# Patient Record
Sex: MALE | Race: WHITE | Employment: UNEMPLOYED | URBAN - METROPOLITAN AREA
[De-identification: names, ages, dates, MRNs, and addresses within clinical notes are randomized per-mention and may not be internally consistent; named-entity substitution may affect disease eponyms.]

---

## 2019-01-21 PROBLEM — R45.851 SUICIDAL IDEATION: Status: ACTIVE | Noted: 2019-01-21

## 2019-01-22 PROBLEM — F32.A DEPRESSION: Status: ACTIVE | Noted: 2019-01-22

## 2019-03-19 PROBLEM — R45.89 SUICIDAL BEHAVIOR WITHOUT ATTEMPTED SELF-INJURY: Status: ACTIVE | Noted: 2019-03-19

## 2019-03-22 PROBLEM — R45.851 SUICIDAL IDEATION: Status: RESOLVED | Noted: 2019-01-21 | Resolved: 2019-03-22

## 2019-10-31 ENCOUNTER — APPOINTMENT (OUTPATIENT)
Dept: GENERAL RADIOLOGY | Age: 24
End: 2019-10-31
Payer: MEDICAID

## 2019-10-31 ENCOUNTER — HOSPITAL ENCOUNTER (EMERGENCY)
Age: 24
Discharge: HOME | End: 2019-10-31
Attending: EMERGENCY MEDICINE
Payer: MEDICAID

## 2019-10-31 VITALS
SYSTOLIC BLOOD PRESSURE: 100 MMHG | RESPIRATION RATE: 20 BRPM | DIASTOLIC BLOOD PRESSURE: 61 MMHG | HEART RATE: 52 BPM | HEIGHT: 74 IN | WEIGHT: 250 LBS | OXYGEN SATURATION: 100 % | TEMPERATURE: 98.2 F | BODY MASS INDEX: 32.08 KG/M2

## 2019-10-31 DIAGNOSIS — F19.10 MULTIPLE SUBSTANCE ABUSE (HCC): Primary | ICD-10-CM

## 2019-10-31 LAB
ALBUMIN SERPL-MCNC: 4.2 G/DL (ref 3.5–4.6)
ALP BLD-CCNC: 81 U/L (ref 35–104)
ALT SERPL-CCNC: 74 U/L (ref 0–41)
AMPHETAMINE SCREEN, URINE: POSITIVE
ANION GAP SERPL CALCULATED.3IONS-SCNC: 13 MEQ/L (ref 9–15)
AST SERPL-CCNC: 67 U/L (ref 0–40)
BACTERIA: NORMAL /HPF
BARBITURATE SCREEN URINE: ABNORMAL
BASOPHILS ABSOLUTE: 0 K/UL (ref 0–0.2)
BASOPHILS RELATIVE PERCENT: 0.2 %
BENZODIAZEPINE SCREEN, URINE: ABNORMAL
BILIRUB SERPL-MCNC: 0.5 MG/DL (ref 0.2–0.7)
BILIRUBIN URINE: ABNORMAL
BLOOD, URINE: ABNORMAL
BUN BLDV-MCNC: 7 MG/DL (ref 6–20)
CALCIUM SERPL-MCNC: 9.4 MG/DL (ref 8.5–9.9)
CANNABINOID SCREEN URINE: POSITIVE
CHLORIDE BLD-SCNC: 104 MEQ/L (ref 95–107)
CLARITY: CLEAR
CO2: 25 MEQ/L (ref 20–31)
COCAINE METABOLITE SCREEN URINE: ABNORMAL
COLOR: YELLOW
CREAT SERPL-MCNC: 0.58 MG/DL (ref 0.7–1.2)
EKG ATRIAL RATE: 57 BPM
EKG P AXIS: 43 DEGREES
EKG P-R INTERVAL: 138 MS
EKG Q-T INTERVAL: 400 MS
EKG QRS DURATION: 94 MS
EKG QTC CALCULATION (BAZETT): 389 MS
EKG R AXIS: 22 DEGREES
EKG T AXIS: 12 DEGREES
EKG VENTRICULAR RATE: 57 BPM
EOSINOPHILS ABSOLUTE: 0.2 K/UL (ref 0–0.7)
EOSINOPHILS RELATIVE PERCENT: 2 %
EPITHELIAL CELLS, UA: NORMAL /HPF
GFR AFRICAN AMERICAN: >60
GFR NON-AFRICAN AMERICAN: >60
GLOBULIN: 2.9 G/DL (ref 2.3–3.5)
GLUCOSE BLD-MCNC: 92 MG/DL (ref 70–99)
GLUCOSE URINE: NEGATIVE MG/DL
HCT VFR BLD CALC: 44 % (ref 42–52)
HEMOGLOBIN: 14.6 G/DL (ref 14–18)
KETONES, URINE: ABNORMAL MG/DL
LEUKOCYTE ESTERASE, URINE: ABNORMAL
LYMPHOCYTES ABSOLUTE: 1.9 K/UL (ref 1–4.8)
LYMPHOCYTES RELATIVE PERCENT: 23.6 %
Lab: ABNORMAL
MAGNESIUM: 1.8 MG/DL (ref 1.7–2.4)
MCH RBC QN AUTO: 32.3 PG (ref 27–31.3)
MCHC RBC AUTO-ENTMCNC: 33.1 % (ref 33–37)
MCV RBC AUTO: 97.7 FL (ref 80–100)
MONOCYTES ABSOLUTE: 0.7 K/UL (ref 0.2–0.8)
MONOCYTES RELATIVE PERCENT: 8.2 %
MUCUS: PRESENT
NEUTROPHILS ABSOLUTE: 5.4 K/UL (ref 1.4–6.5)
NEUTROPHILS RELATIVE PERCENT: 66 %
NITRITE, URINE: NEGATIVE
OPIATE SCREEN URINE: ABNORMAL
PDW BLD-RTO: 14.4 % (ref 11.5–14.5)
PH UA: 7 (ref 5–9)
PHENCYCLIDINE SCREEN URINE: ABNORMAL
PLATELET # BLD: 266 K/UL (ref 130–400)
POTASSIUM SERPL-SCNC: 4 MEQ/L (ref 3.4–4.9)
PROTEIN UA: ABNORMAL MG/DL
RBC # BLD: 4.51 M/UL (ref 4.7–6.1)
RBC UA: NORMAL /HPF (ref 0–2)
SODIUM BLD-SCNC: 142 MEQ/L (ref 135–144)
SPECIFIC GRAVITY UA: 1.01 (ref 1–1.03)
TOTAL PROTEIN: 7.1 G/DL (ref 6.3–8)
TRICYCLIC, URINE: ABNORMAL
URINE REFLEX TO CULTURE: YES
UROBILINOGEN, URINE: >=8 E.U./DL
WBC # BLD: 8.2 K/UL (ref 4.8–10.8)
WBC UA: NORMAL /HPF (ref 0–5)

## 2019-10-31 PROCEDURE — 6360000002 HC RX W HCPCS: Performed by: EMERGENCY MEDICINE

## 2019-10-31 PROCEDURE — 93005 ELECTROCARDIOGRAM TRACING: CPT

## 2019-10-31 PROCEDURE — 87086 URINE CULTURE/COLONY COUNT: CPT

## 2019-10-31 PROCEDURE — 83735 ASSAY OF MAGNESIUM: CPT

## 2019-10-31 PROCEDURE — 96361 HYDRATE IV INFUSION ADD-ON: CPT

## 2019-10-31 PROCEDURE — 96374 THER/PROPH/DIAG INJ IV PUSH: CPT

## 2019-10-31 PROCEDURE — 80053 COMPREHEN METABOLIC PANEL: CPT

## 2019-10-31 PROCEDURE — 96375 TX/PRO/DX INJ NEW DRUG ADDON: CPT

## 2019-10-31 PROCEDURE — 85025 COMPLETE CBC W/AUTO DIFF WBC: CPT

## 2019-10-31 PROCEDURE — 74022 RADEX COMPL AQT ABD SERIES: CPT

## 2019-10-31 PROCEDURE — 2580000003 HC RX 258: Performed by: EMERGENCY MEDICINE

## 2019-10-31 PROCEDURE — 99284 EMERGENCY DEPT VISIT MOD MDM: CPT

## 2019-10-31 PROCEDURE — 80306 DRUG TEST PRSMV INSTRMNT: CPT

## 2019-10-31 PROCEDURE — 81001 URINALYSIS AUTO W/SCOPE: CPT

## 2019-10-31 PROCEDURE — 36415 COLL VENOUS BLD VENIPUNCTURE: CPT

## 2019-10-31 RX ORDER — KETOROLAC TROMETHAMINE 10 MG/1
10 TABLET, FILM COATED ORAL EVERY 6 HOURS PRN
Qty: 20 TABLET | Refills: 0 | Status: ON HOLD | OUTPATIENT
Start: 2019-10-31 | End: 2020-10-13

## 2019-10-31 RX ORDER — KETOROLAC TROMETHAMINE 30 MG/ML
30 INJECTION, SOLUTION INTRAMUSCULAR; INTRAVENOUS ONCE
Status: COMPLETED | OUTPATIENT
Start: 2019-10-31 | End: 2019-10-31

## 2019-10-31 RX ORDER — BENZONATATE 100 MG/1
100 CAPSULE ORAL 3 TIMES DAILY PRN
Qty: 20 CAPSULE | Refills: 0 | Status: ON HOLD | OUTPATIENT
Start: 2019-10-31 | End: 2020-10-13

## 2019-10-31 RX ORDER — ONDANSETRON 2 MG/ML
4 INJECTION INTRAMUSCULAR; INTRAVENOUS ONCE
Status: COMPLETED | OUTPATIENT
Start: 2019-10-31 | End: 2019-10-31

## 2019-10-31 RX ORDER — 0.9 % SODIUM CHLORIDE 0.9 %
1000 INTRAVENOUS SOLUTION INTRAVENOUS ONCE
Status: COMPLETED | OUTPATIENT
Start: 2019-10-31 | End: 2019-10-31

## 2019-10-31 RX ORDER — ONDANSETRON 4 MG/1
4 TABLET, FILM COATED ORAL EVERY 8 HOURS PRN
Qty: 20 TABLET | Refills: 0 | Status: ON HOLD | OUTPATIENT
Start: 2019-10-31 | End: 2020-10-13

## 2019-10-31 RX ADMIN — SODIUM CHLORIDE 1000 ML: 9 INJECTION, SOLUTION INTRAVENOUS at 16:38

## 2019-10-31 RX ADMIN — ONDANSETRON 4 MG: 2 INJECTION INTRAMUSCULAR; INTRAVENOUS at 16:38

## 2019-10-31 RX ADMIN — KETOROLAC TROMETHAMINE 30 MG: 30 INJECTION, SOLUTION INTRAMUSCULAR at 16:38

## 2019-10-31 ASSESSMENT — PAIN SCALES - GENERAL: PAINLEVEL_OUTOF10: 2

## 2019-10-31 ASSESSMENT — PAIN DESCRIPTION - DESCRIPTORS: DESCRIPTORS: CRAMPING

## 2019-10-31 ASSESSMENT — PAIN DESCRIPTION - LOCATION: LOCATION: LEG

## 2019-10-31 ASSESSMENT — PAIN DESCRIPTION - FREQUENCY: FREQUENCY: INTERMITTENT

## 2019-10-31 ASSESSMENT — PAIN DESCRIPTION - PAIN TYPE: TYPE: ACUTE PAIN

## 2019-10-31 ASSESSMENT — PAIN DESCRIPTION - ORIENTATION: ORIENTATION: RIGHT

## 2019-11-01 PROCEDURE — 93010 ELECTROCARDIOGRAM REPORT: CPT | Performed by: INTERNAL MEDICINE

## 2019-11-02 LAB — URINE CULTURE, ROUTINE: NORMAL

## 2019-11-03 ENCOUNTER — HOSPITAL ENCOUNTER (EMERGENCY)
Age: 24
Discharge: HOME | End: 2019-11-03
Attending: EMERGENCY MEDICINE
Payer: MEDICAID

## 2019-11-03 VITALS
WEIGHT: 275 LBS | DIASTOLIC BLOOD PRESSURE: 83 MMHG | SYSTOLIC BLOOD PRESSURE: 121 MMHG | TEMPERATURE: 98.3 F | OXYGEN SATURATION: 97 % | HEIGHT: 75 IN | RESPIRATION RATE: 18 BRPM | HEART RATE: 82 BPM | BODY MASS INDEX: 34.19 KG/M2

## 2019-11-03 DIAGNOSIS — F19.10 DRUG ABUSE (HCC): Primary | ICD-10-CM

## 2019-11-03 LAB
ALBUMIN SERPL-MCNC: 4.2 G/DL (ref 3.5–4.6)
ALP BLD-CCNC: 87 U/L (ref 35–104)
ALT SERPL-CCNC: 148 U/L (ref 0–41)
AMPHETAMINE SCREEN, URINE: ABNORMAL
ANION GAP SERPL CALCULATED.3IONS-SCNC: 14 MEQ/L (ref 9–15)
AST SERPL-CCNC: 123 U/L (ref 0–40)
BACTERIA: NEGATIVE /HPF
BARBITURATE SCREEN URINE: ABNORMAL
BENZODIAZEPINE SCREEN, URINE: ABNORMAL
BILIRUB SERPL-MCNC: 0.4 MG/DL (ref 0.2–0.7)
BILIRUBIN URINE: NEGATIVE
BLOOD, URINE: NEGATIVE
BUN BLDV-MCNC: 8 MG/DL (ref 6–20)
CALCIUM SERPL-MCNC: 9.6 MG/DL (ref 8.5–9.9)
CANNABINOID SCREEN URINE: POSITIVE
CHLORIDE BLD-SCNC: 102 MEQ/L (ref 95–107)
CLARITY: CLEAR
CO2: 24 MEQ/L (ref 20–31)
COCAINE METABOLITE SCREEN URINE: ABNORMAL
COLOR: YELLOW
CREAT SERPL-MCNC: 0.46 MG/DL (ref 0.7–1.2)
EPITHELIAL CELLS, UA: ABNORMAL /HPF (ref 0–5)
ETHANOL PERCENT: NORMAL G/DL
ETHANOL: <10 MG/DL (ref 0–0.08)
GFR AFRICAN AMERICAN: >60
GFR NON-AFRICAN AMERICAN: >60
GLOBULIN: 3.1 G/DL (ref 2.3–3.5)
GLUCOSE BLD-MCNC: 119 MG/DL (ref 70–99)
GLUCOSE URINE: NEGATIVE MG/DL
HCT VFR BLD CALC: 44.2 % (ref 42–52)
HEMOGLOBIN: 14.8 G/DL (ref 14–18)
HYALINE CASTS: ABNORMAL /HPF (ref 0–5)
KETONES, URINE: NEGATIVE MG/DL
LEUKOCYTE ESTERASE, URINE: ABNORMAL
Lab: ABNORMAL
MAGNESIUM: 1.9 MG/DL (ref 1.7–2.4)
MCH RBC QN AUTO: 32.5 PG (ref 27–31.3)
MCHC RBC AUTO-ENTMCNC: 33.6 % (ref 33–37)
MCV RBC AUTO: 96.8 FL (ref 80–100)
METHADONE SCREEN, URINE: ABNORMAL
NITRITE, URINE: NEGATIVE
OPIATE SCREEN URINE: ABNORMAL
OXYCODONE URINE: ABNORMAL
PDW BLD-RTO: 14.1 % (ref 11.5–14.5)
PH UA: 6.5 (ref 5–9)
PHENCYCLIDINE SCREEN URINE: ABNORMAL
PLATELET # BLD: 244 K/UL (ref 130–400)
POTASSIUM SERPL-SCNC: 4.1 MEQ/L (ref 3.4–4.9)
PROPOXYPHENE SCREEN: ABNORMAL
PROTEIN UA: NEGATIVE MG/DL
RBC # BLD: 4.56 M/UL (ref 4.7–6.1)
RBC UA: ABNORMAL /HPF (ref 0–5)
SODIUM BLD-SCNC: 140 MEQ/L (ref 135–144)
SPECIFIC GRAVITY UA: 1.02 (ref 1–1.03)
TOTAL CK: 36 U/L (ref 0–190)
TOTAL PROTEIN: 7.3 G/DL (ref 6.3–8)
TSH SERPL DL<=0.05 MIU/L-ACNC: 0.66 UIU/ML (ref 0.44–3.86)
URINE REFLEX TO CULTURE: YES
UROBILINOGEN, URINE: 1 E.U./DL
WBC # BLD: 9.2 K/UL (ref 4.8–10.8)
WBC UA: ABNORMAL /HPF (ref 0–5)

## 2019-11-03 PROCEDURE — 84443 ASSAY THYROID STIM HORMONE: CPT

## 2019-11-03 PROCEDURE — 80053 COMPREHEN METABOLIC PANEL: CPT

## 2019-11-03 PROCEDURE — 36415 COLL VENOUS BLD VENIPUNCTURE: CPT

## 2019-11-03 PROCEDURE — 85027 COMPLETE CBC AUTOMATED: CPT

## 2019-11-03 PROCEDURE — 87086 URINE CULTURE/COLONY COUNT: CPT

## 2019-11-03 PROCEDURE — 80307 DRUG TEST PRSMV CHEM ANLYZR: CPT

## 2019-11-03 PROCEDURE — 82550 ASSAY OF CK (CPK): CPT

## 2019-11-03 PROCEDURE — 6370000000 HC RX 637 (ALT 250 FOR IP): Performed by: EMERGENCY MEDICINE

## 2019-11-03 PROCEDURE — 99282 EMERGENCY DEPT VISIT SF MDM: CPT

## 2019-11-03 PROCEDURE — 83735 ASSAY OF MAGNESIUM: CPT

## 2019-11-03 PROCEDURE — 81001 URINALYSIS AUTO W/SCOPE: CPT

## 2019-11-03 PROCEDURE — G0480 DRUG TEST DEF 1-7 CLASSES: HCPCS

## 2019-11-03 RX ORDER — ACETAMINOPHEN 500 MG
1000 TABLET ORAL ONCE
Status: COMPLETED | OUTPATIENT
Start: 2019-11-03 | End: 2019-11-03

## 2019-11-03 RX ORDER — LORAZEPAM 1 MG/1
2 TABLET ORAL ONCE
Status: COMPLETED | OUTPATIENT
Start: 2019-11-03 | End: 2019-11-03

## 2019-11-03 RX ORDER — NICOTINE 21 MG/24HR
1 PATCH, TRANSDERMAL 24 HOURS TRANSDERMAL ONCE
Status: DISCONTINUED | OUTPATIENT
Start: 2019-11-03 | End: 2019-11-03 | Stop reason: HOSPADM

## 2019-11-03 RX ORDER — LORAZEPAM 1 MG/1
1 TABLET ORAL ONCE
Status: DISCONTINUED | OUTPATIENT
Start: 2019-11-03 | End: 2019-11-03

## 2019-11-03 RX ORDER — ONDANSETRON 4 MG/1
4 TABLET, ORALLY DISINTEGRATING ORAL ONCE
Status: COMPLETED | OUTPATIENT
Start: 2019-11-03 | End: 2019-11-03

## 2019-11-03 RX ADMIN — ONDANSETRON 4 MG: 4 TABLET, ORALLY DISINTEGRATING ORAL at 16:51

## 2019-11-03 RX ADMIN — ACETAMINOPHEN 1000 MG: 500 TABLET ORAL at 16:51

## 2019-11-03 RX ADMIN — LORAZEPAM 2 MG: 1 TABLET ORAL at 19:13

## 2019-11-03 ASSESSMENT — PAIN DESCRIPTION - PAIN TYPE: TYPE: ACUTE PAIN

## 2019-11-03 ASSESSMENT — ENCOUNTER SYMPTOMS
RHINORRHEA: 0
ABDOMINAL PAIN: 0
SHORTNESS OF BREATH: 0
EYE DISCHARGE: 0
VOMITING: 0
FACIAL SWELLING: 0
COLOR CHANGE: 0
NAUSEA: 1

## 2019-11-03 ASSESSMENT — PAIN DESCRIPTION - LOCATION: LOCATION: HEAD

## 2019-11-03 ASSESSMENT — PAIN SCALES - GENERAL
PAINLEVEL_OUTOF10: 7
PAINLEVEL_OUTOF10: 7

## 2019-11-05 LAB — URINE CULTURE, ROUTINE: NORMAL

## 2019-12-15 ASSESSMENT — ENCOUNTER SYMPTOMS
EYE PAIN: 0
SHORTNESS OF BREATH: 0
CONSTIPATION: 1
BACK PAIN: 0
APNEA: 0
COLOR CHANGE: 0
ABDOMINAL PAIN: 0
VOMITING: 0
NAUSEA: 1
COUGH: 0
SORE THROAT: 0
SINUS PRESSURE: 0
WHEEZING: 0
PHOTOPHOBIA: 0
ABDOMINAL DISTENTION: 0
RHINORRHEA: 0
DIARRHEA: 0

## 2020-03-12 ENCOUNTER — HOSPITAL ENCOUNTER (EMERGENCY)
Age: 25
Discharge: HOME | End: 2020-03-12
Attending: EMERGENCY MEDICINE
Payer: MEDICAID

## 2020-03-12 VITALS
SYSTOLIC BLOOD PRESSURE: 125 MMHG | HEIGHT: 75 IN | WEIGHT: 275 LBS | OXYGEN SATURATION: 97 % | TEMPERATURE: 97.9 F | BODY MASS INDEX: 34.19 KG/M2 | RESPIRATION RATE: 14 BRPM | DIASTOLIC BLOOD PRESSURE: 75 MMHG | HEART RATE: 100 BPM

## 2020-03-12 LAB
ACETAMINOPHEN LEVEL: <5 MCG/ML (ref 10–30)
ALBUMIN SERPL-MCNC: 4.3 G/DL (ref 3.5–5.2)
ALP BLD-CCNC: 68 U/L (ref 40–129)
ALT SERPL-CCNC: 40 U/L (ref 0–40)
AMPHETAMINE SCREEN, URINE: NOT DETECTED
ANION GAP SERPL CALCULATED.3IONS-SCNC: 10 MMOL/L (ref 7–16)
AST SERPL-CCNC: 47 U/L (ref 0–39)
BARBITURATE SCREEN URINE: NOT DETECTED
BASOPHILS ABSOLUTE: 0.03 E9/L (ref 0–0.2)
BASOPHILS RELATIVE PERCENT: 0.5 % (ref 0–2)
BENZODIAZEPINE SCREEN, URINE: NOT DETECTED
BILIRUB SERPL-MCNC: 0.6 MG/DL (ref 0–1.2)
BUN BLDV-MCNC: 11 MG/DL (ref 6–20)
CALCIUM SERPL-MCNC: 9.2 MG/DL (ref 8.6–10.2)
CANNABINOID SCREEN URINE: NOT DETECTED
CHLORIDE BLD-SCNC: 107 MMOL/L (ref 98–107)
CO2: 25 MMOL/L (ref 22–29)
COCAINE METABOLITE SCREEN URINE: NOT DETECTED
CREAT SERPL-MCNC: 0.7 MG/DL (ref 0.7–1.2)
EOSINOPHILS ABSOLUTE: 0.1 E9/L (ref 0.05–0.5)
EOSINOPHILS RELATIVE PERCENT: 1.6 % (ref 0–6)
ETHANOL: <10 MG/DL (ref 0–0.08)
FENTANYL SCREEN, URINE: NOT DETECTED
GFR AFRICAN AMERICAN: >60
GFR NON-AFRICAN AMERICAN: >60 ML/MIN/1.73
GLUCOSE BLD-MCNC: 107 MG/DL (ref 74–99)
HCT VFR BLD CALC: 39.2 % (ref 37–54)
HEMOGLOBIN: 13.4 G/DL (ref 12.5–16.5)
IMMATURE GRANULOCYTES #: 0.01 E9/L
IMMATURE GRANULOCYTES %: 0.2 % (ref 0–5)
LYMPHOCYTES ABSOLUTE: 2.28 E9/L (ref 1.5–4)
LYMPHOCYTES RELATIVE PERCENT: 37.5 % (ref 20–42)
Lab: NORMAL
MCH RBC QN AUTO: 31.5 PG (ref 26–35)
MCHC RBC AUTO-ENTMCNC: 34.2 % (ref 32–34.5)
MCV RBC AUTO: 92.2 FL (ref 80–99.9)
METHADONE SCREEN, URINE: NOT DETECTED
MONOCYTES ABSOLUTE: 0.54 E9/L (ref 0.1–0.95)
MONOCYTES RELATIVE PERCENT: 8.9 % (ref 2–12)
NEUTROPHILS ABSOLUTE: 3.12 E9/L (ref 1.8–7.3)
NEUTROPHILS RELATIVE PERCENT: 51.3 % (ref 43–80)
OPIATE SCREEN URINE: NOT DETECTED
OXYCODONE URINE: NOT DETECTED
PDW BLD-RTO: 13.2 FL (ref 11.5–15)
PHENCYCLIDINE SCREEN URINE: NOT DETECTED
PLATELET # BLD: 206 E9/L (ref 130–450)
PMV BLD AUTO: 9.4 FL (ref 7–12)
POTASSIUM SERPL-SCNC: 3.9 MMOL/L (ref 3.5–5)
RBC # BLD: 4.25 E12/L (ref 3.8–5.8)
SALICYLATE, SERUM: <0.3 MG/DL (ref 0–30)
SODIUM BLD-SCNC: 142 MMOL/L (ref 132–146)
TOTAL PROTEIN: 7.1 G/DL (ref 6.4–8.3)
TRICYCLIC ANTIDEPRESSANTS SCREEN SERUM: NEGATIVE NG/ML
WBC # BLD: 6.1 E9/L (ref 4.5–11.5)

## 2020-03-12 PROCEDURE — 85025 COMPLETE CBC W/AUTO DIFF WBC: CPT

## 2020-03-12 PROCEDURE — 80053 COMPREHEN METABOLIC PANEL: CPT

## 2020-03-12 PROCEDURE — 6360000002 HC RX W HCPCS: Performed by: EMERGENCY MEDICINE

## 2020-03-12 PROCEDURE — 96372 THER/PROPH/DIAG INJ SC/IM: CPT

## 2020-03-12 PROCEDURE — G0480 DRUG TEST DEF 1-7 CLASSES: HCPCS

## 2020-03-12 PROCEDURE — 80307 DRUG TEST PRSMV CHEM ANLYZR: CPT

## 2020-03-12 PROCEDURE — 99284 EMERGENCY DEPT VISIT MOD MDM: CPT

## 2020-03-12 RX ORDER — HALOPERIDOL 5 MG/ML
5 INJECTION INTRAMUSCULAR ONCE
Status: COMPLETED | OUTPATIENT
Start: 2020-03-12 | End: 2020-03-12

## 2020-03-12 RX ADMIN — HALOPERIDOL LACTATE 5 MG: 5 INJECTION INTRAMUSCULAR at 16:41

## 2020-03-12 NOTE — ED PROVIDER NOTES
HPI:  3/12/20, Time: 4:59 PM EDT         Jazmyn Castañeda is a 30 y.o. male presenting to the ED for psychiatric evaluation. Patient's been clean from meth about 2 months. He denies suicidal or homicidal ideation. He just has having auditory hallucinations. Hears people calling his name. He says was severe when he first got clean, it is improved much since. Denies any nausea, vomit, neck pain or stiffness, chest pain, or any other symptoms or complaints. Review of Systems:   Pertinent positives and negatives are stated within HPI, all other systems reviewed and are negative.          --------------------------------------------- PAST HISTORY ---------------------------------------------  Past Medical History:  has a past medical history of Anxiety, Bipolar 1 disorder ([facility] H/O alcohol abuse, and Hep C w/o coma, chronic ([facility] Past Surgical History:  has no past surgical history on file. Social History:  reports that he has quit smoking. His smoking use included cigarettes. He has a 7.00 pack-year smoking history. He has quit using smokeless tobacco.  His smokeless tobacco use included snuff. He reports previous alcohol use. He reports previous drug use. Drugs: Methamphetamines, Opiates , and Other-see comments. Family History: family history is not on file. The patients home medications have been reviewed. Allergies: Patient has no known allergies.     -------------------------------------------------- RESULTS -------------------------------------------------  All laboratory and radiology results have been personally reviewed by myself   LABS:  Results for orders placed or performed during the hospital encounter of 03/12/20   Comprehensive Metabolic Panel   Result Value Ref Range    Sodium 142 132 - 146 mmol/L    Potassium 3.9 3.5 - 5.0 mmol/L    Chloride 107 98 - 107 mmol/L    CO2 25 22 - 29 mmol/L    Anion Gap 10 7 - 16 mmol/L    Glucose 107 (H) 74 - 99 mg/dL    BUN 11 6 - 20 mg/dL    CREATININE 0.7 0.7 - 1.2 mg/dL    GFR Non-African American >60 >=60 mL/min/1.73    GFR African American >60     Calcium 9.2 8.6 - 10.2 mg/dL    Total Protein 7.1 6.4 - 8.3 g/dL    Alb 4.3 3.5 - 5.2 g/dL    Total Bilirubin 0.6 0.0 - 1.2 mg/dL    Alkaline Phosphatase 68 40 - 129 U/L    ALT 40 0 - 40 U/L    AST 47 (H) 0 - 39 U/L   CBC Auto Differential   Result Value Ref Range    WBC 6.1 4.5 - 11.5 E9/L    RBC 4.25 3.80 - 5.80 E12/L    Hemoglobin 13.4 12.5 - 16.5 g/dL    Hematocrit 39.2 37.0 - 54.0 %    MCV 92.2 80.0 - 99.9 fL    MCH 31.5 26.0 - 35.0 pg    MCHC 34.2 32.0 - 34.5 %    RDW 13.2 11.5 - 15.0 fL    Platelets 463 302 - 525 E9/L    MPV 9.4 7.0 - 12.0 fL    Neutrophils % 51.3 43.0 - 80.0 %    Immature Granulocytes % 0.2 0.0 - 5.0 %    Lymphocytes % 37.5 20.0 - 42.0 %    Monocytes % 8.9 2.0 - 12.0 %    Eosinophils % 1.6 0.0 - 6.0 %    Basophils % 0.5 0.0 - 2.0 %    Neutrophils Absolute 3.12 1.80 - 7.30 E9/L    Immature Granulocytes # 0.01 E9/L    Lymphocytes Absolute 2.28 1.50 - 4.00 E9/L    Monocytes Absolute 0.54 0.10 - 0.95 E9/L    Eosinophils Absolute 0.10 0.05 - 0.50 E9/L    Basophils Absolute 0.03 0.00 - 0.20 E9/L   Serum Drug Screen   Result Value Ref Range    Ethanol Lvl <10 mg/dL    Acetaminophen Level <5.0 (L) 10.0 - 54.4 mcg/mL    Salicylate, Serum <0.1 0.0 - 30.0 mg/dL    TCA Scrn NEGATIVE Cutoff:300 ng/mL   Urine Drug Screen   Result Value Ref Range    Amphetamine Screen, Urine NOT DETECTED Negative <1000 ng/mL    Barbiturate Screen, Ur NOT DETECTED Negative < 200 ng/mL    Benzodiazepine Screen, Urine NOT DETECTED Negative < 200 ng/mL    Cannabinoid Scrn, Ur NOT DETECTED Negative < 50ng/mL    Cocaine Metabolite Screen, Urine NOT DETECTED Negative < 300 ng/mL    Opiate Scrn, Ur NOT DETECTED Negative < 300ng/mL    PCP Screen, Urine NOT DETECTED Negative < 25 ng/mL    Methadone Screen, Urine NOT DETECTED Negative <300 ng/mL    Oxycodone Urine NOT DETECTED Negative <100 DISPOSITION  Disposition: Pending  Patient condition is stable      NOTE: This report was transcribed using voice recognition software.  Every effort was made to ensure accuracy; however, inadvertent computerized transcription errors may be present.starla Patel MD  03/15/20 0140

## 2020-03-12 NOTE — ED NOTES
Haldol 5mg IM for for c/o paranoia. Pt reports 61 days sobriety from meth. Resides in a [address] [place] [address] on [address]. Pt denies SI or HI. Reports intermittent auditory auditory hallucinations, non commanding. Reports he has a mental health appointment on Monday.       Celestine Severino RN  03/12/20 1700

## 2020-06-24 ENCOUNTER — HOSPITAL ENCOUNTER (EMERGENCY)
Age: 25
Discharge: HOME | End: 2020-06-24
Payer: MEDICAID

## 2020-06-24 VITALS
TEMPERATURE: 98.1 F | DIASTOLIC BLOOD PRESSURE: 69 MMHG | SYSTOLIC BLOOD PRESSURE: 129 MMHG | HEART RATE: 74 BPM | OXYGEN SATURATION: 99 % | RESPIRATION RATE: 17 BRPM

## 2020-06-24 PROCEDURE — 96372 THER/PROPH/DIAG INJ SC/IM: CPT

## 2020-06-24 PROCEDURE — 6360000002 HC RX W HCPCS: Performed by: PHYSICIAN ASSISTANT

## 2020-06-24 PROCEDURE — 99282 EMERGENCY DEPT VISIT SF MDM: CPT

## 2020-06-24 PROCEDURE — 6370000000 HC RX 637 (ALT 250 FOR IP): Performed by: PHYSICIAN ASSISTANT

## 2020-06-24 RX ORDER — NAPROXEN 500 MG/1
500 TABLET ORAL 2 TIMES DAILY WITH MEALS
Qty: 20 TABLET | Refills: 0 | Status: ON HOLD | OUTPATIENT
Start: 2020-06-24 | End: 2020-10-13

## 2020-06-24 RX ORDER — KETOROLAC TROMETHAMINE 30 MG/ML
30 INJECTION, SOLUTION INTRAMUSCULAR; INTRAVENOUS ONCE
Status: COMPLETED | OUTPATIENT
Start: 2020-06-24 | End: 2020-06-24

## 2020-06-24 RX ORDER — PENICILLIN V POTASSIUM 500 MG/1
500 TABLET ORAL 3 TIMES DAILY
Qty: 30 TABLET | Refills: 0 | Status: SHIPPED | OUTPATIENT
Start: 2020-06-24 | End: 2020-07-04

## 2020-06-24 RX ORDER — PENICILLIN V POTASSIUM 250 MG/1
500 TABLET ORAL ONCE
Status: COMPLETED | OUTPATIENT
Start: 2020-06-24 | End: 2020-06-24

## 2020-06-24 RX ORDER — PENICILLIN V POTASSIUM 500 MG/1
500 TABLET ORAL 3 TIMES DAILY
Qty: 30 TABLET | Refills: 0 | Status: SHIPPED | OUTPATIENT
Start: 2020-06-24 | End: 2020-06-24 | Stop reason: SDUPTHER

## 2020-06-24 RX ORDER — NAPROXEN 500 MG/1
500 TABLET ORAL 2 TIMES DAILY WITH MEALS
Qty: 20 TABLET | Refills: 0 | Status: SHIPPED | OUTPATIENT
Start: 2020-06-24 | End: 2020-06-24 | Stop reason: SDUPTHER

## 2020-06-24 RX ADMIN — PENICILLIN V POTASIUM 500 MG: 250 TABLET ORAL at 11:25

## 2020-06-24 RX ADMIN — KETOROLAC TROMETHAMINE 30 MG: 30 INJECTION, SOLUTION INTRAMUSCULAR at 11:25

## 2020-06-24 ASSESSMENT — PAIN SCALES - GENERAL
PAINLEVEL_OUTOF10: 5
PAINLEVEL_OUTOF10: 5

## 2020-06-24 NOTE — ED PROVIDER NOTES
Independent MLP    HPI:  6/24/20, Time: 10:45 AM EDT         Valeriy Ceballos is a 30 y.o. male presenting to the ED for dental pain beginning 2 weeks ago. The complaint has been persistent, moderate in severity, and worsened by nothing. 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 States that the pain is located to the right lower jaw. Denies any intraoral swelling, elevation of the tongue, difficulty breathing or swallowing. States that he has not seen a dentist in approximately 4 years. Reports that he does have significant dental decay due to his methamphetamine abuse. Patient also reports an intermittent sore throat starting this morning. Afebrile at home without recent travel or sick contacts. Patient denies all other symptoms at this time. Review of Systems:   Pertinent positives and negatives are stated within HPI, all other systems reviewed and are negative.          --------------------------------------------- PAST HISTORY ---------------------------------------------  Past Medical History:  has a past medical history of Anxiety, Bipolar 1 disorder ([facility] Drug abuse in remission ([facility] H/O alcohol abuse, and Hep C w/o coma, chronic ([facility] Past Surgical History:  has no past surgical history on file. Social History:  reports that he has quit smoking. His smoking use included cigarettes. He has a 7.00 pack-year smoking history. He has quit using smokeless tobacco.  His smokeless tobacco use included snuff. He reports previous alcohol use. He reports previous drug use. Drugs: Methamphetamines, Opiates , and Other-see comments. Family History: family history is not on file. The patients home medications have been reviewed. Allergies: Patient has no known allergies.     -------------------------------------------------- RESULTS No airway problems at this time. Recommended close follow-up with PCP and dentist for further evaluation treatment. Will treat with antibiotics and pain medication. Patient specifically requested no narcotics due to being in recovery. We will also give patient a prescription for miracle mouthwash for sore throat. Advised return emergency department any new worsening symptoms. Patient voiced understanding is agreeable above treatment plan. Counseling: The emergency provider has spoken with the patient and discussed todays results, in addition to providing specific details for the plan of care and counseling regarding the diagnosis and prognosis. Questions are answered at this time and they are agreeable with the plan.      --------------------------------- IMPRESSION AND DISPOSITION ---------------------------------    IMPRESSION  1. Pain due to dental caries    2. Acute pharyngitis, unspecified etiology        DISPOSITION  Disposition: Discharge to home  Patient condition is stable      NOTE: This report was transcribed using voice recognition software.  Every effort was made to ensure accuracy; however, inadvertent computerized transcription errors may be present        Pepe Almaguer, [id number]  06/24/20 6824

## 2020-07-01 ENCOUNTER — HOSPITAL ENCOUNTER (EMERGENCY)
Age: 25
Discharge: LEFT BEFORE DISCHARGE | End: 2020-07-01
Attending: EMERGENCY MEDICINE
Payer: MEDICAID

## 2020-07-01 VITALS — OXYGEN SATURATION: 95 % | TEMPERATURE: 97.8 F | HEART RATE: 98 BPM

## 2020-07-01 NOTE — ED NOTES
Pt stated,he was just going to make it through to his appointment in the morning     Sindi Houston  07/01/20 0057

## 2020-10-11 PROBLEM — F11.10 OPIATE ABUSE, CONTINUOUS (HCC): Status: ACTIVE | Noted: 2020-10-11

## 2020-10-11 PROBLEM — F11.20 OPIOID TYPE DEPENDENCE, CONTINUOUS (HCC): Status: ACTIVE | Noted: 2020-10-11

## 2020-10-13 PROBLEM — L02.413 ABSCESS OF RIGHT ARM: Status: ACTIVE | Noted: 2020-10-13

## 2020-10-13 PROBLEM — F19.90 IVDU (INTRAVENOUS DRUG USER): Status: ACTIVE | Noted: 2020-10-13

## 2020-12-12 ENCOUNTER — HOSPITAL ENCOUNTER (INPATIENT)
Age: 25
LOS: 3 days | Discharge: HOME | DRG: 750 | End: 2020-12-15
Attending: EMERGENCY MEDICINE | Admitting: PSYCHIATRY & NEUROLOGY
Payer: MEDICAID

## 2020-12-12 PROBLEM — F32.A ACUTE DEPRESSION: Status: ACTIVE | Noted: 2020-12-12

## 2020-12-12 LAB
ACETAMINOPHEN LEVEL: <5 MCG/ML (ref 10–30)
ALBUMIN SERPL-MCNC: 4.4 G/DL (ref 3.5–5.2)
ALP BLD-CCNC: 62 U/L (ref 40–129)
ALT SERPL-CCNC: 17 U/L (ref 0–40)
AMORPHOUS: ABNORMAL
AMPHETAMINE SCREEN, URINE: NOT DETECTED
ANION GAP SERPL CALCULATED.3IONS-SCNC: 10 MMOL/L (ref 7–16)
AST SERPL-CCNC: 30 U/L (ref 0–39)
BACTERIA: ABNORMAL /HPF
BARBITURATE SCREEN URINE: NOT DETECTED
BASOPHILS ABSOLUTE: 0.02 E9/L (ref 0–0.2)
BASOPHILS RELATIVE PERCENT: 0.2 % (ref 0–2)
BENZODIAZEPINE SCREEN, URINE: NOT DETECTED
BILIRUB SERPL-MCNC: 0.2 MG/DL (ref 0–1.2)
BILIRUBIN URINE: NEGATIVE
BLOOD, URINE: NEGATIVE
BUN BLDV-MCNC: 15 MG/DL (ref 6–20)
CALCIUM SERPL-MCNC: 9.1 MG/DL (ref 8.6–10.2)
CANNABINOID SCREEN URINE: NOT DETECTED
CHLORIDE BLD-SCNC: 103 MMOL/L (ref 98–107)
CLARITY: CLEAR
CO2: 26 MMOL/L (ref 22–29)
COCAINE METABOLITE SCREEN URINE: NOT DETECTED
COLOR: YELLOW
CREAT SERPL-MCNC: 0.6 MG/DL (ref 0.7–1.2)
EOSINOPHILS ABSOLUTE: 0.07 E9/L (ref 0.05–0.5)
EOSINOPHILS RELATIVE PERCENT: 0.9 % (ref 0–6)
ETHANOL: <10 MG/DL (ref 0–0.08)
FENTANYL SCREEN, URINE: NOT DETECTED
GFR AFRICAN AMERICAN: >60
GFR NON-AFRICAN AMERICAN: >60 ML/MIN/1.73
GLUCOSE BLD-MCNC: 87 MG/DL (ref 74–99)
GLUCOSE URINE: NEGATIVE MG/DL
HCT VFR BLD CALC: 41.3 % (ref 37–54)
HEMOGLOBIN: 13.6 G/DL (ref 12.5–16.5)
IMMATURE GRANULOCYTES #: 0.03 E9/L
IMMATURE GRANULOCYTES %: 0.4 % (ref 0–5)
KETONES, URINE: NEGATIVE MG/DL
LEUKOCYTE ESTERASE, URINE: NEGATIVE
LYMPHOCYTES ABSOLUTE: 2.33 E9/L (ref 1.5–4)
LYMPHOCYTES RELATIVE PERCENT: 28.3 % (ref 20–42)
Lab: NORMAL
MCH RBC QN AUTO: 31 PG (ref 26–35)
MCHC RBC AUTO-ENTMCNC: 32.9 % (ref 32–34.5)
MCV RBC AUTO: 94.1 FL (ref 80–99.9)
METHADONE SCREEN, URINE: NOT DETECTED
MONOCYTES ABSOLUTE: 0.67 E9/L (ref 0.1–0.95)
MONOCYTES RELATIVE PERCENT: 8.2 % (ref 2–12)
NEUTROPHILS ABSOLUTE: 5.1 E9/L (ref 1.8–7.3)
NEUTROPHILS RELATIVE PERCENT: 62 % (ref 43–80)
NITRITE, URINE: NEGATIVE
OPIATE SCREEN URINE: NOT DETECTED
OXYCODONE URINE: NOT DETECTED
PDW BLD-RTO: 12.9 FL (ref 11.5–15)
PH UA: 7 (ref 5–9)
PHENCYCLIDINE SCREEN URINE: NOT DETECTED
PLATELET # BLD: 269 E9/L (ref 130–450)
PMV BLD AUTO: 9.1 FL (ref 7–12)
POTASSIUM SERPL-SCNC: 4.3 MMOL/L (ref 3.5–5)
PROTEIN UA: NEGATIVE MG/DL
RBC # BLD: 4.39 E12/L (ref 3.8–5.8)
RBC UA: ABNORMAL /HPF (ref 0–2)
SALICYLATE, SERUM: <0.3 MG/DL (ref 0–30)
SARS-COV-2, NAAT: NOT DETECTED
SODIUM BLD-SCNC: 139 MMOL/L (ref 132–146)
SPECIFIC GRAVITY UA: 1.02 (ref 1–1.03)
TOTAL PROTEIN: 7.2 G/DL (ref 6.4–8.3)
TRICYCLIC ANTIDEPRESSANTS SCREEN SERUM: NEGATIVE NG/ML
UROBILINOGEN, URINE: 0.2 E.U./DL
WBC # BLD: 8.2 E9/L (ref 4.5–11.5)
WBC UA: ABNORMAL /HPF (ref 0–5)

## 2020-12-12 PROCEDURE — U0002 COVID-19 LAB TEST NON-CDC: HCPCS

## 2020-12-12 PROCEDURE — 80307 DRUG TEST PRSMV CHEM ANLYZR: CPT

## 2020-12-12 PROCEDURE — 81001 URINALYSIS AUTO W/SCOPE: CPT

## 2020-12-12 PROCEDURE — G0480 DRUG TEST DEF 1-7 CLASSES: HCPCS

## 2020-12-12 PROCEDURE — 93005 ELECTROCARDIOGRAM TRACING: CPT | Performed by: EMERGENCY MEDICINE

## 2020-12-12 PROCEDURE — 96372 THER/PROPH/DIAG INJ SC/IM: CPT

## 2020-12-12 PROCEDURE — 80053 COMPREHEN METABOLIC PANEL: CPT

## 2020-12-12 PROCEDURE — 1240000000 HC EMOTIONAL WELLNESS R&B

## 2020-12-12 PROCEDURE — 6360000002 HC RX W HCPCS: Performed by: EMERGENCY MEDICINE

## 2020-12-12 PROCEDURE — 85025 COMPLETE CBC W/AUTO DIFF WBC: CPT

## 2020-12-12 PROCEDURE — 99284 EMERGENCY DEPT VISIT MOD MDM: CPT

## 2020-12-12 RX ORDER — HALOPERIDOL 5 MG/ML
5 INJECTION INTRAMUSCULAR ONCE
Status: COMPLETED | OUTPATIENT
Start: 2020-12-12 | End: 2020-12-12

## 2020-12-12 RX ADMIN — HALOPERIDOL LACTATE 5 MG: 5 INJECTION, SOLUTION INTRAMUSCULAR at 19:52
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
## 2020-12-12 NOTE — ED NOTES
Patient placed in gown and pants, belongings removed and placed in locker 30 in [address] [place] [address], [address] [place] [address]  12/12/20 5211

## 2020-12-13 PROBLEM — F19.10 POLYSUBSTANCE ABUSE (HCC): Status: ACTIVE | Noted: 2020-12-13

## 2020-12-13 PROBLEM — F25.0 SCHIZOAFFECTIVE DISORDER, BIPOLAR TYPE (HCC): Status: ACTIVE | Noted: 2020-12-13

## 2020-12-13 LAB
EKG ATRIAL RATE: 96 BPM
EKG P AXIS: 61 DEGREES
EKG P-R INTERVAL: 164 MS
EKG Q-T INTERVAL: 344 MS
EKG QRS DURATION: 82 MS
EKG QTC CALCULATION (BAZETT): 434 MS
EKG R AXIS: 53 DEGREES
EKG T AXIS: 37 DEGREES
EKG VENTRICULAR RATE: 96 BPM

## 2020-12-13 PROCEDURE — 6370000000 HC RX 637 (ALT 250 FOR IP): Performed by: PSYCHIATRY & NEUROLOGY

## 2020-12-13 PROCEDURE — 6370000000 HC RX 637 (ALT 250 FOR IP): Performed by: NURSE PRACTITIONER

## 2020-12-13 PROCEDURE — 1240000000 HC EMOTIONAL WELLNESS R&B

## 2020-12-13 PROCEDURE — 93010 ELECTROCARDIOGRAM REPORT: CPT | Performed by: INTERNAL MEDICINE

## 2020-12-13 PROCEDURE — 99221 1ST HOSP IP/OBS SF/LOW 40: CPT | Performed by: NURSE PRACTITIONER

## 2020-12-13 RX ORDER — HALOPERIDOL 5 MG/ML
5 INJECTION INTRAMUSCULAR EVERY 6 HOURS PRN
Status: DISCONTINUED | OUTPATIENT
Start: 2020-12-13 | End: 2020-12-15 | Stop reason: HOSPADM

## 2020-12-13 RX ORDER — NICOTINE 21 MG/24HR
1 PATCH, TRANSDERMAL 24 HOURS TRANSDERMAL ONCE
Status: COMPLETED | OUTPATIENT
Start: 2020-12-13 | End: 2020-12-14

## 2020-12-13 RX ORDER — CHLORDIAZEPOXIDE HYDROCHLORIDE 25 MG/1
25 CAPSULE, GELATIN COATED ORAL EVERY 6 HOURS PRN
Status: DISCONTINUED | OUTPATIENT
Start: 2020-12-13 | End: 2020-12-15 | Stop reason: HOSPADM

## 2020-12-13 RX ORDER — DIVALPROEX SODIUM 250 MG/1
250 TABLET, DELAYED RELEASE ORAL EVERY 12 HOURS SCHEDULED
Status: DISCONTINUED | OUTPATIENT
Start: 2020-12-13 | End: 2020-12-15 | Stop reason: HOSPADM

## 2020-12-13 RX ORDER — TRAZODONE HYDROCHLORIDE 50 MG/1
50 TABLET ORAL NIGHTLY PRN
Status: DISCONTINUED | OUTPATIENT
Start: 2020-12-13 | End: 2020-12-15 | Stop reason: HOSPADM

## 2020-12-13 RX ORDER — HYDROXYZINE PAMOATE 50 MG/1
50 CAPSULE ORAL 3 TIMES DAILY PRN
Status: DISCONTINUED | OUTPATIENT
Start: 2020-12-13 | End: 2020-12-15 | Stop reason: HOSPADM

## 2020-12-13 RX ORDER — BUPRENORPHINE AND NALOXONE 4; 1 MG/1; MG/1
1 FILM, SOLUBLE BUCCAL; SUBLINGUAL 2 TIMES DAILY
Status: ON HOLD | COMMUNITY
End: 2020-12-15 | Stop reason: HOSPADM

## 2020-12-13 RX ORDER — MAGNESIUM HYDROXIDE/ALUMINUM HYDROXICE/SIMETHICONE 120; 1200; 1200 MG/30ML; MG/30ML; MG/30ML
30 SUSPENSION ORAL PRN
Status: DISCONTINUED | OUTPATIENT
Start: 2020-12-13 | End: 2020-12-15 | Stop reason: HOSPADM

## 2020-12-13 RX ORDER — HALOPERIDOL 5 MG
5 TABLET ORAL EVERY 6 HOURS PRN
Status: DISCONTINUED | OUTPATIENT
Start: 2020-12-13 | End: 2020-12-15 | Stop reason: HOSPADM

## 2020-12-13 RX ORDER — ACETAMINOPHEN 325 MG/1
650 TABLET ORAL EVERY 6 HOURS PRN
Status: DISCONTINUED | OUTPATIENT
Start: 2020-12-13 | End: 2020-12-15 | Stop reason: HOSPADM

## 2020-12-13 RX ADMIN — DIVALPROEX SODIUM 250 MG: 250 TABLET, DELAYED RELEASE ORAL at 11:09

## 2020-12-13 RX ADMIN — HYDROXYZINE PAMOATE 50 MG: 50 CAPSULE ORAL at 18:46

## 2020-12-13 RX ADMIN — TRAZODONE HYDROCHLORIDE 50 MG: 50 TABLET ORAL at 21:52

## 2020-12-13 RX ADMIN — DIVALPROEX SODIUM 250 MG: 250 TABLET, DELAYED RELEASE ORAL at 21:52

## 2020-12-13 RX ADMIN — HYDROXYZINE PAMOATE 50 MG: 50 CAPSULE ORAL at 11:11

## 2020-12-13 ASSESSMENT — SLEEP AND FATIGUE QUESTIONNAIRES
AVERAGE NUMBER OF SLEEP HOURS: 4
DIFFICULTY STAYING ASLEEP: YES
DO YOU USE A SLEEP AID: NO
RESTFUL SLEEP: NO
DO YOU USE A SLEEP AID: NO
DO YOU HAVE DIFFICULTY SLEEPING: YES
DIFFICULTY FALLING ASLEEP: YES
DIFFICULTY STAYING ASLEEP: NO
RESTFUL SLEEP: YES
SLEEP PATTERN: DIFFICULTY FALLING ASLEEP;DISTURBED/INTERRUPTED SLEEP
DIFFICULTY FALLING ASLEEP: NO
DIFFICULTY ARISING: NO
DIFFICULTY ARISING: NO
DO YOU HAVE DIFFICULTY SLEEPING: NO

## 2020-12-13 ASSESSMENT — PAIN DESCRIPTION - LOCATION: LOCATION: FOOT

## 2020-12-13 ASSESSMENT — LIFESTYLE VARIABLES: HISTORY_ALCOHOL_USE: NO

## 2020-12-13 ASSESSMENT — PAIN SCALES - GENERAL
PAINLEVEL_OUTOF10: 5
PAINLEVEL_OUTOF10: 0

## 2020-12-13 ASSESSMENT — PAIN - FUNCTIONAL ASSESSMENT: PAIN_FUNCTIONAL_ASSESSMENT: PREVENTS OR INTERFERES SOME ACTIVE ACTIVITIES AND ADLS

## 2020-12-13 ASSESSMENT — PAIN DESCRIPTION - FREQUENCY: FREQUENCY: CONTINUOUS

## 2020-12-13 ASSESSMENT — PAIN DESCRIPTION - DESCRIPTORS: DESCRIPTORS: ACHING;CONSTANT;CRAMPING;CRUSHING

## 2020-12-13 ASSESSMENT — PAIN DESCRIPTION - ONSET: ONSET: ON-GOING

## 2020-12-13 ASSESSMENT — PAIN DESCRIPTION - PAIN TYPE: TYPE: ACUTE PAIN

## 2020-12-13 NOTE — PLAN OF CARE
Patient is alert and oriented, denies thoughts of harm to others, but does verbalizes thoughts of harm to self by either friends gun or OD on drugs. Verbalizes did earlier today hearing voices saying his name, people making fun of him and telling me to kill myself. Also earlier today saw at times seen his self dying or random figures out of corner of eye. Patient observed as anxious , eager to get through assessment. Flat, sad depressed at times, do to unknown where going to go if does not get into in patient rehab. Verbalize struggles going to sleep and staying a sleep. Will continue to monitor, provide safe environment with continued rounds.
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
## 2020-12-13 NOTE — BH NOTE
Pt medicated for anxiety, \"If I marbin't get something, I am going to flip the fuck out!!\" pr pt. Pt was visibly anxious and repeatedly telling this nurse he needed something. Pt medicated and no other issues were assessed or occurred. Will monitor and follow.

## 2020-12-13 NOTE — ED NOTES
Notified Dr. Bimal Mitchell of need for admission. pt accepted to [facility].       Lyly Lazcano RN  12/12/20 0006

## 2020-12-13 NOTE — BH NOTE
Pt is stable and alert. Pt denies suicidal  / homicidal ideations. Pt denies hallucinations. Pt without distress. Will follow and monitor.

## 2020-12-13 NOTE — PROGRESS NOTES
`[facility]  Admission Note     Admission Type:   Admission Type: Involuntary  Patient arrived from [facility] via wheelchair with his belongings that he came to hospital with. Slightly anxious, flat,sad. Verbalizes that he has frequent thoughts of suicide either by friends gun or OD. Verbalizes that he was kicked out of [facility] where he had been a patient for 2 days due to staff member was rude to him so he got rude with her and got kicked out 12/12/20. States that he was hearing voices earlier today calling his name and people making fun of him telling him to kill his self. States also earlier today that he saw him self dying and random figures. Patient states his desire to be discharged to in patient rehab for drugs and alcohol. Has addiction to heroin and meth and has stolen in order to support his habit. Has struggle getting to sleep and staying a sleep. Will continue to monitor and provide safe environment with continued rounds.       Reason for admission:  Reason for Admission: patient verbalizes suicidal past couple weeks with plan to take friends gun and cocked to my head, and last week attempted to OD 1 gram of herion    PATIENT STRENGTHS:  Strengths: Motivated    Patient Strengths and Limitations:  Limitations: Inappropriate/potentially harmful leisure interests, Difficulty problem solving/relies on others to help solve problems    Addictive Behavior:   Addictive Behavior  In the past 3 months, have you felt or has someone told you that you have a problem with:  : None  Do you have a history of Chemical Use?: Yes  Do you have a history of Alcohol Use?: No  Do you have a history of Street Drug Abuse?: Yes  Histroy of Prescripton Drug Abuse?: No    Medical Problems:   Past Medical History:   Diagnosis Date    Anxiety     [facility]     Drug abuse in remission [facility]     H/O alcohol abuse     Hep C w/o coma, chronic ([facility]        Status EXAM:  Status and Exam  Normal: No Facial Expression: Flat, Sad  Affect: Congruent  Level of Consciousness: Alert  Mood:Normal: No  Mood: Depressed, Anxious  Motor Activity:Normal: No  Motor Activity: Increased  Interview Behavior: Cooperative  Preception: [place] to Person, [place] to Time, [place] to Place, [place] to Situation  Attention:Normal: No  Attention: Distractible  Thought Processes: (WNL)  Hallucinations: Auditory (Comment), Visual (Comment)  Delusions: No  Memory:Normal: No  Memory: Poor Remote, Poor Recent  Insight and Judgment: No  Insight and Judgment: Poor Judgment, Poor Insight  Present Suicidal Ideation: Yes  Present Homicidal Ideation: No    Tobacco Screening:  Practical Counseling, on admission, aron X, if applicable and completed (first 3 are required if patient doesn't refuse): ( x)  Recognizing danger situations (included triggers and roadblocks)                    ( x)  Coping skills (new ways to manage stress, exercise, relaxation techniques, changing routine, distraction)                                                           ( x)  Basic information about quitting (benefits of quitting, techniques in how to quit, available resources  ( x) Referral for counseling faxed to [facility]                                           ( x) Patient refused counseling  ( ) Patient has not smoked in the last 30 days    Metabolic Screening:    Lab Results   Component Value Date    LABA1C 5.0 08/15/2019       Lab Results   Component Value Date    CHOL 132 08/15/2019    CHOL 144 03/20/2019    CHOL 128 01/22/2019     Lab Results   Component Value Date    TRIG 200 (H) 08/15/2019    TRIG 83 03/20/2019    TRIG 146 01/22/2019     Lab Results   Component Value Date    HDL 41 08/15/2019    HDL 42 03/20/2019    HDL 37 (L) 01/22/2019     No components found for: LDLCAL  No results found for: LABVLDL      Body mass index is 27.44 kg/m².     BP Readings from Last 2 Encounters:   12/13/20 122/69   10/19/20 108/67 Pt admitted with followings belongings:  Dentures: None  Vision - Corrective Lenses: None  Hearing Aid: None  Jewelry: None  Body Piercings Removed: N/A(no visible body piercing.)  Clothing: Footwear, Pants, Shirt, Sweater, Socks, Undergarments (Comment)(1 pair of shoes,4 pair of socks, 2 shirts, 3 jackets, 3 undergarment)  Were All Patient Medications Collected?: Other (comment)(no medication with the patient)  Other Valuables: None     Valuables sent home withN/A. Valuables placed in safe in security envelope, number:  N/A. Patient's home medications were N/A. Patient oriented to surroundings and program expectations and copy of patient rights given. Received admission packet:  yes. Consents reviewed, signed yes. Patient verbalize understanding:  Yes . Patient education on precautions, PIN number,to call whom ever and give PIN number, time phone is available 24 hour help hot line, tour of facility, toiletries provided. ,                    Ayo Lorenzo RN

## 2020-12-13 NOTE — CARE COORDINATION
Biopsychosocial Assessment Note    Social work met with patient to complete the biopsychosocial assessment and CSSR-S. Pt was cooperative  Very drowsy kept falling asleeping  Mental Status Exam:  Pt is alert and oriented x4  Mood depressed   Affect  Flat  Speech clear    Chief Complaint: Pt was admitted after he left  [facility] , following an argument with staff. Pt went to a friend's home and put a gun to his head , which failed to go off. Pt then presented to the hospital for help  . Patient Report: Pt reports  He came to the hospital after a suicide attempt.  Pt reports  he wants to enter a drug tx program. Pt recently walked out of [facility]    Gender  [x] Male [] Female [] Transgender  [] Other    Sexual Orientation    [x] Heterosexual [] Homosexual [] Bisexual [] Other    Suicidal Ideation  [x] Reports [] Denies    Homicidal Ideation  [] Reports [x] Denies      Hallucinations/Delusions (Specify type)  [] Reports [x] Denies     Substance Use/Alcohol Use/Addiction  [x] Reports [] Denies     Trauma History  [x] Reports [] Denies     Collateral Contact (JESSA signed)  Refused to give collateral  Name:   Relationship:  Number:     Collateral Information:   refused    Access to Weapons:  none    Follow up provider:  Referred to peer support    Plan for discharge (where they live can they return): Pt wants inpatient  Substance abuse tx

## 2020-12-13 NOTE — BH NOTE
[facility]  Initial Interdisciplinary Treatment Plan NOTE    Review Date & Time: 12-13-20  1000am    Patient was not in treatment team    Admission Type:   Admission Type: Involuntary    Reason for admission:  Reason for Admission: patient verbalizes suicidal past couple weeks with plan to take friends gun and cocked to my head, and last week attempted to OD 1 gram of herion      Estimated Length of Stay Update:  3-5 days  Estimated Discharge Date Update: 3-5 days    PATIENT STRENGTHS:  Patient Strengths Strengths: Motivated  Patient Strengths and Limitations:Limitations: Inappropriate/potentially harmful leisure interests, Difficulty problem solving/relies on others to help solve problems  Addictive Behavior:Addictive Behavior  In the past 3 months, have you felt or has someone told you that you have a problem with:  : None  Do you have a history of Chemical Use?: Yes  Do you have a history of Alcohol Use?: No  Do you have a history of Street Drug Abuse?: Yes  Histroy of Prescripton Drug Abuse?: No  Medical Problems:  Past Medical History:   Diagnosis Date    Anxiety     [facility]     [facility]     H/O alcohol abuse     [facility]        EDUCATION:   Learner Progress Toward Treatment Goals: Reviewed results and recommendations of this team and Reviewed group plan and strategies    Method: Small group    Outcome: Verbalized understanding    PATIENT GOALS: pt does not verbalize a goal presently. PLAN/TREATMENT RECOMMENDATIONS UPDATE:Begin medication regimen and assess pt responses. GOALS UPDATE:   Time frame for Short-Term Goals: Daily re assessment.      Yissel Luna RN

## 2020-12-13 NOTE — H&P
On interview, the patient is tired, irritable but superficially cooperative. He states that he wants to go to rehab and wishes to speak with a  about the next step. He states that he has been experiencing auditory hallucinations that are command in nature and tell him to do harm to himself. He states that the voices come and go and have been present more frequently over the past few months. He says that he has been battling a depressed mood alongside the substance abuse for \"a long time\" and nothing seems to help. He endorses difficulty with sleep with occasional sleepless nights. He has had decreased interest in activities, concentration, energy, and weight loss with decreased appetite. He states that he has had weeks of elevated mood, distractibility, and feelings of grandiosity. He currently denies suicidal ideation or homicidal ideation with intent or plan. He hasn't experienced auditory or visual hallucinations today, but states they come and go regularly. Past Psychiatric History: The patient states that he attempted suicide two weeks ago by overdosing on heroine. He says that he was hospitalized at [facility] as a result. He also admits to a suicide attempt via hanging at age 19. He is unsure of any other hospitalizations or stays at a state facility. He has a history of schizoaffective disorder for which he was receiving Abilify 400 mg monthly for psychosis; however, he states that he has not had a dose in over a month. He also has been taking 10 mg of Lexapro, which he does not believe is helping. Family Psychiatric History: He describes his grandfather, mother, and sister as having schizophrenia. He is unaware of any other diagnoses or any previous suicide attempts. Substance Abuse: As mentioned prior, [facility] to this admission. He has interest in resuming rehab. He admits to alcohol abuse since age 10, with his last drink being yesterday. He is unsure of an amount, he described constant binge drinking. He states that he tries to use methamphetamine and heroine daily with previous uses being 4-5 days ago. He is unsure of previous rehab treatments. Legal History: He says that he has been to correction many times over the years for various reasons. His recent correction time was in September 2020 for [employer]. Social History: He says that he was born and raised in [place] [facility] [place]. He denies any support system. He hasn't held a job since April and he has been homeless for 2 years. He has never been  and denies any children. He denies any seizures or history of head trauma. He has received his GED. He denies access to a gun currently but states that \"if he wanted to, he could be crafty\". Past Medical History:        Diagnosis Date    Anxiety     [facility]     Drug abuse in remission [facility]     H/O alcohol abuse     Hep C w/o coma, chronic ([facility])        Medications Prior to Admission:   Medications Prior to Admission: buprenorphine-naloxone (SUBOXONE) 4-1 MG FILM SL film, Place 1 Film under the tongue 2 times daily. Bought off the street    Past Surgical History:        Procedure Laterality Date    ABSCESS DRAINAGE Right 10/13/2020    Irrigation and debridement    HAND SURGERY Right        Allergies:   Patient has no known allergies. Family History  History reviewed. No pertinent family history.       Vitals:  /69   Pulse 77   Temp 97.7 °F (36.5 °C) (Temporal)   Resp 17   Ht 6' 3\" (1.905 m)   Wt 219 lb 8 oz (99.6 kg)   SpO2 99%   BMI 27.44 kg/m²      Mental Status Examination:    Level of consciousness:  within normal limits Appearance:  well-appearing, hospital attire, lying in bed, poor grooming and poor hygiene  Behavior/Motor:  no abnormalities noted  Attitude toward examiner:  cooperative, attentive and good eye contact  Speech:  spontaneous, normal rate, normal volume and well articulated   Mood: anxious and irritable  Affect:  mood congruent  Thought processes:  linear and goal directed   Thought content:  Homocidal ideation denies  Suicidal Ideation:  denies  Delusions:  no evidence of delusions  Perceptual Disturbance:  auditory  Cognition:  oriented to person, place, and time   Concentration intact  Memory intact  Insight poor   Judgement poor   Fund of Knowledge limited      DIAGNOSIS:    1. Schizoaffective Disorder, bipolar type  2. Polysubstance Use Disorder   3. Cluster B Personality Disorder        LABS: REVIEWED TODAY:  Recent Labs     12/12/20  1745   WBC 8.2   HGB 13.6        Recent Labs     12/12/20  1745      K 4.3      CO2 26   BUN 15   CREATININE 0.6*   GLUCOSE 87     Recent Labs     12/12/20  1745   BILITOT 0.2   ALKPHOS 62   AST 30   ALT 17     Lab Results   Component Value Date    LABAMPH NOT DETECTED 12/12/2020    BARBSCNU NOT DETECTED 12/12/2020    LABBENZ NOT DETECTED 12/12/2020    COCAINESCRN Negative 10/10/2020    LABMETH NOT DETECTED 12/12/2020    OPIATESCREENURINE NOT DETECTED 12/12/2020    PHENCYCLIDINESCREENURINE NOT DETECTED 12/12/2020    ETOH <10 12/12/2020     Lab Results   Component Value Date    TSH 0.657 11/03/2019     Lab Results   Component Value Date    LITHIUM <0.2 (L) 03/20/2019     No results found for: VALPROATE, CBMZ  Lab Results   Component Value Date    LITHIUM <0.2 03/20/2019         Radiology No results found. TREATMENT PLAN:  - Monitor for signs of suicidal ideation. - The patient admits to a lengthy history of substance abuse. He was receiving care at [facility] for two days until he displayed violent behaviors toward staff and was released. He does display interest and readiness for rehab and would like another chance at a facility. Patient is currently on the Abilify injection well to verify the date and dose of his last injection  Start Depakote ER 50 mg twice daily for mood stabilization    Risk Management: Based on the diagnosis and assessment biopsychosocial treatment model was presented to the patient and was given the opportunity to ask any question. The patient was agreeable to the plan and all the patient's questions were answered to the patient's satisfaction. I discussed with the patient the risk, benefit, alternative and common side effects for the proposed medication treatment. The patient is consenting to this treatment. Collateral Information:  Will obtain collateral information from the family or friends. Will obtain medical records as appropriate from out patient providers  Will consult the hospitalist for a physical exam to rule out any co-morbid physical condition. Home medication Reconciled       New Medications started during this admission :    - Consider depakote 250 mg BID for mood stabilization.  - Resume Abilify 400 mg monthly injection for psychosis. Prn Haldol 5mg and Vistaril 50mg q6hr for extreme agitation. Trazodone as ordered for insomnia  Vistaril as ordered for anxiety      Psychotherapy:   Encourage participation in milieu and group therapy  Individual therapy as needed        Behavioral Services  Medicare Certification      Admission Day 1  I certify that this patient's inpatient psychiatric hospital admission is medically necessary for:     (1) treatment which could reasonably be expected to improve this patient's condition, or     (2) diagnostic study or its equivalent. Electronically signed by Olga Lidia Payan on 12/13/2020 at 12:03 PM

## 2020-12-14 PROCEDURE — 99231 SBSQ HOSP IP/OBS SF/LOW 25: CPT | Performed by: NURSE PRACTITIONER

## 2020-12-14 PROCEDURE — 6370000000 HC RX 637 (ALT 250 FOR IP): Performed by: NURSE PRACTITIONER

## 2020-12-14 PROCEDURE — 6370000000 HC RX 637 (ALT 250 FOR IP): Performed by: PSYCHIATRY & NEUROLOGY

## 2020-12-14 PROCEDURE — 1240000000 HC EMOTIONAL WELLNESS R&B

## 2020-12-14 RX ORDER — NICOTINE 21 MG/24HR
1 PATCH, TRANSDERMAL 24 HOURS TRANSDERMAL DAILY
Status: DISCONTINUED | OUTPATIENT
Start: 2020-12-14 | End: 2020-12-15 | Stop reason: HOSPADM

## 2020-12-14 RX ADMIN — DIVALPROEX SODIUM 250 MG: 250 TABLET, DELAYED RELEASE ORAL at 21:50

## 2020-12-14 RX ADMIN — DIVALPROEX SODIUM 250 MG: 250 TABLET, DELAYED RELEASE ORAL at 09:27

## 2020-12-14 RX ADMIN — TRAZODONE HYDROCHLORIDE 50 MG: 50 TABLET ORAL at 21:50

## 2020-12-14 RX ADMIN — HYDROXYZINE PAMOATE 50 MG: 50 CAPSULE ORAL at 16:00

## 2020-12-14 RX ADMIN — HYDROXYZINE PAMOATE 50 MG: 50 CAPSULE ORAL at 09:27

## 2020-12-14 ASSESSMENT — PAIN SCALES - GENERAL: PAINLEVEL_OUTOF10: 0

## 2020-12-14 NOTE — PROGRESS NOTES
[facility]  Initial Interdisciplinary Treatment Plan NOTE    Review Date & Time: 12/14/2020 1000    Patient was in treatment team    Admission Type:   Admission Type:  Involuntary    Reason for admission:  Reason for Admission: patient verbalizes suicidal past couple weeks with plan to take friends gun and cocked to my head, and last week attempted to OD 1 gram of herion      Estimated Length of Stay Update:  3-5 days  Estimated Discharge Date Update: 12/17/2020    PATIENT STRENGTHS:  Patient Strengths Strengths: Motivated  Patient Strengths and Limitations:Limitations: Multiple barriers to leisure interests, Difficulty problem solving/relies on others to help solve problems, Tendency to isolate self  Addictive Behavior:Addictive Behavior  In the past 3 months, have you felt or has someone told you that you have a problem with:  : None  Do you have a history of Chemical Use?: Yes  Do you have a history of Alcohol Use?: No  Do you have a history of Street Drug Abuse?: Yes  Histroy of Prescripton Drug Abuse?: No  Medical Problems:  Past Medical History:   Diagnosis Date    Anxiety     [facility]     [facility]     H/O alcohol abuse     Hep C w/o coma, chronic ([facility]        EDUCATION:   Learner Progress Toward Treatment Goals: Reviewed group plan and strategies    Method: Small group    Outcome: Demonstrated Understanding    PATIENT GOALS: medication compliance and group therapy attendance    PLAN/TREATMENT RECOMMENDATIONS UPDATE:medication compliance and group therapy attendance    GOALS UPDATE:   Time frame for Short-Term Goals: 3-5 days    Rg Caldwell RN

## 2020-12-14 NOTE — SUICIDE SAFETY PLAN
SAFETY PLAN    A suicide Safety Plan is a document that supports someone when they are having thoughts of suicide. Warning Signs that indicate a suicidal crisis may be developing: What (situations, thoughts, feelings, body sensations, behaviors, etc.) do you experience that lets you know you are beginning to think about suicide? 1. isolate  2. Get depressed  3. No appetite    Internal Coping Strategies:  What things can I do (relaxation techniques, hobbies, physical activities, etc.) to take my mind off my problems without contacting another person? 1. Take a walk  2. Listen to music  3. Watch tv    People and social settings that provide distraction: Who can I call or where can I go to distract me? 1. Name: none  Phone: none  2. Name: I have no friends      People whom I can ask for help: Who can I call when I need help - for example, friends, family, clergy, someone else? 1. Name:  Edinlester Jacob               Phone: 911  2. Name: tv       Professionals or [address] [place] [address] Blvd I can contact during a crisis: Who can I call for help - for example, my doctor, my psychiatrist, my psychologist, a mental health provider, a suicide hotline? 1. Clinician Name: 911   Phone: 211      Clinician Pager or Emergency Contact #: [phone]    7. Suicide Prevention Lifeline: 7-802-659-TALK (5223)    4. [address] [place] [address] Emergency Services -  for example, [facility] suicide hotline, [facility] Hotline: 211      Emergency Services Address: 911      Emergency Services Phone: 211    Making the environment safe: How can I make my environment (house/apartment/living space) safer? For example, can I remove guns, medications, and other items? 1. No weapons  2.  No guns

## 2020-12-14 NOTE — CARE COORDINATION
Pt accepted to [facility]. Will be transported to [facility] at 9:30 AM on Tuesday, Dec. 15th, via their [place]. Sw offered to assist as needed.

## 2020-12-14 NOTE — PROGRESS NOTES
Per nurse at [facility], [phone], ext.[phone], patient is no longer their client, was scheduled for the [facility] March 16, 2020 did not show up for his appointment, has not had a long acting injection there all year

## 2020-12-14 NOTE — PROGRESS NOTES
Patient attended afternoon meet and greet. Patient 1 of 16 in attendance. Patient participated in would you rather trivia. Updated on afternoon staffing and expectations.

## 2020-12-14 NOTE — PROGRESS NOTES
Patient was isolative to room. Denies SI,HI or AV hallucinations. Depression 6 out of 10 that fact I'm here. Anxiety is controlled. Verbalizes no with drawals. Verbalizes appetite is increased. Sleep is good. Compliant with medications and attended group. Safety rounds continue.

## 2020-12-14 NOTE — PLAN OF CARE
Problem: Suicide risk  Goal: Provide patient with safe environment  Description: Provide patient with safe environment  Outcome: Ongoing     Problem: Pain:  Goal: Pain level will decrease  Description: Pain level will decrease  Outcome: Ongoing  Goal: Control of acute pain  Description: Control of acute pain  Outcome: Ongoing  Goal: Control of chronic pain  Description: Control of chronic pain  Outcome: Ongoing     Problem: Depressive Behavior With or Without Suicide Precautions:  Goal: Able to verbalize acceptance of life and situations over which he or she has no control  Description: Able to verbalize acceptance of life and situations over which he or she has no control  Outcome: Ongoing  Goal: Able to verbalize and/or display a decrease in depressive symptoms  Description: Able to verbalize and/or display a decrease in depressive symptoms  Outcome: Ongoing  Goal: Ability to disclose and discuss suicidal ideas will improve  Description: Ability to disclose and discuss suicidal ideas will improve  Outcome: Ongoing  Goal: Absence of self-harm  Description: Absence of self-harm  Outcome: Ongoing  Goal: Patient specific goal  Description: Patient specific goal  Outcome: Ongoing  Goal: Participates in care planning  Description: Participates in care planning  Outcome: Ongoing

## 2020-12-14 NOTE — PROGRESS NOTES
History reviewed. No pertinent family history. Social History     Socioeconomic History    Marital status: Single     Spouse name: Not on file    Number of children: Not on file    Years of education: Not on file    Highest education level: Not on file   Occupational History    Not on file   Social Needs    Financial resource strain: Not on file    Food insecurity     Worry: Not on file     Inability: Not on file    Transportation needs     Medical: Not on file     Non-medical: Not on file   Tobacco Use    Smoking status: Former Smoker     Packs/day: 1.00     Years: 7.00     Pack years: 7.00     Types: Cigarettes    Smokeless tobacco: Former User     Types: Snuff    Tobacco comment: 5 days without smoking   Substance and Sexual Activity    Alcohol use: Yes    Drug use: Yes     Types: Methamphetamines, Opiates , IV    Sexual activity: Not Currently   Lifestyle    Physical activity     Days per week: Not on file     Minutes per session: Not on file    Stress: Not on file   Relationships    Social connections     Talks on phone: Not on file     Gets together: Not on file     Attends Jewish service: Not on file     Active member of club or organization: Not on file     Attends meetings of clubs or organizations: Not on file     Relationship status: Not on file    Intimate partner violence     Fear of current or ex partner: Not on file     Emotionally abused: Not on file     Physically abused: Not on file     Forced sexual activity: Not on file   Other Topics Concern    Not on file   Social History Narrative    Not on file           ROS:  [x] All negative/unchanged except if checked.  Explain positive(checked items) below:  [] Constitutional  [] Eyes  [] Ear/Nose/Mouth/Throat  [] Respiratory  [] CV  [] GI  []   [] Musculoskeletal  [] Skin/Breast  [] Neurological  [] Endocrine  [] Heme/Lymph  [] Allergic/Immunologic    Explanation:     MEDICATIONS:    Current Facility-Administered Medications:   nicotine (NICODERM CQ) 21 MG/24HR 1 patch, 1 patch, Transdermal, Daily, Orlin Landers MD, 1 patch at 12/14/20 1057    acetaminophen (TYLENOL) tablet 650 mg, 650 mg, Oral, Q6H PRN, Orlin Landers MD    magnesium hydroxide (MILK OF MAGNESIA) 400 MG/5ML suspension 30 mL, 30 mL, Oral, Daily PRN, Orlin Landers MD    aluminum & magnesium hydroxide-simethicone (MAALOX) 200-200-20 MG/5ML suspension 30 mL, 30 mL, Oral, PRN, Orlin Landers MD    hydrOXYzine (VISTARIL) capsule 50 mg, 50 mg, Oral, TID PRN, Orlin Landers MD, 50 mg at 12/14/20 8860    haloperidol lactate (HALDOL) injection 5 mg, 5 mg, Intramuscular, Q6H PRN **OR** haloperidol (HALDOL) tablet 5 mg, 5 mg, Oral, Q6H PRN, Orlin Landers MD    traZODone (DESYREL) tablet 50 mg, 50 mg, Oral, Nightly PRN, Orlin Landers MD, 50 mg at 12/13/20 2152    chlordiazePOXIDE (LIBRIUM) capsule 25 mg, 25 mg, Oral, Z5B PRN, STEFAN Lay - CNP    divalproex (DEPAKOTE) DR tablet 250 mg, 250 mg, Oral, 2 times per day, STEFAN Martínez - CNP, 855 mg at 12/14/20 7298      Examination:  BP (!) 106/50   Pulse 71   Temp 99.3 °F (37.4 °C)   Resp 16   Ht 6' 3\" (1.905 m)   Wt 219 lb 8 oz (99.6 kg)   SpO2 99%   BMI 27.44 kg/m²   Gait - steady  Medication side effects(SE): Denies    Mental Status Examination:    Level of consciousness:  within normal limits   Appearance:  good grooming and good hygiene  Behavior/Motor:  no abnormalities noted  Attitude toward examiner:  cooperative  Speech:  spontaneous, normal rate and normal volume   Mood: within normal limits  Affect:  mood congruent  Thought processes:  linear and goal directed   Thought content:  Homocidal ideation Denies  Suicidal Ideation:  denies suicidal ideation  Delusions:  no evidence of delusions  Perceptual Disturbance:  denies any perceptual disturbance  Cognition:  oriented to person, place, and time   Concentration intact  Insight poor   Judgement poor     ASSESSMENT: Patient symptoms are:  [] Well controlled  [x] Improving  [] Worsening  [] No change      Diagnosis:   Principal Problem:    Schizoaffective disorder, bipolar type ([facility]  Active Problems:    Polysubstance abuse ([facility]  Resolved Problems:    * No resolved hospital problems. *      LABS:    Recent Labs     12/12/20  1745   WBC 8.2   HGB 13.6        Recent Labs     12/12/20  1745      K 4.3      CO2 26   BUN 15   CREATININE 0.6*   GLUCOSE 87     Recent Labs     12/12/20  1745   BILITOT 0.2   ALKPHOS 62   AST 30   ALT 17     Lab Results   Component Value Date    LABAMPH NOT DETECTED 12/12/2020    BARBSCNU NOT DETECTED 12/12/2020    LABBENZ NOT DETECTED 12/12/2020    COCAINESCRN Negative 10/10/2020    LABMETH NOT DETECTED 12/12/2020    OPIATESCREENURINE NOT DETECTED 12/12/2020    PHENCYCLIDINESCREENURINE NOT DETECTED 12/12/2020    ETOH <10 12/12/2020     Lab Results   Component Value Date    TSH 0.657 11/03/2019     Lab Results   Component Value Date    LITHIUM <0.2 (L) 03/20/2019     No results found for: VALPROATE, CBMZ        Treatment Plan:  Reviewed current Medications with the patient. Risks, benefits, side effects, drug-to-drug interactions and alternatives to treatment were discussed. Collateral information:   CD evaluation  Encourage patient to attend group and other milieu activities.   Discharge planning discussed with the patient and treatment team.    Continue Depakote 250mg BID for mood stabilization   Nursing to verify date and dose of last injection    PSYCHOTHERAPY/COUNSELING:  [x] Therapeutic interview  [x] Supportive  [] CBT  [] Ongoing  [] Other    [x] Patient continues to need, on a daily basis, active treatment furnished directly by or requiring the supervision of inpatient psychiatric personnel      Anticipated Length of stay: 3 to 5 days based on stability            Electronically signed by Vilma Amaya on 12/14/2020 at 12:59 PM

## 2020-12-15 VITALS
OXYGEN SATURATION: 97 % | SYSTOLIC BLOOD PRESSURE: 114 MMHG | TEMPERATURE: 98 F | RESPIRATION RATE: 16 BRPM | HEART RATE: 75 BPM | HEIGHT: 75 IN | WEIGHT: 219.5 LBS | DIASTOLIC BLOOD PRESSURE: 56 MMHG | BODY MASS INDEX: 27.29 KG/M2

## 2020-12-15 PROCEDURE — 99239 HOSP IP/OBS DSCHRG MGMT >30: CPT | Performed by: NURSE PRACTITIONER

## 2020-12-15 PROCEDURE — 6370000000 HC RX 637 (ALT 250 FOR IP): Performed by: NURSE PRACTITIONER

## 2020-12-15 PROCEDURE — 6370000000 HC RX 637 (ALT 250 FOR IP): Performed by: PSYCHIATRY & NEUROLOGY

## 2020-12-15 RX ORDER — ARIPIPRAZOLE 10 MG/1
10 TABLET ORAL DAILY
Qty: 30 TABLET | Refills: 0 | Status: SHIPPED | OUTPATIENT
Start: 2020-12-15 | End: 2020-12-15

## 2020-12-15 RX ORDER — ARIPIPRAZOLE 10 MG/1
10 TABLET ORAL DAILY
Qty: 30 TABLET | Refills: 0 | Status: ON HOLD | OUTPATIENT
Start: 2020-12-15 | End: 2021-06-15

## 2020-12-15 RX ORDER — DIVALPROEX SODIUM 250 MG/1
250 TABLET, DELAYED RELEASE ORAL EVERY 12 HOURS SCHEDULED
Qty: 60 TABLET | Refills: 0 | Status: ON HOLD | OUTPATIENT
Start: 2020-12-15 | End: 2021-06-15 | Stop reason: HOSPADM

## 2020-12-15 RX ORDER — DIVALPROEX SODIUM 250 MG/1
250 TABLET, DELAYED RELEASE ORAL EVERY 12 HOURS SCHEDULED
Qty: 60 TABLET | Refills: 0 | Status: SHIPPED | OUTPATIENT
Start: 2020-12-15 | End: 2020-12-15

## 2020-12-15 RX ORDER — ARIPIPRAZOLE 10 MG/1
10 TABLET ORAL DAILY
Status: DISCONTINUED | OUTPATIENT
Start: 2020-12-15 | End: 2020-12-15 | Stop reason: HOSPADM

## 2020-12-15 RX ADMIN — DIVALPROEX SODIUM 250 MG: 250 TABLET, DELAYED RELEASE ORAL at 08:42

## 2020-12-15 RX ADMIN — ARIPIPRAZOLE 10 MG: 10 TABLET ORAL at 08:42

## 2020-12-15 RX ADMIN — HYDROXYZINE PAMOATE 50 MG: 50 CAPSULE ORAL at 08:42

## 2020-12-15 ASSESSMENT — PAIN SCALES - GENERAL: PAINLEVEL_OUTOF10: 0

## 2020-12-15 NOTE — PROGRESS NOTES
Awake in room denies any thoughts of suicide or HI or hallucinations tells me [facility] for rehab emotional support given

## 2020-12-15 NOTE — PROGRESS NOTES
CLINICAL PHARMACY NOTE: MEDS TO [address] Select Patient?: No  Total # of Prescriptions Filled: 2   The following medications were delivered to the patient:  · ABILIFY 10 MG   · DEPAKOTE  MG   Total # of Interventions Completed: 3  Time Spent (min): 15    Additional Documentation:

## 2020-12-15 NOTE — CARE COORDINATION
In order to ensure appropriate transition and discharge planning is in place, the following documents have been transmitted to Kadi Fernandez 106Sonia, as the new outpatient provider:    ? The d/c diagnosis was transmitted to the next care provider  ? The reason for hospitalization was transmitted to the next care provider  ? The d/c medications (dosage and indication) were transmitted to the next care provider   ?  The continuing care plan was transmitted to the next care provider

## 2020-12-17 NOTE — DISCHARGE SUMMARY
DISCHARGE SUMMARY      Patient ID:  Denia Mata  [id number]  30 y.o.  1995    Admit date: 12/12/2020    Discharge date and time: 12/15/20    Admitting Physician: Matthew Ellis MD     Discharge Physician: Dr Koffi Sarmiento MD    Admission Diagnoses: Acute depression [F32.9]    Admission Condition: poor    Discharged Condition: stable    Admission Circumstance: Patient presented the ED for suicidal ideations after getting [facility]      PAST MEDICAL/PSYCHIATRIC HISTORY:   Past Medical History:   Diagnosis Date    Anxiety     [facility]     Drug abuse in remission ([facility]     H/O alcohol abuse     Hep C w/o coma, chronic ([facility]        FAMILY/SOCIAL HISTORY:  History reviewed. No pertinent family history. Social History     Socioeconomic History    Marital status: Single     Spouse name: Not on file    Number of children: Not on file    Years of education: Not on file    Highest education level: Not on file   Occupational History    Not on file   Social Needs    Financial resource strain: Not on file    Food insecurity     Worry: Not on file     Inability: Not on file    Transportation needs     Medical: Not on file     Non-medical: Not on file   Tobacco Use    Smoking status: Former Smoker     Packs/day: 1.00     Years: 7.00     Pack years: 7.00     Types: Cigarettes    Smokeless tobacco: Former User     Types: Snuff    Tobacco comment: 5 days without smoking   Substance and Sexual Activity    Alcohol use:  Yes    Drug use: Yes     Types: Methamphetamines, Opiates , IV    Sexual activity: Not Currently   Lifestyle    Physical activity     Days per week: Not on file     Minutes per session: Not on file    Stress: Not on file   Relationships    Social connections     Talks on phone: Not on file     Gets together: Not on file     Attends Mosque service: Not on file     Active member of club or organization: Not on file Attends meetings of clubs or organizations: Not on file     Relationship status: Not on file    Intimate partner violence     Fear of current or ex partner: Not on file     Emotionally abused: Not on file     Physically abused: Not on file     Forced sexual activity: Not on file   Other Topics Concern    Not on file   Social History Narrative    Not on file       MEDICATIONS:  No current facility-administered medications for this encounter.      Current Outpatient Medications:     ARIPiprazole (ABILIFY) 10 MG tablet, Take 1 tablet by mouth daily, Disp: 30 tablet, Rfl: 0    divalproex (DEPAKOTE) 250 MG DR tablet, Take 1 tablet by mouth every 12 hours, Disp: 60 tablet, Rfl: 0    Examination:  BP (!) 114/56   Pulse 75   Temp 98 °F (36.7 °C) (Temporal)   Resp 16   Ht 6' 3\" (1.905 m)   Wt 219 lb 8 oz (99.6 kg)   SpO2 97%   BMI 27.44 kg/m²   Gait - steady    HOSPITAL COURSE[de-identified] Patient is admitted to unit on 12/12/2020 was closely monitored for suicidal ideations. He was evaluated and was treated with Abilify 10 mg daily and Depakote 250 mg twice daily. Medical instrument significant patient continued to improve on the floor. Patient never made any suicidal statements or any suicidal gestures. He start coming of his room he is attending groups he was socializing with peers. He stated he wanted to go to inpatient rehab on discharge and social work was able to secure an inpatient bed for patient. Treatment team felt the patient attained a maximum benefit for his hospitalization he was set up with an outpatient mental health agency for outpatient follow-up services. The time of discharge patient not shown impulsive behavior. He vehemently denied any suicidal homicidal ideations intent or plan. He was eating well sleeping well there are no neurovegetative signs symptoms of depression. He denies any auditory visual hallucinations there were no overt overt signs psychosis. He was appreciate that he received here. This patient no longer is criteria for inpatient hospitalization      No AVH or paranoid thoughts  No hopeless or worthless feeling  No active SI/HI  Appetite:  [x] Normal  [] Increased  [] Decreased    Sleep:       [x] Normal  [] Fair       [] Poor            Energy:    [x] Normal  [] Increased  [] Decreased     SI [] Present  [x] Absent  HI  []Present  [x] Absent   Aggression:  [] yes  [x] no  Patient is [x] able  [] unable to CONTRACT FOR SAFETY   Medication side effects(SE):  [x] None(Psych. Meds.) [] Other      Mental Status Examination on discharge:    Level of consciousness:  within normal limits   Appearance:  well-appearing  Behavior/Motor:  no abnormalities noted  Attitude toward examiner:  attentive and good eye contact  Speech:  spontaneous, normal rate and normal volume   Mood: \"My mood is good. \"  Affect: Appropriate and pleasant Thought processes: Linear without flight of ideas loose associations  Thought content: Devoid of any auditory visual hallucinations delusions or any other perceptual normalities. Denies SI/HI intent or plan  Cognition:  oriented to person, place, and time   Concentration intact  Memory intact  Insight good   Judgement fair   Fund of Knowledge adequate      ASSESSMENT:  Patient symptoms are:  [x] Well controlled  [x] Improving  [] Worsening  [] No change    Reason for more than one antipsychotic:  [x] N/A  [] 3 Failed Monotherapy attempts (Drugs tried:)  [] Crossover to a new antipsychotic  [] Taper to Monotherapy from Polypharmacy  [] Augmentation of clozapine therapy due to treatment resistance to single therapy    Diagnosis:  Principal Problem:    Schizoaffective disorder, bipolar type ([facility]  Active Problems:    Polysubstance abuse ([facility]  Resolved Problems:    * No resolved hospital problems. *      LABS:    No results for input(s): WBC, HGB, PLT in the last 72 hours. No results for input(s): NA, K, CL, CO2, BUN, CREATININE, GLUCOSE in the last 72 hours. No results for input(s): BILITOT, ALKPHOS, AST, ALT in the last 72 hours. Lab Results   Component Value Date    LABAMPH NOT DETECTED 12/12/2020    BARBSCNU NOT DETECTED 12/12/2020    LABBENZ NOT DETECTED 12/12/2020    COCAINESCRN Negative 10/10/2020    LABMETH NOT DETECTED 12/12/2020    OPIATESCREENURINE NOT DETECTED 12/12/2020    PHENCYCLIDINESCREENURINE NOT DETECTED 12/12/2020    ETOH <10 12/12/2020     Lab Results   Component Value Date    TSH 0.657 11/03/2019     Lab Results   Component Value Date    LITHIUM <0.2 (L) 03/20/2019     No results found for: VALPROATE, CBMZ    RISK ASSESSMENT AT DISCHARGE: Low risk for suicide and homicide. Treatment Plan:  Reviewed current Medications with the patient. Education provided on the complaince with treatment. Risks, benefits, side effects, drug-to-drug interactions and alternatives to treatment were discussed. Encourage patient to attend outpatient follow up appointment and therapy. Patient was advised to call the outpatient provider, visit the nearest ED or call 911 if symptoms are not manageable. Patient's family member was contacted prior to the discharge.          Medication List      START taking these medications    ARIPiprazole 10 MG tablet  Commonly known as: ABILIFY  Take 1 tablet by mouth daily     divalproex 250 MG DR tablet  Commonly known as: DEPAKOTE  Take 1 tablet by mouth every 12 hours        STOP taking these medications    Suboxone 4-1 MG Film SL film  Generic drug: buprenorphine-naloxone           Where to Get Your Medications      These medications were sent to [address]  [facility] [address]    Phone: [phone]   · ARIPiprazole 10 MG tablet  · divalproex 250 MG DR tablet       Patient is counseled if he continues to abuse drugs or alcohol he could act out impulsively causing serious harm to himself or others even though may be unintentional.  He demonstrated understanding of this and has the capacity understand this    Patient is counseled most been compliant with all medications outpatient follow-up appointments    Patient is discharged to inpatient rehab in stable condition    TIME SPEND - 35 MINUTES TO COMPLETE THE EVALUATION, DISCHARGE SUMMARY, MEDICATION RECONCILIATION AND FOLLOW UP CARE     Signed:  Kenrick Hernández  33/17/2690  3:56 PM

## 2021-01-21 ENCOUNTER — HOSPITAL ENCOUNTER (EMERGENCY)
Age: 26
Discharge: HOME | End: 2021-01-21
Attending: EMERGENCY MEDICINE
Payer: MEDICAID

## 2021-01-21 VITALS
RESPIRATION RATE: 18 BRPM | SYSTOLIC BLOOD PRESSURE: 125 MMHG | HEART RATE: 75 BPM | TEMPERATURE: 98 F | DIASTOLIC BLOOD PRESSURE: 73 MMHG | OXYGEN SATURATION: 93 %

## 2021-01-21 DIAGNOSIS — F39 MOOD DISORDER (HCC): Primary | ICD-10-CM

## 2021-01-21 PROCEDURE — 99283 EMERGENCY DEPT VISIT LOW MDM: CPT

## 2021-01-21 NOTE — ED PROVIDER NOTES
HPI:  1/21/21,   Time: 11:44 AM CANDELARIA Finney is a 30 y.o. male presenting to the ED for stated want to be up guard at [place] [address], beginning 1 hr ago. The complaint has been intermittent, mild in severity, and worsened by guard calling him derogatory terms. States guard at [facility] where he is at for rehab called him derogatory term. Pt states was mad, and told staff he wanted to beat up guard. Pt denies si/hi, states was just upset, and is over issue. No issues with this guard in past.  No hallucinations/n/v/d/cp/sob/cough/congestoin. Review of Systems:   Pertinent positives and negatives are stated within HPI, all other systems reviewed and are negative.          --------------------------------------------- PAST HISTORY ---------------------------------------------  Past Medical History:  has a past medical history of Anxiety, Bipolar 1 disorder ([facility] Drug abuse in remission ([facility] H/O alcohol abuse, and Hep C w/o coma, chronic ([facility] Past Surgical History:  has a past surgical history that includes Hand surgery (Right) and Abscess Drainage (Right, 10/13/2020). Social History:  reports that he has been smoking cigarettes. He has a 7.00 pack-year smoking history. He has quit using smokeless tobacco.  His smokeless tobacco use included snuff. He reports current alcohol use. He reports current drug use. Drugs: Methamphetamines, Opiates , and IV. Family History: family history is not on file. The patients home medications have been reviewed. Allergies: Patient has no known allergies.         ---------------------------------------------------PHYSICAL EXAM--------------------------------------    Constitutional/General: Alert and oriented x3, well appearing, non toxic in NAD  Head: Normocephalic and atraumatic  Eyes: PERRL, EOMI, conjunctive normal, sclera non icteric  Mouth: Oropharynx clear, handling secretions, no trismus, no asymmetry of the posterior oropharynx or uvular edema  Neck: Supple, full ROM, non tender to palpation in the midline, no stridor, no crepitus, no meningeal signs  Respiratory: Lungs clear to auscultation bilaterally, no wheezes, rales, or rhonchi. Not in respiratory distress  Cardiovascular:  Regular rate. Regular rhythm. No murmurs, gallops, or rubs. 2+ distal pulses  Chest: No chest wall tenderness  GI:  Abdomen Soft, Non tender, Non distended. +BS. No organomegaly, no palpable masses,  No rebound, guarding, or rigidity. Musculoskeletal: Moves all extremities x 4. Warm and well perfused, no clubbing, cyanosis, or edema. Capillary refill <3 seconds  Integument: skin warm and dry. No rashes. Lymphatic: no lymphadenopathy noted  Neurologic: GCS 15, no focal deficits, symmetric strength 5/5 in the upper and lower extremities bilaterally  Psychiatric: Normal Affect    -------------------------------------------------- RESULTS -------------------------------------------------  I have personally reviewed all laboratory and imaging results for this patient. Results are listed below. LABS:  No results found for this visit on 01/21/21. RADIOLOGY:  Interpreted by Radiologist.  No orders to display       EKG: This EKG is signed and interpreted by the EP. Time:   Rate:   Rhythm:   Interpretation:   Comparison:       ------------------------- NURSING NOTES AND VITALS REVIEWED ---------------------------   The nursing notes within the ED encounter and vital signs as below have been reviewed by myself. /73   Pulse 75   Temp 98 °F (36.7 °C)   Resp 18   SpO2 93%   Oxygen Saturation Interpretation: Normal    The patients available past medical records and past encounters were reviewed.         ------------------------------ ED COURSE/MEDICAL DECISION MAKING----------------------  Medications - No data to display      ED COURSE:       Medical Decision Making:    Pt no si/hi/hallucinations, no criteria for inpt tx, feel is safe to return to rehab      This patient's ED course included: a personal history and physicial examination    This patient has remained hemodynamically stable during their ED course. Counseling: The emergency provider has spoken with the patient and discussed todays results, in addition to providing specific details for the plan of care and counseling regarding the diagnosis and prognosis. Questions are answered at this time and they are agreeable with the plan.       --------------------------------- IMPRESSION AND DISPOSITION ---------------------------------    IMPRESSION  1. Mood disorder ([facility]        DISPOSITION  Disposition: Discharge to detox  Patient condition is stable    NOTE: This report was transcribed using voice recognition software.  Every effort was made to ensure accuracy; however, inadvertent computerized transcription errors may be present        Asia Osborn MD  01/21/21 1144

## 2021-04-04 PROBLEM — F11.93 OPIATE WITHDRAWAL (HCC): Status: ACTIVE | Noted: 2021-04-04

## 2021-04-05 PROBLEM — F11.20 SEVERE OPIOID USE DISORDER (HCC): Chronic | Status: ACTIVE | Noted: 2020-10-11

## 2021-04-05 PROBLEM — F19.10 POLYSUBSTANCE ABUSE (HCC): Chronic | Status: ACTIVE | Noted: 2020-12-13

## 2021-04-22 ENCOUNTER — HOSPITAL ENCOUNTER (EMERGENCY)
Age: 26
Discharge: OTHER INSTITUTION | End: 2021-04-22
Attending: EMERGENCY MEDICINE
Payer: COMMERCIAL

## 2021-04-22 VITALS
TEMPERATURE: 98 F | HEART RATE: 76 BPM | SYSTOLIC BLOOD PRESSURE: 128 MMHG | OXYGEN SATURATION: 98 % | RESPIRATION RATE: 16 BRPM | WEIGHT: 240 LBS | BODY MASS INDEX: 30.81 KG/M2 | DIASTOLIC BLOOD PRESSURE: 75 MMHG

## 2021-04-22 DIAGNOSIS — F25.9 SCHIZOAFFECTIVE DISORDER, UNSPECIFIED TYPE (HCC): Primary | ICD-10-CM

## 2021-04-22 DIAGNOSIS — F19.10 SUBSTANCE ABUSE (HCC): ICD-10-CM

## 2021-04-22 PROCEDURE — 99285 EMERGENCY DEPT VISIT HI MDM: CPT

## 2021-04-22 NOTE — ED NOTES
Emergency Department [facility] AN AFFILIATE OF [facility] Biopsychosocial Assessment Note    Chief Complaint: The pt is a 30 yr old white male who presents with a desire to be admitted to [facility]. MSE: The pt presents calm and cooperative with a flat affect and congruent mood. He is oriented times 4 and is a good historian. He denies current AVH but reports a hx of it \"I have done a lot of Acid\". Clinical Summary/History:  The pt stated that he was at [facility] in [facility] today and he was supposed to be transferred to [facility]. He stated that when he got there they felt he should go somewhere to address his schizoaffective d/o and get on meds. The pt stated that he does not want to go to a dual diagnosis clinic and would like to go to [facility]. He denies current SI, HI, and AVH. He stated that he has never had a suicide attempt or attempted to harm others but he has had suicidal ideation in the past.  He admitted that he was seeing things earlier due to earlier meth use. He stated that he is not currently following with any psychiatric agency but has been getting his psych meds when he has been in rehabs. He stated that his last admit to psych was around 3 weeks ago. Call to [facility] and spoke to Terry. He stated that he is willing to take the pt if we provide transport- ED doc in agreement with the pt being d/c to the [address] Gender  [x] Male [] Female [] Transgender  [] Other    Sexual Orientation    [x] Heterosexual [] Homosexual [] Bisexual [] Other    Suicidal Behavioral: CSSR-S Complete. [] Reports: hX THOUGHTS   [] Past [] Present   [x] Denies    Homicidal/ Violent Behavior  [] Reports:   [] Past [] Present   [x] Denies     Hallucinations/Delusions   [] Reports:   [x] Denies     Substance Use/Alcohol Use/Addiction: SBIRT Screen Complete.    [x] Reports: Meth use this AM  [] Denies     Trauma History  [] Reports:  [] Denies     Collateral Information: EMS      Level of Care/Disposition Plan  [] Home:   [] Outpatient Provider:   [] Crisis Unit:   [] Inpatient Psychiatric Unit:  [x] Other:  Cab to JENNIFER Conklin  04/22/21 3837

## 2021-04-22 NOTE — ED NOTES
Pt denies si/hi/ hallucinations  Reports was sent to farooq fiore from [facility] in [facility] to be admittd for detox howeer when he got there they refused to accept him d/t wanted pt to be on medicine for his psych conditio.   Pt requesting [place] pt is kimani and coopeative aware of plan of care awiating to speak with arthur [facility] at this time     Render HAKAN Casillas  04/22/21 9257

## 2021-04-22 NOTE — ED PROVIDER NOTES
HPI:  4/22/21,   Time: 3:30 PM EDT       Nhan White is a 30 y.o. male presenting to the ED for detox/substance abuse/schizoaff, beginning unk ago. The complaint has been persistent, moderate in severity, and worsened by nothing. Seen at [facility], sent to [facility] for rehab after med clearance, they refused on arrival, sent him here due to psych hx. Not on meds. Pos hallucinations, no si/hi/delusions    Review of Systems:   Pertinent positives and negatives are stated within HPI, all other systems reviewed and are negative.          --------------------------------------------- PAST HISTORY ---------------------------------------------  Past Medical History:  has a past medical history of Anxiety, Bipolar 1 disorder ([facility] Drug abuse in remission ([facility] H/O alcohol abuse, and Hep C w/o coma, chronic ([facility] Past Surgical History:  has a past surgical history that includes Hand surgery (Right) and Abscess Drainage (Right, 10/13/2020). Social History:  reports that he has been smoking cigarettes. He has a 3.50 pack-year smoking history. He has quit using smokeless tobacco.  His smokeless tobacco use included snuff. He reports current alcohol use. He reports current drug use. Drugs: Methamphetamines, Opiates , IV, Cocaine, and Marijuana. Family History: family history is not on file. The patients home medications have been reviewed. Allergies: Patient has no known allergies. ---------------------------------------------------PHYSICAL EXAM--------------------------------------    Constitutional/General: Alert and oriented x3, well appearing, non toxic in NAD  Head: Normocephalic and atraumatic  Eyes: PERRL, EOMI, conjunctive normal, sclera non icteric  Mouth: Oropharynx clear, handling secretions, no trismus, no asymmetry of the posterior oropharynx or uvular edema  Neck: Supple, full ROM,   Respiratory: Not in respiratory distress  Cardiovascular:  Regular rate. Regular rhythm.  No murmurs, gallops, or rubs. 2+ distal pulses  Chest: No chest wall tenderness  GI:  Abdomen Soft, Non tender, Non distended. +BS. No organomegaly, no palpable masses,  No rebound, guarding, or rigidity. Musculoskeletal: Moves all extremities x 4. Warm and well perfused, no clubbing, cyanosis, or edema. Capillary refill <3 seconds  Integument: skin warm and dry. No rashes. Lymphatic: no lymphadenopathy noted  Neurologic: GCS 15, no focal deficits, symmetric strength 5/5 in the upper and lower extremities bilaterally  Psychiatric: colorful Affect    -------------------------------------------------- RESULTS -------------------------------------------------  I have personally reviewed all laboratory and imaging results for this patient. Results are listed below. LABS:  No results found for this visit on 04/22/21. RADIOLOGY:  Interpreted by Radiologist.  No orders to display       EKG: This EKG is signed and interpreted by the EP. Time: \  Rate: \  Rhythm:   Interpretation:   Comparison:       ------------------------- NURSING NOTES AND VITALS REVIEWED ---------------------------   The nursing notes within the ED encounter and vital signs as below have been reviewed by myself. /83   Pulse 65   Temp 98.3 °F (36.8 °C)   Resp 18   Wt 240 lb (108.9 kg)   SpO2 98%   BMI 30.81 kg/m²   Oxygen Saturation Interpretation: Normal    The patients available past medical records and past encounters were reviewed.         ------------------------------ ED COURSE/MEDICAL DECISION MAKING----------------------  Medications - No data to display      ED COURSE:       Medical Decision Making:    Chart reviewed from [facility] er visit, medically clear from [facility] w/u, social work to setup transfer to tx facility supriya for pt with current conditions      This patient's ED course included: a personal history and physicial examination    This patient has remained hemodynamically stable during their ED

## 2021-05-05 ENCOUNTER — HOSPITAL ENCOUNTER (EMERGENCY)
Age: 26
Discharge: HOME | End: 2021-05-05
Attending: EMERGENCY MEDICINE
Payer: COMMERCIAL

## 2021-05-05 VITALS
HEIGHT: 74 IN | OXYGEN SATURATION: 97 % | DIASTOLIC BLOOD PRESSURE: 78 MMHG | SYSTOLIC BLOOD PRESSURE: 106 MMHG | WEIGHT: 230 LBS | TEMPERATURE: 97.6 F | BODY MASS INDEX: 29.52 KG/M2 | HEART RATE: 66 BPM | RESPIRATION RATE: 18 BRPM

## 2021-05-05 DIAGNOSIS — K02.9 DENTAL CARIES: ICD-10-CM

## 2021-05-05 DIAGNOSIS — K04.7 DENTAL INFECTION: Primary | ICD-10-CM

## 2021-05-05 PROCEDURE — 6360000002 HC RX W HCPCS: Performed by: EMERGENCY MEDICINE

## 2021-05-05 PROCEDURE — 99283 EMERGENCY DEPT VISIT LOW MDM: CPT

## 2021-05-05 PROCEDURE — 96372 THER/PROPH/DIAG INJ SC/IM: CPT

## 2021-05-05 PROCEDURE — 6370000000 HC RX 637 (ALT 250 FOR IP): Performed by: EMERGENCY MEDICINE

## 2021-05-05 RX ORDER — KETOROLAC TROMETHAMINE 30 MG/ML
30 INJECTION, SOLUTION INTRAMUSCULAR; INTRAVENOUS ONCE
Status: COMPLETED | OUTPATIENT
Start: 2021-05-05 | End: 2021-05-05

## 2021-05-05 RX ORDER — CLINDAMYCIN HYDROCHLORIDE 150 MG/1
450 CAPSULE ORAL 3 TIMES DAILY
Qty: 63 CAPSULE | Refills: 0 | Status: SHIPPED | OUTPATIENT
Start: 2021-05-05 | End: 2021-05-12

## 2021-05-05 RX ORDER — CLINDAMYCIN HYDROCHLORIDE 150 MG/1
450 CAPSULE ORAL ONCE
Status: COMPLETED | OUTPATIENT
Start: 2021-05-05 | End: 2021-05-05

## 2021-05-05 RX ORDER — IBUPROFEN 800 MG/1
800 TABLET ORAL EVERY 8 HOURS PRN
Qty: 21 TABLET | Refills: 0 | Status: SHIPPED | OUTPATIENT
Start: 2021-05-05 | End: 2021-11-24 | Stop reason: SDUPTHER

## 2021-05-05 RX ADMIN — CLINDAMYCIN HYDROCHLORIDE 450 MG: 150 CAPSULE ORAL at 14:40

## 2021-05-05 RX ADMIN — KETOROLAC TROMETHAMINE 30 MG: 30 INJECTION, SOLUTION INTRAMUSCULAR; INTRAVENOUS at 14:40

## 2021-05-05 ASSESSMENT — PAIN SCALES - GENERAL
PAINLEVEL_OUTOF10: 4
PAINLEVEL_OUTOF10: 4

## 2021-05-05 ASSESSMENT — PAIN DESCRIPTION - FREQUENCY: FREQUENCY: CONTINUOUS

## 2021-05-05 ASSESSMENT — PAIN DESCRIPTION - DESCRIPTORS: DESCRIPTORS: PRESSURE

## 2021-05-05 ASSESSMENT — PAIN DESCRIPTION - LOCATION: LOCATION: FACE

## 2021-05-05 NOTE — ED PROVIDER NOTES
Department of Emergency Medicine   ED  Provider Note  Admit Date/RoomTime: 5/5/2021  2:06 PM  ED Room: 06/06 5/5/21  2:23 PM EDT      HPI: Bryant Lo. 30 y.o. male with a past medical history of   Past Medical History:   Diagnosis Date    Anxiety     Bipolar 1 disorder ([facility]     Drug abuse in remission ([facility]     H/O alcohol abuse     Hep C w/o coma, chronic ([facility]     presents with a complaint of dental pain. The patient states this pain has been gradual in onset, persistent, moderate in severity and worse today which is what prompted the visit. Pain has not been relieved with any OTC medications. Patient reports unilateral facial swelling to left jaw. Patient is able to handle their own secretions and drink fluids without difficulty. Patient denies any fever. The patient also denies any history of dental trauma. Denies difficulty breathing or swallowing. The location of the pain appears to be isolated over tooth number 19/20 . Review of Systems:   Pertinent positives and negatives are stated within HPI, all other systems reviewed and are negative.           --------------------------------------------- PAST HISTORY ---------------------------------------------  Past Medical History:  has a past medical history of Anxiety, Bipolar 1 disorder ([facility] Drug abuse in remission ([facility] H/O alcohol abuse, and Hep C w/o coma, chronic ([facility] Past Surgical History:  has a past surgical history that includes Hand surgery (Right) and Abscess Drainage (Right, 10/13/2020). Social History:  reports that he has been smoking cigarettes. He has a 3.50 pack-year smoking history. He has quit using smokeless tobacco.  His smokeless tobacco use included snuff. He reports current alcohol use. He reports current drug use. Drugs: Methamphetamines, Opiates , IV, Cocaine, and Marijuana. Family History: family history is not on file.      The patients home medications have been reviewed. Allergies: Patient has no known allergies. Nursing Notes reviewed and vital signs reviewed by myself  /78   Pulse 66   Temp 97.6 °F (36.4 °C) (Temporal)   Resp 18   Ht 6' 2\" (1.88 m)   Wt 230 lb (104.3 kg)   SpO2 97%   BMI 29.53 kg/m²     Physical exam:  Constitutional: The patient is comfortable, alert and oriented x3, well appearing, non toxic in NAD. Head: Atraumatic and normocephalic. Eyes: No discharge from the eyes the sclerae are normal.  ENT: The oropharynx is normal. No pharyngeal erythema, uvular edema, tonsillar exudates, asymmetry or trismus. Mouth is normal to inspection  With the exception of a pain on percussion of the tooth noted above. with localized swelling to gum and cheek over jaw. No palpable abscess. . Floor of the mouth is soft. No tenderness in the submental or submandibular space. No tongue elevation. Neck: The neck demonstrates normal range of motion. No meningeals signs are present. No stridor. Respiratory/chest: The chest is nontender. Breath sounds are normal. no respiratory distress is noted  Cardiovascular: Heart shows a regular rate and rhythm no murmurs no rubs no gallops. Skin: The skin exam shows no evidence of rashes  Neuro: GCS is 15  Lymphatic: No cervical lymphadenopathy                  Medical Decision Making: Exam and history c/w  dental pain with developing infection but currently no palpable abscess. Patient is well-appearing, nontoxic and afebrile. He has no systemic symptoms, soft submandibular region. at this time we will  the patient on following up in dental clinic and provide pain relief and antibitoics, and return precautions.              Impression:   1) Dental infection  2) Dental Caries    Disposition: Discharge  Condition: Stable             Pablo Tellez, DO  05/07/21 4331

## 2021-06-11 ENCOUNTER — APPOINTMENT (OUTPATIENT)
Dept: GENERAL RADIOLOGY | Age: 26
End: 2021-06-11
Payer: COMMERCIAL

## 2021-06-11 ENCOUNTER — HOSPITAL ENCOUNTER (EMERGENCY)
Age: 26
Discharge: PSYCHIATRIC OR SUBSTANCE USE FACILITY | End: 2021-06-12
Attending: EMERGENCY MEDICINE
Payer: COMMERCIAL

## 2021-06-11 DIAGNOSIS — R45.851 SUICIDAL IDEATION: Primary | ICD-10-CM

## 2021-06-11 PROCEDURE — 80053 COMPREHEN METABOLIC PANEL: CPT

## 2021-06-11 PROCEDURE — 80179 DRUG ASSAY SALICYLATE: CPT

## 2021-06-11 PROCEDURE — 84484 ASSAY OF TROPONIN QUANT: CPT

## 2021-06-11 PROCEDURE — 99285 EMERGENCY DEPT VISIT HI MDM: CPT

## 2021-06-11 PROCEDURE — 96360 HYDRATION IV INFUSION INIT: CPT

## 2021-06-11 PROCEDURE — 71045 X-RAY EXAM CHEST 1 VIEW: CPT

## 2021-06-11 PROCEDURE — 80307 DRUG TEST PRSMV CHEM ANLYZR: CPT

## 2021-06-11 PROCEDURE — G0480 DRUG TEST DEF 1-7 CLASSES: HCPCS

## 2021-06-11 PROCEDURE — 80143 DRUG ASSAY ACETAMINOPHEN: CPT

## 2021-06-11 PROCEDURE — 85025 COMPLETE CBC W/AUTO DIFF WBC: CPT

## 2021-06-11 RX ORDER — ACETAMINOPHEN 500 MG
1000 TABLET ORAL ONCE
Status: COMPLETED | OUTPATIENT
Start: 2021-06-11 | End: 2021-06-12

## 2021-06-11 RX ORDER — 0.9 % SODIUM CHLORIDE 0.9 %
1000 INTRAVENOUS SOLUTION INTRAVENOUS ONCE
Status: COMPLETED | OUTPATIENT
Start: 2021-06-11 | End: 2021-06-12

## 2021-06-12 ENCOUNTER — APPOINTMENT (OUTPATIENT)
Dept: CT IMAGING | Age: 26
End: 2021-06-12
Payer: COMMERCIAL

## 2021-06-12 ENCOUNTER — HOSPITAL ENCOUNTER (INPATIENT)
Age: 26
LOS: 3 days | Discharge: HOME | DRG: 750 | End: 2021-06-15
Attending: PSYCHIATRY & NEUROLOGY | Admitting: PSYCHIATRY & NEUROLOGY
Payer: COMMERCIAL

## 2021-06-12 VITALS
TEMPERATURE: 96.8 F | RESPIRATION RATE: 19 BRPM | HEART RATE: 86 BPM | SYSTOLIC BLOOD PRESSURE: 126 MMHG | OXYGEN SATURATION: 98 % | DIASTOLIC BLOOD PRESSURE: 75 MMHG

## 2021-06-12 PROBLEM — F32.A DEPRESSION WITH SUICIDAL IDEATION: Status: ACTIVE | Noted: 2021-06-12

## 2021-06-12 PROBLEM — R45.851 DEPRESSION WITH SUICIDAL IDEATION: Status: ACTIVE | Noted: 2021-06-12

## 2021-06-12 LAB
ABSOLUTE EOS #: 0.05 K/UL (ref 0–0.44)
ABSOLUTE IMMATURE GRANULOCYTE: 0.05 K/UL (ref 0–0.3)
ABSOLUTE LYMPH #: 3.22 K/UL (ref 1.1–3.7)
ABSOLUTE MONO #: 1.07 K/UL (ref 0.1–1.2)
ACETAMINOPHEN LEVEL: <5 UG/ML (ref 10–30)
ALBUMIN SERPL-MCNC: 4.5 G/DL (ref 3.5–5.2)
ALBUMIN/GLOBULIN RATIO: 1.5 (ref 1–2.5)
ALP BLD-CCNC: 79 U/L (ref 40–129)
ALT SERPL-CCNC: 35 U/L (ref 5–41)
AMPHETAMINE SCREEN URINE: POSITIVE
ANION GAP SERPL CALCULATED.3IONS-SCNC: 16 MMOL/L (ref 9–17)
AST SERPL-CCNC: 35 U/L
BARBITURATE SCREEN URINE: NEGATIVE
BASOPHILS # BLD: 0 % (ref 0–2)
BASOPHILS ABSOLUTE: <0.03 K/UL (ref 0–0.2)
BENZODIAZEPINE SCREEN, URINE: NEGATIVE
BILIRUB SERPL-MCNC: 0.29 MG/DL (ref 0.3–1.2)
BUN BLDV-MCNC: 20 MG/DL (ref 6–20)
BUN/CREAT BLD: ABNORMAL (ref 9–20)
BUPRENORPHINE URINE: ABNORMAL
CALCIUM SERPL-MCNC: 9 MG/DL (ref 8.6–10.4)
CANNABINOID SCREEN URINE: POSITIVE
CHLORIDE BLD-SCNC: 108 MMOL/L (ref 98–107)
CO2: 20 MMOL/L (ref 20–31)
COCAINE METABOLITE, URINE: NEGATIVE
CREAT SERPL-MCNC: 0.53 MG/DL (ref 0.7–1.2)
DIFFERENTIAL TYPE: ABNORMAL
EKG ATRIAL RATE: 101 BPM
EKG P-R INTERVAL: 158 MS
EKG Q-T INTERVAL: 346 MS
EKG QRS DURATION: 86 MS
EKG QTC CALCULATION (BAZETT): 448 MS
EKG R AXIS: 175 DEGREES
EKG T AXIS: 161 DEGREES
EKG VENTRICULAR RATE: 101 BPM
EOSINOPHILS RELATIVE PERCENT: 0 % (ref 1–4)
ETHANOL PERCENT: 0.06 %
ETHANOL: 64 MG/DL
GFR AFRICAN AMERICAN: >60 ML/MIN
GFR NON-AFRICAN AMERICAN: >60 ML/MIN
GFR SERPL CREATININE-BSD FRML MDRD: ABNORMAL ML/MIN/{1.73_M2}
GFR SERPL CREATININE-BSD FRML MDRD: ABNORMAL ML/MIN/{1.73_M2}
GLUCOSE BLD-MCNC: 111 MG/DL (ref 70–99)
HCT VFR BLD CALC: 40 % (ref 40.7–50.3)
HEMOGLOBIN: 13.8 G/DL (ref 13–17)
IMMATURE GRANULOCYTES: 0 %
LYMPHOCYTES # BLD: 28 % (ref 24–43)
MCH RBC QN AUTO: 30.3 PG (ref 25.2–33.5)
MCHC RBC AUTO-ENTMCNC: 34.5 G/DL (ref 28.4–34.8)
MCV RBC AUTO: 87.7 FL (ref 82.6–102.9)
MDMA URINE: ABNORMAL
METHADONE SCREEN, URINE: NEGATIVE
METHAMPHETAMINE, URINE: ABNORMAL
MONOCYTES # BLD: 9 % (ref 3–12)
NRBC AUTOMATED: 0 PER 100 WBC
OPIATES, URINE: NEGATIVE
OXYCODONE SCREEN URINE: NEGATIVE
PDW BLD-RTO: 12.8 % (ref 11.8–14.4)
PHENCYCLIDINE, URINE: NEGATIVE
PLATELET # BLD: 274 K/UL (ref 138–453)
PLATELET ESTIMATE: ABNORMAL
PMV BLD AUTO: 9.5 FL (ref 8.1–13.5)
POTASSIUM SERPL-SCNC: 3.9 MMOL/L (ref 3.7–5.3)
PROPOXYPHENE, URINE: ABNORMAL
RBC # BLD: 4.56 M/UL (ref 4.21–5.77)
RBC # BLD: ABNORMAL 10*6/UL
SALICYLATE LEVEL: <1 MG/DL (ref 3–10)
SARS-COV-2, RAPID: NOT DETECTED
SEG NEUTROPHILS: 63 % (ref 36–65)
SEGMENTED NEUTROPHILS ABSOLUTE COUNT: 6.92 K/UL (ref 1.5–8.1)
SODIUM BLD-SCNC: 144 MMOL/L (ref 135–144)
SPECIMEN DESCRIPTION: NORMAL
TEST INFORMATION: ABNORMAL
TOTAL PROTEIN: 7.5 G/DL (ref 6.4–8.3)
TOXIC TRICYCLIC SC,BLOOD: NEGATIVE
TRICYCLIC ANTIDEPRESSANTS, UR: ABNORMAL
TROPONIN INTERP: NORMAL
TROPONIN T: NORMAL NG/ML
TROPONIN, HIGH SENSITIVITY: 13 NG/L (ref 0–22)
WBC # BLD: 11.3 K/UL (ref 3.5–11.3)
WBC # BLD: ABNORMAL 10*3/UL

## 2021-06-12 PROCEDURE — 6370000000 HC RX 637 (ALT 250 FOR IP): Performed by: PSYCHIATRY & NEUROLOGY

## 2021-06-12 PROCEDURE — 1240000000 HC EMOTIONAL WELLNESS R&B

## 2021-06-12 PROCEDURE — 6370000000 HC RX 637 (ALT 250 FOR IP): Performed by: NURSE PRACTITIONER

## 2021-06-12 PROCEDURE — 87635 SARS-COV-2 COVID-19 AMP PRB: CPT

## 2021-06-12 PROCEDURE — 6370000000 HC RX 637 (ALT 250 FOR IP): Performed by: STUDENT IN AN ORGANIZED HEALTH CARE EDUCATION/TRAINING PROGRAM

## 2021-06-12 PROCEDURE — 90792 PSYCH DIAG EVAL W/MED SRVCS: CPT | Performed by: PSYCHIATRY & NEUROLOGY

## 2021-06-12 PROCEDURE — 93010 ELECTROCARDIOGRAM REPORT: CPT | Performed by: INTERNAL MEDICINE

## 2021-06-12 PROCEDURE — 6360000002 HC RX W HCPCS: Performed by: PSYCHIATRY & NEUROLOGY

## 2021-06-12 PROCEDURE — APPSS60 APP SPLIT SHARED TIME 46-60 MINUTES: Performed by: NURSE PRACTITIONER

## 2021-06-12 PROCEDURE — 93005 ELECTROCARDIOGRAM TRACING: CPT | Performed by: STUDENT IN AN ORGANIZED HEALTH CARE EDUCATION/TRAINING PROGRAM

## 2021-06-12 PROCEDURE — 70450 CT HEAD/BRAIN W/O DYE: CPT

## 2021-06-12 PROCEDURE — 2580000003 HC RX 258: Performed by: STUDENT IN AN ORGANIZED HEALTH CARE EDUCATION/TRAINING PROGRAM

## 2021-06-12 PROCEDURE — 96360 HYDRATION IV INFUSION INIT: CPT

## 2021-06-12 PROCEDURE — 72125 CT NECK SPINE W/O DYE: CPT

## 2021-06-12 RX ORDER — DIVALPROEX SODIUM 500 MG/1
500 TABLET, DELAYED RELEASE ORAL 2 TIMES DAILY
Status: DISCONTINUED | OUTPATIENT
Start: 2021-06-12 | End: 2021-06-13

## 2021-06-12 RX ORDER — HALOPERIDOL 10 MG/1
5 TABLET ORAL EVERY 4 HOURS PRN
Status: DISCONTINUED | OUTPATIENT
Start: 2021-06-12 | End: 2021-06-15 | Stop reason: HOSPADM

## 2021-06-12 RX ORDER — MAGNESIUM HYDROXIDE/ALUMINUM HYDROXICE/SIMETHICONE 120; 1200; 1200 MG/30ML; MG/30ML; MG/30ML
30 SUSPENSION ORAL EVERY 6 HOURS PRN
Status: DISCONTINUED | OUTPATIENT
Start: 2021-06-12 | End: 2021-06-15 | Stop reason: HOSPADM

## 2021-06-12 RX ORDER — NICOTINE 21 MG/24HR
1 PATCH, TRANSDERMAL 24 HOURS TRANSDERMAL DAILY
Status: DISCONTINUED | OUTPATIENT
Start: 2021-06-12 | End: 2021-06-15 | Stop reason: HOSPADM

## 2021-06-12 RX ORDER — HALOPERIDOL 5 MG/ML
5 INJECTION INTRAMUSCULAR EVERY 4 HOURS PRN
Status: DISCONTINUED | OUTPATIENT
Start: 2021-06-12 | End: 2021-06-15 | Stop reason: HOSPADM

## 2021-06-12 RX ORDER — LORAZEPAM 1 MG/1
2 TABLET ORAL EVERY 4 HOURS PRN
Status: DISCONTINUED | OUTPATIENT
Start: 2021-06-12 | End: 2021-06-15 | Stop reason: HOSPADM

## 2021-06-12 RX ORDER — CYCLOBENZAPRINE HCL 10 MG
10 TABLET ORAL 3 TIMES DAILY PRN
Status: DISCONTINUED | OUTPATIENT
Start: 2021-06-12 | End: 2021-06-15 | Stop reason: HOSPADM

## 2021-06-12 RX ORDER — HYDROXYZINE 50 MG/1
50 TABLET, FILM COATED ORAL 3 TIMES DAILY PRN
Status: DISCONTINUED | OUTPATIENT
Start: 2021-06-12 | End: 2021-06-15 | Stop reason: HOSPADM

## 2021-06-12 RX ORDER — DIPHENHYDRAMINE HYDROCHLORIDE 50 MG/ML
50 INJECTION INTRAMUSCULAR; INTRAVENOUS EVERY 4 HOURS PRN
Status: DISCONTINUED | OUTPATIENT
Start: 2021-06-12 | End: 2021-06-15 | Stop reason: HOSPADM

## 2021-06-12 RX ORDER — LORAZEPAM 2 MG/ML
2 INJECTION INTRAMUSCULAR EVERY 4 HOURS PRN
Status: DISCONTINUED | OUTPATIENT
Start: 2021-06-12 | End: 2021-06-15 | Stop reason: HOSPADM

## 2021-06-12 RX ORDER — MIRTAZAPINE 30 MG/1
30 TABLET, FILM COATED ORAL NIGHTLY
Status: ON HOLD | COMMUNITY
End: 2021-06-15 | Stop reason: HOSPADM

## 2021-06-12 RX ORDER — TRAZODONE HYDROCHLORIDE 50 MG/1
50 TABLET ORAL NIGHTLY PRN
Status: DISCONTINUED | OUTPATIENT
Start: 2021-06-12 | End: 2021-06-15 | Stop reason: HOSPADM

## 2021-06-12 RX ORDER — ONDANSETRON 4 MG/1
4 TABLET, ORALLY DISINTEGRATING ORAL EVERY 8 HOURS PRN
Status: DISCONTINUED | OUTPATIENT
Start: 2021-06-12 | End: 2021-06-15 | Stop reason: HOSPADM

## 2021-06-12 RX ORDER — CLONIDINE HYDROCHLORIDE 0.1 MG/1
0.1 TABLET ORAL 3 TIMES DAILY PRN
Status: DISCONTINUED | OUTPATIENT
Start: 2021-06-12 | End: 2021-06-15 | Stop reason: HOSPADM

## 2021-06-12 RX ORDER — ARIPIPRAZOLE 10 MG/1
10 TABLET ORAL DAILY
Status: DISCONTINUED | OUTPATIENT
Start: 2021-06-12 | End: 2021-06-15 | Stop reason: HOSPADM

## 2021-06-12 RX ORDER — POLYETHYLENE GLYCOL 3350 17 G/17G
17 POWDER, FOR SOLUTION ORAL DAILY PRN
Status: DISCONTINUED | OUTPATIENT
Start: 2021-06-12 | End: 2021-06-15 | Stop reason: HOSPADM

## 2021-06-12 RX ORDER — IBUPROFEN 400 MG/1
400 TABLET ORAL EVERY 6 HOURS PRN
Status: DISCONTINUED | OUTPATIENT
Start: 2021-06-12 | End: 2021-06-15 | Stop reason: HOSPADM

## 2021-06-12 RX ORDER — ACETAMINOPHEN 325 MG/1
650 TABLET ORAL EVERY 4 HOURS PRN
Status: DISCONTINUED | OUTPATIENT
Start: 2021-06-12 | End: 2021-06-15 | Stop reason: HOSPADM

## 2021-06-12 RX ADMIN — ARIPIPRAZOLE 10 MG: 10 TABLET ORAL at 10:04

## 2021-06-12 RX ADMIN — TRAZODONE HYDROCHLORIDE 50 MG: 50 TABLET ORAL at 21:15

## 2021-06-12 RX ADMIN — DIPHENHYDRAMINE HYDROCHLORIDE 50 MG: 50 INJECTION INTRAMUSCULAR; INTRAVENOUS at 13:38

## 2021-06-12 RX ADMIN — DIVALPROEX SODIUM 500 MG: 500 TABLET, DELAYED RELEASE ORAL at 21:15

## 2021-06-12 RX ADMIN — SODIUM CHLORIDE 1000 ML: 9 INJECTION, SOLUTION INTRAVENOUS at 00:33

## 2021-06-12 RX ADMIN — DIVALPROEX SODIUM 500 MG: 500 TABLET, DELAYED RELEASE ORAL at 10:04

## 2021-06-12 RX ADMIN — CYCLOBENZAPRINE 10 MG: 10 TABLET, FILM COATED ORAL at 10:04

## 2021-06-12 RX ADMIN — HALOPERIDOL LACTATE 5 MG: 5 INJECTION, SOLUTION INTRAMUSCULAR at 13:38

## 2021-06-12 RX ADMIN — HYDROXYZINE HYDROCHLORIDE 50 MG: 50 TABLET, FILM COATED ORAL at 09:22

## 2021-06-12 RX ADMIN — LORAZEPAM 2 MG: 2 INJECTION INTRAMUSCULAR; INTRAVENOUS at 13:38

## 2021-06-12 RX ADMIN — ACETAMINOPHEN 1000 MG: 500 TABLET ORAL at 02:55

## 2021-06-12 ASSESSMENT — SLEEP AND FATIGUE QUESTIONNAIRES
DIFFICULTY ARISING: YES
DIFFICULTY ARISING: YES
DIFFICULTY FALLING ASLEEP: YES
SLEEP PATTERN: DIFFICULTY FALLING ASLEEP;DISTURBED/INTERRUPTED SLEEP;RESTLESSNESS;EARLY AWAKENING
DO YOU USE A SLEEP AID: YES
RESTFUL SLEEP: NO
AVERAGE NUMBER OF SLEEP HOURS: 3
RESTFUL SLEEP: NO
DIFFICULTY STAYING ASLEEP: YES
DO YOU HAVE DIFFICULTY SLEEPING: YES
SLEEP PATTERN: DISTURBED/INTERRUPTED SLEEP
DO YOU HAVE DIFFICULTY SLEEPING: YES
DO YOU USE A SLEEP AID: YES
DIFFICULTY STAYING ASLEEP: YES
AVERAGE NUMBER OF SLEEP HOURS: 6
DIFFICULTY FALLING ASLEEP: NO

## 2021-06-12 ASSESSMENT — LIFESTYLE VARIABLES
HISTORY_ALCOHOL_USE: YES
HISTORY_ALCOHOL_USE: NO

## 2021-06-12 ASSESSMENT — PAIN SCALES - GENERAL
PAINLEVEL_OUTOF10: 0
PAINLEVEL_OUTOF10: 8

## 2021-06-12 ASSESSMENT — PATIENT HEALTH QUESTIONNAIRE - PHQ9: SUM OF ALL RESPONSES TO PHQ QUESTIONS 1-9: 18

## 2021-06-12 NOTE — ED PROVIDER NOTES
[facility]  Emergency Department  Emergency Medicine Resident Sign-out     Care of Holly Peña. was assumed from Dr. Jonah Telles and is being seen for Alcohol Intoxication (daily drinker), Addiction Problem (pt uses meth, last use about a month ago, wants help. ), and Suicidal (OD on heroin)  . The patient's initial evaluation and plan have been discussed with the prior provider who initially evaluated the patient. EMERGENCY DEPARTMENT COURSE / MEDICAL DECISION MAKING:       MEDICATIONS GIVEN:  Orders Placed This Encounter   Medications    acetaminophen (TYLENOL) tablet 1,000 mg    0.9 % sodium chloride bolus       LABS / RADIOLOGY:     Labs Reviewed   CBC WITH AUTO DIFFERENTIAL - Abnormal; Notable for the following components:       Result Value    Hematocrit 40.0 (*)     Eosinophils % 0 (*)     All other components within normal limits   COMPREHENSIVE METABOLIC PANEL - Abnormal; Notable for the following components:    Glucose 111 (*)     CREATININE 0.53 (*)     Chloride 108 (*)     Total Bilirubin 0.29 (*)     All other components within normal limits   TOX SCR, BLD, ED - Abnormal; Notable for the following components:    Acetaminophen Level <5 (*)     Ethanol 64 (*)     Ethanol percent 3.759 (*)     Salicylate Lvl <1 (*)     All other components within normal limits   URINE DRUG SCREEN - Abnormal; Notable for the following components:    Amphetamine Screen, Ur POSITIVE (*)     Cannabinoid Scrn, Ur POSITIVE (*)     All other components within normal limits   COVID-19, RAPID   TROPONIN       XR CHEST PORTABLE    Result Date: 6/12/2021  EXAMINATION: ONE XRAY VIEW OF THE CHEST 6/12/2021 12:06 am COMPARISON: None. HISTORY: ORDERING SYSTEM PROVIDED HISTORY: lightheaded TECHNOLOGIST PROVIDED HISTORY: lightheaded Reason for Exam: lightheaded  chest pain   upright port FINDINGS: The cardiomediastinal silhouette is normal in size and contour. The lungs are clear.   No pleural effusion

## 2021-06-12 NOTE — ED PROVIDER NOTES
[facility]   Emergency Department  Emergency Medicine Attending Sign-out     Care of Meredith Leger. was assumed from previous attending Dr. Rutledge Sites and is being seen for Alcohol Intoxication (daily drinker), Addiction Problem (pt uses meth, last use about a month ago, wants help. ), and Suicidal (OD on heroin)  . The patient's initial evaluation and plan have been discussed with the prior provider who initially evaluated the patient. Attestation  I was available and discussed any additional care issues that arose and coordinated the management plans with the resident(s) caring for the patient during my duty period. Any areas of disagreement with resident's documentation of care or procedures are noted on the chart. I was personally present for the key portions of any/all procedures, during my duty period. I have documented in the chart those procedures where I was not present during the key portions. BRIEF PATIENT SUMMARY/MDM COURSE PER INITIAL PROVIDER:   RECENT VITALS:     Temp: 96.8 °F (36 °C),  Pulse: 130, Resp: 20, BP: 121/74, SpO2: 97 %    This patient is a 30 y.o. Male with alcohol intoxication and suicidal/homicidal ideatiosn.   Re-eval at sober time and possibel psych admission     DIAGNOSTICS/MEDICATIONS:     MEDICATIONS GIVEN:  ED Medication Orders (From admission, onward)    Start Ordered     Status Ordering Provider    06/11/21 2330 06/11/21 2320  acetaminophen (TYLENOL) tablet 1,000 mg  ONCE      Last MAR action: Given - by Jana Layton on 06/12/21 at [facility]    06/11/21 2330 06/11/21 2327  0.9 % sodium chloride bolus  ONCE      Last MAR action: Stopped - by Jana Layton on 06/12/21 at [facility] 92 Reviewed   CBC WITH AUTO DIFFERENTIAL - Abnormal; Notable for the following components:       Result Value    Hematocrit 40.0 (*)     Eosinophils % 0 (*)     All other components within normal limits   COMPREHENSIVE METABOLIC PANEL - Abnormal; Notable for the following components:    Glucose 111 (*)     CREATININE 0.53 (*)     Chloride 108 (*)     Total Bilirubin 0.29 (*)     All other components within normal limits   TOX SCR, BLD, ED - Abnormal; Notable for the following components:    Acetaminophen Level <5 (*)     Ethanol 64 (*)     Ethanol percent 6.894 (*)     Salicylate Lvl <1 (*)     All other components within normal limits   URINE DRUG SCREEN - Abnormal; Notable for the following components:    Amphetamine Screen, Ur POSITIVE (*)     Cannabinoid Scrn, Ur POSITIVE (*)     All other components within normal limits   TROPONIN       RADIOLOGY  XR CHEST PORTABLE    Result Date: 6/12/2021  EXAMINATION: ONE XRAY VIEW OF THE CHEST 6/12/2021 12:06 am COMPARISON: None. HISTORY: ORDERING SYSTEM PROVIDED HISTORY: lightheaded TECHNOLOGIST PROVIDED HISTORY: lightheaded Reason for Exam: lightheaded  chest pain   upright port FINDINGS: The cardiomediastinal silhouette is normal in size and contour. The lungs are clear. No pleural effusion or pneumothorax is present. No acute cardiopulmonary process       OUTSTANDING TASKS / ADDITIONAL COMMENTS   1.  Re-eval at [facility]  2. Bismark Moon MD  Emergency Medicine Attending  [facility]        Jose Angel Moore MD  06/12/21 2067

## 2021-06-12 NOTE — BH NOTE
Patient brought to unit by EMS in stretcher from [facility]. V's. Patient ambulated with steady gait. Patient cooperative with staff. Patient oriented to unit and assigned room. Safety maintained.

## 2021-06-12 NOTE — ED NOTES
Level of Care Disposition:   will contact [facility] once all labs are resulted for possible admission into [address] [place] [address].           Opal Carlson, \[facility] [place] [facility]\""  06/12/21 [address], \[facility] [place] [facility]\""  06/12/21 [address], \[facility] [place] [facility]\""  06/12/21 7823

## 2021-06-12 NOTE — ED PROVIDER NOTES
Faculty Sign-Out Attestation  Handoff taken on the following patient from prior Attending Physician: Ilya Sifuentes    I was available and discussed any additional care issues that arose and coordinated the management plans with the resident(s) caring for the patient during my duty period. Any areas of disagreement with residents documentation of care or procedures are noted on the chart. I was personally present for the key portions of any/all procedures during my duty period. I have documented in the chart those procedures where I was not present during the key portions. Medically cleared, transfer to [facility] for inpatient psychiatric admission for suicidal and homicidal ideation. Pending transport.     Yara Miranda MD  Attending Physician       Yara Miranda MD  06/12/21 5452
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
## 2021-06-12 NOTE — GROUP NOTE
Group Therapy Note    Date: 6/12/2021    Group Start Time: 1000  Group End Time: 1030  Group Topic: Psychoeducation    [facility] D    Evelin Reyes        Group Therapy Note         Patient refused to attend psychotherapy group after encouragement from staff. 1:1 talk time offered but refused. Signature:   Evelin Reyes

## 2021-06-12 NOTE — ED NOTES
Jose Keita and Louise providing transportation to [facility] room 229. ETA 0745 Forms at nurses station zone 3 in folder.       Zak Lombardo, LYNNE  06/12/21 Malissa Culver, LYNNE  06/12/21 2012

## 2021-06-12 NOTE — ED NOTES
Reviewed patient case with on call psychiatrist who finds that patient meets criteria for inpatient psychiatric admission. Pt to be admitted to the [facility] room 229 under the care of Adina Paez with a diagnosis of depression with suicidal ideation. Pt admitted voluntarily.        Tae Bradley, [facility]  06/12/21 [address] [facility], [facility]  06/12/21 1104

## 2021-06-12 NOTE — PROGRESS NOTES
Behavioral Services  Medicare Certification Upon Admission    I certify that this patient's inpatient psychiatric hospital admission is medically necessary for:    [x] (1) Treatment which could reasonably be expected to improve this patient's condition,       [x] (2) Or for diagnostic study;     AND     [x](2) The inpatient psychiatric services are provided while the individual is under the care of a physician and are included in the individualized plan of care.     Estimated length of stay/service 3-5 days    Plan for post-hospital care [facility]    Electronically signed by Yvette Chung MD on 6/12/2021 at 11:01 AM
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
## 2021-06-12 NOTE — BH NOTE
`[facility] [place]  Admission Note     Admission Type:   Admission Type: Voluntary    Reason for admission:  Reason for Admission: suicidal ideations without plan/substance abuse    PATIENT STRENGTHS:  Strengths: Communication, Social Skills, Positive Support, Motivated    Patient Strengths and Limitations:  Limitations: Multiple barriers to leisure interests, Inappropriate/potentially harmful leisure interests, Tendency to isolate self    Addictive Behavior:   Addictive Behavior  In the past 3 months, have you felt or has someone told you that you have a problem with:  : None  Do you have a history of Chemical Use?: No  Do you have a history of Alcohol Use?: No  Do you have a history of Street Drug Abuse?: Yes  Histroy of Prescripton Drug Abuse?: No    Medical Problems:   Past Medical History:   Diagnosis Date    Anxiety     [facility] ([facility] [address]     [facility]     H/O alcohol abuse     Hep C w/o coma, chronic (HCC)        Status EXAM:  Status and Exam  Normal: No  Facial Expression: Flat  Affect: Appropriate  Level of Consciousness: Alert  Mood:Normal: No  Mood: Depressed, Anxious  Motor Activity:Normal: No  Motor Activity: Decreased  Interview Behavior: Cooperative  Preception: [place] to Person, [place] to Time, [place] to Place, [place] to Situation  Attention:Normal: No  Attention: Distractible  Thought Processes: Circumstantial  Thought Content:Normal: No  Thought Content: Preoccupations  Hallucinations: None  Delusions: No  Memory:Normal: Yes  Insight and Judgment: No  Insight and Judgment: Poor Judgment  Present Suicidal Ideation: No  Present Homicidal Ideation: No    Tobacco Screening:  Practical Counseling, on admission, aron X, if applicable and completed (first 3 are required if patient doesn't refuse):            ( )  Recognizing danger situations (included triggers and roadblocks)                    ( )  Coping skills (new ways to manage stress, exercise, relaxation techniques, changing routine, distraction)                                                           ( )  Basic information about quitting (benefits of quitting, techniques in how to quit, available resources  ( ) Referral for counseling faxed to [facility]                                           ( ) Patient refused counseling  ( ) Patient has not smoked in the last 30 days    Metabolic Screening:    Lab Results   Component Value Date    LABA1C 5.0 08/15/2019       Lab Results   Component Value Date    CHOL 132 08/15/2019    CHOL 144 03/20/2019    CHOL 128 01/22/2019     Lab Results   Component Value Date    TRIG 200 (H) 08/15/2019    TRIG 83 03/20/2019    TRIG 146 01/22/2019     Lab Results   Component Value Date    HDL 41 08/15/2019    HDL 42 03/20/2019    HDL 37 (L) 01/22/2019     No components found for: LDLCAL  No results found for: LABVLDL      Body mass index is 29.15 kg/m². BP Readings from Last 2 Encounters:   06/12/21 121/85   06/12/21 126/75           Pt admitted with followings belongings:  Dentures: None  Vision - Corrective Lenses: None  Hearing Aid: None  Jewelry: None  Body Piercings Removed: N/A  Clothing: Footwear, Pants, Shirt, Undergarments (Comment), Other (Comment) (grey hoodie, covid card)  Were All Patient Medications Collected?: Not Applicable  Other Valuables: None     Valuables sent home with n/a. Valuables placed in safe in security envelope, number:  N/A. Patient's home medications were Verified. Patient oriented to surroundings and program expectations and copy of patient rights given. Received admission packet:  yes. Consents reviewed, signed yes. Refused no. Patient verbalize understanding:  yes. Patient education on precautions: yes  Patient admitted to unit from [facility]. V's for suicidal ideations. Patient also has been using meth and heroin daily. Patient denies suicidal/homicidal/self harm ideations. Patient denies hallucinations.  Patient cooperative upon admission. Patient searched with wand. Patient oriented to unit and assigned room. Food/fluids provided. Safety maintained. Provider notified of patient's admission to unit.                     Anisa Caballero RN

## 2021-06-12 NOTE — ED PROVIDER NOTES
[facility] ED  Emergency Department Encounter  Emergency Medicine Resident     Pt Name: Marya Mccurdy MRN: [id number]  Daydaygfcarolyn 1995  Date of evaluation: 6/12/21  PCP:  No primary care provider on file. CHIEF COMPLAINT       Chief Complaint   Patient presents with    Alcohol Intoxication     daily drinker    Addiction Problem     pt uses meth, last use about a month ago, wants help.  Suicidal     OD on heroin       HISTORY OFPRESENT ILLNESS  (Location/Symptom, Timing/Onset, Context/Setting, Quality, Duration, Modifying Factors,Severity.)      Marya Mccurdy is a 58-year-old male who presents with alcohol intoxication. The patient reports drinking a lot of of vodka today but unable to specify. Patient is obviously intoxicated and he is a poor historian. He also reported suicidal ideation and homicidal ideation. He has a plan to \"blow up his parents\". Patient reports using meth up until 3 days ago. Reports that he has not been sleeping for the past 3 days due to his meth use. He currently is complaining of lightheadedness and headache. The patient reports that he does not replace to stay and has been outside for long periods of time. Has been evaluated in the emergency department multiple times for substance abuse and suicidal ideation. PAST MEDICAL / SURGICAL / SOCIAL / FAMILY HISTORY      has a past medical history of Anxiety, Bipolar 1 disorder ([facility] [address] Drug abuse in remission ([facility] [address] H/O alcohol abuse, and Hep C w/o coma, chronic ([facility] [address] [facility] has a past surgical history that includes Hand surgery (Right) and Abscess Drainage (Right, 10/13/2020).      Social History     Socioeconomic History    Marital status: Single     Spouse name: Not on file    Number of children: Not on file    Years of education: Not on file    Highest education level: Not on file   Occupational History    Not on file   Tobacco Use    Smoking status: Current Every Day Smoker Packs/day: 0.50     Years: 7.00     Pack years: 3.50     Types: Cigarettes    Smokeless tobacco: Former User     Types: Snuff   Vaping Use    Vaping Use: Never used   Substance and Sexual Activity    Alcohol use: Yes    Drug use: Yes     Types: Methamphetamines, Opiates , IV, Cocaine, Marijuana     Comment: States uses \"whatever I can get my hands on\"    Sexual activity: Not Currently   Other Topics Concern    Not on file   Social History Narrative    Not on file     Social Determinants of Health     Financial Resource Strain:     Difficulty of Paying Living Expenses:    Food Insecurity:     Worried About Running Out of Food in the Last Year:     [address] in the Last Year:    Transportation Needs:     Lack of Transportation (Medical):  Lack of Transportation (Non-Medical):    Physical Activity:     Days of Exercise per Week:     Minutes of Exercise per Session:    Stress:     Feeling of Stress :    Social Connections:     Frequency of Communication with Friends and Family:     Frequency of Social Gatherings with Friends and Family:     Attends Orthodox Services:     Active Member of Clubs or Organizations:     Attends Club or Organization Meetings:     Marital Status:    Intimate Partner Violence:     Fear of Current or Ex-Partner:     Emotionally Abused:     Physically Abused:     Sexually Abused:        History reviewed. No pertinent family history. Allergies:  Patient has no known allergies. Home Medications:  Prior to Admission medications    Medication Sig Start Date End Date Taking?  Authorizing Provider   ibuprofen (IBU) 800 MG tablet Take 1 tablet by mouth every 8 hours as needed for Pain 5/5/21 5/12/21  Cierra Sis, DO   ARIPiprazole (ABILIFY) 10 MG tablet Take 1 tablet by mouth daily 12/15/20 3/77/11  STEFAN Mckeon CNP   divalproex (DEPAKOTE) 250 MG DR tablet Take 1 tablet by mouth every 12 hours 12/15/20 4/84/37  STEFAN Mckeon - CNP REVIEW OFSYSTEMS    (2-9 systems for level 4, 10 or more for level 5)      Review of Systems  Unable to obtain 2/2 mentation  PHYSICAL EXAM   (up to 7 for level 4, 8 or more forlevel 5)      INITIAL VITALS:   ED Triage Vitals [06/11/21 2249]   BP Temp Temp Source Pulse Resp SpO2 Height Weight   121/74 96.8 °F (36 °C) Oral 130 20 97 % -- --       Physical Exam  Constitutional:       Comments: Slurred speech, drowsy but answers questions   HENT:      Head: Normocephalic and atraumatic. Eyes:      Extraocular Movements: Extraocular movements intact. Pupils: Pupils are equal, round, and reactive to light. Cardiovascular:      Rate and Rhythm: Regular rhythm. Tachycardia present. Pulmonary:      Effort: Pulmonary effort is normal.      Breath sounds: Normal breath sounds. Abdominal:      General: There is no distension. Palpations: Abdomen is soft. Tenderness: There is no abdominal tenderness. Musculoskeletal:      Cervical back: Neck supple. Right lower leg: No edema. Left lower leg: No edema. Skin:     Comments: Significant sunburn on upper extremities and neck   Neurological:      General: No focal deficit present. Mental Status: He is alert. Comments: Ambulating with an unsteady gait         DIFFERENTIAL  DIAGNOSIS     PLAN (LABS / IMAGING / EKG):  Orders Placed This Encounter   Procedures    COVID-19, Rapid    XR CHEST PORTABLE    CT HEAD WO CONTRAST    CT CERVICAL SPINE WO CONTRAST    CBC WITH AUTO DIFFERENTIAL    COMPREHENSIVE METABOLIC PANEL    TOX SCR, BLD, ED    Urine Drug Screen    Troponin    EKG 12 Lead       MEDICATIONS ORDERED:  Orders Placed This Encounter   Medications    acetaminophen (TYLENOL) tablet 1,000 mg    0.9 % sodium chloride bolus     Initial MDM/Plan: 30 y.o. male who presents with alcohol intoxication, drug abuse and suicidal/homicidal ideation.   He was tachycardic on arrival.  The patient appeared drowsy but would answer questions. He has slurred speech. No external signs of trauma. Plan for ED tox screen and urine drug screen. We will also get cardiac work-up given the patient's complaint of lightheadedness and polysubstance abuse. DIAGNOSTIC RESULTS / EMERGENCYDEPARTMENT COURSE / MDM     LABS:  Labs Reviewed   CBC WITH AUTO DIFFERENTIAL - Abnormal; Notable for the following components:       Result Value    Hematocrit 40.0 (*)     Eosinophils % 0 (*)     All other components within normal limits   COMPREHENSIVE METABOLIC PANEL - Abnormal; Notable for the following components:    Glucose 111 (*)     CREATININE 0.53 (*)     Chloride 108 (*)     Total Bilirubin 0.29 (*)     All other components within normal limits   TOX SCR, BLD, ED - Abnormal; Notable for the following components:    Acetaminophen Level <5 (*)     Ethanol 64 (*)     Ethanol percent 0.265 (*)     Salicylate Lvl <1 (*)     All other components within normal limits   URINE DRUG SCREEN - Abnormal; Notable for the following components:    Amphetamine Screen, Ur POSITIVE (*)     Cannabinoid Scrn, Ur POSITIVE (*)     All other components within normal limits   COVID-19, RAPID   TROPONIN         RADIOLOGY:  XR CHEST PORTABLE    Result Date: 6/12/2021  EXAMINATION: ONE XRAY VIEW OF THE CHEST 6/12/2021 12:06 am COMPARISON: None. HISTORY: ORDERING SYSTEM PROVIDED HISTORY: lightheaded TECHNOLOGIST PROVIDED HISTORY: lightheaded Reason for Exam: lightheaded  chest pain   upright port FINDINGS: The cardiomediastinal silhouette is normal in size and contour. The lungs are clear. No pleural effusion or pneumothorax is present. No acute cardiopulmonary process         EKG      All EKG's are interpreted by the Emergency Department Physicianwho either signs or Co-signs this chart in the absence of a cardiologist.    [address] [place] [address]:    He is reevaluated after imaging and lab results. He was ambulating without difficulty. Reports improvement in his headache. Tolerating p.o. Patient is medically clear for discharge at this time. PROCEDURES:  None    CONSULTS:  None    CRITICAL CARE:  Please see attending note    FINAL IMPRESSION      1. Suicidal ideation          DISPOSITION / PLAN     DISPOSITION Decision To Transfer 06/12/2021 04:33:20 AM      PATIENT REFERRED TO:  No follow-up provider specified.     DISCHARGE MEDICATIONS:  New Prescriptions    No medications on file       Angelo Emmanuel MD  Emergency Medicine Resident    (Please note that portions of this note were completed with a voice recognition program.Efforts were made to edit the dictations but occasionally words are mis-transcribed.)       Angelo Emmanuel MD  Resident  06/12/21 [id number]
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
## 2021-06-12 NOTE — BH NOTE
Patient given tobacco quitline number 23622309451 at this time, refusing to call at this time, states \" I just dont want to quit now\"- patient given information as to the dangers of long term tobacco use. Continue to reinforce the importance of tobacco cessation.

## 2021-06-12 NOTE — GROUP NOTE
Group Therapy Note    Date: 6/12/2021    Group Start Time: 0900  Group End Time: 0920  Group Topic: Community Meeting    GARY PONCEI BRAN Amin    Patient refused to attend community meeting/ goals group at 0900 after encouragement from staff. 1:1 talk time provided by staff.      Signature:  Corina Amin

## 2021-06-12 NOTE — GROUP NOTE
Group Therapy Note    Date: 6/12/2021    Group Start Time: 1330  Group End Time: 9046  Group Topic: Psychoeducation    [facility]    Elvis Cummings        Group Therapy Note    Attendees: 10/17         Patient's Goal:  To increase interpersonal interaction    Notes:      Status After Intervention:  Improved    Participation Level:  Active Listener and Interactive    Participation Quality: Appropriate, Attentive and Sharing      Speech:  normal      Thought Process/Content: Logical  Linear      Affective Functioning: Congruent      Mood: euthymic      Level of consciousness:  Alert, Oriented x4 and Attentive      Response to Learning: Able to verbalize current knowledge/experience, Able to verbalize/acknowledge new learning, Able to retain information and Progressing to goal      Endings: None Reported    Modes of Intervention: Education, Support, Socialization, Exploration, Clarifying, Problem-solving and Activity      Discipline Responsible: Psychoeducational Specialist      Signature:  Elvis Cummings

## 2021-06-12 NOTE — PROGRESS NOTES
Pt unable to complete leisure assessment at this time. [facility] staff will meet with pt on 6/13/2021 to complete assessment.

## 2021-06-12 NOTE — ED NOTES
[] Deshawn    [] [facility]    [x]  [facility] ASSESSMENT      Y  N     [x] [] In the past two weeks have you had thoughts of hurting yourself in any way? [x] [] In the past two weeks have you had thoughts that you would be better off dead? [] [x] Have you made a suicide attempt in the past two months? [x] [] Do you have a plan for hurting yourself or suicide? [] [x] Presence of hallucinations/voices related to hurting himself or herself or someone else. SUICIDE/SECURITY WATCH PRECAUTION CHECKLIST     Orders    [x]  Suicide/Security Watch Precautions initiated as checked below:   6/12/21 4:31 AM EDT BH31/BH31B    [x] Notified physician:  Cheli Dean MD  6/12/21 4:31 AM EDT    [x] Orders obtained as appropriate:     [x] 1:1 Observer     [] Psych Consult     [] Psych Consult    Name:  Date:  Time:    [x] 1:1 Observer, Notified by:  Siddhartha Dorado RN    Contact Nurse Supervisor    [x] Remove all personal clothes from room and place in snap/paper gown/pants. Slipper only    [x] Remove all personal belongings from room and secured away from patient. Documentation    [x] Initiate Suicide/Security Watch Precaution Flow Sheet    [x] Initiate individualized Care Plan/Problem    [x] Document why precautions initiated on flow sheet (Initiate Nursing Care Plan/Problem)    [x] 1:1 Observer in place; instructions provided. Suicide precautions require observer be within arms length. [x] Nurse-Observer Communication Hand-off initiated by RN, reviewed with Observer. Subsequently used as Hand Off between Observers. [x] Initiate every 15 minute observations per observer as delegated by the RN.     [x] Initiate RN assessment and documentation    Environmental Scan  Search Criteria and Process: OPTIONAL, see Search Policy    [] Reason for search:    [] Nursing in presence of second person to search patient    [] Patient notified of reason for body assessment and belongings search:     Persons present during search:   Results of search and disposition:       Searchers Name: [employer]     These items or items similar should be removed from the room:   [x] Chairs   [x] Telephone   [x] Trash cans and liners   [x] Plastic utensils (order Patient Safety tray)   [x] Empty or remove Sharps containers   [x] All personal clothing/belongings removed   [x] All unnecessary lead wires, electrical cords, draw cords, etc.   [x] Flowers and plants   [x] Double check for lighters, matches, razors, any glass items etc that can be used as weapons. Person completing Checklist: Imelda Perales RN       GENERAL INFORMATION     Y  N     [x] [] Has the patient been informed that they are on a watch and what that means? [x] [] Can the patient get out of Bed without nursing assistance? [x] [] Can the patient use the restroom without nursing assistance? [x] [] Can the patient walk the halls to [place] their legs? \"   [] [x] Does the patient have metal utensils? [x] [] Have the patient's belongings been placed out of control of the patient? [x] [] Have the patient and his/her belongings been checked for contraband? [] [x] Is the patient under any visitor restrictions? If Yes, explain:   [] [x] Is the patient under an alias? [facility] Name:   Authorized visitors (no more than two are to be on the list)   Name/Relationship:   Name/Relationship:    Name of Staff member that you  Received this information from?: Imelda Perales RN    General Description:    Fidel Pollard male 30 y.o. Admission weight:      Race: [x]  [] Black  []   []   [] [place] [] Other  Facial Hair:  [] Yes  [] No  If yes, please describe: Identifying Marks (i.e. Visible tattoos, scars, etc... ):     NURSING CARE PLAN    Nursing Diagnosis: Risk of Self Directed Harm  [] Actual  [x] Potential  Date Started: 6/12/21      Etiological Factors: (related to)  [x]

## 2021-06-12 NOTE — ED PROVIDER NOTES
[facility]     Emergency Department     Faculty Attestation    I performed a history and physical examination of the patient and discussed management with the resident. I reviewed the resident´s note and agree with the documented findings and plan of care. Any areas of disagreement are noted on the chart. I was personally present for the key portions of any procedures. I have documented in the chart those procedures where I was not present during the key portions. I have reviewed the emergency nurses triage note. I agree with the chief complaint, past medical history, past surgical history, allergies, medications, social and family history as documented unless otherwise noted below. For Physician Assistant/ Nurse Practitioner cases/documentation I have personally evaluated this patient and have completed at least one if not all key elements of the E/M (history, physical exam, and MDM). Additional findings are as noted. Awake and alert, not oriented, clinically intoxicated, skin warm and dry, no ataxia or nystagmus, no muscle rigidity, cranial nerves intact, cerebellar testing normal, good airway, no meningeal signs.        Winda Collet, MD  06/11/21 5028       EKG Interpretation    Interpreted by emergency department physician    Rhythm: normal sinus   Rate: 101  Axis: right 175  Ectopy: none  Conduction: normal  Nonspecific ST and T wave changes  Q Waves: none    Clinical Impression: Abnormal EKG    Winda Collet, III Winda Collet, MD  06/14/21 3082

## 2021-06-12 NOTE — ED TRIAGE NOTES
Patient to ED for drug assessment and SI/HI. Patient states that he wants to overdose on heroin and also states that he wants to shoot his parents with a shotgun. Pt states that he does not have access to a shotgun. Pt reports that he is homeless. Pt admits to using heroin, cocaine, meth and drinking four lokos and vodka. VSS, patient is in NAD. Respirations even and unlabored, call light in reach, pt resting comfortably on stretcher. No further concerns at this time.

## 2021-06-13 PROCEDURE — 6370000000 HC RX 637 (ALT 250 FOR IP): Performed by: PSYCHIATRY & NEUROLOGY

## 2021-06-13 PROCEDURE — 6370000000 HC RX 637 (ALT 250 FOR IP): Performed by: NURSE PRACTITIONER

## 2021-06-13 PROCEDURE — 1240000000 HC EMOTIONAL WELLNESS R&B

## 2021-06-13 PROCEDURE — 99232 SBSQ HOSP IP/OBS MODERATE 35: CPT | Performed by: NURSE PRACTITIONER

## 2021-06-13 RX ADMIN — ONDANSETRON 4 MG: 4 TABLET, ORALLY DISINTEGRATING ORAL at 11:10

## 2021-06-13 RX ADMIN — HYDROXYZINE HYDROCHLORIDE 50 MG: 50 TABLET, FILM COATED ORAL at 21:11

## 2021-06-13 RX ADMIN — CYCLOBENZAPRINE 10 MG: 10 TABLET, FILM COATED ORAL at 21:11

## 2021-06-13 RX ADMIN — IBUPROFEN 400 MG: 400 TABLET, FILM COATED ORAL at 11:10

## 2021-06-13 RX ADMIN — ARIPIPRAZOLE 10 MG: 10 TABLET ORAL at 08:50

## 2021-06-13 RX ADMIN — DIVALPROEX SODIUM 500 MG: 500 TABLET, DELAYED RELEASE ORAL at 08:50

## 2021-06-13 RX ADMIN — HYDROXYZINE HYDROCHLORIDE 50 MG: 50 TABLET, FILM COATED ORAL at 08:50

## 2021-06-13 RX ADMIN — DIVALPROEX SODIUM 750 MG: 500 TABLET, DELAYED RELEASE ORAL at 21:11

## 2021-06-13 RX ADMIN — CYCLOBENZAPRINE 10 MG: 10 TABLET, FILM COATED ORAL at 08:50

## 2021-06-13 RX ADMIN — TRAZODONE HYDROCHLORIDE 50 MG: 50 TABLET ORAL at 21:11

## 2021-06-13 ASSESSMENT — PAIN SCALES - GENERAL: PAINLEVEL_OUTOF10: 6

## 2021-06-13 NOTE — GROUP NOTE
Group Therapy Note    Date: 6/13/2021    Group Start Time: 1330  Group End Time: 9343  Group Topic: Cognitive Skills    GARY BHI BRAN Ontiveros, CTRS    Pt did not attend 1330 cognitive skills group d/t resting in room despite staff invitation to attend. 1:1 talk time offered as alternative to group session, pt declined.           Signature:  Cele Wills

## 2021-06-13 NOTE — PLAN OF CARE
Problem: Depressive Behavior With or Without Suicide Precautions:  Goal: Ability to disclose and discuss suicidal ideas will improve  Description: Ability to disclose and discuss suicidal ideas will improve  6/13/2021 0959 by Spencer Cheng LPN  Outcome: Ongoing   Patient denies suicidal ideations at this time

## 2021-06-13 NOTE — PLAN OF CARE
Problem: Depressive Behavior With or Without Suicide Precautions:  Goal: Able to verbalize and/or display a decrease in depressive symptoms  Description: Able to verbalize and/or display a decrease in depressive symptoms  Outcome: Ongoing  Goal: Ability to disclose and discuss suicidal ideas will improve  Description: Ability to disclose and discuss suicidal ideas will improve  Outcome: Ongoing   Patient denies suicidal ideas at this time. Patient denies homicidal ideas at this time. Patient denies depressive symptoms at this time. Patient has been asleep in room. Patient is free of self harm at this time. Patient agrees to seek out staff if thoughts to harm self arise. Staff will provide support and reassurance as needed. Safety checks maintained every 15 minutes.

## 2021-06-13 NOTE — GROUP NOTE
Group Therapy Note    Date: 6/13/2021    Group Start Time: 0900  Group End Time: 3170  Group Topic: Community Meeting    SIENA Michelle        Group Therapy Note    Attendees: 9         Patient's Goal:  To improve goal setting skills / improve decision making skills     Notes:   Pt was pleasant and participated well     Status After Intervention:  Improved    Participation Level:  Active Listener and Interactive    Participation Quality: Appropriate, Attentive and Supportive      Speech:  normal      Thought Process/Content: Logical      Affective Functioning: Congruent      Mood: euthymic      Level of consciousness:  Alert      Response to Learning: Able to verbalize current knowledge/experience and Progressing to goal      Endings: None Reported    Modes of Intervention: Education, Support and Problem-solving      Discipline Responsible: Psychoeducational Specialist      Signature:  Jr Bruno

## 2021-06-13 NOTE — GROUP NOTE
Group Therapy Note    Date: 6/13/2021    Group Start Time: 1000  Group End Time: 1030  Group Topic: Psychoeducation    [facility]    Sona Leonard        Group Therapy Note    Attendees: 9/21         Patient's Goal:  PT will demonstrate increased interpersonal interaction and a clear understanding on multiple types of coping skills relating to the here-and-now therapeutic practice. Notes: :  Patient is making progress, AEB participating in group discussion, actively listening, and supporting other group members. PT participates in group and encourages others to participate     Status After Intervention:  Improved    Participation Level: Active Listener and Interactive    Participation Quality: Appropriate, Attentive and Sharing      Speech:  normal      Thought Process/Content: Logical      Affective Functioning: Flat      Mood: depressed      Level of consciousness:  Alert, Oriented x4 and Attentive      Response to Learning: Able to verbalize/acknowledge new learning and Progressing to goal      Endings: None Reported    Modes of Intervention: Education, Support, Socialization, Exploration and Clarifying      Discipline Responsible: /Counselor      Signature:   Sona Leonard

## 2021-06-13 NOTE — BH NOTE
RN has reviewed LPN documentation. patient reports generalized cough, fever, generalized body aches x today

## 2021-06-13 NOTE — PLAN OF CARE
[facility]  Initial Interdisciplinary Treatment Plan NO      Original treatment plan Date & Time: 6/13/2021  0728    Admission Type:  Admission Type: Voluntary    Reason for admission:   Reason for Admission: suicidal ideations without plan/substance abuse    Estimated Length of Stay:  5-7days  Estimated Discharge Date: to be determined by physician    PATIENT STRENGTHS:  Patient Strengths:Strengths: Communication  Patient Strengths and Limitations:Limitations: Tendency to isolate self, General negative or hopeless attitude about future/recovery, Lacks leisure interests, Inappropriate/potentially harmful leisure interests, Difficult relationships / poor social skills, Demonstrates discomfort with /lack of social skills, Difficulty problem solving/relies on others to help solve problems  Addictive Behavior: Addictive Behavior  In the past 3 months, have you felt or has someone told you that you have a problem with:  : Excessive Fluid intake  Do you have a history of Chemical Use?: Yes  Do you have a history of Alcohol Use?: Yes  Do you have a history of Street Drug Abuse?: Yes  Histroy of Prescripton Drug Abuse?: Yes  Medical Problems:  Past Medical History:   Diagnosis Date    Anxiety     Bipolar 1 disorder ([facility]     Drug abuse in remission ([facility]     H/O alcohol abuse     Hep C w/o coma, chronic ([facility]      Status EXAM:Status and Exam  Normal: No  Facial Expression: Flat  Affect: Blunt  Level of Consciousness: Alert  Mood:Normal: No  Mood: Depressed  Motor Activity:Normal: Yes  Motor Activity: Increased  Interview Behavior: Cooperative  Preception: [place] to Person, Karene Issa to Time, [place] to Place, [place] to Situation  Attention:Normal: No  Attention: Distractible  Thought Processes: Circumstantial  Thought Content:Normal: No  Thought Content: Preoccupations  Hallucinations: None  Delusions: No  Memory:Normal: Yes  Insight and Judgment: No  Insight and Judgment: Poor Judgment, Poor Insight  Present Suicidal Ideation: No  Present Homicidal Ideation: No    EDUCATION:   Learner Progress Toward Treatment Goals: reviewed group plans and strategies for care    Method:group therapy, medication compliance, individualized assessments and care planning    Outcome: needs reinforcement    PATIENT GOALS: to be discussed with patient within 72 hours    PLAN/TREATMENT RECOMMENDATIONS:     continue group therapy , medications compliance, goal setting, individualized assessments and care, continue to monitor pt on unit      SHORT-TERM GOALS:   Time frame for Short-Term Goals: 5-7 days    LONG-TERM GOALS:  Time frame for Long-Term Goals: 6 months  Members Present in Team Meeting: See Signature Sheet    BENJAMIN Lemon

## 2021-06-14 PROCEDURE — 1240000000 HC EMOTIONAL WELLNESS R&B

## 2021-06-14 PROCEDURE — 6370000000 HC RX 637 (ALT 250 FOR IP): Performed by: PSYCHIATRY & NEUROLOGY

## 2021-06-14 PROCEDURE — 99232 SBSQ HOSP IP/OBS MODERATE 35: CPT | Performed by: PSYCHIATRY & NEUROLOGY

## 2021-06-14 PROCEDURE — APPSS15 APP SPLIT SHARED TIME 0-15 MINUTES: Performed by: NURSE PRACTITIONER

## 2021-06-14 PROCEDURE — 6370000000 HC RX 637 (ALT 250 FOR IP): Performed by: NURSE PRACTITIONER

## 2021-06-14 RX ADMIN — DIVALPROEX SODIUM 750 MG: 500 TABLET, DELAYED RELEASE ORAL at 08:33

## 2021-06-14 RX ADMIN — TRAZODONE HYDROCHLORIDE 50 MG: 50 TABLET ORAL at 20:51

## 2021-06-14 RX ADMIN — DIVALPROEX SODIUM 750 MG: 500 TABLET, DELAYED RELEASE ORAL at 20:51

## 2021-06-14 RX ADMIN — ONDANSETRON 4 MG: 4 TABLET, ORALLY DISINTEGRATING ORAL at 10:59

## 2021-06-14 RX ADMIN — ARIPIPRAZOLE 10 MG: 10 TABLET ORAL at 08:33

## 2021-06-14 RX ADMIN — HYDROXYZINE HYDROCHLORIDE 50 MG: 50 TABLET, FILM COATED ORAL at 20:51

## 2021-06-14 NOTE — GROUP NOTE
Group Therapy Note    Date: 6/14/2021    Group Start Time: 1000  Group End Time: 1608  Group Topic: Cognitive Skills    SIENA Van        Group Therapy Note    Attendees: 15/23         Patient's Goal:  To increase social interaction and to practice decision making, and concentration skills. Notes: Pt participated fully in group . Pt was able to practice decision making, and concentration skills. Pt is pleasant and supportive of peers. Status After Intervention:  Improved         Participation Level:  Active Listener and Interactive     Participation Quality: Appropriate, Attentive, Sharing and Supportive        Speech:  Normal      Thought Process/Content: Logical , linear      Affective Functioning: Congruent, brightens     Mood: euthymic     Level of consciousness:  Alert, and Attentive        Response to Learning: Able to verbalize current knowledge/experience, Able to verbalize/acknowledge new learning ,  and Progressing to goal        Endings: None Reported     Modes of Intervention: Education, Support, Socialization, Exploration, Clarifying and Problem-solving        Discipline Responsible: Psychoeducational Specialist      Signature:  SIENA Bansal

## 2021-06-14 NOTE — PLAN OF CARE
Problem: Depressive Behavior With or Without Suicide Precautions:  Goal: Able to verbalize acceptance of life and situations over which he or she has no control  Description: Able to verbalize acceptance of life and situations over which he or she has no control  6/14/2021 1403 by Jyoti Ferraro  Outcome: Ongoing   Patient is accepting of life and situations over which he cannot control, he is able to show good judgement and insight in care, and the criteria for discharge. Problem: Depressive Behavior With or Without Suicide Precautions:  Goal: Able to verbalize and/or display a decrease in depressive symptoms  Description: Able to verbalize and/or display a decrease in depressive symptoms  6/14/2021 1403 by Jyoti Ferraro  Outcome: Ongoing   Patient denies depression at this time, is selectively social and attending groups on the unit. He can be hyper verbal at times, and is fixated on discharge. Problem: Depressive Behavior With or Without Suicide Precautions:  Goal: Ability to disclose and discuss suicidal ideas will improve  Description: Ability to disclose and discuss suicidal ideas will improve  6/14/2021 1403 by Jyoti Ferraro  Outcome: Ongoing   Patient denies suicidal ideation at this time. He verbally agrees to seek out staff should the thoughts arise.

## 2021-06-14 NOTE — PROGRESS NOTES
Daily Progress Note  6/14/2021    Patient Name: Stefan Ndiaye. CHIEF COMPLAINT: Depression with suicidal ideation         SUBJECTIVE:      Patient is seen today for a follow up assessment. Tyrel was interviewed today in the common area. He reports that he is feeling better and tolerating the increase dosage adjustment with the Depakote. He denies any side effects. He is nervous about treatment options, he reports that he is hoping he can stay somewhere that is somewhat long-term to reduce the likelihood of relapse. He states that he is feeling \"more clear\". Social work is looking into the likelihood of him discharging to [facility]. While he continues to endorse anxiety he reports that his depression is improving and he is feeling \"safer\" with regard to suicidal thoughts. He is denying any auditory or visual hallucinations. He endorses a good appetite and good sleep. Appetite:  [x] Adequate/Unchanged  [] Increased  [] Decreased      Sleep:       [x] Adequate/Unchanged  [] Fair  [] Poor      Group Attendance on Unit:   [x] Yes  [] Selectively    [] No    Medication Side Effects: Denies         Mental Status Exam  Level of consciousness: Alert and awake   Appearance: Appropriate attire for setting, standing at nurses station, with good  grooming and hygiene   Behavior/Motor: Approachable, nervous  Attitude toward examiner: Cooperative, attentive, good eye contact  Speech: Normal rate, volume and tone  Mood: Anxious  Affect: Mood congruent, somewhat guarded  Thought processes: linear, goal directed and coherent   Thought content: Discharge focused, Denies homicidal ideation  Suicidal Ideation: Reports improvement in suicidal ideations, contracts for safety on the unit. Delusions: No evidence of delusions. Perceptual Disturbance: Denies, patient does not appear to be responding to internal stimuli.    Cognition: Oriented to self, location, time, and situation Memory: intact  Insight: fair   Judgement: poor       Data   height is 6' 2\" (1.88 m) and weight is 227 lb (103 kg). His oral temperature is 98.1 °F (36.7 °C). His blood pressure is 137/96 (abnormal) and his pulse is 97. His respiration is 14 and oxygen saturation is 98%. Labs:   No visits with results within 2 Day(s) from this visit.    Latest known visit with results is:   Admission on 06/11/2021, Discharged on 06/12/2021   Component Date Value Ref Range Status    Ventricular Rate 06/12/2021 101  BPM Final    Atrial Rate 06/12/2021 101  BPM Final    P-R Interval 06/12/2021 158  ms Final    QRS Duration 06/12/2021 86  ms Final    Q-T Interval 06/12/2021 346  ms Final    QTc Calculation (Bazett) 06/12/2021 448  ms Final    R Axis 06/12/2021 175  degrees Final    T Axis 06/12/2021 161  degrees Final    WBC 06/11/2021 11.3  3.5 - 11.3 k/uL Final    RBC 06/11/2021 4.56  4.21 - 5.77 m/uL Final    Hemoglobin 06/11/2021 13.8  13.0 - 17.0 g/dL Final    Hematocrit 06/11/2021 40.0* 40.7 - 50.3 % Final    MCV 06/11/2021 87.7  82.6 - 102.9 fL Final    MCH 06/11/2021 30.3  25.2 - 33.5 pg Final    MCHC 06/11/2021 34.5  28.4 - 34.8 g/dL Final    RDW 06/11/2021 12.8  11.8 - 14.4 % Final    Platelets 23/50/2157 274  138 - 453 k/uL Final    MPV 06/11/2021 9.5  8.1 - 13.5 fL Final    NRBC Automated 06/11/2021 0.0  0.0 per 100 WBC Final    Differential Type 06/11/2021 NOT REPORTED   Final    Seg Neutrophils 06/11/2021 63  36 - 65 % Final    Lymphocytes 06/11/2021 28  24 - 43 % Final    Monocytes 06/11/2021 9  3 - 12 % Final    Eosinophils % 06/11/2021 0* 1 - 4 % Final    Basophils 06/11/2021 0  0 - 2 % Final    Immature Granulocytes 06/11/2021 0  0 % Final    Segs Absolute 06/11/2021 6.92  1.50 - 8.10 k/uL Final    Absolute Lymph # 06/11/2021 3.22  1.10 - 3.70 k/uL Final    Absolute Mono # 06/11/2021 1.07  0.10 - 1.20 k/uL Final    Absolute Eos # 06/11/2021 0.05  0.00 - 0.44 k/uL Final    Basophils Absolute 06/11/2021 <0.03  0.00 - 0.20 k/uL Final    Absolute Immature Granulocyte 06/11/2021 0.05  0.00 - 0.30 k/uL Final    WBC Morphology 06/11/2021 NOT REPORTED   Final    RBC Morphology 06/11/2021 NOT REPORTED   Final    Platelet Estimate 77/16/8819 NOT REPORTED   Final    Glucose 06/11/2021 111* 70 - 99 mg/dL Final    BUN 06/11/2021 20  6 - 20 mg/dL Final    CREATININE 06/11/2021 0.53* 0.70 - 1.20 mg/dL Final    Bun/Cre Ratio 06/11/2021 NOT REPORTED  9 - 20 Final    Calcium 06/11/2021 9.0  8.6 - 10.4 mg/dL Final    Sodium 06/11/2021 144  135 - 144 mmol/L Final    Potassium 06/11/2021 3.9  3.7 - 5.3 mmol/L Final    Chloride 06/11/2021 108* 98 - 107 mmol/L Final    CO2 06/11/2021 20  20 - 31 mmol/L Final    Anion Gap 06/11/2021 16  9 - 17 mmol/L Final    Alkaline Phosphatase 06/11/2021 79  40 - 129 U/L Final    ALT 06/11/2021 35  5 - 41 U/L Final    AST 06/11/2021 35  <40 U/L Final    Total Bilirubin 06/11/2021 0.29* 0.3 - 1.2 mg/dL Final    Total Protein 06/11/2021 7.5  6.4 - 8.3 g/dL Final    Albumin 06/11/2021 4.5  3.5 - 5.2 g/dL Final    Albumin/Globulin Ratio 06/11/2021 1.5  1.0 - 2.5 Final    GFR Non- 06/11/2021 >60  >60 mL/min Final    GFR  06/11/2021 >60  >60 mL/min Final    GFR Comment 06/11/2021        Final    Comment: Average GFR for 20-28 years old:   116 mL/min/1.73sq m  Chronic Kidney Disease:   <60 mL/min/1.73sq m  Kidney failure:   <15 mL/min/1.73sq m              eGFR calculated using average adult body mass.  Additional eGFR calculator available at:        [email or web address]            GFR Staging 06/11/2021 NOT REPORTED   Final    Acetaminophen Level 06/11/2021 <5* 10 - 30 ug/mL Final    Ethanol 06/11/2021 64* <10 mg/dL Final    Ethanol percent 06/11/2021 0.064* <6.917 % Final    Salicylate Lvl 65/78/3320 <1* 3 - 10 mg/dL Final    Toxic Tricyclic Sc,Blood 94/10/7814 NEGATIVE  NEGATIVE Final    Amphetamine Screen, Ur 06/11/2021 POSITIVE* NEGATIVE Final    Comment:       (Positive cutoff 1000 ng/mL)                  Barbiturate Screen, Ur 06/11/2021 NEGATIVE  NEGATIVE Final    Comment:       (Positive cutoff 200 ng/mL)                  Benzodiazepine Screen, Urine 06/11/2021 NEGATIVE  NEGATIVE Final    Comment:       (Positive cutoff 200 ng/mL)                  Cocaine Metabolite, Urine 06/11/2021 NEGATIVE  NEGATIVE Final    Comment:       (Positive cutoff 300 ng/mL)                  Methadone Screen, Urine 06/11/2021 NEGATIVE  NEGATIVE Final    Comment:       (Positive cutoff 300 ng/mL)                  Opiates, Urine 06/11/2021 NEGATIVE  NEGATIVE Final    Comment:       (Positive cutoff 300 ng/mL)                  Phencyclidine, Urine 06/11/2021 NEGATIVE  NEGATIVE Final    Comment:       (Positive cutoff 25 ng/mL)                  Propoxyphene, Urine 06/11/2021 NOT REPORTED  NEGATIVE Final    Cannabinoid Scrn, Ur 06/11/2021 POSITIVE* NEGATIVE Final    Comment:       (Positive cutoff 50 ng/mL)                  Oxycodone Screen, Ur 06/11/2021 NEGATIVE  NEGATIVE Final    Comment:       (Positive cutoff 100 ng/mL)                  Methamphetamine, Urine 06/11/2021 NOT REPORTED  NEGATIVE Final    Tricyclic Antidepressants, Urine 06/11/2021 NOT REPORTED  NEGATIVE Final    MDMA, Urine 06/11/2021 NOT REPORTED  NEGATIVE Final    Buprenorphine Urine 06/11/2021 NOT REPORTED  NEGATIVE Final    Test Information 06/11/2021 Assay provides medical screening only. The absence of expected drug(s) and/or metabolite(s) may indicate diluted or adulterated urine, limitations of testing or timing of collection. Final    Comment: Testing for legal purposes should be confirmed by another method. To request confirmation   of test result, please call the lab within 7 days of sample submission.       Troponin, High Sensitivity 06/11/2021 13  0 - 22 ng/L Final    Comment:       High Sensitivity Troponin values cannot be compared with other Troponin methodologies. Patients with high levels of Biotin oral intake (i.e >5mg/day) may have falsely decreased   Troponin levels. Samples collected within 8 hours of biotin intake may require additional   information for diagnosis.  Troponin T 06/11/2021 NOT REPORTED  <0.03 ng/mL Final    Troponin Interp 06/11/2021 NOT REPORTED   Final    Specimen Description 06/12/2021 . NASOPHARYNGEAL SWAB   Final    SARS-CoV-2, Rapid 06/12/2021 Not Detected  Not Detected Final    Comment:       Rapid NAAT:  The specimen is NEGATIVE for SARS-CoV-2, the novel coronavirus associated with   COVID-19. The ID NOW COVID-19 assay is designed to detect the virus that causes COVID-19 in patients   with signs and symptoms of infection who are suspected of COVID-19. An individual without symptoms of COVID-19 and who is not shedding SARS-CoV-2 virus would   expect to have a negative (not detected) result in this assay. Negative results should be treated as presumptive and, if inconsistent with clinical signs   and symptoms or necessary for patient management,  should be tested with an alternative molecular assay. Negative results do not preclude   SARS-CoV-2 infection and   should not be used as the sole basis for patient management decisions. Fact sheet for Healthcare Providers: Salma.es  Fact sheet for Patients: Salma.es          Methodology: Isothermal Nucleic Acid Amplification           Reviewed patient's current plan of care and vital signs with nursing staff.     Labs reviewed: [x] Yes    Medications  Current Facility-Administered Medications: divalproex (DEPAKOTE) DR tablet 750 mg, 750 mg, Oral, BID  acetaminophen (TYLENOL) tablet 650 mg, 650 mg, Oral, Q4H PRN  aluminum & magnesium hydroxide-simethicone (MAALOX) 200-200-20 MG/5ML suspension 30 mL, 30 mL, Oral, Q6H PRN  hydrOXYzine (ATARAX) tablet 50 mg, 50 furnished directly by or requiring the supervision of inpatient psychiatric personnel. Electronically signed by STEFAN Nieto CNP on 6/14/2021 at 10:26 AM    **This report has been created using voice recognition software. It may contain minor errors which are inherent in voice recognition technology. **                                         Psychiatry Attending Attestation     I independently saw and evaluated the patient. I reviewed the Advance Practice Provider's documentation above. Any additional comments or changes to the Advance Practice Provider's documentation are stated below otherwise agree with assessment. Patient feels better than before. Mood and affect are better. Patient reports fleeting suicidal thoughts with no intent or plan. Patient notes that these thoughts are occurring less frequently. Patient reports that he is looking forward to go to full [place] to work on his sobriety. Mentions his family called [facility] [place] and made these plans. Denies any homicidal thoughts, that was explored with the patient. Oriented to time place and person. Recent and remote memory is intact. Patient feels hopeful. Sleep and appetite is good. No side effect from medication reported. Side-effect of medication were discussed with the patient. Patient is responding to current treatment. Discharge soon, if patient continues to show improvement. Case discussed with the staff.        Electronically signed by Baldemar Cody MD on 6/14/21 at 2:29 PM EDT

## 2021-06-14 NOTE — PROGRESS NOTES
Daily Progress Note  6/13/2021    Patient Name: Booker Berry. CHIEF COMPLAINT: Depression with suicidal ideation         SUBJECTIVE:      Patient is seen today for a follow up assessment. Tyrel reports that he is feeling better today. He reports that he slept well last night. He states that he has been talking a lot with social work about discharge planning. He is hopeful that they will get him into an inpatient rehab facility. Social work will reach out to the [facility] in [facility] [place]. He is hopeful to transition to a place that takes care of addiction and mental health in the same setting. He continues to endorse depression though feels that the suicidal ideation is less intense. He remains able to contract for safety. We did discuss his current medication regimen, he is tolerating them though feels that he has room for improvement. We discussed the risks and benefits and agreed to increase his Depakote to 750 mg twice daily. Appetite:  [x] Adequate/Unchanged  [] Increased  [] Decreased      Sleep:       [x] Adequate/Unchanged  [] Fair  [] Poor      Group Attendance on Unit:   [x] Yes  [] Selectively    [] No    Medication Side Effects: Denies         Mental Status Exam  Level of consciousness: Alert and awake   Appearance: Appropriate attire for setting, seated in chair, with good  grooming and hygiene   Behavior/Motor: Approachable, no psychomotor abnormalities   Attitude toward examiner: Cooperative, attentive, good eye contact  Speech: Normal rate, volume and tone  Mood: Anxious  Affect: Mood congruent, guarded  Thought processes: linear, goal directed and coherent   Thought content: He has concerned that if he uses any as needed medication or misses any group it would delay his ability to discharge, Denies homicidal ideation  Suicidal Ideation: Reports improvement in suicidal ideations, contracts for safety on the unit. Delusions: No evidence of delusions.    Perceptual Disturbance: Denies, patient does not appear to be responding to internal stimuli. Cognition: Oriented to self, location, time, and situation  Memory: intact  Insight: fair   Judgement: poor       Data   height is 6' 2\" (1.88 m) and weight is 227 lb (103 kg). His oral temperature is 97.8 °F (36.6 °C). His blood pressure is 128/81 and his pulse is 100. His respiration is 14 and oxygen saturation is 98%.    Labs:   Admission on 06/11/2021, Discharged on 06/12/2021   Component Date Value Ref Range Status    Ventricular Rate 06/12/2021 101  BPM Final    Atrial Rate 06/12/2021 101  BPM Final    P-R Interval 06/12/2021 158  ms Final    QRS Duration 06/12/2021 86  ms Final    Q-T Interval 06/12/2021 346  ms Final    QTc Calculation (Bazett) 06/12/2021 448  ms Final    R Axis 06/12/2021 175  degrees Final    T Axis 06/12/2021 161  degrees Final    WBC 06/11/2021 11.3  3.5 - 11.3 k/uL Final    RBC 06/11/2021 4.56  4.21 - 5.77 m/uL Final    Hemoglobin 06/11/2021 13.8  13.0 - 17.0 g/dL Final    Hematocrit 06/11/2021 40.0* 40.7 - 50.3 % Final    MCV 06/11/2021 87.7  82.6 - 102.9 fL Final    MCH 06/11/2021 30.3  25.2 - 33.5 pg Final    MCHC 06/11/2021 34.5  28.4 - 34.8 g/dL Final    RDW 06/11/2021 12.8  11.8 - 14.4 % Final    Platelets 25/30/3082 274  138 - 453 k/uL Final    MPV 06/11/2021 9.5  8.1 - 13.5 fL Final    NRBC Automated 06/11/2021 0.0  0.0 per 100 WBC Final    Differential Type 06/11/2021 NOT REPORTED   Final    Seg Neutrophils 06/11/2021 63  36 - 65 % Final    Lymphocytes 06/11/2021 28  24 - 43 % Final    Monocytes 06/11/2021 9  3 - 12 % Final    Eosinophils % 06/11/2021 0* 1 - 4 % Final    Basophils 06/11/2021 0  0 - 2 % Final    Immature Granulocytes 06/11/2021 0  0 % Final    Segs Absolute 06/11/2021 6.92  1.50 - 8.10 k/uL Final    Absolute Lymph # 06/11/2021 3.22  1.10 - 3.70 k/uL Final    Absolute Mono # 06/11/2021 1.07  0.10 - 1.20 k/uL Final    Absolute Eos # 06/11/2021 0.05 0.00 - 0.44 k/uL Final    Basophils Absolute 06/11/2021 <0.03  0.00 - 0.20 k/uL Final    Absolute Immature Granulocyte 06/11/2021 0.05  0.00 - 0.30 k/uL Final    WBC Morphology 06/11/2021 NOT REPORTED   Final    RBC Morphology 06/11/2021 NOT REPORTED   Final    Platelet Estimate 16/73/6816 NOT REPORTED   Final    Glucose 06/11/2021 111* 70 - 99 mg/dL Final    BUN 06/11/2021 20  6 - 20 mg/dL Final    CREATININE 06/11/2021 0.53* 0.70 - 1.20 mg/dL Final    Bun/Cre Ratio 06/11/2021 NOT REPORTED  9 - 20 Final    Calcium 06/11/2021 9.0  8.6 - 10.4 mg/dL Final    Sodium 06/11/2021 144  135 - 144 mmol/L Final    Potassium 06/11/2021 3.9  3.7 - 5.3 mmol/L Final    Chloride 06/11/2021 108* 98 - 107 mmol/L Final    CO2 06/11/2021 20  20 - 31 mmol/L Final    Anion Gap 06/11/2021 16  9 - 17 mmol/L Final    Alkaline Phosphatase 06/11/2021 79  40 - 129 U/L Final    ALT 06/11/2021 35  5 - 41 U/L Final    AST 06/11/2021 35  <40 U/L Final    Total Bilirubin 06/11/2021 0.29* 0.3 - 1.2 mg/dL Final    Total Protein 06/11/2021 7.5  6.4 - 8.3 g/dL Final    Albumin 06/11/2021 4.5  3.5 - 5.2 g/dL Final    Albumin/Globulin Ratio 06/11/2021 1.5  1.0 - 2.5 Final    GFR Non- 06/11/2021 >60  >60 mL/min Final    GFR  06/11/2021 >60  >60 mL/min Final    GFR Comment 06/11/2021        Final    Comment: Average GFR for 20-28 years old:   80 mL/min/1.73sq m  Chronic Kidney Disease:   <60 mL/min/1.73sq m  Kidney failure:   <15 mL/min/1.73sq m              eGFR calculated using average adult body mass.  Additional eGFR calculator available at:        [employer]            GFR Staging 06/11/2021 NOT REPORTED   Final    Acetaminophen Level 06/11/2021 <5* 10 - 30 ug/mL Final    Ethanol 06/11/2021 64* <10 mg/dL Final    Ethanol percent 06/11/2021 0.064* <2.430 % Final    Salicylate Lvl 09/88/7246 <1* 3 - 10 mg/dL Final    Toxic Tricyclic Sc,Blood 11/35/6497 NEGATIVE  NEGATIVE Final    Amphetamine Screen, Ur 06/11/2021 POSITIVE* NEGATIVE Final    Comment:       (Positive cutoff 1000 ng/mL)                  Barbiturate Screen, Ur 06/11/2021 NEGATIVE  NEGATIVE Final    Comment:       (Positive cutoff 200 ng/mL)                  Benzodiazepine Screen, Urine 06/11/2021 NEGATIVE  NEGATIVE Final    Comment:       (Positive cutoff 200 ng/mL)                  Cocaine Metabolite, Urine 06/11/2021 NEGATIVE  NEGATIVE Final    Comment:       (Positive cutoff 300 ng/mL)                  Methadone Screen, Urine 06/11/2021 NEGATIVE  NEGATIVE Final    Comment:       (Positive cutoff 300 ng/mL)                  Opiates, Urine 06/11/2021 NEGATIVE  NEGATIVE Final    Comment:       (Positive cutoff 300 ng/mL)                  Phencyclidine, Urine 06/11/2021 NEGATIVE  NEGATIVE Final    Comment:       (Positive cutoff 25 ng/mL)                  Propoxyphene, Urine 06/11/2021 NOT REPORTED  NEGATIVE Final    Cannabinoid Scrn, Ur 06/11/2021 POSITIVE* NEGATIVE Final    Comment:       (Positive cutoff 50 ng/mL)                  Oxycodone Screen, Ur 06/11/2021 NEGATIVE  NEGATIVE Final    Comment:       (Positive cutoff 100 ng/mL)                  Methamphetamine, Urine 06/11/2021 NOT REPORTED  NEGATIVE Final    Tricyclic Antidepressants, Urine 06/11/2021 NOT REPORTED  NEGATIVE Final    MDMA, Urine 06/11/2021 NOT REPORTED  NEGATIVE Final    Buprenorphine Urine 06/11/2021 NOT REPORTED  NEGATIVE Final    Test Information 06/11/2021 Assay provides medical screening only. The absence of expected drug(s) and/or metabolite(s) may indicate diluted or adulterated urine, limitations of testing or timing of collection. Final    Comment: Testing for legal purposes should be confirmed by another method. To request confirmation   of test result, please call the lab within 7 days of sample submission.       Troponin, High Sensitivity 06/11/2021 13  0 - 22 ng/L Final    Comment:       High Sensitivity Troponin values cannot be compared with other Troponin methodologies. Patients with high levels of Biotin oral intake (i.e >5mg/day) may have falsely decreased   Troponin levels. Samples collected within 8 hours of biotin intake may require additional   information for diagnosis.  Troponin T 06/11/2021 NOT REPORTED  <0.03 ng/mL Final    Troponin Interp 06/11/2021 NOT REPORTED   Final    Specimen Description 06/12/2021 . NASOPHARYNGEAL SWAB   Final    SARS-CoV-2, Rapid 06/12/2021 Not Detected  Not Detected Final    Comment:       Rapid NAAT:  The specimen is NEGATIVE for SARS-CoV-2, the novel coronavirus associated with   COVID-19. The ID NOW COVID-19 assay is designed to detect the virus that causes COVID-19 in patients   with signs and symptoms of infection who are suspected of COVID-19. An individual without symptoms of COVID-19 and who is not shedding SARS-CoV-2 virus would   expect to have a negative (not detected) result in this assay. Negative results should be treated as presumptive and, if inconsistent with clinical signs   and symptoms or necessary for patient management,  should be tested with an alternative molecular assay. Negative results do not preclude   SARS-CoV-2 infection and   should not be used as the sole basis for patient management decisions. Fact sheet for Healthcare Providers: Salma.es  Fact sheet for Patients: Salma.es          Methodology: Isothermal Nucleic Acid Amplification           Reviewed patient's current plan of care and vital signs with nursing staff.     Labs reviewed: [x] Yes    Medications  Current Facility-Administered Medications: divalproex (DEPAKOTE) DR tablet 750 mg, 750 mg, Oral, BID  acetaminophen (TYLENOL) tablet 650 mg, 650 mg, Oral, Q4H PRN  aluminum & magnesium hydroxide-simethicone (MAALOX) 200-200-20 MG/5ML suspension 30 mL, 30 mL, Oral, Q6H PRN  hydrOXYzine (ATARAX) tablet 50 mg, 50 mg, Oral, TID PRN  ibuprofen (ADVIL;MOTRIN) tablet 400 mg, 400 mg, Oral, Q6H PRN  nicotine (NICODERM CQ) 14 MG/24HR 1 patch, 1 patch, Transdermal, Daily  polyethylene glycol (GLYCOLAX) packet 17 g, 17 g, Oral, Daily PRN  traZODone (DESYREL) tablet 50 mg, 50 mg, Oral, Nightly PRN  haloperidol (HALDOL) tablet 5 mg, 5 mg, Oral, Q4H PRN **AND** LORazepam (ATIVAN) tablet 2 mg, 2 mg, Oral, Q4H PRN  haloperidol lactate (HALDOL) injection 5 mg, 5 mg, Intramuscular, Q4H PRN **AND** LORazepam (ATIVAN) injection 2 mg, 2 mg, Intramuscular, Q4H PRN **AND** diphenhydrAMINE (BENADRYL) injection 50 mg, 50 mg, Intramuscular, Q4H PRN  ARIPiprazole (ABILIFY) tablet 10 mg, 10 mg, Oral, Daily  cyclobenzaprine (FLEXERIL) tablet 10 mg, 10 mg, Oral, TID PRN  ondansetron (ZOFRAN-ODT) disintegrating tablet 4 mg, 4 mg, Oral, Q8H PRN  cloNIDine (CATAPRES) tablet 0.1 mg, 0.1 mg, Oral, TID PRN    ASSESSMENT  Schizoaffective disorder, bipolar type (HCC)         PLAN  Patient symptoms are: Modestly improved  Increase Depakote to 750 mg twice daily  Monitor need and frequency of PRN medications. Encourage participation in groups and milieu. Attempt to develop insight. Psycho-education conducted. Supportive Therapy conducted. Probable discharge is per attending physician, plan is to transition to inpatient rehab. Follow-up daily while inpatient. Patient continues to be monitored in the inpatient psychiatric facility at [facility] for safety and stabilization. Patient continues to need, on a daily basis, active treatment furnished directly by or requiring the supervision of inpatient psychiatric personnel. Electronically signed by STEFAN Hooper CNP on 6/13/2021 at 8:52 PM    **This report has been created using voice recognition software. It may contain minor errors which are inherent in voice recognition technology. **

## 2021-06-14 NOTE — GROUP NOTE
Group Therapy Note    Date: 6/14/2021    Group Start Time: 1000  Group End Time: 6914  Group Topic: Group Therapy    GARY SCHMIDT    JOSÉ MIGUEL Zimmerman LSW        Group Therapy Note    Attendees: 13/23         Patient's Goal:  Increase interpersonal relationship skills    Notes:  Patient was an active participant in group discussion and activity     Status After Intervention:  Unchanged    Participation Level:  Active Listener and Interactive    Participation Quality: Appropriate, Attentive, Sharing and Supportive      Speech:  normal      Thought Process/Content: Logical      Affective Functioning: Congruent      Mood: anxious and depressed      Level of consciousness:  Alert, Oriented x4 and Attentive      Response to Learning: Able to verbalize current knowledge/experience      Endings: None Reported    Modes of Intervention: Support, Socialization, Exploration, Clarifying and Activity      Discipline Responsible: /Counselor      Signature:  JOSÉ MIGUEL Zimmerman LSW

## 2021-06-14 NOTE — PLAN OF CARE
[address] [place] [address]  Day 3 Interdisciplinary Treatment Plan NOTE    Review Date & Time: 6/14/2021  1307    Admission Type:   Admission Type: Voluntary    Reason for admission:  Reason for Admission: suicidal ideations without plan/substance abuse  Estimated Length of Stay: 5-7 days  Estimated Discharge Date Update: to be determined by physician    PATIENT STRENGTHS:  Patient Strengths Strengths: Communication  Patient Strengths and Limitations:Limitations: Difficult relationships / poor social skills, Difficulty problem solving/relies on others to help solve problems, Apathetic / unmotivated  Addictive Behavior:Addictive Behavior  In the past 3 months, have you felt or has someone told you that you have a problem with:  : Excessive Fluid intake  Do you have a history of Chemical Use?: Yes  Do you have a history of Alcohol Use?: Yes  Do you have a history of Street Drug Abuse?: Yes  Histroy of Prescripton Drug Abuse?: Yes  Medical Problems:  Past Medical History:   Diagnosis Date    Anxiety     Bipolar 1 disorder ([facility]     Drug abuse in remission ([facility]     H/O alcohol abuse     Hep C w/o coma, chronic ([facility]        Risk:  Fall RiskTotal: 53  Rudy Scale Rudy Scale Score: 23  BVC Total: 0  Change in scores no Changes to plan of Care no    Status EXAM:   Status and Exam  Normal: Yes  Facial Expression: Brightened  Affect: Appropriate  Level of Consciousness: Alert  Mood:Normal: No  Mood: Anxious  Motor Activity:Normal: Yes  Motor Activity: Increased  Interview Behavior: Cooperative  Preception: [place] to Person, Karene Issa to Time, [place] to Place, [place] to Situation  Attention:Normal: No  Attention: Distractible  Thought Processes: Circumstantial  Thought Content:Normal: Yes  Thought Content: Preoccupations  Hallucinations: None  Delusions: No  Memory:Normal: Yes  Insight and Judgment: No  Insight and Judgment: Poor Judgment, Poor Insight  Present Suicidal Ideation: No  Present Homicidal Ideation: No    Daily Assessment Last Entry:   Daily Sleep (WDL): Within Defined Limits         Patient Currently in Pain: No  Daily Nutrition (WDL): Within Defined Limits    Patient Monitoring:  Frequency of Checks: 4 times per hour, close    Psychiatric Symptoms:   Depression Symptoms  Depression Symptoms: Impaired concentration  Anxiety Symptoms  Anxiety Symptoms: Generalized  Janet Symptoms  Janet Symptoms: No problems reported or observed. Psychosis Symptoms  Delusion Type: No problems reported or observed. Suicide Risk CSSR-S:  1) Within the past month, have you wished you were dead or wished you could go to sleep and not wake up? : Yes  2) Have you actually had any thoughts of killing yourself? : Yes  3) Have you been thinking about how you might kill yourself? : No  5) Have you started to work out or worked out the details of how to kill yourself?  Do you intend to carry out this plan? : No  6) Have you ever done anything, started to do anything, or prepared to do anything to end your life?: No  Change in Result no Change in Plan of care no      EDUCATION:   EDUCATION:   Learner Progress Toward Treatment Goals: Reviewed results and recommendations of this team, Reviewed group plan and strategies, Reviewed signs, symptoms and risk of self harm and violent behavior, Reviewed goals and plan of care    Method:small group, individual verbal education    Outcome:verbalized by patient, but needs reinforcement to obtain goals    PATIENT GOALS:  Short term: finding a rehab   Long term: sobriety , staying on meds     PLAN/TREATMENT RECOMMENDATIONS UPDATE: continue with group therapies, increased socialization, continue planning for after discharge goals, continue with medication compliance    SHORT-TERM GOALS UPDATE:   Time frame for Short-Term Goals: 5-7 days    LONG-TERM GOALS UPDATE:   Time frame for Long-Term Goals: 6 months  Members Present in Team Meeting: See Signature Sheet    BENJAMIN Ramírez

## 2021-06-14 NOTE — PLAN OF CARE
Problem: Depressive Behavior With or Without Suicide Precautions:  Goal: Able to verbalize and/or display a decrease in depressive symptoms  Description: Able to verbalize and/or display a decrease in depressive symptoms  6/13/2021 2206 by Theron Horn LPN  Outcome: Ongoing     Problem: Depressive Behavior With or Without Suicide Precautions:  Goal: Ability to disclose and discuss suicidal ideas will improve  Description: Ability to disclose and discuss suicidal ideas will improve  6/13/2021 2206 by Theron Horn LPN  Outcome: Ongoing   Patient denies suicidal ideas at this time. Patient denies homicidal ideas at this time. Patient denies depressive symptoms at this time. Patient has been out in day room watching tv and socializing with peers. Patient is free of self harm at this time. Patient agrees to seek out staff if thoughts to harm self arise. Staff will provide support and reassurance as needed. Safety checks maintained every 15 minutes.

## 2021-06-14 NOTE — FLOWSHEET NOTE
*Patient participated in the Spirituality Group       06/14/21 1525   Encounter Summary   Services provided to: Patient   Referral/Consult From: Rounding   Continue Visiting   (6/14/21)   Complexity of Encounter Moderate   Length of Encounter 30 minutes   Spiritual/Episcopal   Type Spiritual support   Assessment Calm; Approachable   Intervention Active listening   Outcome Receptive;Engaged in conversation;Expressed gratitude

## 2021-06-14 NOTE — GROUP NOTE
Group Therapy Note    Date: 6/14/2021    Group Start Time: 0900  Group End Time: 4207  Group Topic: Community Meeting    GARY SOTOMAYOR    [place], [place]        Group Therapy Note    Attendees: 12/23         Patient's Goal:  To improve decision making skills / improve goal setting skills     Notes:   Pt was pleasant and participated well     Status After Intervention:  Improved    Participation Level:  Active Listener and Interactive    Participation Quality: Appropriate, Attentive and Supportive      Speech:  normal      Thought Process/Content: Logical      Affective Functioning: Congruent      Mood: euthymic      Level of consciousness:  Alert and Oriented x4      Response to Learning: Able to verbalize current knowledge/experience and Progressing to goal      Endings: None Reported    Modes of Intervention: Education, Support and Problem-solving      Discipline Responsible: Psychoeducational Specialist      Signature:  Nathan Mcfarland

## 2021-06-14 NOTE — SUICIDE SAFETY PLAN
SAFETY PLAN    A suicide Safety Plan is a document that supports someone when they are having thoughts of suicide. Warning Signs that indicate a suicidal crisis may be developing: What (situations, thoughts, feelings, body sensations, behaviors, etc.) do you experience that lets you know you are beginning to think about suicide? 1. Go off medications  2. Mood is depressed and start to feel sad, hopeless, helpless, guilty, decline in self-esteem, excess worry, no interest in doing any pleasurable activities, unable to concentrate  3. Begin to cry over the smallest of things  4. Not eating or sleeping as normal  5. Relationship issues start happening  6. I become angry and start a fight  7. When I dont listen or respond to people in a good, positive way  8. Increase drug use      Internal Coping Strategies:  What things can I do (relaxation techniques, hobbies, physical activities, etc.) to take my mind off my problems without contacting another person? 1. Go to hospital discharge appointments and follow-up with community mental health counseling  2. Talk with other people  3. Learn to identify and control your emotions by new ways  4. Think before you speak or act; walk away from the situation  5. Join a support group in person or on Social Media  6. Take a time-out  7. Take deep breaths; use relaxation techniques  8. Get some exercise; go for a walk  9. Read; listen to music; watch a funny movie    10. Coping skills/ strategies  journal/ listen to music/ go for a walk/ read a book/ watch a funny movie/show / crafts / video game   11.  Grounding techniques- eat a sour candy or hot cinnamon candy / focus on colors, sounds, smells, textures on things around you / drink some herbal tea / eat a piece of dark chocolate / take a hot bath or shower / essential oils for smelling / meditate / color / arts and crafts    People whom I can ask for help: Who can I call when I need help - for example, friends, family, clergy, someone else? 1. Your grandfather    Professionals or [address] I can contact during a crisis: Who can I call for help - for example, my doctor, my psychiatrist, my psychologist, a mental health provider, a suicide hotline? 1. Staff at [employer]  2. Suicide Prevention Lifeline: 5-143-826-TALK (8831)  3. [facility]: Emergency Services Address: [address] [facility], [facility],  [address], Phone: [phone]  4. [employer]: 2-1-1, 415.289.6191 or [address] [facility] Emergency Services - for example, [address], [facility] suicide hotline,   1. [place] [address], 4-546.699.6112  2. Mental Health & Recovery Services Board [facility], Crisis line: 294.296.8629  3. [employer] (Crisis Intervention Team - CIT), 336.333.6158 or 9-1-1  4. [address] [place] [address], 0-862.732.2063  5. National Association of Mental Illness, 3-780.477.9130  6. [facility] Substance Abuse National Helpline, 9-576-542-HELP (0979)  7. Crisis Text Line, Text 4HOPE to 062508 to connect with a crisis counselor  8. [address] [place] [address], 5-109.144.2480  9. ANGELICA ([place]ape, [address]), 6-153.581.9764  10. [employer] (Alcohol / Drug help)  11. Call the Recovery Helpline at 56 001 620 (24 hours a day - 7 days a week)  12. COVID-19 Emotional Support Line: 359.840.1367    Making the environment safe: How can I make my environment (house/apartment/living space) safer? For example, can I remove guns, medications, and other items? 1. Remove unsafe objects  2. Keep Medications in safe and secure location  3.  Plan daily goals to help remember to stay on specific medications

## 2021-06-15 VITALS
BODY MASS INDEX: 29.13 KG/M2 | TEMPERATURE: 97.6 F | HEIGHT: 74 IN | HEART RATE: 90 BPM | OXYGEN SATURATION: 98 % | WEIGHT: 227 LBS | RESPIRATION RATE: 16 BRPM | DIASTOLIC BLOOD PRESSURE: 92 MMHG | SYSTOLIC BLOOD PRESSURE: 141 MMHG

## 2021-06-15 PROCEDURE — 6370000000 HC RX 637 (ALT 250 FOR IP): Performed by: NURSE PRACTITIONER

## 2021-06-15 PROCEDURE — 99239 HOSP IP/OBS DSCHRG MGMT >30: CPT | Performed by: PSYCHIATRY & NEUROLOGY

## 2021-06-15 RX ORDER — DIVALPROEX SODIUM 250 MG/1
750 TABLET, DELAYED RELEASE ORAL 2 TIMES DAILY
Qty: 180 TABLET | Refills: 3 | Status: SHIPPED | OUTPATIENT
Start: 2021-06-15 | End: 2021-12-27

## 2021-06-15 RX ORDER — ARIPIPRAZOLE 10 MG/1
10 TABLET ORAL DAILY
Qty: 30 TABLET | Refills: 0 | Status: SHIPPED | OUTPATIENT
Start: 2021-06-15 | End: 2021-06-15

## 2021-06-15 RX ORDER — ARIPIPRAZOLE 10 MG/1
10 TABLET ORAL DAILY
Qty: 30 TABLET | Refills: 0 | Status: ON HOLD | OUTPATIENT
Start: 2021-06-15 | End: 2022-04-06 | Stop reason: HOSPADM

## 2021-06-15 RX ORDER — DIVALPROEX SODIUM 250 MG/1
750 TABLET, DELAYED RELEASE ORAL 2 TIMES DAILY
Qty: 180 TABLET | Refills: 3 | Status: SHIPPED | OUTPATIENT
Start: 2021-06-15 | End: 2021-06-15

## 2021-06-15 RX ADMIN — DIVALPROEX SODIUM 750 MG: 500 TABLET, DELAYED RELEASE ORAL at 08:21

## 2021-06-15 RX ADMIN — ARIPIPRAZOLE 10 MG: 10 TABLET ORAL at 08:21

## 2021-06-15 NOTE — BH NOTE
Patient given tobacco quitline number 10845987522 at this time, refusing to call at this time, states \" I just dont want to quit now\"- patient given information as to the dangers of long term tobacco use. Continue to reinforce the importance of tobacco cessation.

## 2021-06-15 NOTE — GROUP NOTE
Group Therapy Note    Date: 6/15/2021    Group Start Time: 0900  Group End Time: 0930  Group Topic: Community Meeting    [facility] Evon Goodell, CTRS        Group Therapy Note    Attendees:13          Patient's Goal:  To improve goal setting/ improve decision making skills     Notes:   Pt was pleasant and participated well     Status After Intervention:  Improved    Participation Level:  Active Listener and Interactive    Participation Quality: Appropriate, Attentive and Supportive      Speech:  normal      Thought Process/Content: Logical      Affective Functioning: Congruent      Mood: euthymic      Level of consciousness:  Alert and Oriented x4      Response to Learning: Able to verbalize current knowledge/experience and Progressing to goal      Endings: None Reported    Modes of Intervention: Education and Support      Discipline Responsible: Psychoeducational Specialist      Signature:  Racquel Martines

## 2021-06-15 NOTE — DISCHARGE SUMMARY
DISCHARGE SUMMARY      Patient ID:  Eufemia Zayas  [id number]  30 y.o.  1995    Admit date: 6/12/2021    Discharge date and time: 6/15/2021    Disposition: Residential rehab    Admitting Physician: Jayna Mccoy MD     Discharge Physician: Dr Carole Perrin MD    Admission Diagnoses: Depression with suicidal ideation [F32.9, R45.851]  Schizoaffective disorder ([address] [F25.9]    Admission Condition: poor    Discharged Condition: stable    Admission Circumstance: Eufemia Bragg is a 30 y.o. male who has a past medical history of hepatitis C, schizoaffective disorder and polysubstance abuse. Patient presented to the [facility] ED with reported suicidal and homicidal ideation. According to ED documentation: The patient reports drinking a lot of of vodka today but unable to specify. Triston Arcos is obviously intoxicated and he is a poor historian. Conrad Martin also reported suicidal ideation and homicidal ideation. Conrad Martin has a plan to \"blow up his parents\".  Patient reports using meth up until 3 days ago.  Reports that he has not been sleeping for the past 3 days due to his meth use. Conrad Martin currently is complaining of lightheadedness and headache. The patient reports that he does not replace to stay and has been outside for long periods of time.     Sohail Dallas reports that he was recently discharged from a psychiatric facility in the [facility] and reports that he came to [place] to engage in rehab at [facility] for [employer]. He reports that upon arrival he \"bailed on them\" and relapsed using alcohol, heroin and methamphetamines. He reports that his last methamphetamine use was \"2 days ago\". He reported to the ED that he had vodka prior to coming in, at time of arrival at the ED his blood alcohol level was 0.064. [place] Derek reports that he has been in and out of mental health and rehab facilities for years.   He is currently endorsing a low mood for greater than 2 weeks, poor sleep, poor motivation and interest in enjoyable compliance and would like to be linked with mental health services locally in the [facility]. He reports that he plans to stay in this area and would like to be linked to a rehabilitation facility like [facility] if he is unable to return to [facility]. He is agreeable to restarting the Abilify and Depakote, he is also requesting medication for withdrawal/anxiety. He reports experiencing \"restless legs\". He has a good appetite, was observed eating his full breakfast.  He is agreeable to engaging in unit group activity.         PAST MEDICAL/PSYCHIATRIC HISTORY:   Past Medical History:   Diagnosis Date    Anxiety     Bipolar 1 disorder [facility]     Drug abuse in remission ([facility]     H/O alcohol abuse     Hep C w/o coma, chronic (HCC)        FAMILY/SOCIAL HISTORY:  History reviewed. No pertinent family history. Social History     Socioeconomic History    Marital status: Single     Spouse name: Not on file    Number of children: Not on file    Years of education: Not on file    Highest education level: Not on file   Occupational History    Not on file   Tobacco Use    Smoking status: Current Every Day Smoker     Packs/day: 0.50     Years: 7.00     Pack years: 3.50     Types: Cigarettes    Smokeless tobacco: Former User     Types: Snuff   Vaping Use    Vaping Use: Never used   Substance and Sexual Activity    Alcohol use: Yes    Drug use: Yes     Types: Methamphetamines, Opiates , IV, Cocaine, Marijuana     Comment: States uses \"whatever I can get my hands on\"    Sexual activity: Not Currently   Other Topics Concern    Not on file   Social History Narrative    Not on file     Social Determinants of Health     Financial Resource Strain:     Difficulty of Paying Living Expenses:    Food Insecurity:     Worried About Running Out of Food in the Last Year:     [address] in the Last Year:    Transportation Needs:     Lack of Transportation (Medical):      Lack of Transportation (Non-Medical):    Physical Activity:     Days of Exercise per Week:     Minutes of Exercise per Session:    Stress:     Feeling of Stress :    Social Connections:     Frequency of Communication with Friends and Family:     Frequency of Social Gatherings with Friends and Family:     Attends Faith Services:     Active Member of Clubs or Organizations:     Attends Club or Organization Meetings:     Marital Status:    Intimate Partner Violence:     Fear of Current or Ex-Partner:     Emotionally Abused:     Physically Abused:     Sexually Abused:        MEDICATIONS:    Current Facility-Administered Medications:     divalproex (DEPAKOTE) DR tablet 750 mg, 750 mg, Oral, BID, Yusra Rossi, APRN - CNP, 750 mg at 06/15/21 9044    acetaminophen (TYLENOL) tablet 650 mg, 650 mg, Oral, Q4H PRN, Harry Xiao MD    aluminum & magnesium hydroxide-simethicone (MAALOX) 200-200-20 MG/5ML suspension 30 mL, 30 mL, Oral, Q6H PRN, Harry Xiao MD    hydrOXYzine (ATARAX) tablet 50 mg, 50 mg, Oral, TID PRN, Harry Xiao MD, 50 mg at 06/14/21 2051    ibuprofen (ADVIL;MOTRIN) tablet 400 mg, 400 mg, Oral, Q6H PRN, Harry Xiao MD, 400 mg at 06/13/21 1110    nicotine (NICODERM CQ) 14 MG/24HR 1 patch, 1 patch, Transdermal, Daily, Harry Xiao MD, 1 patch at 06/14/21 0834    polyethylene glycol (GLYCOLAX) packet 17 g, 17 g, Oral, Daily PRN, Harry Xiao MD    traZODone (DESYREL) tablet 50 mg, 50 mg, Oral, Nightly PRN, Harry Xiao MD, 50 mg at 06/14/21 2051    haloperidol (HALDOL) tablet 5 mg, 5 mg, Oral, Q4H PRN **AND** LORazepam (ATIVAN) tablet 2 mg, 2 mg, Oral, Q4H PRN, Harry Xiao MD    haloperidol lactate (HALDOL) injection 5 mg, 5 mg, Intramuscular, Q4H PRN, 5 mg at 06/12/21 1338 **AND** LORazepam (ATIVAN) injection 2 mg, 2 mg, Intramuscular, Q4H PRN, 2 mg at 06/12/21 1338 **AND** diphenhydrAMINE (BENADRYL) injection 50 mg, 50 mg, Intramuscular, Q4H PRN, Harry Xiao MD, 50 mg at 06/12/21 1338    ARIPiprazole (ABILIFY) tablet 10 mg, 10 mg, Oral, Daily, Yusra Rossi APRN - CNP, 10 mg at 06/15/21 6196    cyclobenzaprine (FLEXERIL) tablet 10 mg, 10 mg, Oral, TID PRN, Carlos Tam APRN - CNP, 10 mg at 06/13/21 2111    ondansetron (ZOFRAN-ODT) disintegrating tablet 4 mg, 4 mg, Oral, Q8H PRN, Yusra Rossi APRN - CNP, 4 mg at 06/14/21 1059    cloNIDine (CATAPRES) tablet 0.1 mg, 0.1 mg, Oral, TID PRN, Yusra Rossi APRN - CNP    Examination:  BP (!) 141/92   Pulse 90   Temp 97.6 °F (36.4 °C) (Oral)   Resp 16   Ht 6' 2\" (1.88 m)   Wt 227 lb (103 kg)   SpO2 98%   BMI 29.15 kg/m²   Gait - steady    HOSPITAL COURSE[de-identified]  Following admission to the hospital, patient had a complete physical exam and blood work up, which was unremarkable. Patient was monitored closely with suicide precaution  Patient was started on Abilify Depakote and other medications noted above  Was encouraged to participate in group and other milieu activity  Patient started to feel better with this combination of treatment. Significant progress in the symptoms since admission. Mood is improved  The patient denies AVH or paranoid thoughts  The patient denies any hopelessness or worthlessness  No active SI/HI  Appetite:  [x] Normal  [] Increased  [] Decreased    Sleep:       [x] Normal  [] Fair       [] Poor            Energy:    [x] Normal  [] Increased  [] Decreased     SI [] Present  [x] Absent  HI  []Present  [x] Absent   Aggression:  [] yes  [] no  Patient is [x] able  [] unable to CONTRACT FOR SAFETY   Medication side effects(SE):  [x] None(Psych.  Meds.) [] Other      Mental Status Examination on discharge:    Level of consciousness:  within normal limits   Appearance:  well-appearing  Behavior/Motor:  no abnormalities noted  Attitude toward examiner:  attentive and good eye contact  Speech:  spontaneous, normal rate and normal volume   Mood: euthymic  Affect:  mood congruent  Thought processes:  goal directed   Thought content:  Suicidal Ideation: denies suicidal ideation  Delusions:  no evidence of delusions  Perceptual Disturbance:  denies any perceptual disturbance  Cognition:  oriented to person, place, and time   Concentration intact  Memory intact  Insight good   Judgement fair   Fund of Knowledge adequate      ASSESSMENT:  Patient symptoms are:  [x] Well controlled  [x] Improving  [] Worsening  [] No change      Diagnosis:  Principal Problem:    Schizoaffective disorder, bipolar type ([facility]  Active Problems:    Schizoaffective disorder ([facility]    Polysubstance abuse ([facility]  Resolved Problems:    * No resolved hospital problems. *      LABS:    No results for input(s): WBC, HGB, PLT in the last 72 hours. No results for input(s): NA, K, CL, CO2, BUN, CREATININE, GLUCOSE in the last 72 hours. No results for input(s): BILITOT, ALKPHOS, AST, ALT in the last 72 hours. Lab Results   Component Value Date    LABAMPH NOT DETECTED 12/12/2020    BARBSCNU NEGATIVE 06/11/2021    LABBENZ NEGATIVE 06/11/2021    COCAINESCRN Negative 04/07/2021    LABMETH NEGATIVE 06/11/2021    OPIATESCREENURINE NOT DETECTED 12/12/2020    PHENCYCLIDINESCREENURINE NOT DETECTED 12/12/2020    PPXUR NOT REPORTED 06/11/2021    ETOH <0.010 04/07/2021     Lab Results   Component Value Date    TSH 0.657 11/03/2019     Lab Results   Component Value Date    LITHIUM <0.2 (L) 03/20/2019     No results found for: VALPROATE, CBMZ    RISK ASSESSMENT AT DISCHARGE: Low risk for suicide and homicide. Treatment Plan:  Reviewed current Medications with the patient. Education provided on the complaince with treatment. Risks, benefits, side effects, drug-to-drug interactions and alternatives to treatment were discussed. Encourage patient to attend outpatient follow up appointment and therapy. Patient was advised to call the outpatient provider, visit the nearest ED or call 911 if symptoms are not manageable.            Medication List      ASK your doctor about these medications    ARIPiprazole 10 MG tablet  Commonly known as: ABILIFY  Take 1 tablet by mouth daily     divalproex 250 MG DR tablet  Commonly known as: DEPAKOTE  Take 1 tablet by mouth every 12 hours     ibuprofen 800 MG tablet  Commonly known as: IBU  Take 1 tablet by mouth every 8 hours as needed for Pain     mirtazapine 30 MG tablet  Commonly known as: REMERON              Core Measures statement:   Not applicable      TIME SPENT - 35 MINUTES TO COMPLETE THE EVALUATION, DISCHARGE SUMMARY, MEDICATION RECONCILIATION AND FOLLOW UP CARE                                         Bryant Mancuso is a 30 y.o. male being evaluated Maia Gilmore MD on 6/15/2021 at 9:16 AM    An electronic signature was used to authenticate this note. **This report has been created using voice recognition software. It may contain minor errors which are inherent in voice recognition technology. **

## 2021-06-15 NOTE — CARE COORDINATION
Name: Dewayne Gandara. : 1995    Discharge Date: 6/15/2021    Primary Auth/Cert #: [id number]    Destination: [facility] [place] [facility]    Discharge Medications:      Medication List      CHANGE how you take these medications    divalproex 250 MG DR tablet  Commonly known as: DEPAKOTE  Take 3 tablets by mouth 2 times daily  What changed:   · how much to take  · when to take this  Notes to patient: Thoughts        CONTINUE taking these medications    ARIPiprazole 10 MG tablet  Commonly known as: ABILIFY  Take 1 tablet by mouth daily  Notes to patient: Thoughts     ibuprofen 800 MG tablet  Commonly known as: IBU  Take 1 tablet by mouth every 8 hours as needed for Pain  Notes to patient: Pain         STOP taking these medications    mirtazapine 30 MG tablet  Commonly known as: REMERON           Where to Get Your Medications      These medications were sent to [address] [place] [address]/ [address], [address]  [address]    Phone: [phone]   · ARIPiprazole 10 MG tablet  · divalproex 250 MG DR tablet         Follow Up Appointment: Discharge to [facility] [place] [facility]:  [facility] [address] [facility], [address] [facility]    MEDS TO BE SEND TO [facility]   ON [address]    [facility] [place] TAKES CARE OF ALL MEDICATIONS AND THERAPY  Go on 6/15/2021  Please send by Patricia Pérez and Angelic winters  to arrive no later than 12:00pm

## 2021-06-15 NOTE — PLAN OF CARE
Problem: Depressive Behavior With or Without Suicide Precautions:  Goal: Able to verbalize and/or display a decrease in depressive symptoms  Description: Able to verbalize and/or display a decrease in depressive symptoms  6/14/2021 2001 by Darinel Langston LPN  Outcome: Ongoing     Problem: Depressive Behavior With or Without Suicide Precautions:  Goal: Ability to disclose and discuss suicidal ideas will improve  Description: Ability to disclose and discuss suicidal ideas will improve  6/14/2021 2001 by Darinel Langston LPN  Outcome: Ongoing   Patient denies suicidal ideas at this time. Patient denies homicidal ideas at this time. Patient denies depressive symptoms at this time. Patient has been out in day room watching tv and socializing with peers. Patient is free of self harm at this time. Patient agrees to seek out staff if thoughts to harm self arise. Staff will provide support and reassurance as needed. Safety checks maintained every 15 minutes.

## 2021-10-20 PROBLEM — F15.10 METHAMPHETAMINE USE (HCC): Status: ACTIVE | Noted: 2021-10-20

## 2021-10-20 PROBLEM — F11.90 OPIATE USE: Status: ACTIVE | Noted: 2021-10-20

## 2021-11-01 ENCOUNTER — HOSPITAL ENCOUNTER (EMERGENCY)
Age: 26
Discharge: OTHER INSTITUTION | End: 2021-11-02
Attending: EMERGENCY MEDICINE
Payer: COMMERCIAL

## 2021-11-01 DIAGNOSIS — T44.902A: Primary | ICD-10-CM

## 2021-11-01 DIAGNOSIS — R45.851 SUICIDAL IDEATION: ICD-10-CM

## 2021-11-01 LAB
ABSOLUTE EOS #: 0.04 K/UL (ref 0–0.44)
ABSOLUTE IMMATURE GRANULOCYTE: 0.03 K/UL (ref 0–0.3)
ABSOLUTE LYMPH #: 2 K/UL (ref 1.1–3.7)
ABSOLUTE MONO #: 0.55 K/UL (ref 0.1–1.2)
ALBUMIN SERPL-MCNC: 4.5 G/DL (ref 3.5–5.2)
ALBUMIN/GLOBULIN RATIO: 1.9 (ref 1–2.5)
ALP BLD-CCNC: 73 U/L (ref 40–129)
ALT SERPL-CCNC: 139 U/L (ref 5–41)
ANION GAP SERPL CALCULATED.3IONS-SCNC: 13 MMOL/L (ref 9–17)
AST SERPL-CCNC: 111 U/L
BASOPHILS # BLD: 0 % (ref 0–2)
BASOPHILS ABSOLUTE: <0.03 K/UL (ref 0–0.2)
BILIRUB SERPL-MCNC: 0.28 MG/DL (ref 0.3–1.2)
BILIRUBIN DIRECT: 0.1 MG/DL
BILIRUBIN, INDIRECT: 0.18 MG/DL (ref 0–1)
BUN BLDV-MCNC: 16 MG/DL (ref 6–20)
BUN/CREAT BLD: ABNORMAL (ref 9–20)
CALCIUM SERPL-MCNC: 8.8 MG/DL (ref 8.6–10.4)
CHLORIDE BLD-SCNC: 107 MMOL/L (ref 98–107)
CO2: 25 MMOL/L (ref 20–31)
CREAT SERPL-MCNC: 0.74 MG/DL (ref 0.7–1.2)
DIFFERENTIAL TYPE: ABNORMAL
EOSINOPHILS RELATIVE PERCENT: 1 % (ref 1–4)
GFR AFRICAN AMERICAN: >60 ML/MIN
GFR NON-AFRICAN AMERICAN: >60 ML/MIN
GFR SERPL CREATININE-BSD FRML MDRD: ABNORMAL ML/MIN/{1.73_M2}
GFR SERPL CREATININE-BSD FRML MDRD: ABNORMAL ML/MIN/{1.73_M2}
GLOBULIN: ABNORMAL G/DL (ref 1.5–3.8)
GLUCOSE BLD-MCNC: 115 MG/DL (ref 70–99)
HCT VFR BLD CALC: 37.5 % (ref 40.7–50.3)
HEMOGLOBIN: 12.3 G/DL (ref 13–17)
IMMATURE GRANULOCYTES: 1 %
LYMPHOCYTES # BLD: 34 % (ref 24–43)
MCH RBC QN AUTO: 30.1 PG (ref 25.2–33.5)
MCHC RBC AUTO-ENTMCNC: 32.8 G/DL (ref 28.4–34.8)
MCV RBC AUTO: 91.9 FL (ref 82.6–102.9)
MONOCYTES # BLD: 9 % (ref 3–12)
NRBC AUTOMATED: 0 PER 100 WBC
PDW BLD-RTO: 14.3 % (ref 11.8–14.4)
PLATELET # BLD: 232 K/UL (ref 138–453)
PLATELET ESTIMATE: ABNORMAL
PMV BLD AUTO: 9.5 FL (ref 8.1–13.5)
POTASSIUM SERPL-SCNC: 4.8 MMOL/L (ref 3.7–5.3)
RBC # BLD: 4.08 M/UL (ref 4.21–5.77)
RBC # BLD: ABNORMAL 10*6/UL
SARS-COV-2, RAPID: NOT DETECTED
SEG NEUTROPHILS: 55 % (ref 36–65)
SEGMENTED NEUTROPHILS ABSOLUTE COUNT: 3.2 K/UL (ref 1.5–8.1)
SODIUM BLD-SCNC: 145 MMOL/L (ref 135–144)
SPECIMEN DESCRIPTION: NORMAL
TOTAL PROTEIN: 6.9 G/DL (ref 6.4–8.3)
WBC # BLD: 5.8 K/UL (ref 3.5–11.3)
WBC # BLD: ABNORMAL 10*3/UL

## 2021-11-01 PROCEDURE — 2580000003 HC RX 258: Performed by: STUDENT IN AN ORGANIZED HEALTH CARE EDUCATION/TRAINING PROGRAM

## 2021-11-01 PROCEDURE — 81001 URINALYSIS AUTO W/SCOPE: CPT

## 2021-11-01 PROCEDURE — 80307 DRUG TEST PRSMV CHEM ANLYZR: CPT

## 2021-11-01 PROCEDURE — 87635 SARS-COV-2 COVID-19 AMP PRB: CPT

## 2021-11-01 PROCEDURE — 80076 HEPATIC FUNCTION PANEL: CPT

## 2021-11-01 PROCEDURE — 80143 DRUG ASSAY ACETAMINOPHEN: CPT

## 2021-11-01 PROCEDURE — 96361 HYDRATE IV INFUSION ADD-ON: CPT

## 2021-11-01 PROCEDURE — 80179 DRUG ASSAY SALICYLATE: CPT

## 2021-11-01 PROCEDURE — 96360 HYDRATION IV INFUSION INIT: CPT

## 2021-11-01 PROCEDURE — 85025 COMPLETE CBC W/AUTO DIFF WBC: CPT

## 2021-11-01 PROCEDURE — G0480 DRUG TEST DEF 1-7 CLASSES: HCPCS

## 2021-11-01 PROCEDURE — 80048 BASIC METABOLIC PNL TOTAL CA: CPT

## 2021-11-01 PROCEDURE — 99285 EMERGENCY DEPT VISIT HI MDM: CPT

## 2021-11-01 PROCEDURE — 93005 ELECTROCARDIOGRAM TRACING: CPT | Performed by: STUDENT IN AN ORGANIZED HEALTH CARE EDUCATION/TRAINING PROGRAM

## 2021-11-01 RX ORDER — SODIUM CHLORIDE, SODIUM LACTATE, POTASSIUM CHLORIDE, AND CALCIUM CHLORIDE .6; .31; .03; .02 G/100ML; G/100ML; G/100ML; G/100ML
1000 INJECTION, SOLUTION INTRAVENOUS ONCE
Status: COMPLETED | OUTPATIENT
Start: 2021-11-01 | End: 2021-11-01

## 2021-11-01 RX ORDER — SODIUM CHLORIDE, SODIUM LACTATE, POTASSIUM CHLORIDE, AND CALCIUM CHLORIDE .6; .31; .03; .02 G/100ML; G/100ML; G/100ML; G/100ML
1000 INJECTION, SOLUTION INTRAVENOUS ONCE
Status: COMPLETED | OUTPATIENT
Start: 2021-11-01 | End: 2021-11-02

## 2021-11-01 RX ADMIN — SODIUM CHLORIDE, POTASSIUM CHLORIDE, SODIUM LACTATE AND CALCIUM CHLORIDE 1000 ML: 600; 310; 30; 20 INJECTION, SOLUTION INTRAVENOUS at 23:45

## 2021-11-01 RX ADMIN — SODIUM CHLORIDE, POTASSIUM CHLORIDE, SODIUM LACTATE AND CALCIUM CHLORIDE 1000 ML: 600; 310; 30; 20 INJECTION, SOLUTION INTRAVENOUS at 22:00

## 2021-11-02 VITALS
SYSTOLIC BLOOD PRESSURE: 102 MMHG | OXYGEN SATURATION: 98 % | BODY MASS INDEX: 29.52 KG/M2 | TEMPERATURE: 97.4 F | RESPIRATION RATE: 18 BRPM | HEIGHT: 74 IN | WEIGHT: 230 LBS | DIASTOLIC BLOOD PRESSURE: 55 MMHG | HEART RATE: 93 BPM

## 2021-11-02 LAB
-: NORMAL
ACETAMINOPHEN LEVEL: <5 UG/ML (ref 10–30)
AMORPHOUS: NORMAL
AMPHETAMINE SCREEN URINE: NEGATIVE
BACTERIA: NORMAL
BARBITURATE SCREEN URINE: NEGATIVE
BENZODIAZEPINE SCREEN, URINE: NEGATIVE
BILIRUBIN URINE: NEGATIVE
BUPRENORPHINE URINE: ABNORMAL
CANNABINOID SCREEN URINE: POSITIVE
CASTS UA: NORMAL /LPF (ref 0–8)
COCAINE METABOLITE, URINE: NEGATIVE
COLOR: ABNORMAL
COMMENT UA: ABNORMAL
CRYSTALS, UA: NORMAL /HPF
EKG ATRIAL RATE: 94 BPM
EKG P AXIS: 49 DEGREES
EKG P-R INTERVAL: 164 MS
EKG Q-T INTERVAL: 324 MS
EKG QRS DURATION: 82 MS
EKG QTC CALCULATION (BAZETT): 405 MS
EKG R AXIS: 9 DEGREES
EKG T AXIS: 8 DEGREES
EKG VENTRICULAR RATE: 94 BPM
EPITHELIAL CELLS UA: NORMAL /HPF (ref 0–5)
ETHANOL PERCENT: 0.05 %
ETHANOL: 52 MG/DL
GLUCOSE URINE: NEGATIVE
KETONES, URINE: ABNORMAL
LEUKOCYTE ESTERASE, URINE: NEGATIVE
MDMA URINE: ABNORMAL
METHADONE SCREEN, URINE: NEGATIVE
METHAMPHETAMINE, URINE: ABNORMAL
MUCUS: NORMAL
NITRITE, URINE: NEGATIVE
OPIATES, URINE: NEGATIVE
OTHER OBSERVATIONS UA: NORMAL
OXYCODONE SCREEN URINE: NEGATIVE
PH UA: 8 (ref 5–8)
PHENCYCLIDINE, URINE: NEGATIVE
PROPOXYPHENE, URINE: ABNORMAL
PROTEIN UA: ABNORMAL
RBC UA: NORMAL /HPF (ref 0–4)
RENAL EPITHELIAL, UA: NORMAL /HPF
SALICYLATE LEVEL: <1 MG/DL (ref 3–10)
SPECIFIC GRAVITY UA: 1.02 (ref 1–1.03)
TEST INFORMATION: ABNORMAL
TOXIC TRICYCLIC SC,BLOOD: NEGATIVE
TRICHOMONAS: NORMAL
TRICYCLIC ANTIDEPRESSANTS, UR: ABNORMAL
TURBIDITY: CLEAR
URINE HGB: NEGATIVE
UROBILINOGEN, URINE: NORMAL
WBC UA: NORMAL /HPF (ref 0–5)
YEAST: NORMAL

## 2021-11-02 PROCEDURE — H0049 ALCOHOL/DRUG SCREENING: HCPCS | Performed by: SOCIAL WORKER

## 2021-11-02 PROCEDURE — 2580000003 HC RX 258: Performed by: STUDENT IN AN ORGANIZED HEALTH CARE EDUCATION/TRAINING PROGRAM

## 2021-11-02 PROCEDURE — 93010 ELECTROCARDIOGRAM REPORT: CPT | Performed by: INTERNAL MEDICINE

## 2021-11-02 RX ORDER — SODIUM CHLORIDE, SODIUM LACTATE, POTASSIUM CHLORIDE, CALCIUM CHLORIDE 600; 310; 30; 20 MG/100ML; MG/100ML; MG/100ML; MG/100ML
INJECTION, SOLUTION INTRAVENOUS CONTINUOUS
Status: DISCONTINUED | OUTPATIENT
Start: 2021-11-02 | End: 2021-11-02 | Stop reason: HOSPADM

## 2021-11-02 RX ADMIN — SODIUM CHLORIDE, POTASSIUM CHLORIDE, SODIUM LACTATE AND CALCIUM CHLORIDE: 600; 310; 30; 20 INJECTION, SOLUTION INTRAVENOUS at 01:30

## 2021-11-02 ASSESSMENT — ENCOUNTER SYMPTOMS
SHORTNESS OF BREATH: 0
SORE THROAT: 0
CONSTIPATION: 0
VOMITING: 0
NAUSEA: 0
ABDOMINAL PAIN: 0
DIARRHEA: 0

## 2021-11-02 ASSESSMENT — PATIENT HEALTH QUESTIONNAIRE - PHQ9: SUM OF ALL RESPONSES TO PHQ QUESTIONS 1-9: 7

## 2021-11-02 NOTE — ED NOTES
Transport is here to  patient, awaiting consent form.   assisting     Hansa Nova RN  11/02/21 2145 no hematuria

## 2021-11-02 NOTE — ED NOTES
Pt is a&o x4 in NAD with all vitals stable. . Pt denies any needs at this time.  Pt is under supervision        Angelo Dumas RN  11/02/21 7946

## 2021-11-02 NOTE — ED NOTES
Security called re belongings  S/Worker contacted [facility] re form, advised none needed.      Danica Vick RN  11/02/21 3687

## 2021-11-02 NOTE — ED NOTES
Patient presented to ED stating he's suicidal. Patient stated he's suicidal because he can't get his life in order. Patient expressed concern over his substance abuse but stated he hasn't been contacting facilities for inpatient tx. Patient confirmed consuming a half gallon of rum daily, heroin here & there, methamphetamines weekly, marijuana & acid. Patient stated his last drink was a couple hours ago. Of note, patient's ETOH level was 52 at 10:00pm. Patient stated he last used heroin & methamphetamines 1 week ago. Of note, patient's UDS was positive for THC only. Patient stated he has been staying in drug houses here & there & affords the drugs by hustling. Patient stated he was last in [facility] tx a couple months ago at an unknown location & unknown facility. Per [facility], he was discharged from [facility] in [place] 10/8/21 & again 10/19/21. Patient stated he has felt suicidal off & on for the past couple years with the most recent period beginning 1 week ago. Patient stated if he were discharged, he would attempt to OD on heroin. Patient confirmed an OD on heroin a couple years ago. Patient denied HI, a/v hallucinations, legal concerns, hx seizures, HC link, current psychiatric medications. Patient confirmed poor concentration & racing thoughts. Patient stated he has hepatitis C. Patient requested admission to [facility] & if declined there, attempted placement at the [facility]. Patient stated his ultimate goal is placement in a sober living facility. Writer to [facility] for assistance with placement.       JOSEPH Perez  11/02/21 4456

## 2021-11-02 NOTE — ED NOTES
Bed: 28  Expected date:   Expected time:   Means of arrival:   Comments:  [place], [address]  11/01/21 2111

## 2021-11-02 NOTE — ED NOTES
Pt is a&o x4 in NAD with all vitals stable. Pt has both side rails up and call light within reach. Pt denies any needs at this time.    Pt admits that he is still feeling suicidal.      Ashli Velasco, RN  11/02/21 1597

## 2021-11-02 NOTE — ED NOTES
Pt is a&o x4 in NAD with all vitals stable. Pt has both side rails up and call light within reach. Pt denies any needs at this time. Guard outside of room watching patient.       Randal Aguilar RN  11/01/21 2237

## 2021-11-02 NOTE — ED NOTES
Writer met with patient at bedside to complete the SBIRT assessment. The results can be located in the ED navigator under screening questions.      Patient scored as following:    AUDIT (Alcohol Screening Questionnaire): 31  DAST (Drug Screening Questionnaire): 5  PHQ-9 (Depression Screening Questionnaire): 7      The patient was provided with the following resources/interventions: [facility] application    Start Time 02:10  End Time 02:30  Diagnosis Z71.41, Z71.51, Z13.39     JOSEPH Bundy  11/02/21 0234

## 2021-11-02 NOTE — ED NOTES
Pt called [place] police today stating Ronnie Oliva took a lot of acid, smoked a lot of weed and took an unknown amount of of pills      Faith Valenzuela, HAKAN  11/01/21 2131

## 2021-11-02 NOTE — ED PROVIDER NOTES
[address] ED  Emergency Department  Emergency Medicine Resident Sign-out     Care of Rohit Vargas. was assumed from Dr. Lauren Bojorquez and is being seen for Suicidal  .  The patient's initial evaluation and plan have been discussed with the prior provider who initially evaluated the patient.      EMERGENCY DEPARTMENT COURSE / MEDICAL DECISION MAKING:       MEDICATIONS GIVEN:  Orders Placed This Encounter   Medications    lactated ringers bolus    lactated ringers bolus    lactated ringers infusion       LABS / RADIOLOGY:     Labs Reviewed   CBC WITH AUTO DIFFERENTIAL - Abnormal; Notable for the following components:       Result Value    RBC 4.08 (*)     Hemoglobin 12.3 (*)     Hematocrit 37.5 (*)     Immature Granulocytes 1 (*)     All other components within normal limits   BASIC METABOLIC PANEL W/ REFLEX TO MG FOR LOW K - Abnormal; Notable for the following components:    Glucose 115 (*)     Sodium 145 (*)     All other components within normal limits   HEPATIC FUNCTION PANEL - Abnormal; Notable for the following components:     (*)      (*)     Total Bilirubin 0.28 (*)     All other components within normal limits   URINE RT REFLEX TO CULTURE - Abnormal; Notable for the following components:    Color, UA Dark Yellow (*)     Ketones, Urine TRACE (*)     Protein, UA NEGATIVE  Verified by sulfosalicylic acid test. (*)     All other components within normal limits   TOX SCR, BLD, ED - Abnormal; Notable for the following components:    Acetaminophen Level <5 (*)     Ethanol 52 (*)     Ethanol percent 5.708 (*)     Salicylate Lvl <1 (*)     All other components within normal limits   URINE DRUG SCREEN - Abnormal; Notable for the following components:    Cannabinoid Scrn, Ur POSITIVE (*)     All other components within normal limits   COVID-19, RAPID   MICROSCOPIC URINALYSIS       XR CHEST PORTABLE    Result Date: 10/7/2021  Patient Name:                Ariadna Maharaj MRN: [id number] [facility]#:                       [id number] Diagnostic Radiology ACCESSION                 EXAM DATE/TIME           PROCEDURE                 ORDERING PROVIDER -[id number]             10/7/2021 23:41 EDT      CR Chest Portable         [id number] Grace Flanagan CPT code 79865 Reason For Exam (CR Chest Portable) cp Report HISTORY: Chest pain Frontal view the chest with comparison studies from 2019. FINDINGS: Hypoinflation lungs with crowding of bronchovascular markings in the lower chest --- subtle infiltrates in the lung bases cannot be excluded Report Dictated on Workstation: [id number] ---** Final ---** Dictated: 10/07/2021 11:39 pm Dictating Physician: Chelsie Brink MD, Jamee Castleman Signed Date and Time: 10/07/2021 11:41 pm Signed by: Chelsie Brink MD, Jamee Castleman Transcribed Date and Time: 10/07/2021 11:39      RECENT VITALS:     Temp: 97.9 °F (36.6 °C),  Pulse: 84, Resp: 18, BP: 117/74, SpO2: 99 %      This patient is a 27 y.o. Male with suicidal ideation, drug use, patient was evaluated in the emergency department, medically cleared for transfer to psychiatric facility for continued suicidal ideation. Patient will be transported to [address] [place] [address] at 11 AM.           OUTSTANDING TASKS / RECOMMENDATIONS:    1. Transfer     FINAL IMPRESSION:     1. Overdose of sympathomimetic agent, intentional self-harm, initial encounter ([facility]    2. Suicidal ideation        DISPOSITION:         DISPOSITION:  []  Discharge   [x]  Transfer [facility]. []  Admission -     []  Against Medical Advice   []  [facility]   FOLLOW-UP: No follow-up provider specified.    DISCHARGE MEDICATIONS: New Prescriptions    No medications on file          Kemal Nash DO  Emergency Medicine Resident  [address] [facility]     Kemal Nash [place]  Resident  11/02/21 [phone] [id number]

## 2021-11-02 NOTE — ED PROVIDER NOTES
[facility] ED  Emergency Department  Emergency Medicine Resident Sign-out     Care of Maciej Rowan. was assumed from Dr. Virgilio Jones and is being seen for Suicidal  .  The patient's initial evaluation and plan have been discussed with the prior provider who initially evaluated the patient.      EMERGENCY DEPARTMENT COURSE / MEDICAL DECISION MAKING:       MEDICATIONS GIVEN:  Orders Placed This Encounter   Medications    lactated ringers bolus    lactated ringers bolus    lactated ringers infusion       LABS / RADIOLOGY:     Labs Reviewed   CBC WITH AUTO DIFFERENTIAL - Abnormal; Notable for the following components:       Result Value    RBC 4.08 (*)     Hemoglobin 12.3 (*)     Hematocrit 37.5 (*)     Immature Granulocytes 1 (*)     All other components within normal limits   BASIC METABOLIC PANEL W/ REFLEX TO MG FOR LOW K - Abnormal; Notable for the following components:    Glucose 115 (*)     Sodium 145 (*)     All other components within normal limits   HEPATIC FUNCTION PANEL - Abnormal; Notable for the following components:     (*)      (*)     Total Bilirubin 0.28 (*)     All other components within normal limits   TOX SCR, BLD, ED - Abnormal; Notable for the following components:    Acetaminophen Level <5 (*)     Ethanol 52 (*)     Ethanol percent 7.717 (*)     Salicylate Lvl <1 (*)     All other components within normal limits   COVID-19, RAPID   URINE RT REFLEX TO CULTURE   URINE DRUG SCREEN       XR CHEST PORTABLE    Result Date: 10/7/2021  Patient Name:                Robbin Torres MRN:                         [id number] Acct#:                       [id number] Diagnostic Radiology ACCESSION                 EXAM [facility] [id number]             10/7/2021 23:41 EDT      CR Chest Portable         [id number] -PALLACI, MICHAEL CPT code 73251 Reason For Exam (CR Chest Portable) cp Report HISTORY: Chest pain Frontal view the chest with comparison studies from 2019. FINDINGS: Hypoinflation lungs with crowding of bronchovascular markings in the lower chest --- subtle infiltrates in the lung bases cannot be excluded Report Dictated on Workstation: [id number] ---** Final ---** Dictated: 10/07/2021 11:39 pm Dictating Physician: Waldo Devine MD, Marcus Adhikari Signed Date and Time: 10/07/2021 11:41 pm Signed by: Waldo Devine MD, Marcus Adhikari Transcribed Date and Time: 10/07/2021 11:39      RECENT VITALS:     Temp: 98.6 °F (37 °C),  Pulse: 92, Resp: 14, BP: 98/62, SpO2: 94 %    This patient is a 27 y.o. Male with 1 day acute onset suicidal ideation, attempting to commit suicide by overdosing on marijuana, acid, and \"pills from the cartel\". Pills were described as uppers but patient does not know specifically what they were. Patient seeking inpatient psychiatric admission. Patient notes the drugs made his suicidal ideation worse but besides taking them he has not done anything else to harm himself. History of polysubstance abuse. Laboratory work-up unremarkable, is not yet medically cleared for inpatient psychiatric admission pending clearance of substances. Receiving IV fluids. Tacky and diaphoretic on arrival, given fluids not benzos as patient's blood pressure has been soft side and normal however patient's mentation has been adequate throughout admission. Patient tolerating p.o. intake, awaiting urine drug screen. Reevaluation, patient is alert and oriented, openly communicative, not tachycardic, no complaints other than suicidal ideation. Patient is medically clear for transfer to [facility] for further psychiatric care. OUTSTANDING TASKS / RECOMMENDATIONS:    1. Follow-up urine drug  2. Reevaluate  3. Dispo     FINAL IMPRESSION:     1. Overdose of sympathomimetic agent, intentional self-harm, initial encounter ([facility] [address]    2.  Suicidal ideation        DISPOSITION:         DISPOSITION:  []  Discharge   [x]  Transfer - [facility]   [] Admission -     []  Against Medical Advice   []  [facility]   FOLLOW-UP: No follow-up provider specified.    DISCHARGE MEDICATIONS: New Prescriptions    No medications on file           Melissa Anton MD  Emergency Medicine Resident  [facility]        Melissa Anton MD  Resident  11/02/21 [address] Carol Copeland MD  Resident  11/02/21 0309

## 2021-11-02 NOTE — ED NOTES
Assumed care of patient walking around bed. Is AAOX3, in no distress.  Is awaiting transfer to  Merced Anglin RN  11/02/21 1834

## 2021-11-02 NOTE — ED PROVIDER NOTES
Currently   Other Topics Concern    Not on file   Social History Narrative    Not on file     Social Determinants of Health     Financial Resource Strain:     Difficulty of Paying Living Expenses:    Food Insecurity:     Worried About Running Out of Food in the Last Year:     [address] in the Last Year:    Transportation Needs:     Lack of Transportation (Medical):  Lack of Transportation (Non-Medical):    Physical Activity:     Days of Exercise per Week:     Minutes of Exercise per Session:    Stress:     Feeling of Stress :    Social Connections:     Frequency of Communication with Friends and Family:     Frequency of Social Gatherings with Friends and Family:     Attends Anabaptism Services:     Active Member of Clubs or Organizations:     Attends Club or Organization Meetings:     Marital Status:    Intimate Partner Violence:     Fear of Current or Ex-Partner:     Emotionally Abused:     Physically Abused:     Sexually Abused:        History reviewed. No pertinent family history. Allergies:  Patient has no known allergies. Home Medications:  Prior to Admission medications    Medication Sig Start Date End Date Taking? Authorizing Provider   divalproex (DEPAKOTE) 250 MG DR tablet Take 3 tablets by mouth 2 times daily 6/15/21   Sabrina Merida MD   ARIPiprazole (ABILIFY) 10 MG tablet Take 1 tablet by mouth daily 6/15/21 7/15/21  Sabrina Merida MD   ibuprofen (IBU) 800 MG tablet Take 1 tablet by mouth every 8 hours as needed for Pain 5/5/21 5/12/21  Kitonya Baltazar,        REVIEW OF SYSTEMS    (2-9 systems for level 4, 10 or more for level 5)      Review of Systems   Constitutional: Negative for fever. HENT: Negative for sore throat. Eyes: Negative for visual disturbance. Respiratory: Negative for shortness of breath. Cardiovascular: Negative for chest pain. Gastrointestinal: Negative for abdominal pain, constipation, diarrhea, nausea and vomiting.    Genitourinary: Negative for decreased urine volume. Musculoskeletal: Negative for arthralgias and myalgias. Skin: Negative for wound. Neurological: Negative for weakness, light-headedness and headaches. Psychiatric/Behavioral: Positive for suicidal ideas. Negative for confusion. PHYSICAL EXAM   (up to 7 for level 4, 8 or more for level 5)      INITIAL VITALS:   BP 98/62   Pulse 92   Temp 98.6 °F (37 °C) (Oral)   Resp 14   Ht 6' 2\" (1.88 m)   Wt 230 lb (104.3 kg)   SpO2 94%   BMI 29.53 kg/m²     Physical Exam  Vitals and nursing note reviewed. Constitutional:       Appearance: He is well-developed. He is diaphoretic. HENT:      Head: Normocephalic and atraumatic. Right Ear: External ear normal.      Left Ear: External ear normal.      Nose: Nose normal.   Eyes:      General:         Right eye: No discharge. Left eye: No discharge. Extraocular Movements: Extraocular movements intact. Conjunctiva/sclera: Conjunctivae normal.      Pupils: Pupils are equal, round, and reactive to light. Neck:      Trachea: Trachea normal. No tracheal deviation. Cardiovascular:      Rate and Rhythm: Normal rate and regular rhythm. Heart sounds: Normal heart sounds. Pulmonary:      Effort: Pulmonary effort is normal. No respiratory distress. Breath sounds: Normal breath sounds. Abdominal:      Palpations: Abdomen is soft. Tenderness: There is no abdominal tenderness. There is no guarding. Musculoskeletal:         General: No deformity. Normal range of motion. Cervical back: Normal range of motion. Skin:     General: Skin is warm. Capillary Refill: Capillary refill takes less than 2 seconds. Neurological:      General: No focal deficit present. Mental Status: He is alert and oriented to person, place, and time.    Psychiatric:         Mood and Affect: Mood normal.         Behavior: Behavior normal.         DIFFERENTIAL  DIAGNOSIS     PLAN (Panfilo Barreto / Adrian  / CREATININE 0.74 0.70 - 1.20 mg/dL    Bun/Cre Ratio NOT REPORTED 9 - 20    Calcium 8.8 8.6 - 10.4 mg/dL    Sodium 145 (H) 135 - 144 mmol/L    Potassium 4.8 3.7 - 5.3 mmol/L    Chloride 107 98 - 107 mmol/L    CO2 25 20 - 31 mmol/L    Anion Gap 13 9 - 17 mmol/L    GFR Non-African American >60 >60 mL/min    GFR African American >60 >60 mL/min    GFR Comment          GFR Staging NOT REPORTED    Hepatic Function Panel   Result Value Ref Range    Albumin 4.5 3.5 - 5.2 g/dL    Alkaline Phosphatase 73 40 - 129 U/L     (H) 5 - 41 U/L     (H) <40 U/L    Total Bilirubin 0.28 (L) 0.3 - 1.2 mg/dL    Bilirubin, Direct 0.10 <0.31 mg/dL    Bilirubin, Indirect 0.18 0.00 - 1.00 mg/dL    Total Protein 6.9 6.4 - 8.3 g/dL    Globulin NOT REPORTED 1.5 - 3.8 g/dL    Albumin/Globulin Ratio 1.9 1.0 - 2.5   TOX SCR, BLD, ED   Result Value Ref Range    Acetaminophen Level <5 (L) 10 - 30 ug/mL    Ethanol 52 (H) <10 mg/dL    Ethanol percent 0.052 (H) <2.525 %    Salicylate Lvl <1 (L) 3 - 10 mg/dL    Toxic Tricyclic Sc,Blood NEGATIVE NEGATIVE         RADIOLOGY:  No results found. EKG  None    All EKG's are interpreted by the Emergency Department Physician who either signs or Co-signs this chart in the absence of a cardiologist.    EMERGENCY DEPARTMENT COURSE:  Patient found seated upright in bed, no acute distress, not ill or toxic appearing. Relatively engaged and cooperative in exam.  Patient clinically appears like a sympathomimetic overdose, mild diaphoresis and tachycardia. Low suspicion for anticholinergic toxidrome. Patient is not restless or agitated, no indication for benzodiazepines at this time. Patient treated with fluids with laboratory work-up including ED tox. No significant abnormality noted an undetectable alcohol level and mild transaminitis. Patient repeatedly requesting admission to [facility] inpatient psychiatric rehab.   Patient awaiting medical clearance at time of signout, patient care transferred to Dr. Peyton Duncan for reevaluation and final disposition. PROCEDURES:  None    CONSULTS:  None    CRITICAL CARE:  None    FINAL IMPRESSION      1. Overdose of sympathomimetic agent, intentional self-harm, initial encounter ([facility] [address]    2. Suicidal ideation          DISPOSITION / PLAN     DISPOSITION        PATIENT REFERRED TO:  No follow-up provider specified.     DISCHARGE MEDICATIONS:  New Prescriptions    No medications on file       Ryan Morris MD  Emergency Medicine Resident    (Please note that portions of this note were completed with a voice recognition program.  Efforts were made to edit the dictations but occasionally words are mis-transcribed.)        Ryan Morris MD  Resident  11/02/21 2723

## 2021-11-02 NOTE — ED NOTES
Dr Deshaun Toscano accepted patient to [facility], dx depressive disorder. [facility] ETA 11:00am. Writer unable to secure earlier transport.       Minus JOSEPH Rubio  11/02/21 9624

## 2021-11-02 NOTE — ED PROVIDER NOTES
[address]   Emergency Department  Emergency Medicine Attending Sign-out     Care of Jackline Goldmann. was assumed from previous attending Dr. Jimmie Driscoll and is being seen for Suicidal  .  The patient's initial evaluation and plan have been discussed with the prior provider who initially evaluated the patient. Attestation  I was available and discussed any additional care issues that arose and coordinated the management plans with the resident(s) caring for the patient during my duty period. Any areas of disagreement with resident's documentation of care or procedures are noted on the chart. I was personally present for the key portions of any/all procedures, during my duty period. I have documented in the chart those procedures where I was not present during the key portions. BRIEF PATIENT SUMMARY/MDM COURSE PER INITIAL PROVIDER:   RECENT VITALS:     Temp: 98.6 °F (37 °C),  Pulse: 92, Resp: 14, BP: 98/62, SpO2: 94 %    This patient is a 27 y.o. Male with suicidal ideations. Patient was taking marijuana lots of illegal drug saying that he took a bunch of drugs and a cartel insert all the things he took. Had lower blood pressures initially.   Awaiting labs, and then awaiting reevaluation and then plan for psych admission    DIAGNOSTICS/MEDICATIONS:     MEDICATIONS GIVEN:  ED Medication Orders (From admission, onward)    Start Ordered     Status Ordering Provider    11/02/21 0015 11/02/21 0006  lactated ringers infusion  CONTINUOUS      Acknowledged Franck Puentes    11/01/21 2330 11/01/21 2318  lactated ringers bolus  ONCE      Last MAR action: New Bag - by Raysa Jansen on 11/01/21 at 2345 Franck Puentes    11/01/21 2145 11/01/21 2134  lactated ringers bolus  ONCE      Last MAR action: [facility] Reviewed   CBC WITH AUTO DIFFERENTIAL - Abnormal; Notable for the following components:       Result Value    RBC 4.08 (*)     Hemoglobin 12.3 (*)     Hematocrit 37.5 (*)     Immature Granulocytes 1 (*)     All other components within normal limits   BASIC METABOLIC PANEL W/ REFLEX TO MG FOR LOW K - Abnormal; Notable for the following components:    Glucose 115 (*)     Sodium 145 (*)     All other components within normal limits   HEPATIC FUNCTION PANEL - Abnormal; Notable for the following components:     (*)      (*)     Total Bilirubin 0.28 (*)     All other components within normal limits   TOX SCR, BLD, ED - Abnormal; Notable for the following components:    Acetaminophen Level <5 (*)     Ethanol 52 (*)     Ethanol percent 3.550 (*)     Salicylate Lvl <1 (*)     All other components within normal limits   COVID-19, RAPID   URINE RT REFLEX TO CULTURE   URINE DRUG SCREEN       RADIOLOGY  No results found. OUTSTANDING TASKS / ADDITIONAL COMMENTS   1.  Golden Cabrera MD  Emergency Medicine Attending  [facility]        Rosie Isaacs MD  11/02/21 9004

## 2021-11-02 NOTE — ED PROVIDER NOTES
[facility]     Emergency Department     Faculty Attestation    I performed a history and physical examination of the patient and discussed management with the resident. I reviewed the residents note and agree with the documented findings and plan of care. Any areas of disagreement are noted on the chart. I was personally present for the key portions of any procedures. I have documented in the chart those procedures where I was not present during the key portions. I have reviewed the emergency nurses triage note. I agree with the chief complaint, past medical history, past surgical history, allergies, medications, social and family history as documented unless otherwise noted below. For Physician Assistant/ Nurse Practitioner cases/documentation I have personally evaluated this patient and have completed at least one if not all key elements of the E/M (history, physical exam, and MDM). Additional findings are as noted. I have personally seen and evaluated the patient. I find the patient's history and physical exam are consistent with the NP/PA documentation. I agree with the care provided, treatment rendered, disposition and follow-up plan. Patient states that he took multiple medications and recreational drugs today an effort to kill himself. He is suicidal at the present time neurologically intact no to be hypotensive here with blood pressures in the low 90s borderline tachycardic fluids observation medical clearance in progress      Critical Care     Steve Lloyd M.D.   Attending Emergency  Physician              Christie Rivas MD  11/01/21 9310

## 2021-11-17 ENCOUNTER — HOSPITAL ENCOUNTER (EMERGENCY)
Age: 26
Discharge: HOME | End: 2021-11-17
Payer: COMMERCIAL

## 2021-11-17 VITALS
DIASTOLIC BLOOD PRESSURE: 63 MMHG | SYSTOLIC BLOOD PRESSURE: 113 MMHG | HEART RATE: 85 BPM | TEMPERATURE: 98.3 F | RESPIRATION RATE: 16 BRPM | OXYGEN SATURATION: 95 %

## 2021-11-17 DIAGNOSIS — T40.1X1A ACCIDENTAL OVERDOSE OF HEROIN, INITIAL ENCOUNTER (HCC): Primary | ICD-10-CM

## 2021-11-17 PROCEDURE — 99283 EMERGENCY DEPT VISIT LOW MDM: CPT

## 2021-11-17 ASSESSMENT — ENCOUNTER SYMPTOMS
SORE THROAT: 0
CONSTIPATION: 0
ABDOMINAL PAIN: 0
ABDOMINAL DISTENTION: 0
EYE DISCHARGE: 0
SHORTNESS OF BREATH: 0
COLOR CHANGE: 0
RHINORRHEA: 0

## 2021-11-17 NOTE — ED PROVIDER NOTES
Bipolar 1 disorder ([facility]     [facility]     H/O alcohol abuse     Hep C w/o coma, chronic (HCC)          SURGICALHISTORY       Past Surgical History:   Procedure Laterality Date    ABSCESS DRAINAGE Right 10/13/2020    Irrigation and debridement    HAND SURGERY Right          CURRENT MEDICATIONS       Previous Medications    ARIPIPRAZOLE (ABILIFY) 10 MG TABLET    Take 1 tablet by mouth daily    DIVALPROEX (DEPAKOTE) 250 MG DR TABLET    Take 3 tablets by mouth 2 times daily    IBUPROFEN (IBU) 800 MG TABLET    Take 1 tablet by mouth every 8 hours as needed for Pain       ALLERGIES     Patient has no known allergies. FAMILY HISTORY     History reviewed. No pertinent family history. SOCIAL HISTORY       Social History     Socioeconomic History    Marital status: Single     Spouse name: None    Number of children: None    Years of education: None    Highest education level: None   Occupational History    None   Tobacco Use    Smoking status: Current Every Day Smoker     Packs/day: 0.50     Years: 7.00     Pack years: 3.50     Types: Cigarettes    Smokeless tobacco: Former User     Types: Snuff   Vaping Use    Vaping Use: Never used   Substance and Sexual Activity    Alcohol use: Yes    Drug use: Yes     Types: Methamphetamines (Crystal Meth), Opiates , IV, Cocaine, Marijuana (Weed)     Comment: States uses \"whatever I can get my hands on\"    Sexual activity: Not Currently   Other Topics Concern    None   Social History Narrative    None     Social Determinants of Health     Financial Resource Strain:     Difficulty of Paying Living Expenses: Not on file   Food Insecurity:     Worried About Running Out of Food in the Last Year: Not on file    [place] of Food in the Last Year: Not on file   Transportation Needs:     Lack of Transportation (Medical): Not on file    Lack of Transportation (Non-Medical):  Not on file   Physical Activity:     Days of Exercise per Week: Not on PROGRESS NOTE:   Authored by Wesley Page MD, Pager [phone] [facility], [id number] [facility]     Patient is a 89y old  Female who presents with a chief complaint of Acute Hypoxic Respiratory Failure (28 Jan 2020 07:17)      SUBJECTIVE / OVERNIGHT EVENTS: No acute events overnight. Patient seen and examined at bedside. Patient has still not had a bowel movement. Her cough has decreased significantly since admission. Patient is still short of breath on occasion. When waking her up from the exam, she became tachypneic and pursing her lips with each breath.     ADDITIONAL REVIEW OF SYSTEMS: She denies fever, chills.    MEDICATIONS  (STANDING):  albuterol/ipratropium for Nebulization. 3 milliLiter(s) Nebulizer every 6 hours  aspirin  chewable 81 milliGRAM(s) Oral daily  cefepime   IVPB 1000 milliGRAM(s) IV Intermittent every 12 hours  dextrose 5%. 1000 milliLiter(s) (50 mL/Hr) IV Continuous <Continuous>  dextrose 50% Injectable 12.5 Gram(s) IV Push once  dextrose 50% Injectable 25 Gram(s) IV Push once  dextrose 50% Injectable 25 Gram(s) IV Push once  enoxaparin Injectable 40 milliGRAM(s) SubCutaneous daily  FIRST- Mouthwash  BLM 5 milliLiter(s) Swish and Spit four times a day  fluconAZOLE   Tablet 100 milliGRAM(s) Oral daily  influenza   Vaccine 0.5 milliLiter(s) IntraMuscular once  insulin glargine Injectable (LANTUS) 5 Unit(s) SubCutaneous at bedtime  insulin lispro (HumaLOG) corrective regimen sliding scale   SubCutaneous three times a day before meals  insulin lispro (HumaLOG) corrective regimen sliding scale   SubCutaneous at bedtime  melatonin 3 milliGRAM(s) Oral at bedtime  metoprolol succinate ER 25 milliGRAM(s) Oral daily  multivitamin 1 Tablet(s) Oral daily  polyethylene glycol 3350 17 Gram(s) Oral daily  predniSONE   Tablet 30 milliGRAM(s) Oral daily  ursodiol Capsule 300 milliGRAM(s) Oral two times a day  vancomycin  IVPB 1000 milliGRAM(s) IV Intermittent every 24 hours    MEDICATIONS  (PRN):  acetaminophen   Tablet .. 650 milliGRAM(s) Oral every 6 hours PRN Temp greater or equal to 38C (100.4F), Mild Pain (1 - 3), Moderate Pain (4 - 6)  benzocaine 15 mG/menthol 3.6 mG (Sugar-Free) Lozenge 1 Lozenge Oral two times a day PRN Sore Throat  bisacodyl 5 milliGRAM(s) Oral every 12 hours PRN Constipation  dextrose 40% Gel 15 Gram(s) Oral once PRN Blood Glucose LESS THAN 70 milliGRAM(s)/deciliter  glucagon  Injectable 1 milliGRAM(s) IntraMuscular once PRN Glucose LESS THAN 70 milligrams/deciliter  guaiFENesin   Syrup  (Sugar-Free) 200 milliGRAM(s) Oral every 6 hours PRN Cough  LORazepam     Tablet 0.25 milliGRAM(s) Oral at bedtime PRN Anxiety      CAPILLARY BLOOD GLUCOSE      POCT Blood Glucose.: 204 mg/dL (27 Jan 2020 22:10)  POCT Blood Glucose.: 184 mg/dL (27 Jan 2020 17:24)  POCT Blood Glucose.: 264 mg/dL (27 Jan 2020 12:42)    I&O's Summary    27 Jan 2020 07:01  -  28 Jan 2020 07:00  --------------------------------------------------------  IN: 360 mL / OUT: 0 mL / NET: 360 mL        PHYSICAL EXAM:  Vital Signs Last 24 Hrs  T(C): 36.7 (28 Jan 2020 05:27), Max: 36.9 (27 Jan 2020 13:45)  T(F): 98 (28 Jan 2020 05:27), Max: 98.5 (27 Jan 2020 22:16)  HR: 91 (28 Jan 2020 05:27) (75 - 103)  BP: 130/67 (28 Jan 2020 05:27) (130/64 - 149/68)  BP(mean): --  RR: 20 (28 Jan 2020 05:27) (19 - 22)  SpO2: 100% (28 Jan 2020 05:27) (92% - 100%)    GENERAL: Mild distress, visibly anxious, on NC 5L. Awake and alert.  EYES: EOMi, PERRLA, no scleral icterus or injection  NECK: Supple, full ROM  RESPIRATORY: Tachypneic without increased work of breathing, pursing lips. Diffuse coarse biphasic crackles. Good inspiratory effort and air movement, no wheeze.   CARDIOVASCULAR: Normal S1/S2, regular rate and rhythm, no murmurs noted  GASTROINTESTINAL: Abdomen is soft, nontender, nondistended, normoactive bowel sounds, no palpable masses  SKIN: Scattered petechiae. Skin warm.  MUSCULOSKELETAL: No clubbing or cyanosis, no obvious deformity    LABS:                        10.5   20.05 )-----------( 319      ( 28 Jan 2020 06:15 )             34.3     01-28    132<L>  |  85<L>  |  28<H>  ----------------------------<  86  4.1   |  36<H>  |  0.55    Ca    9.1      28 Jan 2020 06:15  Phos  2.7     01-28  Mg     2.1     01-28                  RADIOLOGY & ADDITIONAL TESTS:  Results Reviewed: Y  Imaging Personally Reviewed: Y    COORDINATION OF CARE:  Care Discussed with Consultants/Other Providers [Y/N]: Y  Prior or Outpatient Records Reviewed [Y/N]: Y file   BA Insight of Exercise per Session: Not on file   Stress:     Feeling of Stress : Not on file   Social Connections:     Frequency of Communication with Friends and Family: Not on file    Frequency of Social Gatherings with Friends and Family: Not on file    Attends Restoration Services: Not on file    Active Member of [address] [place] [address] [employer] or Organizations: Not on file    Attends Club or Organization Meetings: Not on file    Marital Status: Not on file   Intimate Partner Violence:     Fear of Current or Ex-Partner: Not on file    Emotionally Abused: Not on file    Physically Abused: Not on file    Sexually Abused: Not on file   Housing Stability:     Unable to Pay for Housing in the Last Year: Not on file    Number of [place] in the Last Year: Not on file    Unstable Housing in the Last Year: Not on file       SCREENINGS      @FLOW([id number])@      PHYSICAL EXAM    (up to 7 for level 4, 8 or more for level 5)     ED Triage Vitals   BP Temp Temp src Pulse Resp SpO2 Height Weight   -- -- -- -- -- -- -- --       Physical Exam  Vitals and nursing note reviewed. Constitutional:       General: He is not in acute distress. Appearance: He is well-developed. He is not ill-appearing, toxic-appearing or diaphoretic. HENT:      Head: Normocephalic. Nose: No congestion. Mouth/Throat:      Mouth: Mucous membranes are moist.      Pharynx: No oropharyngeal exudate or posterior oropharyngeal erythema. Eyes:      Extraocular Movements: Extraocular movements intact. Conjunctiva/sclera: Conjunctivae normal.      Pupils: Pupils are equal, round, and reactive to light. Neck:      Vascular: No JVD. Trachea: No tracheal deviation. Cardiovascular:      Rate and Rhythm: Normal rate. Pulses: Normal pulses. Heart sounds: Normal heart sounds. No murmur heard. No friction rub. No gallop.     Pulmonary:      Effort: Pulmonary effort is normal. No tachypnea, accessory muscle usage, respiratory distress or retractions. Breath sounds: No stridor. No wheezing, rhonchi or rales. Chest:      Chest wall: No tenderness. Abdominal:      General: Abdomen is flat. Bowel sounds are normal. There is no distension or abdominal bruit. Palpations: There is no shifting dullness, fluid wave, hepatomegaly, splenomegaly, mass or pulsatile mass. Tenderness: There is no abdominal tenderness. There is no right CVA tenderness, left CVA tenderness, guarding or rebound. Negative signs include Jiménez's sign, Rovsing's sign and McBurney's sign. Musculoskeletal:         General: No deformity. Cervical back: Normal range of motion and neck supple. No rigidity. Skin:     General: Skin is warm and dry. Capillary Refill: Capillary refill takes less than 2 seconds. Coloration: Skin is not jaundiced. Neurological:      General: No focal deficit present. Mental Status: He is alert and oriented to person, place, and time. Mental status is at baseline. Cranial Nerves: No cranial nerve deficit. Sensory: No sensory deficit. Motor: No weakness. Coordination: Coordination normal.      Comments: Patient is alert and oriented, he is mildly sedate on arrival, admits to heroin use prior to EMS response.    Psychiatric:         Mood and Affect: Mood normal.         DIAGNOSTIC RESULTS     EKG: All EKG's are interpreted by the Emergency Department Physician who either signs or Co-signsthis chart in the absence of a cardiologist.        RADIOLOGY:   Rosebud Agustin such as CT, Ultrasound and MRI are read by the radiologist. Plain radiographic images are visualized and preliminarily interpreted by the emergency physician with the below findings:        Interpretation per the Radiologist below, if available at the time ofthis note:    No orders to display         ED BEDSIDE ULTRASOUND:   Performed by ED Physician - none    LABS:  Labs Reviewed - No data to display    All other labs were within normal range or not returned as of this dictation. EMERGENCY DEPARTMENT COURSE and DIFFERENTIAL DIAGNOSIS/MDM:   Vitals:    Vitals:    11/17/21 1759   BP: 113/63   Pulse: 85   Resp: 16   SpO2: 95%        MDM  Number of Diagnoses or Management Options  Accidental overdose of heroin, initial encounter [facility])  Diagnosis management comments: Patient found unresponsive by bystanders, per bystanders CPR was initiated, EMS had given 4 mg of Narcan on arrival, patient did become alert and oriented. He remains alert and oriented at time of arrival to the hospital, he is refusing any further care, he did agree to observation. In the emergency department prior to discharge. Patient has remained alert and oriented without any acute distress, he was discharged, without any further intervention, he is aware of his risks for continued use of heroin including permanent disability or death. He is really fused drug counseling at this time. CRITICAL CARE TIME   Total Critical Care time was 0 minutes, excluding separately reportableprocedures. There was a high probability of clinicallysignificant/life threatening deterioration in the patient's condition which required my urgent intervention. CONSULTS:  None    PROCEDURES:  Unless otherwise noted below, none     Procedures    FINAL IMPRESSION      1.  Accidental overdose of heroin, initial encounter [facility])          DISPOSITION/PLAN   DISPOSITION Decision To Discharge 11/17/2021 06:07:44 PM      PATIENT REFERRED TO:  [facility] AND DRUG ABUSE  [address]  [address]  In 1 day        DISCHARGE MEDICATIONS:  New Prescriptions    No medications on file          (Please note that portions of this note were completed with a voice recognition program.  Efforts were made to edit the dictations but occasionally words are mis-transcribed.)    Juan Nixon PA-C (electronically signed)  Attending Emergency Physician Ra Dickey PA-C  11/17/21 Dontae Barboza

## 2021-11-17 NOTE — ED NOTES
Pt wanting to leave with ride [facility]. Pt ambulated out to waiting room without difficulty     Kimmy Sanchez.  Ree Neff RN  11/17/21 [id number]

## 2021-11-17 NOTE — ED TRIAGE NOTES
Pt arrived [facility] ems [facility] after being found unresponsive. by female. Per ems pt was unresponsive on their arrival pt was given 2 mg narcan nasally and 4 mg iv. Per ems female was doing cpr on pt. O/a to eED pt a/o x 3 skin pink w/d resp non labored. Pt requesting ride to [facility] where he is staying since yesterday. pt does not want blood drawn and denies SI/HI. pt admits to smoking heroin

## 2021-11-17 NOTE — ED NOTES
Bed: 11  Expected date:   Expected time:   Means of arrival:   Comments:  26yr male  overdaose  2narcan  nasal  4 iv   pt a/ox3

## 2021-11-24 ENCOUNTER — HOSPITAL ENCOUNTER (EMERGENCY)
Age: 26
Discharge: HOME | End: 2021-11-24
Attending: STUDENT IN AN ORGANIZED HEALTH CARE EDUCATION/TRAINING PROGRAM
Payer: COMMERCIAL

## 2021-11-24 ENCOUNTER — APPOINTMENT (OUTPATIENT)
Dept: GENERAL RADIOLOGY | Age: 26
End: 2021-11-24
Payer: COMMERCIAL

## 2021-11-24 VITALS
HEIGHT: 74 IN | WEIGHT: 230 LBS | TEMPERATURE: 100.3 F | DIASTOLIC BLOOD PRESSURE: 75 MMHG | HEART RATE: 87 BPM | RESPIRATION RATE: 16 BRPM | SYSTOLIC BLOOD PRESSURE: 126 MMHG | OXYGEN SATURATION: 95 % | BODY MASS INDEX: 29.52 KG/M2

## 2021-11-24 DIAGNOSIS — U07.1 COVID-19: Primary | ICD-10-CM

## 2021-11-24 DIAGNOSIS — F15.10 METHAMPHETAMINE USE (HCC): ICD-10-CM

## 2021-11-24 DIAGNOSIS — F11.93 HEROIN WITHDRAWAL (HCC): ICD-10-CM

## 2021-11-24 PROCEDURE — 71045 X-RAY EXAM CHEST 1 VIEW: CPT

## 2021-11-24 PROCEDURE — 99283 EMERGENCY DEPT VISIT LOW MDM: CPT

## 2021-11-24 PROCEDURE — 6370000000 HC RX 637 (ALT 250 FOR IP): Performed by: STUDENT IN AN ORGANIZED HEALTH CARE EDUCATION/TRAINING PROGRAM

## 2021-11-24 RX ORDER — FAMOTIDINE 20 MG/1
20 TABLET, FILM COATED ORAL ONCE
Status: COMPLETED | OUTPATIENT
Start: 2021-11-24 | End: 2021-11-24

## 2021-11-24 RX ORDER — ONDANSETRON 4 MG/1
4 TABLET, ORALLY DISINTEGRATING ORAL ONCE
Status: COMPLETED | OUTPATIENT
Start: 2021-11-24 | End: 2021-11-24

## 2021-11-24 RX ORDER — IBUPROFEN 800 MG/1
400 TABLET ORAL ONCE
Status: COMPLETED | OUTPATIENT
Start: 2021-11-24 | End: 2021-11-24

## 2021-11-24 RX ORDER — ACETAMINOPHEN 500 MG
1000 TABLET ORAL ONCE
Status: COMPLETED | OUTPATIENT
Start: 2021-11-24 | End: 2021-11-24

## 2021-11-24 RX ORDER — GUAIFENESIN/DEXTROMETHORPHAN 100-10MG/5
5 SYRUP ORAL 3 TIMES DAILY PRN
Qty: 120 ML | Refills: 0 | Status: SHIPPED | OUTPATIENT
Start: 2021-11-24 | End: 2021-12-04

## 2021-11-24 RX ORDER — IBUPROFEN 400 MG/1
400 TABLET ORAL EVERY 6 HOURS PRN
Qty: 120 TABLET | Refills: 0 | Status: ON HOLD | OUTPATIENT
Start: 2021-11-24 | End: 2022-04-06 | Stop reason: HOSPADM

## 2021-11-24 RX ORDER — ACETAMINOPHEN 500 MG
1000 TABLET ORAL EVERY 6 HOURS PRN
Qty: 60 TABLET | Refills: 0 | Status: ON HOLD | OUTPATIENT
Start: 2021-11-24 | End: 2022-04-06 | Stop reason: HOSPADM

## 2021-11-24 RX ADMIN — ACETAMINOPHEN 1000 MG: 500 TABLET ORAL at 13:08

## 2021-11-24 RX ADMIN — IBUPROFEN 400 MG: 800 TABLET, FILM COATED ORAL at 13:09

## 2021-11-24 RX ADMIN — ONDANSETRON 4 MG: 4 TABLET, ORALLY DISINTEGRATING ORAL at 13:09

## 2021-11-24 RX ADMIN — FAMOTIDINE 20 MG: 20 TABLET, FILM COATED ORAL at 13:08

## 2021-11-24 ASSESSMENT — ENCOUNTER SYMPTOMS
RHINORRHEA: 0
BACK PAIN: 0
SHORTNESS OF BREATH: 0
ABDOMINAL PAIN: 1
CONSTIPATION: 0
VOMITING: 0
COUGH: 1
EYE PAIN: 0
NAUSEA: 1
SORE THROAT: 1
DIARRHEA: 0

## 2021-11-24 ASSESSMENT — PAIN SCALES - GENERAL: PAINLEVEL_OUTOF10: 0

## 2021-11-24 NOTE — ED NOTES
[facility] called to evaluate patient for placement in rehab. Patient calm and cooperative at this time.      Mariaelena Beth RN  11/24/21 6930

## 2021-11-24 NOTE — ED PROVIDER NOTES
[facility] [facility] ED  eMERGENCY dEPARTMENT eNCOUnter      Pt Name: Pooja Johnson  MRN: [id number]  [place] 1995  Date of evaluation: 11/24/2021  Provider: Karina Drummond MD      HISTORY OF PRESENT ILLNESS      Chief Complaint   Patient presents with    Addiction Problem     pt would like help with heroine and meth addiction, Pt would also like to be retested for covid after testing positive today       The history is provided by the Patient. Pooja Johnson is a 27 y.o. male with a PMH clinically significant for seizure status post TBI on Depakote, Substance use disorder with opioids, polysubstance abuse, depression, schizoaffective disorder, bipolar type I disorder, IVDU, homelessness presenting to the ED c/o multiple symptoms he suspected from methamphetamine and heroin withdrawal worsening over the past 1 to 2 days in addition to testing positive for Covid today. States that he was at the [facility] and tested positive, so came to the ED for further help with withdrawal symptoms and request for detox facility. Currently complaining of headache, generalized myalgias/fatigue, chills, nausea and itching. Also complains of sore throat, cough and rhinorrhea. Denies any associated chest pain, hemoptysis, BLE edema/pain shortness of breath, diarrhea or new skin lesions. Last used IV heroin and meth 3 days ago. Has been using for multiple years. Characterizes symptoms as constant, mild to moderate. States no known exposures to sick contacts prior to onset of symptoms. Did not receive the Covid vaccine. States symptoms worse with nothing. Improved with nothing. States was placed on Suboxone this morning from the [facility], but it is only helping mildly. Thinks he needs a higher dose. Would also like to be placed in a residential detox center    Per Chart Review: Most recent evaluation in the ED on 11/17/2021 for heroin overdose appreciated. Required multiple doses of Narcan.   At that time noting he had been clean for the past 2 days, then smoked the day prior. Refused drug counseling at that time. Observed in the ED and allowed to be discharged. REVIEW OF SYSTEMS       Review of Systems   Constitutional: Positive for activity change, appetite change, chills and fatigue. Negative for fever. HENT: Positive for sore throat. Negative for rhinorrhea. Eyes: Negative for pain and visual disturbance. Respiratory: Positive for cough. Negative for shortness of breath. Cardiovascular: Negative for chest pain, palpitations and leg swelling. Gastrointestinal: Positive for abdominal pain and nausea. Negative for constipation, diarrhea and vomiting. Genitourinary: Negative for dysuria. Musculoskeletal: Positive for myalgias. Negative for back pain, neck pain and neck stiffness. Skin: Negative for rash. Neurological: Positive for headaches. Negative for weakness and numbness. PAST MEDICAL HISTORY     Past Medical History:   Diagnosis Date    Anxiety     Bipolar 1 disorder ([facility]     Drug abuse in remission ([facility]     H/O alcohol abuse     Hep C w/o coma, chronic ([facility]        SURGICAL HISTORY       Past Surgical History:   Procedure Laterality Date    ABSCESS DRAINAGE Right 10/13/2020    Irrigation and debridement    HAND SURGERY Right        FAMILY HISTORY     No family history on file. SOCIAL HISTORY       Social History     Socioeconomic History    Marital status: Single     Spouse name: Not on file    Number of children: Not on file    Years of education: Not on file    Highest education level: Not on file   Occupational History    Not on file   Tobacco Use    Smoking status: Current Every Day Smoker     Packs/day: 0.50     Years: 7.00     Pack years: 3.50     Types: Cigarettes    Smokeless tobacco: Former User     Types: Snuff   Vaping Use    Vaping Use: Never used   Substance and Sexual Activity    Alcohol use:  Yes    Drug use: Yes     Types: Methamphetamines (Crystal Meth), Opiates , IV, Cocaine, Marijuana (Weed)     Comment: States uses \"whatever I can get my hands on\"    Sexual activity: Not Currently   Other Topics Concern    Not on file   Social History Narrative    Not on file     Social Determinants of Health     Financial Resource Strain:     Difficulty of Paying Living Expenses: Not on file   Food Insecurity:     Worried About Running Out of Food in the Last Year: Not on file    Hansel of Food in the Last Year: Not on file   Transportation Needs:     Lack of Transportation (Medical): Not on file    Lack of Transportation (Non-Medical): Not on file   Physical Activity:     Days of Exercise per Week: Not on file    Minutes of Exercise per Session: Not on file   Stress:     Feeling of Stress : Not on file   Social Connections:     Frequency of Communication with Friends and Family: Not on file    Frequency of Social Gatherings with Friends and Family: Not on file    Attends Hindu Services: Not on file    Active Member of [address] [place] [address] or Organizations: Not on file    Attends Club or Organization Meetings: Not on file    Marital Status: Not on file   Intimate Partner Violence:     Fear of Current or Ex-Partner: Not on file    Emotionally Abused: Not on file    Physically Abused: Not on file    Sexually Abused: Not on file   Housing Stability:     Unable to Pay for Housing in the Last Year: Not on file    Number of [place] in the Last Year: Not on file    Unstable Housing in the Last Year: Not on file       CURRENT MEDICATIONS       Discharge Medication List as of 11/24/2021  1:38 PM      CONTINUE these medications which have NOT CHANGED    Details   divalproex (DEPAKOTE) 250 MG DR tablet Take 3 tablets by mouth 2 times daily, Disp-180 tablet, R-3Normal      ARIPiprazole (ABILIFY) 10 MG tablet Take 1 tablet by mouth daily, Disp-30 tablet, R-0Normal             ALLERGIES     Patient has no known allergies.       PHYSICAL EXAM       ED Triage Vitals [11/24/21 1152]   BP Temp Temp Source Pulse Resp SpO2 Height Weight   126/75 100.3 °F (37.9 °C) Oral 87 16 95 % 6' 2\" (1.88 m) 230 lb (104.3 kg)       Physical Exam  Vitals and nursing note reviewed. Constitutional:       General: He is not in acute distress. Appearance: He is not ill-appearing. Comments: Fatigued-appearing   HENT:      Head: Normocephalic and atraumatic. Mouth/Throat:      Mouth: Mucous membranes are moist.      Pharynx: Oropharynx is clear. Eyes:      Extraocular Movements: Extraocular movements intact. Pupils: Pupils are equal, round, and reactive to light. Cardiovascular:      Rate and Rhythm: Normal rate and regular rhythm. Pulses: Normal pulses. Heart sounds: Normal heart sounds. Pulmonary:      Effort: Pulmonary effort is normal. No tachypnea, accessory muscle usage or respiratory distress. Breath sounds: No decreased air movement or transmitted upper airway sounds. Examination of the right-lower field reveals rales. Examination of the left-lower field reveals rales. Rales (Minimal) present. Abdominal:      General: There is no distension. Palpations: Abdomen is soft. Tenderness: There is no abdominal tenderness. Musculoskeletal:         General: No tenderness. Cervical back: Normal range of motion and neck supple. Right lower leg: No edema. Left lower leg: No edema. Skin:     General: Skin is warm and dry. Capillary Refill: Capillary refill takes less than 2 seconds. Comments: No areas of fluctuance, erythema or edema noted over the bilateral upper extremities. Neurological:      Mental Status: He is alert and oriented to person, place, and time. Mental status is at baseline.    Psychiatric:         Mood and Affect: Mood normal.         Behavior: Behavior normal.         MDM:   Chart Reviewed: PMH and additional information as noted in HPI obtained from chart review    Vitals:    Vitals:    11/24/21 1152   BP: 126/75   Pulse: 87   Resp: 16   Temp: 100.3 °F (37.9 °C)   TempSrc: Oral   SpO2: 95%   Weight: 230 lb (104.3 kg)   Height: 6' 2\" (1.88 m)       PROCEDURES:  Unless otherwise noted below, none  Procedures    LABS:  Labs Reviewed - No data to display    XR CHEST PORTABLE   Final Result   No radiographic evidence of acute intrathoracic process. 27 y.o. male with a PMH clinically significant for seizure status post TBI on Depakote, Substance use disorder with opioids, polysubstance abuse, depression, schizoaffective disorder, bipolar type I disorder, IVDU, homelessness presenting to the ED c/o multiple symptoms he suspected from methamphetamine and heroin withdrawal worsening over the past 1 to 2 days in addition to testing positive for Covid today. Upon initial evaluation, Pt Afebrile, HDS and in NAD. PE as noted above. Imaging as noted above. Given findings, clinical presentation most likely consistent w/ active Covid infection without significant hypoxia in addition to heroin and methamphetamine withdrawal.  Although considered, very low suspicion for atypical ACS given lack of chest pain and very low suspicion for DVT/PE given lack of hemoptysis or additional predisposing risk factors. Patient still able to tolerate p.o. intake. Administered muscle medications to help with both withdrawal and Covid infection. [facility] was discussed with [facility] who stated that he is already enrolled in the [facility] program for substance use disorder and is not currently a candidate for a detox facility. Will therefore be instructed to continue to follow-up with the [facility]. No indications to increase Suboxone dose through the ED. Also without indications to retest for Covid as he likely does have Covid. Chest x-ray as noted above. Lower suspicion for bacterial pneumonia. Given findings, stable for further evaluation and management as an outpatient.   Pt was administered Medications   acetaminophen (TYLENOL) tablet 1,000 mg (1,000 mg Oral Given 11/24/21 1308)   ibuprofen (ADVIL;MOTRIN) tablet 400 mg (400 mg Oral Given 11/24/21 1309)   famotidine (PEPCID) tablet 20 mg (20 mg Oral Given 11/24/21 1308)   ondansetron (ZOFRAN-ODT) disintegrating tablet 4 mg (4 mg Oral Given 11/24/21 1309)       Plan: Discharge home in good condition with meds as noted below and instructions to follow up with PCP and [facility]. Pt stable and appropriate for further evaluation and management as an outpatient. and Patient understanding and amenable to the POC. CRITICAL CARE TIME   Total CriticalCare time was 0 minutes, excluding separately reportable procedures. There was a high probability of clinically significant/life threatening deterioration in the patient's condition which required my urgent intervention. FINAL IMPRESSION      1. COVID-19    2. Heroin withdrawal ([facility]    3.  Methamphetamine use [facility])          DISPOSITION/PLAN   DISPOSITION        Discharge Medication List as of 11/24/2021  1:38 PM      START taking these medications    Details   acetaminophen (TYLENOL) 500 MG tablet Take 2 tablets by mouth every 6 hours as needed for Pain or Fever, Disp-60 tablet, R-0Normal      guaiFENesin-dextromethorphan (ROBITUSSIN DM) 100-10 MG/5ML syrup Take 5 mLs by mouth 3 times daily as needed for Cough, Disp-120 mL, R-0Normal              MD Nas Gaitan MD  11/26/21 8209

## 2021-12-10 ENCOUNTER — HOSPITAL ENCOUNTER (EMERGENCY)
Age: 26
Discharge: HOME | End: 2021-12-10
Attending: EMERGENCY MEDICINE
Payer: COMMERCIAL

## 2021-12-10 VITALS
HEART RATE: 105 BPM | DIASTOLIC BLOOD PRESSURE: 75 MMHG | WEIGHT: 230 LBS | HEIGHT: 74 IN | RESPIRATION RATE: 18 BRPM | TEMPERATURE: 97.3 F | SYSTOLIC BLOOD PRESSURE: 133 MMHG | OXYGEN SATURATION: 96 % | BODY MASS INDEX: 29.52 KG/M2

## 2021-12-10 DIAGNOSIS — F11.10 OPIATE ABUSE, CONTINUOUS (HCC): Primary | ICD-10-CM

## 2021-12-10 PROCEDURE — 99284 EMERGENCY DEPT VISIT MOD MDM: CPT

## 2021-12-10 PROCEDURE — 6370000000 HC RX 637 (ALT 250 FOR IP): Performed by: EMERGENCY MEDICINE

## 2021-12-10 RX ORDER — CLONIDINE HYDROCHLORIDE 0.1 MG/1
0.1 TABLET ORAL ONCE
Status: COMPLETED | OUTPATIENT
Start: 2021-12-10 | End: 2021-12-10

## 2021-12-10 RX ORDER — IBUPROFEN 800 MG/1
800 TABLET ORAL ONCE
Status: COMPLETED | OUTPATIENT
Start: 2021-12-10 | End: 2021-12-10

## 2021-12-10 RX ORDER — LORAZEPAM 1 MG/1
1 TABLET ORAL ONCE
Status: COMPLETED | OUTPATIENT
Start: 2021-12-10 | End: 2021-12-10

## 2021-12-10 RX ORDER — ONDANSETRON 4 MG/1
4 TABLET, ORALLY DISINTEGRATING ORAL ONCE
Status: COMPLETED | OUTPATIENT
Start: 2021-12-10 | End: 2021-12-10

## 2021-12-10 RX ADMIN — LORAZEPAM 1 MG: 1 TABLET ORAL at 12:49

## 2021-12-10 RX ADMIN — CLONIDINE HYDROCHLORIDE 0.1 MG: 0.1 TABLET ORAL at 12:49

## 2021-12-10 RX ADMIN — IBUPROFEN 800 MG: 800 TABLET, FILM COATED ORAL at 12:49

## 2021-12-10 RX ADMIN — ONDANSETRON 4 MG: 4 TABLET, ORALLY DISINTEGRATING ORAL at 12:48

## 2021-12-10 ASSESSMENT — ENCOUNTER SYMPTOMS
DIARRHEA: 0
SORE THROAT: 0
NAUSEA: 0
COUGH: 0
BACK PAIN: 0
SHORTNESS OF BREATH: 0
VOMITING: 0
ABDOMINAL PAIN: 0

## 2021-12-10 ASSESSMENT — PAIN DESCRIPTION - PAIN TYPE: TYPE: ACUTE PAIN

## 2021-12-10 ASSESSMENT — PAIN DESCRIPTION - LOCATION: LOCATION: GENERALIZED

## 2021-12-10 ASSESSMENT — PAIN DESCRIPTION - DESCRIPTORS: DESCRIPTORS: ACHING

## 2021-12-10 ASSESSMENT — PAIN SCALES - GENERAL
PAINLEVEL_OUTOF10: 6
PAINLEVEL_OUTOF10: 6

## 2021-12-10 ASSESSMENT — PAIN DESCRIPTION - FREQUENCY: FREQUENCY: CONTINUOUS

## 2021-12-10 NOTE — ED TRIAGE NOTES
Pt states he is having a break down  Pt states needs help with detox from opiates and depression and is afraid he may harm himself. Pt is aox4 pwd.

## 2021-12-10 NOTE — ED NOTES
[facility] cartside to assess patient for possible placement into detox.       Dat Antonio RN  12/10/21 5658

## 2021-12-10 NOTE — ED PROVIDER NOTES
[address] [facility] ED  eMERGENCYdEPARTMENT eNCOUnter      Pt Name: Yefri Salinas  MRN: [id number]  [place] 1995  Date of evaluation: 12/10/2021  Merari Ortiz MD    CHIEF COMPLAINT           HPI  Yefri Salinas is a 27 y.o. male per chart review has a h/o bipolar, depression/anxiety, hep C, presents to the ED for request for detox. Pt notes he smokes heroin everyday. Last use 2 days ago. Pt also takes oxycodone everyday. Last use this morning. Pt requesting detox. Pt denies fever, n/v, cp, sob, ab pain, dysuria, diarrhea. ROS  Review of Systems   Constitutional: Negative for activity change, chills and fever. HENT: Negative for ear pain and sore throat. Eyes: Negative for visual disturbance. Respiratory: Negative for cough and shortness of breath. Cardiovascular: Negative for chest pain, palpitations and leg swelling. Gastrointestinal: Negative for abdominal pain, diarrhea, nausea and vomiting. Genitourinary: Negative for dysuria. Musculoskeletal: Negative for back pain. Skin: Negative for rash. Neurological: Negative for dizziness and weakness. Except as noted above the remainder of the review of systems was reviewed and negative.        PAST MEDICAL HISTORY     Past Medical History:   Diagnosis Date    Anxiety     Bipolar 1 disorder ([facility]     Drug abuse in remission ([facility]     H/O alcohol abuse     Hep C w/o coma, chronic ([facility])          SURGICAL HISTORY       Past Surgical History:   Procedure Laterality Date    ABSCESS DRAINAGE Right 10/13/2020    Irrigation and debridement    HAND SURGERY Right          CURRENTMEDICATIONS       Previous Medications    ACETAMINOPHEN (TYLENOL) 500 MG TABLET    Take 2 tablets by mouth every 6 hours as needed for Pain or Fever    ARIPIPRAZOLE (ABILIFY) 10 MG TABLET    Take 1 tablet by mouth daily    DIVALPROEX (DEPAKOTE) 250 MG DR TABLET    Take 3 tablets by mouth 2 times daily    IBUPROFEN (IBU) 400 MG TABLET    Take 1 tablet by mouth every 6 hours as needed for Pain       ALLERGIES     Patient has no known allergies. FAMILY HISTORY     History reviewed. No pertinent family history. SOCIAL HISTORY       Social History     Socioeconomic History    Marital status: Single     Spouse name: None    Number of children: None    Years of education: None    Highest education level: None   Occupational History    None   Tobacco Use    Smoking status: Current Every Day Smoker     Packs/day: 0.50     Years: 7.00     Pack years: 3.50     Types: Cigarettes    Smokeless tobacco: Former User     Types: Snuff   Vaping Use    Vaping Use: Never used   Substance and Sexual Activity    Alcohol use: Yes    Drug use: Yes     Types: Methamphetamines (Crystal Meth), Opiates , IV, Cocaine, Marijuana (Weed)     Comment: States uses \"whatever I can get my hands on\"    Sexual activity: Not Currently   Other Topics Concern    None   Social History Narrative    None     Social Determinants of Health     Financial Resource Strain:     Difficulty of Paying Living Expenses: Not on file   Food Insecurity:     Worried About Running Out of Food in the Last Year: Not on file    Hansel of Food in the Last Year: Not on file   Transportation Needs:     Lack of Transportation (Medical): Not on file    Lack of Transportation (Non-Medical):  Not on file   Physical Activity:     Days of Exercise per Week: Not on file    Minutes of Exercise per Session: Not on file   Stress:     Feeling of Stress : Not on file   Social Connections:     Frequency of Communication with Friends and Family: Not on file    Frequency of Social Gatherings with Friends and Family: Not on file    Attends Scientologist Services: Not on file    Active Member of Clubs or Organizations: Not on file    Attends Club or Organization Meetings: Not on file    Marital Status: Not on file   Intimate Partner Violence:     Fear of Current or Ex-Partner: Not on file   [employer] Abused: Not on file    Physically Abused: Not on file    Sexually Abused: Not on file   Housing Stability:     Unable to Pay for Housing in the Last Year: Not on file    Number of Places Lived in the Last Year: Not on file    Unstable Housing in the Last Year: Not on file         PHYSICAL EXAM       ED Triage Vitals [12/10/21 1214]   BP Temp Temp Source Pulse Resp SpO2 Height Weight   133/75 97.3 °F (36.3 °C) Temporal 105 18 96 % 6' 2\" (1.88 m) 230 lb (104.3 kg)       Physical Exam  Vitals and nursing note reviewed. Constitutional:       Appearance: He is well-developed. HENT:      Head: Normocephalic. Right Ear: External ear normal.      Left Ear: External ear normal.   Eyes:      Conjunctiva/sclera: Conjunctivae normal.      Pupils: Pupils are equal, round, and reactive to light. Cardiovascular:      Rate and Rhythm: Normal rate and regular rhythm. Heart sounds: Normal heart sounds. Pulmonary:      Effort: Pulmonary effort is normal.      Breath sounds: Normal breath sounds. Abdominal:      General: Bowel sounds are normal. There is no distension. Palpations: Abdomen is soft. Tenderness: There is no abdominal tenderness. Musculoskeletal:         General: Normal range of motion. Cervical back: Normal range of motion and neck supple. Skin:     General: Skin is warm and dry. Neurological:      Mental Status: He is alert and oriented to person, place, and time. Psychiatric:         Mood and Affect: Mood normal.           MDM  26 yo male presents to the ED with request for detox. Pt is afebrile, hemodynamically stable. Pt given PO ativan, PO clonidine, PO zofran, PO ibuprofen int he ED. [facility] called who recommended discharge to rehab. Pt discharged to rehab with [facility].           FINAL IMPRESSION      1.  Opiate abuse, continuous [facility])          DISPOSITION/PLAN   DISPOSITION Decision To Discharge 12/10/2021 02:22:49 PM        DISCHARGE MEDICATIONS:  [unfilled]         Ulysses Basurto MD(electronically signed)  Attending Emergency Physician            Ulysses Basurto MD  12/10/21 3856

## 2021-12-27 ENCOUNTER — HOSPITAL ENCOUNTER (EMERGENCY)
Age: 26
Discharge: NURSING, REHAB OR LONG-TERM CARE | End: 2021-12-27
Attending: EMERGENCY MEDICINE
Payer: COMMERCIAL

## 2021-12-27 VITALS
HEIGHT: 74 IN | WEIGHT: 260 LBS | TEMPERATURE: 98.2 F | BODY MASS INDEX: 33.37 KG/M2 | HEART RATE: 65 BPM | OXYGEN SATURATION: 97 % | DIASTOLIC BLOOD PRESSURE: 72 MMHG | RESPIRATION RATE: 16 BRPM | SYSTOLIC BLOOD PRESSURE: 126 MMHG

## 2021-12-27 DIAGNOSIS — L29.9 PRURITIC DISORDER: Primary | ICD-10-CM

## 2021-12-27 PROCEDURE — 99283 EMERGENCY DEPT VISIT LOW MDM: CPT

## 2021-12-27 RX ORDER — HYDROXYZINE PAMOATE 25 MG/1
25 CAPSULE ORAL 3 TIMES DAILY PRN
Qty: 21 CAPSULE | Refills: 0 | Status: SHIPPED | OUTPATIENT
Start: 2021-12-27 | End: 2022-01-03

## 2021-12-27 RX ORDER — METHYLPREDNISOLONE 4 MG/1
TABLET ORAL
Qty: 1 KIT | Refills: 0 | Status: SHIPPED | OUTPATIENT
Start: 2021-12-27 | End: 2022-01-02

## 2021-12-27 RX ORDER — TRAZODONE HYDROCHLORIDE 100 MG/1
100 TABLET ORAL NIGHTLY
Status: ON HOLD | COMMUNITY
End: 2022-04-06 | Stop reason: HOSPADM

## 2021-12-27 RX ORDER — ATOMOXETINE 40 MG/1
40 CAPSULE ORAL DAILY
Status: ON HOLD | COMMUNITY
End: 2022-04-06 | Stop reason: HOSPADM

## 2021-12-27 RX ORDER — BUPRENORPHINE AND NALOXONE 8; 2 MG/1; MG/1
2 FILM, SOLUBLE BUCCAL; SUBLINGUAL DAILY
Status: ON HOLD | COMMUNITY
End: 2022-04-06 | Stop reason: HOSPADM

## 2021-12-27 RX ORDER — ESCITALOPRAM OXALATE 10 MG/1
10 TABLET ORAL DAILY
Status: ON HOLD | COMMUNITY
End: 2022-04-06 | Stop reason: HOSPADM

## 2021-12-27 ASSESSMENT — ENCOUNTER SYMPTOMS
NAUSEA: 0
SHORTNESS OF BREATH: 0
VOMITING: 0
DIARRHEA: 0
ABDOMINAL PAIN: 0
BACK PAIN: 0
SINUS PRESSURE: 0
COUGH: 0
EYE PAIN: 0
WHEEZING: 0
EYE REDNESS: 0
SORE THROAT: 0
EYE DISCHARGE: 0

## 2021-12-27 NOTE — ED PROVIDER NOTES
Started SUBOXONE on 12/11/21; lives in recovery house; sent here for [facility]  [facility]. The history is provided by the patient. Rash  Location:  Full body  Severity:  Moderate  Onset quality:  Gradual  Duration:  2 weeks  Timing:  Constant  Progression:  Unchanged  Chronicity:  New  Context: medications    Relieved by:  Nothing  Ineffective treatments:  None tried  Associated symptoms: no abdominal pain, no diarrhea, no fever, no headaches, no joint pain, no nausea, no shortness of breath, no sore throat, not vomiting and not wheezing         Review of Systems   Constitutional: Negative for chills and fever. HENT: Negative for ear pain, sinus pressure and sore throat. Eyes: Negative for pain, discharge and redness. Respiratory: Negative for cough, shortness of breath and wheezing. Cardiovascular: Negative for chest pain. Gastrointestinal: Negative for abdominal pain, diarrhea, nausea and vomiting. Genitourinary: Negative for dysuria and frequency. Musculoskeletal: Negative for arthralgias and back pain. Skin: Positive for rash. Negative for wound. Neurological: Negative for weakness and headaches. Hematological: Negative for adenopathy. Psychiatric/Behavioral: Negative. All other systems reviewed and are negative. Physical Exam  Vitals and nursing note reviewed. Constitutional:       Appearance: He is well-developed. HENT:      Head: Normocephalic and atraumatic. Eyes:      Pupils: Pupils are equal, round, and reactive to light. Cardiovascular:      Rate and Rhythm: Normal rate and regular rhythm. Heart sounds: Normal heart sounds. No murmur heard. Pulmonary:      Effort: Pulmonary effort is normal.      Breath sounds: Normal breath sounds. Abdominal:      General: Bowel sounds are normal.      Palpations: Abdomen is soft. Tenderness: There is no abdominal tenderness. There is no guarding or rebound.    Musculoskeletal:      Cervical back: Normal range of motion and neck supple. Skin:     General: Skin is warm and dry. Findings: Erythema and rash present. Rash is urticarial.   Neurological:      Mental Status: He is alert and oriented to person, place, and time. Psychiatric:         Behavior: Behavior normal.         Thought Content: Thought content normal.         Judgment: Judgment normal.        --------------------------------------------- PAST HISTORY ---------------------------------------------  Past Medical History:  has a past medical history of Anxiety, [facility] ([facility] Drug abuse in remission ([facility] H/O alcohol abuse, and Hep C w/o coma, chronic ([facility] Past Surgical History:  has a past surgical history that includes Hand surgery (Right) and Abscess Drainage (Right, 10/13/2020). Social History:  reports that he has been smoking cigarettes. He has a 3.50 pack-year smoking history. He has quit using smokeless tobacco.  His smokeless tobacco use included snuff. He reports current alcohol use. He reports previous drug use. Drugs: Methamphetamines (Crystal Meth), Opiates , IV, Cocaine, and Marijuana ([place]ia). Family History: family history is not on file. The patients home medications have been reviewed. Allergies: Patient has no known allergies. -------------------------------------------------- RESULTS -------------------------------------------------  No results found for this visit on 12/27/21. No orders to display       ------------------------- NURSING NOTES AND VITALS REVIEWED ---------------------------   The nursing notes within the ED encounter and vital signs as below have been reviewed.    /72   Pulse 65   Temp 98.2 °F (36.8 °C) (Temporal)   Resp 16   Ht 6' 2\" (1.88 m)   Wt 260 lb (117.9 kg)   SpO2 97%   BMI 33.38 kg/m²   Oxygen Saturation Interpretation: Normal      ------------------------------------------ PROGRESS NOTES ------------------------------------------   I have spoken with the patient and discussed todays results, in addition to providing specific details for the plan of care and counseling regarding the diagnosis and prognosis. Their questions are answered at this time and they are agreeable with the plan.      --------------------------------- ADDITIONAL PROVIDER NOTES ---------------------------------        This patient is stable for discharge. I have shared the specific conditions for return, as well as the importance of follow-up. IMPRESSION:     1. Pruritic disorder      Patient's Medications   New Prescriptions    HYDROXYZINE (VISTARIL) 25 MG CAPSULE    Take 1 capsule by mouth 3 times daily as needed for Itching    METHYLPREDNISOLONE (MEDROL, CHEL,) 4 MG TABLET    USE AS DIRECTED DISPENSE ONE PACK NO REFILLS   Previous Medications    ACETAMINOPHEN (TYLENOL) 500 MG TABLET    Take 2 tablets by mouth every 6 hours as needed for Pain or Fever    ARIPIPRAZOLE (ABILIFY) 10 MG TABLET    Take 1 tablet by mouth daily    ATOMOXETINE (STRATTERA) 40 MG CAPSULE    Take 40 mg by mouth daily    BUPRENORPHINE-NALOXONE (SUBOXONE) 8-2 MG FILM SL FILM    Place 2 Film under the tongue daily.     ESCITALOPRAM (LEXAPRO) 10 MG TABLET    Take 10 mg by mouth daily    IBUPROFEN (IBU) 400 MG TABLET    Take 1 tablet by mouth every 6 hours as needed for Pain    TRAZODONE (DESYREL) 100 MG TABLET    Take 100 mg by mouth nightly   Modified Medications    No medications on file   Discontinued Medications    DIVALPROEX (DEPAKOTE) 250 MG DR TABLET    Take 3 tablets by mouth 2 times daily         Kailash Andre DO  12/27/21 0902

## 2022-03-24 ENCOUNTER — HOSPITAL ENCOUNTER (EMERGENCY)
Age: 27
Discharge: HOME | End: 2022-03-25
Payer: COMMERCIAL

## 2022-03-24 DIAGNOSIS — F19.10 POLYSUBSTANCE ABUSE (HCC): Primary | ICD-10-CM

## 2022-03-24 LAB
ACETAMINOPHEN LEVEL: <5 UG/ML (ref 10–30)
ALBUMIN SERPL-MCNC: 4.5 G/DL (ref 3.5–4.6)
ALP BLD-CCNC: 62 U/L (ref 35–104)
ALT SERPL-CCNC: 22 U/L (ref 0–41)
AMPHETAMINE SCREEN, URINE: ABNORMAL
ANION GAP SERPL CALCULATED.3IONS-SCNC: 13 MEQ/L (ref 9–15)
AST SERPL-CCNC: 30 U/L (ref 0–40)
BARBITURATE SCREEN URINE: ABNORMAL
BASOPHILS ABSOLUTE: 0 K/UL (ref 0–0.2)
BASOPHILS RELATIVE PERCENT: 0.2 %
BENZODIAZEPINE SCREEN, URINE: ABNORMAL
BILIRUB SERPL-MCNC: 0.3 MG/DL (ref 0.2–0.7)
BILIRUBIN URINE: NEGATIVE
BLOOD, URINE: NEGATIVE
BUN BLDV-MCNC: 19 MG/DL (ref 6–20)
CALCIUM SERPL-MCNC: 9.4 MG/DL (ref 8.5–9.9)
CANNABINOID SCREEN URINE: POSITIVE
CHLORIDE BLD-SCNC: 105 MEQ/L (ref 95–107)
CHOLESTEROL, TOTAL: 126 MG/DL (ref 0–199)
CLARITY: CLEAR
CO2: 22 MEQ/L (ref 20–31)
COCAINE METABOLITE SCREEN URINE: ABNORMAL
COLOR: YELLOW
CREAT SERPL-MCNC: 0.61 MG/DL (ref 0.7–1.2)
EOSINOPHILS ABSOLUTE: 0.1 K/UL (ref 0–0.7)
EOSINOPHILS RELATIVE PERCENT: 1.3 %
ETHANOL PERCENT: NORMAL G/DL
ETHANOL: <10 MG/DL (ref 0–0.08)
GFR AFRICAN AMERICAN: >60
GFR NON-AFRICAN AMERICAN: >60
GLOBULIN: 2.6 G/DL (ref 2.3–3.5)
GLUCOSE BLD-MCNC: 100 MG/DL (ref 70–99)
GLUCOSE URINE: NEGATIVE MG/DL
HCT VFR BLD CALC: 37.8 % (ref 42–52)
HDLC SERPL-MCNC: 43 MG/DL (ref 40–59)
HEMOGLOBIN: 12.8 G/DL (ref 14–18)
KETONES, URINE: NEGATIVE MG/DL
LDL CHOLESTEROL CALCULATED: 67 MG/DL (ref 0–129)
LEUKOCYTE ESTERASE, URINE: NEGATIVE
LYMPHOCYTES ABSOLUTE: 1.9 K/UL (ref 1–4.8)
LYMPHOCYTES RELATIVE PERCENT: 23.7 %
Lab: ABNORMAL
MCH RBC QN AUTO: 30.1 PG (ref 27–31.3)
MCHC RBC AUTO-ENTMCNC: 33.9 % (ref 33–37)
MCV RBC AUTO: 88.9 FL (ref 80–100)
METHADONE SCREEN, URINE: ABNORMAL
MONOCYTES ABSOLUTE: 0.7 K/UL (ref 0.2–0.8)
MONOCYTES RELATIVE PERCENT: 8.4 %
NEUTROPHILS ABSOLUTE: 5.2 K/UL (ref 1.4–6.5)
NEUTROPHILS RELATIVE PERCENT: 66.4 %
NITRITE, URINE: NEGATIVE
OPIATE SCREEN URINE: ABNORMAL
OXYCODONE URINE: ABNORMAL
PDW BLD-RTO: 14.1 % (ref 11.5–14.5)
PH UA: 6 (ref 5–9)
PHENCYCLIDINE SCREEN URINE: ABNORMAL
PLATELET # BLD: 192 K/UL (ref 130–400)
POTASSIUM SERPL-SCNC: 4.2 MEQ/L (ref 3.4–4.9)
PROPOXYPHENE SCREEN: ABNORMAL
PROTEIN UA: NEGATIVE MG/DL
RBC # BLD: 4.25 M/UL (ref 4.7–6.1)
SALICYLATE, SERUM: <0.3 MG/DL (ref 15–30)
SARS-COV-2, NAAT: NOT DETECTED
SODIUM BLD-SCNC: 140 MEQ/L (ref 135–144)
SPECIFIC GRAVITY UA: 1.02 (ref 1–1.03)
TOTAL CK: 115 U/L (ref 0–190)
TOTAL PROTEIN: 7.1 G/DL (ref 6.3–8)
TRIGL SERPL-MCNC: 79 MG/DL (ref 0–150)
TSH SERPL DL<=0.05 MIU/L-ACNC: 0.91 UIU/ML (ref 0.44–3.86)
URINE REFLEX TO CULTURE: NORMAL
UROBILINOGEN, URINE: 0.2 E.U./DL
WBC # BLD: 7.9 K/UL (ref 4.8–10.8)

## 2022-03-24 PROCEDURE — 81003 URINALYSIS AUTO W/O SCOPE: CPT

## 2022-03-24 PROCEDURE — 82550 ASSAY OF CK (CPK): CPT

## 2022-03-24 PROCEDURE — 87635 SARS-COV-2 COVID-19 AMP PRB: CPT

## 2022-03-24 PROCEDURE — 80053 COMPREHEN METABOLIC PANEL: CPT

## 2022-03-24 PROCEDURE — 82077 ASSAY SPEC XCP UR&BREATH IA: CPT

## 2022-03-24 PROCEDURE — 99285 EMERGENCY DEPT VISIT HI MDM: CPT

## 2022-03-24 PROCEDURE — 85025 COMPLETE CBC W/AUTO DIFF WBC: CPT

## 2022-03-24 PROCEDURE — 80143 DRUG ASSAY ACETAMINOPHEN: CPT

## 2022-03-24 PROCEDURE — 80061 LIPID PANEL: CPT

## 2022-03-24 PROCEDURE — 84443 ASSAY THYROID STIM HORMONE: CPT

## 2022-03-24 PROCEDURE — 80307 DRUG TEST PRSMV CHEM ANLYZR: CPT

## 2022-03-24 PROCEDURE — 80179 DRUG ASSAY SALICYLATE: CPT

## 2022-03-24 PROCEDURE — 36415 COLL VENOUS BLD VENIPUNCTURE: CPT

## 2022-03-24 ASSESSMENT — ENCOUNTER SYMPTOMS
COUGH: 0
DIARRHEA: 0
VOMITING: 0
BACK PAIN: 0
SHORTNESS OF BREATH: 0
ABDOMINAL PAIN: 0
NAUSEA: 0
PHOTOPHOBIA: 0

## 2022-03-24 NOTE — ED TRIAGE NOTES
Pt to Ed with request for help with detox. Pt reports he uses ETOH, marijuana, meth regularly. Other drugs occasionally. Is prescribed Suboxone by Katarina Pandey, but has recently started selling them and wants to stop. Pt reports drugs have gotten him into trouble and he thinks people may be following him because of it. Pt alert and oriented. Speech slow, occasionally slurred. Pt admits to 1 beer, marijuana and multiple marijuana \"gummies\" today. Denies SI/HI.

## 2022-03-25 VITALS
RESPIRATION RATE: 16 BRPM | HEART RATE: 85 BPM | WEIGHT: 279 LBS | DIASTOLIC BLOOD PRESSURE: 80 MMHG | OXYGEN SATURATION: 100 % | TEMPERATURE: 98.5 F | BODY MASS INDEX: 35.81 KG/M2 | HEIGHT: 74 IN | SYSTOLIC BLOOD PRESSURE: 135 MMHG

## 2022-03-25 PROCEDURE — 6370000000 HC RX 637 (ALT 250 FOR IP): Performed by: PHYSICIAN ASSISTANT

## 2022-03-25 PROCEDURE — 6370000000 HC RX 637 (ALT 250 FOR IP): Performed by: EMERGENCY MEDICINE

## 2022-03-25 RX ORDER — DIAZEPAM 5 MG/1
5 TABLET ORAL ONCE
Status: COMPLETED | OUTPATIENT
Start: 2022-03-25 | End: 2022-03-25

## 2022-03-25 RX ORDER — NICOTINE 21 MG/24HR
1 PATCH, TRANSDERMAL 24 HOURS TRANSDERMAL ONCE
Status: DISCONTINUED | OUTPATIENT
Start: 2022-03-25 | End: 2022-03-25 | Stop reason: HOSPADM

## 2022-03-25 RX ORDER — LORAZEPAM 1 MG/1
1 TABLET ORAL ONCE
Status: COMPLETED | OUTPATIENT
Start: 2022-03-25 | End: 2022-03-25

## 2022-03-25 RX ADMIN — DIAZEPAM 5 MG: 5 TABLET ORAL at 09:46

## 2022-03-25 RX ADMIN — LORAZEPAM 1 MG: 1 TABLET ORAL at 06:20

## 2022-03-25 NOTE — ED NOTES
PT UP AMBULATING IN ROOM, PT REQUESTING PUDDING, PUDDING PROVIDED, PT STATED NEEDS MET      Meena Miller RN  03/25/22 0538

## 2022-03-25 NOTE — ED NOTES
PT STANDING AT ROOM DOOR WALKING BACK AND FORTH, [address] MET      Andrew Ocampo RN  03/25/22 1254

## 2022-03-25 NOTE — ED NOTES
SNACKS AND WARM BLANKET PROVIDED TO PT, PT UPDATED ON POC, PT STATED NEEDS MET      Tammie Roldan RN  03/25/22 9864

## 2022-03-25 NOTE — ED NOTES
PT RESTING IN BED, RESPIRATIONS EVEN AND UNLABORED, IN NO ACUTE DISTRESS       Alma Garcia, RN  03/25/22 9539

## 2022-03-25 NOTE — ED NOTES
PT RESTING IN BED, RESPIRATIONS EVEN AND UNLABORED, NO ACUTE DISTRESS     Jessica Martins, HAKAN  03/25/22 8284

## 2022-03-25 NOTE — ED NOTES
PT PACING BACK AND FORTH, PT REQUESTING \"SOMETHING TO HELP HIM CALM DOWN BECAUSE I AM COMING DOWN FROM EVERYTHING\", PROVIDER MADE AWARE      Patricio Garduno RN  03/25/22 1822

## 2022-03-25 NOTE — ED NOTES
PT GIVEN MEAL TRAY, PT STATED NEEDS MET, RESPIRATIONS EVEN AND UNLABORED, IN NO ACUTE DISTRESS      Yasmeen Logan RN  03/25/22 0817

## 2022-03-25 NOTE — ED NOTES
PT GIVEN DRINK PER REQUEST, PT SITTING AT SIDE OF BED, PT STATED NEEDS MET      Enid Carney, RN  03/25/22 8090

## 2022-03-25 NOTE — ED NOTES
PT RESTING IN BED, RESPIRATIONS EVEN AND UNLABORED, IN NO ACUTE DISTRESS      Anh Shaver RN  03/25/22 5622

## 2022-03-25 NOTE — ED NOTES
PT STANDING OUTSIDE OF ROOM ASKING POC, THIS RN AND NATO MCCLENDON UPDATED PT ON POC AND [facility] COMING THIS AM, PT AMBULATED TO AND FROM BATHROOM WITHOUT DIFFICULTY, PT STATES NEEDS MET, CALL LIGHT IN REACH     Patricio Garduno RN  03/25/22 9803

## 2022-03-25 NOTE — ED NOTES
PT STANDING OUTSIDE OF ROOM, PT UPDATED ON POC, PT STATED ALL QUESTIONS ANSWERED      Andrew Ocampo RN  03/25/22 5616

## 2022-03-25 NOTE — ED NOTES
Taking over the care, pt a&ox4, skin w/d/pink, 0 sob, 0 distress, 0 c/o, will monitor.      Cass High RN  03/25/22 3876

## 2022-04-03 ENCOUNTER — HOSPITAL ENCOUNTER (INPATIENT)
Age: 27
LOS: 3 days | Discharge: HOME | DRG: 750 | End: 2022-04-06
Attending: PSYCHIATRY & NEUROLOGY | Admitting: PSYCHIATRY & NEUROLOGY
Payer: COMMERCIAL

## 2022-04-03 DIAGNOSIS — F31.9 BIPOLAR 1 DISORDER (HCC): ICD-10-CM

## 2022-04-03 DIAGNOSIS — F32.A DEPRESSION WITH SUICIDAL IDEATION: Primary | ICD-10-CM

## 2022-04-03 DIAGNOSIS — T69.029A TRENCH FOOT, UNSPECIFIED LATERALITY, INITIAL ENCOUNTER: ICD-10-CM

## 2022-04-03 DIAGNOSIS — R45.851 DEPRESSION WITH SUICIDAL IDEATION: Primary | ICD-10-CM

## 2022-04-03 PROBLEM — F33.9 MDD (MAJOR DEPRESSIVE DISORDER), RECURRENT EPISODE (HCC): Status: ACTIVE | Noted: 2022-04-03

## 2022-04-03 LAB
ACETAMINOPHEN LEVEL: < 5 UG/ML (ref 0–20)
ALBUMIN SERPL-MCNC: 4.5 G/DL (ref 3.5–5.1)
ALP BLD-CCNC: 69 U/L (ref 38–126)
ALT SERPL-CCNC: 80 U/L (ref 11–66)
AMPHETAMINE+METHAMPHETAMINE URINE SCREEN: POSITIVE
ANION GAP SERPL CALCULATED.3IONS-SCNC: 11 MEQ/L (ref 8–16)
AST SERPL-CCNC: 242 U/L (ref 5–40)
BARBITURATE QUANTITATIVE URINE: POSITIVE
BASOPHILS # BLD: 0.2 %
BASOPHILS ABSOLUTE: 0 THOU/MM3 (ref 0–0.1)
BENZODIAZEPINE QUANTITATIVE URINE: NEGATIVE
BILIRUB SERPL-MCNC: 1 MG/DL (ref 0.3–1.2)
BILIRUBIN DIRECT: 0.3 MG/DL (ref 0–0.3)
BUN BLDV-MCNC: 21 MG/DL (ref 7–22)
CALCIUM SERPL-MCNC: 9.3 MG/DL (ref 8.5–10.5)
CANNABINOID QUANTITATIVE URINE: POSITIVE
CHLORIDE BLD-SCNC: 103 MEQ/L (ref 98–111)
CO2: 25 MEQ/L (ref 23–33)
COCAINE METABOLITE QUANTITATIVE URINE: NEGATIVE
CREAT SERPL-MCNC: 0.6 MG/DL (ref 0.4–1.2)
EOSINOPHIL # BLD: 1.7 %
EOSINOPHILS ABSOLUTE: 0.1 THOU/MM3 (ref 0–0.4)
ERYTHROCYTE [DISTWIDTH] IN BLOOD BY AUTOMATED COUNT: 13.6 % (ref 11.5–14.5)
ERYTHROCYTE [DISTWIDTH] IN BLOOD BY AUTOMATED COUNT: 44.2 FL (ref 35–45)
ETHYL ALCOHOL, SERUM: < 0.01 %
GFR SERPL CREATININE-BSD FRML MDRD: > 90 ML/MIN/1.73M2
GLUCOSE BLD-MCNC: 103 MG/DL (ref 70–108)
HCT VFR BLD CALC: 37.4 % (ref 42–52)
HEMOGLOBIN: 12.9 GM/DL (ref 14–18)
IMMATURE GRANS (ABS): 0.02 THOU/MM3 (ref 0–0.07)
IMMATURE GRANULOCYTES: 0.2 %
LYMPHOCYTES # BLD: 29.3 %
LYMPHOCYTES ABSOLUTE: 2.6 THOU/MM3 (ref 1–4.8)
MCH RBC QN AUTO: 30.7 PG (ref 26–33)
MCHC RBC AUTO-ENTMCNC: 34.5 GM/DL (ref 32.2–35.5)
MCV RBC AUTO: 89 FL (ref 80–94)
MONOCYTES # BLD: 9.9 %
MONOCYTES ABSOLUTE: 0.9 THOU/MM3 (ref 0.4–1.3)
NUCLEATED RED BLOOD CELLS: 0 /100 WBC
OPIATES, URINE: NEGATIVE
OSMOLALITY CALCULATION: 280.8 MOSMOL/KG (ref 275–300)
OXYCODONE: NEGATIVE
PHENCYCLIDINE QUANTITATIVE URINE: NEGATIVE
PLATELET # BLD: 212 THOU/MM3 (ref 130–400)
PMV BLD AUTO: 9.2 FL (ref 9.4–12.4)
POTASSIUM REFLEX MAGNESIUM: 3.8 MEQ/L (ref 3.5–5.2)
RBC # BLD: 4.2 MILL/MM3 (ref 4.7–6.1)
SALICYLATE, SERUM: 1.1 MG/DL (ref 2–10)
SARS-COV-2, NAAT: NOT  DETECTED
SEG NEUTROPHILS: 58.7 %
SEGMENTED NEUTROPHILS ABSOLUTE COUNT: 5.2 THOU/MM3 (ref 1.8–7.7)
SODIUM BLD-SCNC: 139 MEQ/L (ref 135–145)
TOTAL PROTEIN: 7.2 G/DL (ref 6.1–8)
TSH SERPL DL<=0.05 MIU/L-ACNC: 2.44 UIU/ML (ref 0.4–4.2)
WBC # BLD: 8.8 THOU/MM3 (ref 4.8–10.8)

## 2022-04-03 PROCEDURE — 80307 DRUG TEST PRSMV CHEM ANLYZR: CPT

## 2022-04-03 PROCEDURE — 87635 SARS-COV-2 COVID-19 AMP PRB: CPT

## 2022-04-03 PROCEDURE — 82077 ASSAY SPEC XCP UR&BREATH IA: CPT

## 2022-04-03 PROCEDURE — 99285 EMERGENCY DEPT VISIT HI MDM: CPT

## 2022-04-03 PROCEDURE — 6370000000 HC RX 637 (ALT 250 FOR IP): Performed by: NURSE PRACTITIONER

## 2022-04-03 PROCEDURE — 80048 BASIC METABOLIC PNL TOTAL CA: CPT

## 2022-04-03 PROCEDURE — 6360000002 HC RX W HCPCS: Performed by: NURSE PRACTITIONER

## 2022-04-03 PROCEDURE — 36415 COLL VENOUS BLD VENIPUNCTURE: CPT

## 2022-04-03 PROCEDURE — 6360000002 HC RX W HCPCS: Performed by: PSYCHIATRY & NEUROLOGY

## 2022-04-03 PROCEDURE — 80076 HEPATIC FUNCTION PANEL: CPT

## 2022-04-03 PROCEDURE — 84443 ASSAY THYROID STIM HORMONE: CPT

## 2022-04-03 PROCEDURE — 1240000000 HC EMOTIONAL WELLNESS R&B

## 2022-04-03 PROCEDURE — 80179 DRUG ASSAY SALICYLATE: CPT

## 2022-04-03 PROCEDURE — 85025 COMPLETE CBC W/AUTO DIFF WBC: CPT

## 2022-04-03 PROCEDURE — 80143 DRUG ASSAY ACETAMINOPHEN: CPT

## 2022-04-03 RX ORDER — HALOPERIDOL 5 MG/ML
10 INJECTION INTRAMUSCULAR EVERY 6 HOURS PRN
Status: DISCONTINUED | OUTPATIENT
Start: 2022-04-03 | End: 2022-04-06 | Stop reason: HOSPADM

## 2022-04-03 RX ORDER — LORAZEPAM 2 MG/ML
1 INJECTION INTRAMUSCULAR ONCE
Status: COMPLETED | OUTPATIENT
Start: 2022-04-03 | End: 2022-04-03

## 2022-04-03 RX ORDER — PALIPERIDONE 6 MG/1
6 TABLET, EXTENDED RELEASE ORAL EVERY MORNING
Status: ON HOLD | COMMUNITY
End: 2022-04-06 | Stop reason: HOSPADM

## 2022-04-03 RX ORDER — HYDROXYZINE HYDROCHLORIDE 25 MG/1
50 TABLET, FILM COATED ORAL 3 TIMES DAILY PRN
Status: DISCONTINUED | OUTPATIENT
Start: 2022-04-03 | End: 2022-04-05

## 2022-04-03 RX ORDER — DROPERIDOL 2.5 MG/ML
1.25 INJECTION, SOLUTION INTRAMUSCULAR; INTRAVENOUS
Status: DISPENSED | OUTPATIENT
Start: 2022-04-03 | End: 2022-04-03

## 2022-04-03 RX ORDER — NICOTINE 21 MG/24HR
1 PATCH, TRANSDERMAL 24 HOURS TRANSDERMAL DAILY
Status: DISCONTINUED | OUTPATIENT
Start: 2022-04-04 | End: 2022-04-06 | Stop reason: HOSPADM

## 2022-04-03 RX ORDER — DIAZEPAM 5 MG/1
5 TABLET ORAL ONCE
Status: COMPLETED | OUTPATIENT
Start: 2022-04-03 | End: 2022-04-03

## 2022-04-03 RX ORDER — HALOPERIDOL 5 MG/ML
5 INJECTION INTRAMUSCULAR ONCE
Status: COMPLETED | OUTPATIENT
Start: 2022-04-03 | End: 2022-04-03

## 2022-04-03 RX ORDER — ACETAMINOPHEN 325 MG/1
650 TABLET ORAL EVERY 4 HOURS PRN
Status: DISCONTINUED | OUTPATIENT
Start: 2022-04-03 | End: 2022-04-06 | Stop reason: HOSPADM

## 2022-04-03 RX ORDER — CIPROFLOXACIN 500 MG/1
500 TABLET, FILM COATED ORAL ONCE
Status: COMPLETED | OUTPATIENT
Start: 2022-04-03 | End: 2022-04-03

## 2022-04-03 RX ORDER — LORAZEPAM 2 MG/ML
2 INJECTION INTRAMUSCULAR EVERY 6 HOURS PRN
Status: DISCONTINUED | OUTPATIENT
Start: 2022-04-03 | End: 2022-04-06 | Stop reason: HOSPADM

## 2022-04-03 RX ORDER — POLYETHYLENE GLYCOL 3350 17 G/17G
17 POWDER, FOR SOLUTION ORAL DAILY PRN
Status: DISCONTINUED | OUTPATIENT
Start: 2022-04-03 | End: 2022-04-06 | Stop reason: HOSPADM

## 2022-04-03 RX ORDER — DROPERIDOL 2.5 MG/ML
1.25 INJECTION, SOLUTION INTRAMUSCULAR; INTRAVENOUS ONCE
Status: DISCONTINUED | OUTPATIENT
Start: 2022-04-03 | End: 2022-04-03

## 2022-04-03 RX ORDER — DIPHENHYDRAMINE HYDROCHLORIDE 50 MG/ML
25 INJECTION INTRAMUSCULAR; INTRAVENOUS ONCE
Status: COMPLETED | OUTPATIENT
Start: 2022-04-03 | End: 2022-04-03

## 2022-04-03 RX ORDER — DIPHENHYDRAMINE HYDROCHLORIDE 50 MG/ML
50 INJECTION INTRAMUSCULAR; INTRAVENOUS EVERY 6 HOURS PRN
Status: DISCONTINUED | OUTPATIENT
Start: 2022-04-03 | End: 2022-04-06 | Stop reason: HOSPADM

## 2022-04-03 RX ORDER — TRAZODONE HYDROCHLORIDE 50 MG/1
50 TABLET ORAL NIGHTLY PRN
Status: DISCONTINUED | OUTPATIENT
Start: 2022-04-04 | End: 2022-04-06 | Stop reason: HOSPADM

## 2022-04-03 RX ADMIN — CIPROFLOXACIN 500 MG: 500 TABLET, FILM COATED ORAL at 03:12

## 2022-04-03 RX ADMIN — HALOPERIDOL LACTATE 5 MG: 5 INJECTION, SOLUTION INTRAMUSCULAR at 21:57

## 2022-04-03 RX ADMIN — LORAZEPAM 1 MG: 2 INJECTION INTRAMUSCULAR; INTRAVENOUS at 22:19

## 2022-04-03 RX ADMIN — LORAZEPAM 1 MG: 2 INJECTION INTRAMUSCULAR; INTRAVENOUS at 21:57

## 2022-04-03 RX ADMIN — DIPHENHYDRAMINE HYDROCHLORIDE 25 MG: 50 INJECTION INTRAMUSCULAR; INTRAVENOUS at 22:19

## 2022-04-03 RX ADMIN — DIAZEPAM 5 MG: 5 TABLET ORAL at 12:01

## 2022-04-03 RX ADMIN — DIPHENHYDRAMINE HYDROCHLORIDE 25 MG: 50 INJECTION INTRAMUSCULAR; INTRAVENOUS at 21:57

## 2022-04-03 RX ADMIN — HALOPERIDOL LACTATE 5 MG: 5 INJECTION, SOLUTION INTRAMUSCULAR at 22:19

## 2022-04-03 ASSESSMENT — PAIN SCALES - GENERAL
PAINLEVEL_OUTOF10: 0
PAINLEVEL_OUTOF10: 10

## 2022-04-03 ASSESSMENT — SLEEP AND FATIGUE QUESTIONNAIRES
DIFFICULTY FALLING ASLEEP: YES
RESTFUL SLEEP: NO
DIFFICULTY ARISING: NO
DO YOU HAVE DIFFICULTY SLEEPING: YES
DO YOU HAVE DIFFICULTY SLEEPING: YES
DO YOU USE A SLEEP AID: NO
DIFFICULTY STAYING ASLEEP: YES
SLEEP PATTERN: DIFFICULTY FALLING ASLEEP;DISTURBED/INTERRUPTED SLEEP;INSOMNIA;RESTLESSNESS;EARLY AWAKENING
DO YOU USE A SLEEP AID: NO
DIFFICULTY FALLING ASLEEP: YES
RESTFUL SLEEP: NO
DIFFICULTY ARISING: NO
SLEEP PATTERN: DIFFICULTY FALLING ASLEEP;DISTURBED/INTERRUPTED SLEEP;INSOMNIA;RESTLESSNESS;EARLY AWAKENING
DIFFICULTY STAYING ASLEEP: YES

## 2022-04-03 ASSESSMENT — ENCOUNTER SYMPTOMS
SHORTNESS OF BREATH: 0
SINUS PRESSURE: 0
COUGH: 0
RHINORRHEA: 0
EYE PAIN: 0
WHEEZING: 0
BLOOD IN STOOL: 0
VOMITING: 0
ABDOMINAL PAIN: 0
DIARRHEA: 0
NAUSEA: 0
CONSTIPATION: 0

## 2022-04-03 ASSESSMENT — LIFESTYLE VARIABLES
HISTORY_ALCOHOL_USE: NO
HISTORY_ALCOHOL_USE: NO

## 2022-04-03 ASSESSMENT — PATIENT HEALTH QUESTIONNAIRE - PHQ9
SUM OF ALL RESPONSES TO PHQ QUESTIONS 1-9: 24
SUM OF ALL RESPONSES TO PHQ QUESTIONS 1-9: 24

## 2022-04-03 ASSESSMENT — PAIN - FUNCTIONAL ASSESSMENT: PAIN_FUNCTIONAL_ASSESSMENT: 0-10

## 2022-04-03 ASSESSMENT — PAIN DESCRIPTION - LOCATION: LOCATION: FOOT

## 2022-04-03 NOTE — ED NOTES
Pt is anxious and is requesting valium. Cecilia Barrera NP notified.       Chen George, RN  04/03/22 1200

## 2022-04-03 NOTE — ED NOTES
Patient resting on right side with eyes closed, breathing easy and unlabored. Call light within reach. Side rails x1. Patient denies any new concerns at this time.       Gideon Gilliam RN  04/03/22 8752

## 2022-04-03 NOTE — ED PROVIDER NOTES
[address]  EMERGENCY MEDICINE     Pt Name: Disha Sherwood  MRN: [id number]  [place] 1995  Date of evaluation: 4/3/2022  PCP:    None Provider  Provider: STEFAN Stephens CNP    CHIEF COMPLAINT       Chief Complaint   Patient presents with    Withdrawal    Suicidal           HISTORY OF PRESENT ILLNESS    Disha Sherwood is a 27 y.o. male patient that presents to ER with request for his addiction to methamphetamines. He also states that he has been suicidal and looking for a rope to hang himself with for the last 3 hours. Patient appears to be manic and states that he does have a history of bipolar. He states that he is on Invega for his bipolar. Patient states that he uses meth daily and he injects it when he does use it. Patient states he has not taken his work boots off for [employer]". His feet are callused on the soles and then he has multiple reddened open areas on the dorsum of his foot and his toes. He appears to have trench foot bilaterally. Patient's feet have a strong pungent odor. Patient is difficult to get history from as he is having racing thoughts and keeps asking for help. Patient also states that he has not slept in several days. Triage notes and Nursing notes were reviewed by myself. Any discrepancies are addressed above. PAST MEDICAL HISTORY     Past Medical History:   Diagnosis Date    Anxiety     B[facility] 1 disorder ([facility]     Drug abuse in remission ([facility]     H/O alcohol abuse     Hep C w/o coma, chronic ([facility]        SURGICAL HISTORY       Past Surgical History:   Procedure Laterality Date    ABSCESS DRAINAGE Right 10/13/2020    Irrigation and debridement    HAND SURGERY Right        CURRENT MEDICATIONS       Discharge Medication List as of 4/6/2022  9:13 AM          ALLERGIES       No Known Allergies    FAMILY HISTORY       History reviewed. No pertinent family history.      SOCIAL HISTORY       Social History     Socioeconomic History    Marital Not on file    Unstable Housing in the Last Year: Not on file       REVIEW OF SYSTEMS     Review of Systems   Constitutional: Negative for appetite change, chills, fatigue, fever and unexpected weight change. HENT: Negative for ear pain, rhinorrhea and sinus pressure. Eyes: Negative for pain and visual disturbance. Respiratory: Negative for cough, shortness of breath and wheezing. Cardiovascular: Negative for chest pain, palpitations and leg swelling. Gastrointestinal: Negative for abdominal pain, blood in stool, constipation, diarrhea, nausea and vomiting. Genitourinary: Negative for dysuria, frequency and hematuria. Musculoskeletal: Negative for arthralgias and joint swelling. Skin: Negative for rash. Neurological: Negative for dizziness, syncope, weakness, light-headedness and headaches. Hematological: Does not bruise/bleed easily. Psychiatric/Behavioral: Positive for sleep disturbance and suicidal ideas. The patient is nervous/anxious and is hyperactive. Except as noted above the remainder of the review of systems was reviewed and is negative. SCREENINGS        Marita Coma Scale  Eye Opening: Spontaneous  Best Verbal Response: Oriented  Best Motor Response: Obeys commands  [place] Coma Scale Score: 15               PHYSICAL EXAM    (up to 7 for level 4, 8 or more for level 5)     ED Triage Vitals [02/19/22 1512]   BP Temp Temp Source Pulse Resp SpO2 Height Weight   (!) 134/95 98.2 °F (36.8 °C) Oral 124 16 100 % -- --       Physical Exam  Vitals and nursing note reviewed. Constitutional:       Appearance: He is not diaphoretic. HENT:      Head: Normocephalic and atraumatic. Right Ear: External ear normal.      Left Ear: External ear normal.   Eyes:      Conjunctiva/sclera: Conjunctivae normal.   Cardiovascular:      Pulses:           Dorsalis pedis pulses are 2+ on the right side and 2+ on the left side.         Posterior tibial pulses are 2+ on the right side and 2+ on the left side. Pulmonary:      Effort: Pulmonary effort is normal. No respiratory distress. Abdominal:      General: There is no distension. Musculoskeletal:      Cervical back: Normal range of motion. Feet:    Feet:      Right foot:      Skin integrity: Skin breakdown and callus present. Left foot:      Skin integrity: Skin breakdown and callus present. Skin:     General: Skin is warm and dry. Neurological:      Mental Status: He is alert. DIAGNOSTIC RESULTS     EKG:(none if blank)  All EKGs are interpreted by the Emergency Department Physician who either signs or Co-signs this chart in the absence of a cardiologist.        RADIOLOGY: (none if blank)   I directly visualized the following images and reviewed the radiologist interpretations. Interpretation per the Radiologist below, if available at the time of this note:  No orders to display       LABS:  Labs Reviewed   CBC WITH AUTO DIFFERENTIAL - Abnormal; Notable for the following components:       Result Value    RBC 4.20 (*)     Hemoglobin 12.9 (*)     Hematocrit 37.4 (*)     MPV 9.2 (*)     All other components within normal limits   HEPATIC FUNCTION PANEL - Abnormal; Notable for the following components:     (*)     ALT 80 (*)     All other components within normal limits   SALICYLATE LEVEL - Abnormal; Notable for the following components:    Salicylate, Serum 1.1 (*)     All other components within normal limits   COVID-19, RAPID   BASIC METABOLIC PANEL W/ REFLEX TO MG FOR LOW K   TSH   URINE DRUG SCREEN   ETHANOL   ACETAMINOPHEN LEVEL   ANION GAP   GLOMERULAR FILTRATION RATE, ESTIMATED   OSMOLALITY       All other labs were within normal range or not returned as of this dictation. Please note, any cultures that may have been sent were not resulted at the time of this patient visit.     EMERGENCY DEPARTMENT COURSE and Medical Decision Making:     Vitals:    Vitals:    04/05/22 1250 04/05/22 1256 04/05/22 1925 04/06/22 0753   BP:  119/70 111/69 131/71   Pulse: 68  64 67   Resp:  16 17 16   Temp:  97.3 °F (36.3 °C) 98.4 °F (36.9 °C) 97.6 °F (36.4 °C)   TempSrc:  Oral Tympanic Tympanic   SpO2:  94% 97% 100%   Weight:       Height:           PROCEDURES: (None if blank)  Procedures    ED Course as of 04/08/22 0208   Sun Apr 03, 2022   0510 No organic cause was identified for patient's change in psychiatric behavior. Patient is cleared for psychiatric evaluation by behavioral access  when they arrive this morning. [NW]      ED Course User Index  [NW] Shahid Factor, APRN - CNP     MDM  Number of Diagnoses or Management Options  Bipolar 1 disorder ([facility]): established, worsening  Depression with suicidal ideation: new, needed workup  Trench foot, unspecified laterality, initial encounter: new, needed workup     Amount and/or Complexity of Data Reviewed  Clinical lab tests: ordered and reviewed    Risk of Complications, Morbidity, and/or Mortality  Presenting problems: moderate  Diagnostic procedures: moderate  Management options: moderate      Patient presents to ER with wanting to get help for his meth addiction and stating that he was looking for rope for several hours to try to hang himself in a suicide attempt. It appears that patient's most recent psychiatric admission was last month around the 16th at [facility]. Patient is actively suicidal with a plan to hang himself. Patient is also behaving manically. Labs were completed and no organic cause was identified to explain patient's change in psychiatric behavior. Patient is cleared for psychiatric evaluation. Patient will be evaluated by behavioral access  when they arrive this morning.   Strict return precautions and follow up instructions were discussed with the patient with which the patient agrees    ED Medications administered this visit:    Medications   droperidol (INAPSINE) injection 1.25 mg (has no administration in time range) ciprofloxacin (CIPRO) tablet 500 mg (500 mg Oral Given 4/3/22 0312)   diazePAM (VALIUM) tablet 5 mg (5 mg Oral Given 4/3/22 1201)   haloperidol lactate (HALDOL) injection 5 mg (5 mg IntraMUSCular Given 4/3/22 2157)   diphenhydrAMINE (BENADRYL) injection 25 mg (25 mg IntraMUSCular Given 4/3/22 2157)   LORazepam (ATIVAN) injection 1 mg (1 mg IntraMUSCular Given 4/3/22 2157)   haloperidol lactate (HALDOL) injection 5 mg (5 mg IntraMUSCular Given 4/3/22 2219)   LORazepam (ATIVAN) injection 1 mg (1 mg IntraMUSCular Given 4/3/22 2219)   diphenhydrAMINE (BENADRYL) injection 25 mg (25 mg IntraMUSCular Given 4/3/22 2219)         FINAL IMPRESSION      1. Depression with suicidal ideation    2. [facility] ([facility]    3. Trench foot, unspecified laterality, initial encounter          DISPOSITION/PLAN   DISPOSITION        PATIENT REFERRED TO:  [facility]  [address] Hines[address]  Phone: [phone] / Fax: [phone]  Go to  Alyssa Navarro is to go to [facility] and [facility] is open for appointments at all five offices Monday through Friday from 9:00am to 5:30pm.closed on Saturday and Sundays.       DISCHARGE MEDICATIONS:  Discharge Medication List as of 4/6/2022  9:13 AM      START taking these medications    Details   hydrOXYzine (ATARAX) 50 MG tablet Take 1 tablet by mouth 4 times daily as needed for Anxiety, Disp-30 tablet, R-0Normal                    STEFAN Moreira CNP (electronically signed)           STEFAN Moreira CNP  04/08/22 6562

## 2022-04-03 NOTE — ED NOTES
Pt resting on cot with eyes closed w/ respirations even and unlabored.      Doc HAKAN Billy  04/03/22 5017

## 2022-04-03 NOTE — ED NOTES
Patient placed in safe room that is ligature resistant with continuous monitoring in place. Provider notified, requested an assessment by behavioral health . Patient belongings secured in a locked lockers outside of the room. Explained suicide prevention precautions to the patient including constant observer.         Deja Leroy RN  04/03/22 7768

## 2022-04-03 NOTE — ED PROVIDER NOTES
Patient was signed out to me by Renaldo Art CNP at end of shift pending final psychiatric distribution. Patient presented with reported methamphetamine abuse for suicidal ideation, reports he was looking for a rope to hang himself with all day. He was placed under [facility] and medically cleared by previous providers. He was given Valium for agitation as well ciprofloxacin for some foot concerns. He was signed out to me pending accepting facility for psychiatric treatment. [facility] clinician reports the patient was declined at multiple outlying facilities and will be excepted for admission here under his current [facility]. He had no further medical needs during my care. 1. Depression with suicidal ideation    2. Bipolar 1 disorder ([facility]    3.  Trench foot, unspecified laterality, initial encounter           Mike Lowe PA-C  04/03/22 1928

## 2022-04-03 NOTE — ED NOTES
PT is given valium at this time and is laying in bed. Pt is cooperative and respers are regular. Patient placed in safe room that is ligature resistant with continuous monitoring in place. Provider notified, requested an assessment by behavioral health . Patient belongings secured in a locked lockers outside of the room. Explained suicide prevention precautions to the patient including constant observer.         Nieves Dominguez RN  04/03/22 7809

## 2022-04-03 NOTE — ED NOTES
RN provided patient with breakfast tray. Pt remains resting on cot.      Grzegorz Sinclair RN  04/03/22 7909

## 2022-04-03 NOTE — ED NOTES
ED nurse-to-nurse bedside report    Chief Complaint   Patient presents with    Withdrawal    Suicidal      LOC: alert and orientated to name, place, date  Vital signs   Vitals:    04/03/22 0143 04/03/22 0627   BP: 139/72 (!) 144/54   Pulse: 92 66   Resp: 22 19   Temp: 98.2 °F (36.8 °C)    TempSrc: Oral    SpO2: 99% 100%   Weight: 237 lb (107.5 kg)    Height: 6' 2\" (1.88 m)       Pain:    Pain Interventions: cipro for bilateral foot pain/infection- droperidol PRN  Oxygen: No    Current needs required N/A   Telemetry: No  LDAs:    Continuous Infusions:   Mobility: Independent  Olguin Fall Risk Score: No flowsheet data found.   Fall Interventions: Side rail x1, non-ligature resistant safe room continuous supervision  Report given to: Parish Apodaca RN  04/03/22 6592

## 2022-04-03 NOTE — ED NOTES
Pt resting in bed. Respires even and unlabored. No distress noted. Pt being continuously monitored from [facility] viewing room for pt safety.       Faith Mcbride RN  04/03/22 3333

## 2022-04-03 NOTE — ED NOTES
Pt resting on cot with eyes closed and remains calm and cooperative.      Cruz Kong RN  04/03/22 9745

## 2022-04-03 NOTE — ED NOTES
ED nurse-to-nurse bedside report    Chief Complaint   Patient presents with    Withdrawal    Suicidal      LOC: alert and orientated to name, place, date  Vital signs   Vitals:    04/03/22 0143 04/03/22 0627 04/03/22 1425   BP: 139/72 (!) 144/54 134/84   Pulse: 92 66 64   Resp: 22 19 18   Temp: 98.2 °F (36.8 °C)     TempSrc: Oral     SpO2: 99% 100% 100%   Weight: 237 lb (107.5 kg)     Height: 6' 2\" (1.88 m)        Pain:    Pain Interventions: n/a  Pain Goal: 0  Oxygen: No    Current needs required room air   Telemetry: No  LDAs:    Continuous Infusions:   Mobility: Independent  Olguin Fall Risk Score: No flowsheet data found.   Fall Interventions: call light in reach  Report given to: Odilia Chawla RN  04/03/22 0105
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
## 2022-04-04 PROCEDURE — APPSS30 APP SPLIT SHARED TIME 16-30 MINUTES: Performed by: PHYSICIAN ASSISTANT

## 2022-04-04 PROCEDURE — 6370000000 HC RX 637 (ALT 250 FOR IP): Performed by: PSYCHIATRY & NEUROLOGY

## 2022-04-04 PROCEDURE — 6370000000 HC RX 637 (ALT 250 FOR IP): Performed by: PHYSICIAN ASSISTANT

## 2022-04-04 PROCEDURE — 1240000000 HC EMOTIONAL WELLNESS R&B

## 2022-04-04 PROCEDURE — 90792 PSYCH DIAG EVAL W/MED SRVCS: CPT | Performed by: PSYCHIATRY & NEUROLOGY

## 2022-04-04 RX ORDER — ESCITALOPRAM OXALATE 10 MG/1
10 TABLET ORAL DAILY
Status: DISCONTINUED | OUTPATIENT
Start: 2022-04-04 | End: 2022-04-05

## 2022-04-04 RX ORDER — PALIPERIDONE 3 MG/1
3 TABLET, EXTENDED RELEASE ORAL EVERY MORNING
Status: DISCONTINUED | OUTPATIENT
Start: 2022-04-04 | End: 2022-04-06 | Stop reason: HOSPADM

## 2022-04-04 RX ADMIN — PALIPERIDONE 3 MG: 3 TABLET, EXTENDED RELEASE ORAL at 12:31

## 2022-04-04 RX ADMIN — ESCITALOPRAM OXALATE 10 MG: 10 TABLET ORAL at 12:31

## 2022-04-04 RX ADMIN — HYDROXYZINE HYDROCHLORIDE 50 MG: 25 TABLET, FILM COATED ORAL at 12:31

## 2022-04-04 ASSESSMENT — PAIN DESCRIPTION - DESCRIPTORS: DESCRIPTORS: ACHING

## 2022-04-04 ASSESSMENT — PAIN DESCRIPTION - ORIENTATION: ORIENTATION: RIGHT;LEFT

## 2022-04-04 ASSESSMENT — PAIN DESCRIPTION - FREQUENCY: FREQUENCY: CONTINUOUS

## 2022-04-04 ASSESSMENT — PAIN - FUNCTIONAL ASSESSMENT: PAIN_FUNCTIONAL_ASSESSMENT: ACTIVITIES ARE NOT PREVENTED

## 2022-04-04 ASSESSMENT — PAIN DESCRIPTION - ONSET: ONSET: ON-GOING

## 2022-04-04 ASSESSMENT — PAIN SCALES - GENERAL: PAINLEVEL_OUTOF10: 7

## 2022-04-04 ASSESSMENT — PAIN DESCRIPTION - LOCATION: LOCATION: FOOT

## 2022-04-04 ASSESSMENT — PAIN DESCRIPTION - PROGRESSION: CLINICAL_PROGRESSION: NOT CHANGED

## 2022-04-04 ASSESSMENT — PAIN DESCRIPTION - PAIN TYPE: TYPE: ACUTE PAIN

## 2022-04-04 NOTE — PROGRESS NOTES
Patient agreed to sign plan of care and phone calls/visitor consents, states \"I will sign the other forms later\".
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
## 2022-04-04 NOTE — PROGRESS NOTES
Pt requested the contact telephone number for [employer] [facility] [employer]. Pt was provided with the telephone number.

## 2022-04-04 NOTE — PLAN OF CARE
Patient did not attend any of the groups today and has not been out of his room to socialize so he has not met his socialization goal. Encourage patient to attend all groups daily and to come out of his room to socialize with others during his hospital stay.

## 2022-04-04 NOTE — PLAN OF CARE
Problem: Depressive Behavior With or Without Suicide Precautions:  Goal: Able to verbalize and/or display a decrease in depressive symptoms  Description: Able to verbalize and/or display a decrease in depressive symptoms  Outcome: Ongoing  Note: Patient reports mood \"tired\". Reports feeling depressed. Has flat affect. Speech clear. Fair eye contact. Reports no hope for future and identifies no one as their support system. Problem: Depressive Behavior With or Without Suicide Precautions:  Goal: Ability to disclose and discuss suicidal ideas will improve  Description: Ability to disclose and discuss suicidal ideas will improve  Outcome: Ongoing  Note: Patient reports suicidal thoughts, no plan or intent to harm self, states \"I just want to die\". Patient remains on suicidal precautions 15 checks for safety. Instructed to seek staff as needed for thoughts of self harm. Problem: Depressive Behavior With or Without Suicide Precautions:  Goal: Able to verbalize support systems  Description: Able to verbalize support systems  Outcome: Ongoing  Note: Patient reports no one as their support system. Problem: Depressive Behavior With or Without Suicide Precautions:  Goal: Absence of self-harm  Description: Absence of self-harm  Outcome: Ongoing  Note: No self harm behaviors were observed or reported so far this shift. Remains on every 15 minutes precautions for safety. Problem: Depressive Behavior With or Without Suicide Precautions:  Goal: Patient specific goal  Description: Patient specific goal  Outcome: Ongoing  Note: Patient reports goal for today is to \"go out of here and go to rehab\".   Patient continues to work towards goal.       Problem: Depressive Behavior With or Without Suicide Precautions:  Goal: Participates in care planning  Description: Participates in care planning  Outcome: Ongoing  Note: Patient discussed treatment plan with physician/medical staff, not attending group, and compliant with medications. Problem: Substance Abuse:  Goal: Absence of drug withdrawal signs and symptoms  Description: Absence of drug withdrawal signs and symptoms  Outcome: Ongoing  Note: Patient reports auditory hallucinations as the only withdrawal symptom. Problem: Discharge Planning:  Goal: Discharged to appropriate level of care  Description: Discharged to appropriate level of care  Outcome: Ongoing  Note: Patient voices he needs to find a rehab center before discharge, states \"I don't want to go back to the [facility]. Discharge planner working with patient to achieve optimal discharge plans, specific to individual needs. Problem: Pain:  Goal: Pain level will decrease  Description: Pain level will decrease  Outcome: Ongoing  Note: Pain Assessment: 0-10  Pain Level: 7   Patient's Stated Pain Goal: No pain   Is pain goal met at this time? No   Patient refused medications and non-pharmaceutical pain interventions. Non-Pharmaceutical Pain Intervention(s): Declines,Other (Comment) (refused medications)     Care plan reviewed with patient. Patient verbalize understanding of the plan of care and contribute to goal setting.

## 2022-04-04 NOTE — PROGRESS NOTES
Psychosocial Assessment    Current Level of Psychosocial Functioning           Independent              Unknown Pt is dismissive with minimal interaction. Dependent    Minimal Assist     Comments:      Psychosocial High Risk Factors (check all that apply)    Unable to obtain meds   Chronic illness/pain        Trench foot  Substance abuse       XXX  Lack of Family Support   Financial stress   Isolation   Inadequate Community Resources    XXX  Suicide attempt(s)  Not taking medications      XXX  Victim of crime   Developmental Delay  Unable to manage personal needs    Age 72 or older   Homeless       XXX  No transportation   Readmission within 30 days  Unemployment  Traumatic Event    Family/Supports identified:     Unknown Pt is dismissive with minimal interaction. Sexual Orientation:        Unknown Pt is dismissive with minimal interaction. Patient Strengths:      Unknown Pt is dismissive with minimal interaction. Patient Barriers:       Continued active drug addiction, Homeless from the [facility]    Safety plan:       Unknown Pt is dismissive with minimal interaction. Close monitoring, with safety checks. CMHC/MH history:    Plan of Care:  medication management, group/individual therapies, family meetings, psycho -education, treatment team meetings to assist with stabilization    [facility] per pt request.     Clinical Summary:  Gladis Brunner is a 27year old homeless male from the [facility], who presented to the ED reporting suicidal ideation secondary to drug addiction. Pt uses IV methamphetamine. He has had past history of hospitalizations and rehab. Per documentation, pt has a history of violence against staff in [place]. He was hospitalized at the [facility] 03/16/22. Pt is dismissive with minimal interaction. Pt is irritable with poor hygiene and body odor.  Per provider report, pt uses IV methamphetamine and heroin daily for 8 years. Patient came to [facility] for a party and now is wanting to go to [facility]. Pt denies current suicidal ideation.

## 2022-04-04 NOTE — PROGRESS NOTES
[facility]  Initial Interdisciplinary Treatment Plan NOTE    Review Date & Time: 04/04/22 1056    Patient was in treatment team.  See Multidisciplinary Treatment Team sheet for participants. Admission Type:   Admission Type: Involuntary    Reason for admission:  Reason for Admission: \"meth binge\"      Estimated Length of Stay Update:  04/05/22  Estimated Discharge Date Update: 3-5 days    PATIENT STRENGTHS:  Patient Strengths Strengths: Communication  Patient Strengths and Limitations:Limitations: Inappropriate/potentially harmful leisure interests,Difficulty problem solving/relies on others to help solve problems  Addictive Behavior:Addictive Behavior  In the past 3 months, have you felt or has someone told you that you have a problem with:  : None  Do you have a history of Chemical Use?: Yes  Do you have a history of Alcohol Use?: No  Do you have a history of Street Drug Abuse?: Yes  Histroy of Prescripton Drug Abuse?: No  Medical Problems:  Past Medical History:   Diagnosis Date    Anxiety     Bipolar 1 disorder ([facility]     [facility]     H/O alcohol abuse     Hep C w/o coma, chronic ([facility]        EDUCATION:   Learner Progress Toward Treatment Goals: Reviewed results and recommendations of this team, Reviewed group plan and strategies, Reviewed signs, symptoms and risk of self harm and violent behavior and Reviewed goals and plan of care    Method: Individual    Outcome: Needs reinforcement    PATIENT GOALS: Daily    PLAN/TREATMENT RECOMMENDATIONS UPDATE:   1. What is the most important thing we can help you with while you are here? Go [facility] [place] [facility].   2. Who is your support system? Unknown Pt is dismissive with minimal interaction. 3. Do you have follow-up providers? None. Plans to go [place] [facility] [place]. 4. Do you have the ability to pay for your medications? [employer]  5.  Where will you be residing when you leave the hospital? Homeless from [address] [place] [address] area. Came to [place] for a party. 6. Will need a return to work slip or FMLA paper completion?  No, unemployed      GOALS UPDATE:   Time frame for Short-Term Goals: Daily    LYNNE Rodgers

## 2022-04-04 NOTE — BH NOTE
Patient verbally agitated. States that he feels like his is going to blow up. Wanted to shower to calm down, and then upon seeing shower became more agitated, stating that he cannot go in there. States that he feels like he is going to loose control. Asking for additional medications. Spoke with Dr Nick Louis, one time dose of ativan 1mg, haldol 5mg, and benedryl 25mg ordered. Meds given without incidence. Patient shown to room and bed.

## 2022-04-04 NOTE — H&P
there. States that he feels like he is going to loose control. Asking for additional medications. \"    He was also given Valium PO in the ED which he specifically requested. Jesus Sweeney is seen laying in bed. She is admitted because he wants to go to rehab. He specifically mentions that he [facility] [place]. He states he has been abusing methamphetamine for 8 years. He uses methamphetamine intravenously daily. He also uses heroin and marijuana almost every day. He reports he has been having suicidal ideation the last few days. He said he had a plan to hang himself and was looking for rope in dumpsters prior to admission. He mentions he came to [place] 3 days ago for a party. He currently lives in [place] [id number]. He states he has been feeling depressed for a while. He reports he has been having trouble falling and staying asleep. When asked how his appetite has been, he says Donzell Baron we done? These questions are getting redundant. \" Patient became uncooperative so the interview was ended at that time. Of note, patient has had multiple similar presentations per care everywhere. He appears to go to the ED for psychiatric concerns and then requests help with rehabilitation placement once he is admitted. He has been to multiple rehabilitation facilities around [facility]. PSYCHIATRIC HISTORY:      · Outpatient psychiatric provider: No one currently. Was previously connected with a provider in [place] [facility]  · Suicide attempts: Multiple by overdose and hanging. Reports his most recent attempt was last year by overdose. History of self-injurious behavior by cutting from the age of 6 until about 1   · Inpatient psychiatric admissions: Multiple all over [place]. Was most recently admitted to [facility] in March 2022.    · Home Medication Compliance: poor    Past psychiatric medications includes:     Lexapro, Hydroxyzine, Zyprexa, Invega, Trazodone, Abilify PO, Abilify  Types: Cigarettes   Smokeless Tobacco Former User    Types: Snuff     Social History     Substance and Sexual Activity   Alcohol Use Yes     Social History     Substance and Sexual Activity   Drug Use Yes    Frequency: 7.0 times per week    Types: Methamphetamines (Crystal Meth), Opiates , IV, Cocaine, Marijuana (Weed)    Comment: States uses \"whatever I can get my hands on\"     Patient reports he has been abusing methamphetamine for 8 years. He uses a \"ball\" of methamphetamine intravenously daily. Started using marijuana when he was 13. Uses heroin and marijuana almost every day. UDS + for cannabis, amphetamine/methaphetamines and barbituates   Of note, patient has been to multiple rehabilitation facilities all over [place] per the medical record. He reports he drinks \"little\" alcohol. Per the medical record, he  has been drinking alcohol since the age of 10. His alcohol use became  problematic at the age of 14 when he would become blackout drunk  daily. LEGAL HISTORY:   HISTORY OF INCARCERATION: yes- reports he has been in care home in the past but does not elaborate why  Per medical record: Significant for a felony 2 drug trafficking charge when he was 10 when he was selling Percocet. As an adult, the patient has had alcohol-related charges, including public intoxication,  disturbance of conduct, and criminal damaging    Family Psychiatric and Medical History: Could not assess. Obtained from medical record    Maternal grandfather had schizophrenia and used drugs like kristal dust in the  62s. Father had bipolar disorder and depression. His parents both abused  alcohol. No known suicide history in the family.       History reviewed. No pertinent family history.       Lifetime Psychiatric Review of Systems      ·    Obsessions and Compulsions: denies  ·    Janet or Hypomania: yes but has not had a significant period of sobriety from illicit drugs   ·    Hallucinations:yes  ·    Panic Attacks:  denies ·    Delusions:  yes  ·    Phobias: denies       Medical Review of Systems:     Constitutional: Negative for appetite change, diaphoresis, fatigue and fever. HENT: Negative for congestion, sore throat and tinnitus. Eyes: Negative for visual disturbance. Respiratory: Negative for cough, shortness of breath and wheezing. Cardiovascular: Negative for chest pain and leg swelling. Gastrointestinal: Negative for nausea, vomiting, diarrhea. Negative for abdominal pain. Genitourinary: Negative for frequency. Musculoskeletal: Negative for arthralgias, myalgias and neck stiffness. Skin: Negative for puritis. Neurological: Negative for dizziness, weakness and headaches. All other systems reviewed and are negative. PHYSICAL EXAM:  Vitals:  /77   Pulse 77   Temp 98.3 °F (36.8 °C) (Oral)   Resp 16   Ht 6' 2\" (1.88 m)   Wt 237 lb (107.5 kg)   SpO2 100%   BMI 30.43 kg/m²     Pain Level: denies any pain      Physical Exam:    Constitutional: Well developed, well nourished, no acute distress  Eyes: Pupils round and reactive to light bilaterally  Neck:  Supple, no thyromegaly. Cardiovascular:  Normal rate and rhythm, normal S1 and S2. No murmur or gallop on auscultation. Radial pulses 2+ and brisk bilaterally  Lungs: Clear to auscultation bilaterally without wheezing or rales. Musculoskeletal:  Full range of motion in all four extremities. Neurologic:  Cranial nerves II through XII are grossly intact. Normal gait and station.        Mental Status Examination:    Level of consciousness:  somnolent  Appearance:  well-appearing, hospital attire, lying in bed, fair grooming and fair hygiene  Behavior/Motor:  no abnormalities noted  Attitude toward examiner:  Cooperative at first but then becomes irritable, uncooperative and dismissive  Speech:  Irritable tone   Mood:  Irritable   Affect:  mood congruent  Thought processes:  linear and goal directed  Thought content:  Denies homicidal ideation  Suicidal Ideation:  passive, without plan and without intent  Delusions:  no evidence of delusions  Perceptual Disturbance:  denies any perceptual disturbance  Cognition: Patient is oriented to person, place, time and situation  Concentration: clinically adequate  Memory: intact  Insight & Judgement: poor        DSM-5 DIAGNOSIS:    Schizoaffective disorder bipolar type  Rule out substance induced psychosis, antisocial personality disorder, malingering  Polysubstance abuse- opiates, methamphetamine      Patient Active Problem List   Diagnosis    Depression    Suicidal behavior without attempted self-injury    Schizoaffective disorder ([address]    Opiate abuse, continuous ([address]    Severe opioid use disorder ([address]    Abscess of right arm    IVDU (intravenous drug user)    Schizoaffective disorder, bipolar type ([address]    Polysubstance abuse ([address]    Opiate withdrawal ([address]    Depression with suicidal ideation    Opiate use    Methamphetamine use ([facility]          Psychosocial and Contextual Factors:   Substance use     Past Medical History:   Diagnosis Date    Anxiety     Bipolar 1 disorder ([facility]     Drug abuse in remission ([facility]     H/O alcohol abuse     Hep C w/o coma, chronic ([address]         Goals:    Reviewed labs  Reviewed EKG  Will obtain records and review them today. Medication adjustment as discussed with the attending physician: Will resume Lexapro 10mg daily and Invega at 3mg daily   Consults: none   Encouraged patient to engage in groups, milieu, and individual therapies offered as part of programing.      Behavioral Services  Medicare Certification Upon Admission    I certify that this patient's inpatient psychiatric hospital admission is medically necessary for:   X (1) Treatment which could reasonably be expected to improve this patient's condition,      X (2) Or for diagnostic study;     AND     X (2) The inpatient psychiatric services are provided while the individual is under the care of a physician and are included in the individualized plan of care. Estimated length of stay/service: Greater than two midnights will be required to reach therapeutic levels of medications and to stabilize mood    Plan for post-hospital care: Follow up with outpatient psychiatric services    Electronically signed by Mateo Dang PA-C on 4/4/2022 at 1:25 PM      **This report has been created using voice recognition software. It may contain minor errors which are inherent in voice recognition technology. **                                     Psychiatry Attending Attestation     I assessed this patient and reviewed the case and plan of care with Mateo Dang PA-C. I have reviewed the above documentation and I agree with the findings and treatment plan with the following updates. Patient was evaluated by Mateo Dang PA-C on the unit in person and I evaluated patient as Tele visit. Patient is a 75-year-old male with extensive history of polysubstance abuse-cocaine methamphetamine cannabis admitted for worsening suicidal thoughts with a plan to kill self by hanging himself. Patient reports that he has been feeling increasingly depressed for last several weeks now. Identifies stressors as relapsing on drugs and being off of his psychotropic medications. Reports some feelings of helplessness hopelessness and worthlessness. In the emergency department patient was verbally agitated however did calm down after taking a shower. Reports that he is been having some trouble falling asleep staying asleep. Discussed with him over restarting his medications and titrating them to effect. He is agreeable to the plan. TREATMENT PLAN    Risk Management:  close watch per standard protocol      Psychotherapy:  participation in milieu and group and individual sessions with Attending Physician,  and Physician Assistant/CNP      Estimated length of stay:   It might take more than 2 midnights to stabilize patient's mood/thoughts and titrate medications to effect. GENERAL PATIENT/FAMILY EDUCATION  Patient will understand basic signs and symptoms, Patient will understand benefits/risks and potential side effects from proposed meds and Patient will understand their role in recovery. Family is  active in patient's care. Patient assets that may be helpful during treatment include: Intent to participate and engage in treatment, sufficient fund of knowledge and intellect to understand and utilize treatments. Risk level: High     Goals:    Reviewed labs  Will obtain records/collateral information and review them today. Medication adjustment: Will restart home dose of Lexapro and Invega and titrate to effect  Consults: None      Tiajuana Angelucci is a 27 y.o. male being evaluated by a Virtual Visit (video visit) encounter to address concerns as mentioned above. A caregiver was present in the room along with the patient. Patient is present at [address] [place] [address] [address] [place] [address] and I am physically present at my home in [address]     This Virtual Visit was conducted with patient's consent. The patient is located in a state where I am licensed to provide care. --Ly Perez MD on 4/4/2022 at 1:56 PM    An electronic signature was used to authenticate this note. **This report has been created using voice recognition software. It may contain minor errors which are inherent in voice recognition technology. **

## 2022-04-04 NOTE — PROGRESS NOTES
Discharge planning-Pt was given the contact nformation for [facility] in [place] at pt request. Address: Ar[place] Obrien [address], [address] [place] [address], Phone: [phone] ext

## 2022-04-04 NOTE — BH NOTE
INPATIENT RECREATIONAL THERAPY  ADULT BEHAVIORAL SERVICES  EVALUATION    REFERRING PHYSICIAN:   Dr. Lino Buenrostro  DIAGNOSIS:   Schizoaffective Disorder, Bipolar Type  PRECAUTIONS:   Standard precautions    HISTORY OF PRESENT ILLNESS/INJURY:   Patient was admitted to the unit due to suicidal ideation and depression. Patient reported that he was having thoughts of hanging himself prior to admission. Patient stated that he has been abusing methamphetamines and would like some help to get clean. Patient reported anxiety, poor sleep and racing thoughts. Patient stated that he is having withdrawal symptoms from methamphetamines at this time. Patient also reported that he has stress due to currently being homeless. PMH:  Please see medical chart for prior medical history, allergies, and medication    HISTORY OF PSYCHIATRIC TREATMENT:  IP:     Jadiel/Fortunato/Win  OP:   CNP  ADDICTIONS:   [place], [place]    DATE OF BIRTH:   [birth year]  GENDER:   male  MARITAL STATUS:  single    EMPLOYMENT STATUS:  unemployed  LIVING SITUATION/SUPPORT:   Patient reported that he is homeless. Patient reported that his grandfather is supportive. EDUCATIONAL LEVEL:     graduate     MEDICATION/DRUG USE:  Methamphetamine/Ampthetamine abuse. Barbiturate abuse. Marijuana use. Cocaine abuse. Heroin abuse. Medication noncompliance. LEISURE INTERESTS:   Reading, watching TV and movies, walking, listening to music, meditation, playing video games, journaling, arts/crafts, coloring   ACTIVITY PREFERENCE:   Individual  ACTIVITY TYPES:   Passive. Indoor. Outdoor. Active. COGNITION:   A&Ox4    COPING:   poor  ATTENTION:   fair  RELAXATION:  Patient reported poor concentration, paranoia, anxiety, poor sleep, a poor appetite and racing thoughts. SELF-ESTEEM:   poor  MOTIVATION:  poor - no insight    SOCIAL SKILLS:   Fair - dismissive  FRUSTRATION TOLERANCE:   Poor - violence against staff.    ATTENTION SEEKING:  Isolating in room  COOPERATION:  Cooperative but dismissive  AFFECT:  blunt  APPEARANCE:   appropriate    HEARING:   No problems noted  VISION:    No problems noted  VERBAL COMMUNICATION:  No problems noted  WRITTEN COMMUNICATION:   No problems noted    COORDINATION:  Impaired - patient has multiple open areas on his feet and toes. MOBILITY:  Ambulates independently    GOALS:   Identify 2 new positive coping skills by time of discharge.

## 2022-04-04 NOTE — PROGRESS NOTES
Behavioral Services  Medicare Certification Upon Admission    I certify that this patient's inpatient psychiatric hospital admission is medically necessary for:    [x] (1) Treatment which could reasonably be expected to improve this patient's condition,       [x] (2) Or for diagnostic study;     AND     [x](2) The inpatient psychiatric services are provided while the individual is under the care of a physician and are included in the individualized plan of care.     Estimated length of stay/service 3-5 days    Plan for post-hospital care hc    Electronically signed by Lander Libman, MD on 4/4/2022 at 9:24 AM

## 2022-04-04 NOTE — PROGRESS NOTES
Patient has been at the [facility] in [place] in the past. Pt asked if we deisi be able to transport him to that shelter.

## 2022-04-04 NOTE — PROGRESS NOTES
Case Management 1058-Pt called Nilesh Phoenix but did not leave a message. I telephoned Nilesh Phoenix after pt's phone call. I left a message requesting a return telephone call. March 22, 2021     Patient: Mckenna Cisneros   YOB: 1978   Date of Visit: 3/22/2021       To Whom it May Concern:    Echo Westfall is under my professional care  She was seen in my office on 3/22/2021  She has an acute wound on her right foot and cannot stand or walk  I am recommending she stay out of work while this heals and am referring her to the [facility]       If you have any questions or concerns, please don't hesitate to call  Sincerely,          Nery Abraham DPM        CC: Belkis Chang

## 2022-04-04 NOTE — ED PROVIDER NOTES
[facility]  eMERGENCY dEPARTMENT eNCOUnter     Pt Name: John Fernandez  MRN: [id number]  Armsirajgfcarolyn 1995  Date of evaluation: 4/4/22        Mid-level provider Note:    I have personally performed and/or participated in the history, exam and medical decision making and agree with all pertinent clinical information as noted by the previous provider. I have also reviewed and agree with the past medical, family and social history unless otherwise noted. I have personally performed a face to face diagnostic evaluation on this patient. I have reviewed the previous provider's findings and agree. Evaluation: Patient was handed off to me by Tomas Torres CNP, patient was medically cleared, was awaiting placement at psychiatric facility. Patient wanted to leave, we did not feel comfortable with him leaving based on his plan to kill himself with a rope. EMC was written and signed by Dr. Carla Ngo. Throughout the day the patient became mildly agitated, requested Valium, he was given this. At the time the end of my shift there was no placement, patient was handed off to [facility] please see her note for final impression disposition plan. 1. Depression with suicidal ideation    2. Bipolar 1 disorder ([facility] [address]    3. Trench foot, unspecified laterality, initial encounter          DISPOSITION/PLAN  PATIENT REFERRED TO:  No follow-up provider specified.   DISCHARGE MEDICATIONS:  Current Discharge Medication List            Kimberlee Art, APRN - CNP      Pinstant Karma, STEFAN - CNP  04/04/22 0348

## 2022-04-04 NOTE — PROGRESS NOTES
Patient at nurses station demanding to use phone for placement issues, blaming staff that he will have to go to a homeless shelter because \"I don't know how to use the phone\", this writer showed patient how to use phone correctly. Patient asking for staff to wash his clothes and boots. Discussed with patient that we are unable to wash his boots.

## 2022-04-05 PROCEDURE — 1240000000 HC EMOTIONAL WELLNESS R&B

## 2022-04-05 PROCEDURE — 99232 SBSQ HOSP IP/OBS MODERATE 35: CPT | Performed by: PSYCHIATRY & NEUROLOGY

## 2022-04-05 PROCEDURE — 6370000000 HC RX 637 (ALT 250 FOR IP): Performed by: PSYCHIATRY & NEUROLOGY

## 2022-04-05 PROCEDURE — APPSS30 APP SPLIT SHARED TIME 16-30 MINUTES: Performed by: PHYSICIAN ASSISTANT

## 2022-04-05 PROCEDURE — 90833 PSYTX W PT W E/M 30 MIN: CPT | Performed by: PSYCHIATRY & NEUROLOGY

## 2022-04-05 PROCEDURE — 6370000000 HC RX 637 (ALT 250 FOR IP): Performed by: PHYSICIAN ASSISTANT

## 2022-04-05 RX ORDER — HYDROXYZINE HYDROCHLORIDE 25 MG/1
50 TABLET, FILM COATED ORAL 4 TIMES DAILY PRN
Status: DISCONTINUED | OUTPATIENT
Start: 2022-04-05 | End: 2022-04-06 | Stop reason: HOSPADM

## 2022-04-05 RX ORDER — ESCITALOPRAM OXALATE 10 MG/1
15 TABLET ORAL DAILY
Status: DISCONTINUED | OUTPATIENT
Start: 2022-04-06 | End: 2022-04-06 | Stop reason: HOSPADM

## 2022-04-05 RX ADMIN — ACETAMINOPHEN 650 MG: 325 TABLET ORAL at 11:14

## 2022-04-05 RX ADMIN — PALIPERIDONE 3 MG: 3 TABLET, EXTENDED RELEASE ORAL at 08:21

## 2022-04-05 RX ADMIN — ESCITALOPRAM OXALATE 10 MG: 10 TABLET ORAL at 08:21

## 2022-04-05 RX ADMIN — ACETAMINOPHEN 650 MG: 325 TABLET ORAL at 16:12

## 2022-04-05 RX ADMIN — HYDROXYZINE HYDROCHLORIDE 50 MG: 25 TABLET, FILM COATED ORAL at 08:21

## 2022-04-05 ASSESSMENT — PAIN SCALES - GENERAL
PAINLEVEL_OUTOF10: 0
PAINLEVEL_OUTOF10: 9
PAINLEVEL_OUTOF10: 5
PAINLEVEL_OUTOF10: 8
PAINLEVEL_OUTOF10: 6
PAINLEVEL_OUTOF10: 0

## 2022-04-05 ASSESSMENT — PAIN DESCRIPTION - DESCRIPTORS
DESCRIPTORS: ACHING
DESCRIPTORS: ACHING

## 2022-04-05 ASSESSMENT — PAIN DESCRIPTION - ONSET
ONSET: ON-GOING
ONSET: ON-GOING

## 2022-04-05 ASSESSMENT — PAIN DESCRIPTION - PROGRESSION
CLINICAL_PROGRESSION: NOT CHANGED

## 2022-04-05 ASSESSMENT — PAIN - FUNCTIONAL ASSESSMENT
PAIN_FUNCTIONAL_ASSESSMENT: ACTIVITIES ARE NOT PREVENTED
PAIN_FUNCTIONAL_ASSESSMENT: ACTIVITIES ARE NOT PREVENTED

## 2022-04-05 ASSESSMENT — PAIN DESCRIPTION - LOCATION
LOCATION: MOUTH
LOCATION: MOUTH

## 2022-04-05 ASSESSMENT — PAIN DESCRIPTION - FREQUENCY
FREQUENCY: INTERMITTENT
FREQUENCY: INTERMITTENT

## 2022-04-05 ASSESSMENT — PAIN DESCRIPTION - ORIENTATION
ORIENTATION: LOWER
ORIENTATION: LOWER

## 2022-04-05 ASSESSMENT — PAIN DESCRIPTION - PAIN TYPE
TYPE: ACUTE PAIN
TYPE: ACUTE PAIN

## 2022-04-05 NOTE — DISCHARGE INSTR - DIET

## 2022-04-05 NOTE — PLAN OF CARE
Problem: Depressive Behavior With or Without Suicide Precautions:  Goal: Able to verbalize and/or display a decrease in depressive symptoms  Description: Able to verbalize and/or display a decrease in depressive symptoms  4/4/2022 2243 by Jovanna Chowdary LPN  Outcome: Ongoing  Note: ARYAN pt did not cooperate with assessment  4/4/2022 1300 by Shai Devine RN  Outcome: Ongoing  Note: Patient reports mood \"tired\". Reports feeling depressed. Has flat affect. Speech clear. Fair eye contact. Reports no hope for future and identifies no one as their support system. Goal: Ability to disclose and discuss suicidal ideas will improve  Description: Ability to disclose and discuss suicidal ideas will improve  4/4/2022 2243 by Jovanna Chowdary LPN  Outcome: Ongoing  Note: AYRAN pt did not cooperate with assessment  4/4/2022 1300 by Shai Devine RN  Outcome: Ongoing  Note: Patient reports suicidal thoughts, no plan or intent to harm self, states \"I just want to die\". Patient remains on suicidal precautions 15 checks for safety. Instructed to seek staff as needed for thoughts of self harm. Goal: Able to verbalize support systems  Description: Able to verbalize support systems  4/4/2022 2243 by Jovanna Chowdary LPN  Outcome: Ongoing  Note: Pt has no support   4/4/2022 1300 by Shai Devine RN  Outcome: Ongoing  Note: Patient reports no one as their support system. Goal: Absence of self-harm  Description: Absence of self-harm  4/4/2022 2243 by Jovanna Chowdary LPN  Outcome: Ongoing  Note: Pt has been free of self-harm so far this shift, pt remains on every 15 minute safety checks. 4/4/2022 1300 by Shai Devine RN  Outcome: Ongoing  Note: No self harm behaviors were observed or reported so far this shift. Remains on every 15 minutes precautions for safety.   Goal: Patient specific goal  Description: Patient specific goal  4/4/2022 2243 by Jovanna Chowdary LPN  Outcome: Not Met This Shift  Note: No goal   4/4/2022 1300 by Genesis Fisher RN  Outcome: Ongoing  Note: Patient reports goal for today is to [facility] out of here and go to rehab\". Patient continues to work towards goal.    Goal: Participates in care planning  Description: Participates in care planning  4/4/2022 2243 by Eduardo Bhat LPN  Outcome: Not Met This Shift  Note: Pt did not participate this shift   4/4/2022 1300 by Genesis Fisher RN  Outcome: Ongoing  Note: Patient discussed treatment plan with physician/medical staff, not attending group, and compliant with medications. Problem: Substance Abuse:  Goal: Absence of drug withdrawal signs and symptoms  Description: Absence of drug withdrawal signs and symptoms  4/4/2022 2243 by Eduardo Bhat LPN  Outcome: Ongoing  Note: ARYAN pt did not cooperate with assessment  4/4/2022 1300 by Genesis Fisher RN  Outcome: Ongoing  Note: Patient reports auditory hallucinations as the only withdrawal symptom. Problem: Discharge Planning:  Goal: Discharged to appropriate level of care  Description: Discharged to appropriate level of care  4/4/2022 2243 by Eduardo Bhat LPN  Outcome: Ongoing  Note: Discharge planner working with pt to achieve optimal level of discharge specific to the needs of the pt.   4/4/2022 1300 by Genesis Fisher RN  Outcome: Ongoing  Note: Patient voices he needs to find a rehab center before discharge, states \"I don't want to go back to the [facility]. Discharge planner working with patient to achieve optimal discharge plans, specific to individual needs. Problem: Pain:  Goal: Pain level will decrease  Description: Pain level will decrease  4/4/2022 2243 by Eduardo Bhat LPN  Outcome: Ongoing  Note: No pain expressed at this time   4/4/2022 1300 by Genesis Fisher RN  Outcome: Ongoing  Note: Pain Assessment: 0-10  Pain Level: 7   Patient's Stated Pain Goal: No pain   Is pain goal met at this time?   No   Patient refused medications and non-pharmaceutical pain interventions. Non-Pharmaceutical Pain Intervention(s): Declines,Other (Comment) (refused medications)    Goal: Control of acute pain  Description: Control of acute pain  4/4/2022 1300 by Cesar Barbosa RN  Outcome: Completed  Goal: Control of chronic pain  Description: Control of chronic pain  4/4/2022 1300 by Cesar Barbosa RN  Outcome: Completed     Problem: Coping:  Goal: Ability to identify problematic behaviors that deter socialization will improve  Description: Ability to identify problematic behaviors that deter socialization will improve  4/4/2022 2243 by Yamila Venegas LPN  Outcome: Ongoing  Note: Pt has not been out to socialize with peers, minimal contact with staff. Socialization with staff pt is irritable. 4/4/2022 1350 by Jeffrey Tran  Outcome: Not Met This Shift       Care plan reviewed with patient and does not verbalize understanding of the plan of care and contribute to goal setting.

## 2022-04-05 NOTE — PATIENT CARE CONFERENCE
[address] [place] [address]  Day 3 Interdisciplinary Treatment Plan NOTE    Review Date & Time: 4/5/22 at [phone]    Patient was in treatment team    Admission Type:   Admission Type: Involuntary    Reason for admission:  Reason for Admission: \"meth binge\"  Estimated Length of Stay Update:  1-2 days  Estimated Discharge Date Update: 4/6/2022    PATIENT STRENGTHS:  Patient Strengths Strengths: Communication  Patient Strengths and Limitations:Limitations: Inappropriate/potentially harmful leisure interests,Difficulty problem solving/relies on others to help solve problems  Addictive Behavior:Addictive Behavior  In the past 3 months, have you felt or has someone told you that you have a problem with:  : None  Do you have a history of Chemical Use?: Yes  Do you have a history of Alcohol Use?: No  Do you have a history of Street Drug Abuse?: Yes  Histroy of Prescripton Drug Abuse?: No  Medical Problems:  Past Medical History:   Diagnosis Date    Anxiety     B[facility] 1 disorder ([facility]     Drug abuse in remission ([facility]     H/O alcohol abuse     Hep C w/o coma, chronic ([facility]        Risk:  Fall RiskTotal: 85  Rudy Scale Rudy Scale Score: 22    Status EXAM:   Status and Exam  Normal: No  Facial Expression: Flat  Affect: Blunt  Level of Consciousness: Alert  Mood:Normal: No  Mood: Irritable  Motor Activity:Normal: No  Motor Activity: Decreased  Interview Behavior: Uncooperative/Withdrawn  Preception: [place] to Person,Cary to Place,[place] to Standard [place] to Time  Attention:Normal: Yes  Attention: Hyperalert  Thought Processes: Circumstantial  Thought Content:Normal: Yes  Thought Content: Paranoia  Hallucinations: Unable to assess  Delusions:  (ARYAN, pt refused)  Delusions:  Other(See Comment) (ARYAN, pt refused)  Memory:Normal: No  Memory: Poor Recent  Insight and Judgment: No  Insight and Judgment: Poor Judgment,Poor Insight  Present Suicidal Ideation: Other(See comment) (ARYAN, pt refused)  Present Homicidal Ideation: Other(See comment) (ARYAN, pt refused)    Daily Assessment Last Entry:   Daily Sleep (WDL): Within Defined Limits         Patient Currently in Pain:  (ARYAN, pt refused)  Daily Nutrition (WDL): Within Defined Limits    Patient Monitoring:  Frequency of Checks: 4 times per hour, close    Psychiatric Symptoms:   Depression Symptoms  Depression Symptoms: Increased irritability,Isolative,Change in energy level  Anxiety Symptoms  Anxiety Symptoms: Other (Comment) (ARYAN, pt refused)  Janet Symptoms  Janet Symptoms: Other (Comment) (ARYAN, pt refused)     Psychosis Symptoms  Delusion Type:  (ARYAN, pt refused)    Suicide Risk CSSR-S:  1) Within the past month, have you wished you were dead or wished you could go to sleep and not wake up? : Yes  2) Have you actually had any thoughts of killing yourself? : Yes  3) Have you been thinking about how you might kill yourself? : Yes  5) Have you started to work out or worked out the details of how to kill yourself? Do you intend to carry out this plan? : Yes  6) Have you ever done anything, started to do anything, or prepared to do anything to end your life?: Yes    EDUCATION:   Learner Progress Toward Treatment Goals: Reviewed goals and plan of care    Method: Individual    Outcome: Needs reinforcement    PATIENT GOALS: \"To go to [facility]" this is a homeless shelter in [place], [place] that pt has been to before. PLAN/TREATMENT RECOMMENDATIONS UPDATE:  1. How are you progressing toward meeting your main treatment goal?  \"I am being discharged tomorrow. \"  Pt wants to go to [place]. States he no longer has suicidal thoughts. 2.  Are there discharge barriers/lingering problems that need to be addressed? Denies. Does not seem interested in mental health follow-up, but more concerned about housing issues at present. Stated he will go \"wherever they send me. \"      3. Do you have the ability to pay for your medications? Has medicaid      4. How is your group participation?   Pt has not been attending groups. GOALS UPDATE:   Time frame for Short-Term Goals: 1-2 days.       Eusebio Monson RN

## 2022-04-05 NOTE — BH NOTE
This nurse asked to assess pt and he asked if \"it could be done later\" Nurse came back later with a refusal of vitals and assessment

## 2022-04-05 NOTE — BH NOTE
Group Therapy Note    Date: 4/5/2022  Start Time:  1000  End Time:   1050  Number of Participants: 6    Type of Group: Recreational    Patient's Goal:  Increase self-expression. Notes:   Patient participated in The Ungame and shared his answers with others in the group.     Status After Intervention:  Improved    Participation Level: Interactive    Participation Quality: Appropriate, Attentive and Sharing      Speech:  normal      Thought Process/Content: Logical      Affective Functioning: Congruent      Mood: euthymic      Level of consciousness:  Oriented x4      Response to Learning: Progressing to goal      Endings: None Reported    Modes of Intervention: Support, Socialization, Exploration and Activity      Discipline Responsible: Psychoeducational Specialist      Signature:  Bryce Tate

## 2022-04-05 NOTE — PROGRESS NOTES
Discharge planning-Ernie is to go to [facility] is open for appointments at all five offices Monday through Friday from 9:00am to 5:30pm.closed on Saturday and Sundays.

## 2022-04-05 NOTE — PROGRESS NOTES
RADHA. has reviewed and agrees with LEELEE Birmingham LPN's shift   Assessment.     Pia Jones RN  4/5/2022

## 2022-04-05 NOTE — PROGRESS NOTES
1130 Patient came to nurses station asking for numbers to homeless shelters in [place], [place]. Patient called a couple of places. 1200 Patient came to nurses asking \"how far can they cab me. I can go to my grandpa's house in [place], [place]". Discussed with patient that we will need to verify your grandpa's address and will call [facility]. 1210 Patient states \"I don't wont to take a bus, I don't trust myself. They stop to many places and I don't want to get lost\". [facility] is able to cab patient to [facility]. Wandy [address] Message left for patient's [facility] at [phone]. [phone] Max returned call. Chin reports patient may come to his house at [address]. Chin states \"I would rather him come here, because if he goes to [place], he will probably die there\". Chin is aware patient will not be discharge until tomorrow. Chin reports he is unable to come  patient. 1309 Arrangements made with [facility].  time is scheduled for  tomorrow, have patient ready by 10 am in case they can come earlier. 1  notified regarding scheduled  time tomorrow.

## 2022-04-05 NOTE — PROGRESS NOTES
This RN has reviewed and agrees with Rudy Ram LPN's data collection and has collaborated with this LPN regarding the patient's care plan.

## 2022-04-05 NOTE — PROGRESS NOTES
Department of Psychiatry  Progress Note     Chief Complaint:  Suicidal ideation with plan to hang self     PROGRESS:  Martínez Brooks is seen pacing in the hallway. He agrees to speak with this writer in the interview room. He is very discharge focused today. He has been coming up to the nurses station multiple times this morning asking if he can be discharged today. He wants to go to the [facility] in [place]. Reports he has family in [place] and has been to the shelter in the past. He reports he is doing good today. Mood is good. When asked what changed for him, he says he slept. He slept all day yesterday and 8 hours continuous last night per staff. He reports his depression has improved since admission. He endorses passive suicidal ideation but denies any specific plan or intent at this time. He is able to contract for safety on the unit. He endorses feeling hopeless and helpless about his situation at times but is looking forward to going to [employer] in [place]. He reports his appetite has been good. He has been hydrating adequately. He denies any hallucinations. Reports he was experiencing them prior to admission but attributed this to using methamphetamine. He denies any paranoia today. Endorses feeling safe on the unit. He has been compliant with his medications and denies any side effects. He was isolative to his room all day yesterday but plans on attending groups today. He denies talking to anyone on the phone or getting any visits. He denies any active withdrawal symptoms from methamphetamine at this time.      Suicidal ideations: passive without current plan or intent     Compliance with medications: good   Medication side effects: absent  ROS: Patient has new complaints:  no  Sleep quality: 8 hours continuous last night per staff   Attending groups: no      OBJECTIVE      Medications  Current Facility-Administered Medications: hydrOXYzine (ATARAX) tablet 50 mg, 50 mg, Oral, 4x Daily PRN  [START ON 4/6/2022] escitalopram (LEXAPRO) tablet 15 mg, 15 mg, Oral, Daily  paliperidone (INVEGA) extended release tablet 3 mg, 3 mg, Oral, QAM  haloperidol lactate (HALDOL) injection 10 mg, 10 mg, IntraMUSCular, Q6H PRN **AND** LORazepam (ATIVAN) injection 2 mg, 2 mg, IntraMUSCular, Q6H PRN **AND** diphenhydrAMINE (BENADRYL) injection 50 mg, 50 mg, IntraMUSCular, Q6H PRN  acetaminophen (TYLENOL) tablet 650 mg, 650 mg, Oral, Q4H PRN  polyethylene glycol (GLYCOLAX) packet 17 g, 17 g, Oral, Daily PRN  traZODone (DESYREL) tablet 50 mg, 50 mg, Oral, Nightly PRN  nicotine (NICODERM CQ) 14 MG/24HR 1 patch, 1 patch, TransDERmal, Daily     Physical     height is 6' 2\" (1.88 m) and weight is 237 lb (107.5 kg). His oral temperature is 98.3 °F (36.8 °C). His blood pressure is 128/77 and his pulse is 77. His respiration is 17 and oxygen saturation is 100%.    Lab Results   Component Value Date    WBC 8.8 04/03/2022    HGB 12.9 (L) 04/03/2022    HCT 37.4 (L) 04/03/2022     04/03/2022    CHOL 126 03/24/2022    TRIG 79 03/24/2022    HDL 43 03/24/2022    ALT 80 (H) 04/03/2022     (H) 04/03/2022     04/03/2022    K 3.8 04/03/2022     04/03/2022    CREATININE 0.6 04/03/2022    BUN 21 04/03/2022    CO2 25 04/03/2022    TSH 2.440 04/03/2022    INR <0.9 10/13/2020    LABA1C 5.0 08/15/2019          Mental Status Exam:   Level of consciousness: awake and alert  Appearance:  well-appearing, hospital attire, in chair, fair grooming and fair hygiene  Behavior/Motor: pacing unit, discharge focused    Attitude toward examiner:  cooperative   Speech: normal rate and volume    Mood:  good per patient    Affect:  constricted   Thought processes:  linear and goal directed  Thought content:  Denies homicidal ideation  Suicidal Ideation:  passive, without plan and without intent  Delusions:  no evidence of delusions  Perceptual Disturbance:  denies any perceptual disturbance  Cognition: Patient is oriented to person, place, time and situation  Concentration: clinically adequate  Memory: intact  Insight & Judgement: poor         ASSESSMENT     Schizoaffective disorder, bipolar type ([facility] [address]   Rule out substance induced psychosis, antisocial personality disorder, malingering  Polysubstance abuse- opiates, methamphetamine    PLAN    Patient's symptoms show some improvement today  Medication adjustments as discussed with the attending physician: Will continue to titrate Lexapro to 15mg daily   Attempt to develop insight, psycho-education and supportive therapy conducted. Probable discharge: 1-2 days   Follow-up: to be determined outpatient, daily while on inpatient unit     Electronically signed by Patsy Glynn PA-C on 4/5/2022 at 10:43 AM Reviewed patient's current plan of care and vital signs with nursing staff. **This report has been created using voice recognition software. It may contain minor errors which are inherent in voice recognition technology. **                                    Psychiatry Attending Attestation     I assessed this patient and reviewed the case and plan of care with [facility]. I have reviewed the above documentation and I agree with the findings and treatment plan with the following updates:  Patient feels better than before. Mood and affect are better. Patient reports fleeting suicidal thoughts with no intent or plan. Patient notes that these thoughts are occurring less frequently. Denies any homicidal thoughts, that was explored with the patient. Oriented to time place and person. Recent and remote memory is intact. Patient feels hopeful. Sleep and appetite is good. No side effect from medication reported. Side-effect of medication were discussed with the patient . Patient is responding to current treatment. Discharge soon, if patient continues to show improvement. Case discussed with the staff.      PLAN  Patient s symptoms   are improving  We will continue same medication today and observe  Attempt to develop insight  Psycho-education conducted. Supportive Therapy conducted. Probable discharge is tomorrow  Follow-up TBD    More than 16 mins of the session was spent doing Supportive psychotherapy and coordinating patient's care. Session started at 11 AM and ended at around 11:30 AM    Electronically signed by Dena Nicolas MD on 4/5/22 at 4:00 PM EDT    **This report has been created using voice recognition software. It may contain minor errors which are inherent in voice recognition technology. **

## 2022-04-05 NOTE — PLAN OF CARE
Patient has attended all of the groups today and has also been out of his room for social interaction with others so he has met his socialization goal.

## 2022-04-05 NOTE — PLAN OF CARE
Problem: Depressive Behavior With or Without Suicide Precautions:  Goal: Able to verbalize and/or display a decrease in depressive symptoms  Description: Able to verbalize and/or display a decrease in depressive symptoms  Outcome: Met This Shift  Note: Patient can verblize a decrease in depressive symptoms, states \" I want to be out of my room today talking to people\" Will continue to monitor. Problem: Depressive Behavior With or Without Suicide Precautions:  Goal: Ability to disclose and discuss suicidal ideas will improve  Description: Ability to disclose and discuss suicidal ideas will improve  Outcome: Met This Shift  Note: Patient denies feeling this way, will continue to monitor. Problem: Depressive Behavior With or Without Suicide Precautions:  Goal: Able to verbalize support systems  Description: Able to verbalize support systems  Outcome: Met This Shift  Note: Patient verbalized his support system to be his grandfather. Problem: Depressive Behavior With or Without Suicide Precautions:  Goal: Patient specific goal  Description: Patient specific goal  Outcome: Met This Shift  Note: Patient has attended one group today, continuing to monitor. Problem: Coping:  Goal: Ability to identify problematic behaviors that deter socialization will improve  Description: Ability to identify problematic behaviors that deter socialization will improve  4/5/2022 1407 by Leanne Boas  Outcome: Met This Shift     Problem: Depressive Behavior With or Without Suicide Precautions:  Goal: Absence of self-harm  Description: Absence of self-harm  Outcome: Ongoing  Note: No self harm behaviors were observed or reported so far this shift. Remains on every 15 minutes precautions for safety.       Problem: Depressive Behavior With or Without Suicide Precautions:  Goal: Participates in care planning  Description: Participates in care planning  Outcome: Ongoing  Note: Patient discussed treatment plan with physician/medical staff, attending group, and compliant with medications. Problem: Discharge Planning:  Goal: Discharged to appropriate level of care  Description: Discharged to appropriate level of care  Outcome: Ongoing  Note: Discharge planning still effect. Problem: Substance Abuse:  Goal: Absence of drug withdrawal signs and symptoms  Description: Absence of drug withdrawal signs and symptoms  Outcome: Ongoing  Note: Patient is showing no S/S of drug withdrawal at this moment. Problem: Pain:  Goal: Pain level will decrease  Description: Pain level will decrease  Outcome: Ongoing  Note: Pain Assessment: 0-10  Pain 9/10  Gave pain medication decreased the pain to 5/10     Is pain goal met at this time? No     Non-Pharmaceutical Pain Intervention(s): Declines,Other (Comment) (refused medications)    Care plan reviewed with patient. Patient verbalize understanding of the plan of care and contribute to goal setting.

## 2022-04-06 VITALS
BODY MASS INDEX: 30.42 KG/M2 | HEART RATE: 67 BPM | OXYGEN SATURATION: 100 % | DIASTOLIC BLOOD PRESSURE: 71 MMHG | TEMPERATURE: 97.6 F | SYSTOLIC BLOOD PRESSURE: 131 MMHG | WEIGHT: 237 LBS | HEIGHT: 74 IN | RESPIRATION RATE: 16 BRPM

## 2022-04-06 PROCEDURE — 6370000000 HC RX 637 (ALT 250 FOR IP): Performed by: PHYSICIAN ASSISTANT

## 2022-04-06 PROCEDURE — 5130000000 HC BRIDGE APPOINTMENT

## 2022-04-06 PROCEDURE — 99239 HOSP IP/OBS DSCHRG MGMT >30: CPT | Performed by: PSYCHIATRY & NEUROLOGY

## 2022-04-06 RX ORDER — ESCITALOPRAM OXALATE 5 MG/1
15 TABLET ORAL DAILY
Qty: 90 TABLET | Refills: 0 | Status: ON HOLD | OUTPATIENT
Start: 2022-04-06 | End: 2022-07-12 | Stop reason: SDUPTHER

## 2022-04-06 RX ORDER — HYDROXYZINE 50 MG/1
50 TABLET, FILM COATED ORAL 4 TIMES DAILY PRN
Qty: 30 TABLET | Refills: 0 | Status: SHIPPED | OUTPATIENT
Start: 2022-04-06 | End: 2022-04-10

## 2022-04-06 RX ORDER — PALIPERIDONE 3 MG/1
3 TABLET, EXTENDED RELEASE ORAL EVERY MORNING
Qty: 30 TABLET | Refills: 0 | Status: ON HOLD | OUTPATIENT
Start: 2022-04-06 | End: 2022-07-12 | Stop reason: HOSPADM

## 2022-04-06 RX ADMIN — ESCITALOPRAM OXALATE 15 MG: 10 TABLET ORAL at 08:25

## 2022-04-06 RX ADMIN — PALIPERIDONE 3 MG: 3 TABLET, EXTENDED RELEASE ORAL at 08:25

## 2022-04-06 ASSESSMENT — PAIN SCALES - GENERAL: PAINLEVEL_OUTOF10: 0

## 2022-04-06 NOTE — PLAN OF CARE
Problem: Depressive Behavior With or Without Suicide Precautions:  Goal: Able to verbalize and/or display a decrease in depressive symptoms  Description: Able to verbalize and/or display a decrease in depressive symptoms  4/6/2022 0155 by Montserrat Barth RN  Outcome: Ongoing  Note: Patient reports anxiety and depression are low and mood is 6/10  4/5/2022 1337 by Harish Aguilar LPN  Outcome: Met This Shift  Note: Patient can verblize a decrease in depressive symptoms, states \" I want to be out of my room today talking to people\" Will continue to monitor. Goal: Ability to disclose and discuss suicidal ideas will improve  Description: Ability to disclose and discuss suicidal ideas will improve  4/6/2022 0155 by Montserrat Barth RN  Outcome: Ongoing  4/5/2022 1337 by Harish Aguilar LPN  Outcome: Met This Shift  Note: Patient denies feeling this way, will continue to monitor. Goal: Able to verbalize support systems  Description: Able to verbalize support systems  4/6/2022 0155 by Montserrat Barth RN  Outcome: Ongoing  Note: Patient states grandparents or positive and supportive  4/5/2022 1337 by Harish Aguilar LPN  Outcome: Met This Shift  Note: Patient verbalized his support system to be his grandfather. Goal: Absence of self-harm  Description: Absence of self-harm  4/6/2022 0155 by Montserrat Barth RN  Outcome: Ongoing  Note: Patient denies self harm or suicidal thoughts   4/5/2022 1337 by Harish Aguilar LPN  Outcome: Ongoing  Note: No self harm behaviors were observed or reported so far this shift. Remains on every 15 minutes precautions for safety. Goal: Patient specific goal  Description: Patient specific goal  4/6/2022 0155 by Montserrat Barth RN  Outcome: Ongoing  Note: Patient meeting goal of being discharged  4/5/2022 1337 by Harish Aguilar LPN  Outcome: Met This Shift  Note: Patient has attended one group today, continuing to monitor.   Goal: Participates in care planning  Description: Participates in care planning  4/6/2022 0155 by Geena Ruggiero RN  Outcome: Ongoing  Note: Pt is taking medications, attending and participating in groups and care planning. Pt has good interaction with staff and peers   4/5/2022 1337 by Enrique Montes LPN  Outcome: Ongoing  Note: Patient discussed treatment plan with physician/medical staff, attending group, and compliant with medications. Problem: Discharge Planning:  Goal: Discharged to appropriate level of care  Description: Discharged to appropriate level of care  4/6/2022 0155 by Geena Ruggiero RN  Outcome: Ongoing  Note: Patient to be discharged to his grandfathers house and follow up with the [facility] center  4/5/2022 1337 by Enrique Montes LPN  Outcome: Ongoing  Note: Discharge planning still effect. Problem: Substance Abuse:  Goal: Absence of drug withdrawal signs and symptoms  Description: Absence of drug withdrawal signs and symptoms  4/6/2022 0155 by Geena Ruggiero RN  Outcome: Ongoing  Note: Patient denies withdrawal symptoms  4/5/2022 1337 by Enrique Montes LPN  Outcome: Ongoing  Note: Patient is showing no S/S of drug withdrawal at this moment. Problem: Pain:  Goal: Pain level will decrease  Description: Pain level will decrease  4/6/2022 0155 by Geena Ruggiero RN  Outcome: Ongoing  Note: Patient denied pain or discomfort  4/5/2022 1337 by Enrique Montes LPN  Outcome: Ongoing  Note: Pain Assessment: 0-10  Pain 9/10  Gave pain medication decreased the pain to 5/10     Is pain goal met at this time? No     Non-Pharmaceutical Pain Intervention(s): Declines,Other (Comment) (refused medications)      Problem: Coping:  Goal: Ability to identify problematic behaviors that deter socialization will improve  Description: Ability to identify problematic behaviors that deter socialization will improve  4/6/2022 0155 by Geena Ruggiero RN  Outcome: Not Met This Shift  Note: Patient remained isolative to room, sleeping.  Patient was cooperative with assessment

## 2022-04-06 NOTE — PROGRESS NOTES
Behavioral Services  Medicare Certification Upon Admission    I certify that this patient's inpatient psychiatric hospital admission is medically necessary for:    [x] (1) Treatment which could reasonably be expected to improve this patient's condition,       [x] (2) Or for diagnostic study;     AND     [x](2) The inpatient psychiatric services are provided while the individual is under the care of a physician and are included in the individualized plan of care.     Estimated length of stay/service 3-5 days    Plan for post-hospital care hc    Electronically signed by Roland Campbell MD on 4/6/2022 at 7:26 AM

## 2022-04-06 NOTE — BH NOTE
Group Therapy Note    Date: 4/5/2022  Start Time: 2000  End Time:  2020  Number of Participants: 1    Type of Group: Wrap-Up    Wellness Binder Information  Module Name:    Session Number:      Patient's Goal:  To be discharged    Notes: Working on goal    Status After Intervention:  Unchanged    Participation Level: Active Listener    Participation Quality: Appropriate      Speech:  normal      Thought Process/Content: Logical      Affective Functioning: Flat      Mood: anxious      Level of consciousness:  Alert      Response to Learning: Able to verbalize current knowledge/experience      Endings: None Reported    Modes of Intervention: Education      Discipline Responsible: Registered Nurse      Signature:   Sumanth Coleman RN
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
## 2022-04-07 ENCOUNTER — TELEPHONE (OUTPATIENT)
Dept: PSYCHIATRY | Age: 27
End: 2022-04-07

## 2022-04-10 ENCOUNTER — HOSPITAL ENCOUNTER (EMERGENCY)
Age: 27
Discharge: HOME HEALTH | End: 2022-04-11
Attending: EMERGENCY MEDICINE
Payer: COMMERCIAL

## 2022-04-10 ENCOUNTER — HOSPITAL ENCOUNTER (EMERGENCY)
Age: 27
Discharge: HOME | End: 2022-04-10
Attending: EMERGENCY MEDICINE
Payer: COMMERCIAL

## 2022-04-10 VITALS
HEART RATE: 50 BPM | SYSTOLIC BLOOD PRESSURE: 93 MMHG | OXYGEN SATURATION: 97 % | DIASTOLIC BLOOD PRESSURE: 44 MMHG | RESPIRATION RATE: 18 BRPM | TEMPERATURE: 96.6 F

## 2022-04-10 DIAGNOSIS — T14.91XA SUICIDAL BEHAVIOR WITH ATTEMPTED SELF-INJURY (HCC): Primary | ICD-10-CM

## 2022-04-10 DIAGNOSIS — R45.851 SUICIDAL IDEATION: Primary | ICD-10-CM

## 2022-04-10 LAB
ABSOLUTE EOS #: 0.11 K/UL (ref 0–0.44)
ABSOLUTE EOS #: 0.15 K/UL (ref 0–0.44)
ABSOLUTE IMMATURE GRANULOCYTE: <0.03 K/UL (ref 0–0.3)
ABSOLUTE IMMATURE GRANULOCYTE: <0.03 K/UL (ref 0–0.3)
ABSOLUTE LYMPH #: 2.97 K/UL (ref 1.1–3.7)
ABSOLUTE LYMPH #: 3.01 K/UL (ref 1.1–3.7)
ABSOLUTE MONO #: 0.61 K/UL (ref 0.1–1.2)
ABSOLUTE MONO #: 0.65 K/UL (ref 0.1–1.2)
ACETAMINOPHEN LEVEL: <5 UG/ML (ref 10–30)
ALBUMIN SERPL-MCNC: 4.2 G/DL (ref 3.5–5.2)
ALBUMIN SERPL-MCNC: 4.2 G/DL (ref 3.5–5.2)
ALBUMIN/GLOBULIN RATIO: 1.8 (ref 1–2.5)
ALBUMIN/GLOBULIN RATIO: 1.8 (ref 1–2.5)
ALP BLD-CCNC: 68 U/L (ref 40–129)
ALP BLD-CCNC: 70 U/L (ref 40–129)
ALT SERPL-CCNC: 63 U/L (ref 5–41)
ALT SERPL-CCNC: 73 U/L (ref 5–41)
AMPHETAMINE SCREEN URINE: POSITIVE
ANION GAP SERPL CALCULATED.3IONS-SCNC: 10 MMOL/L (ref 9–17)
ANION GAP SERPL CALCULATED.3IONS-SCNC: 12 MMOL/L (ref 9–17)
AST SERPL-CCNC: 72 U/L
AST SERPL-CCNC: 74 U/L
BARBITURATE SCREEN URINE: POSITIVE
BASOPHILS # BLD: 0 % (ref 0–2)
BASOPHILS # BLD: 0 % (ref 0–2)
BASOPHILS ABSOLUTE: <0.03 K/UL (ref 0–0.2)
BASOPHILS ABSOLUTE: <0.03 K/UL (ref 0–0.2)
BENZODIAZEPINE SCREEN, URINE: NEGATIVE
BILIRUB SERPL-MCNC: 0.3 MG/DL (ref 0.3–1.2)
BILIRUB SERPL-MCNC: 0.35 MG/DL (ref 0.3–1.2)
BUN BLDV-MCNC: 14 MG/DL (ref 6–20)
BUN BLDV-MCNC: 18 MG/DL (ref 6–20)
CALCIUM SERPL-MCNC: 8.8 MG/DL (ref 8.6–10.4)
CALCIUM SERPL-MCNC: 8.9 MG/DL (ref 8.6–10.4)
CANNABINOID SCREEN URINE: POSITIVE
CHLORIDE BLD-SCNC: 102 MMOL/L (ref 98–107)
CHLORIDE BLD-SCNC: 111 MMOL/L (ref 98–107)
CO2: 23 MMOL/L (ref 20–31)
CO2: 26 MMOL/L (ref 20–31)
COCAINE METABOLITE, URINE: NEGATIVE
CREAT SERPL-MCNC: 0.53 MG/DL (ref 0.7–1.2)
CREAT SERPL-MCNC: 0.62 MG/DL (ref 0.7–1.2)
EOSINOPHILS RELATIVE PERCENT: 1 % (ref 1–4)
EOSINOPHILS RELATIVE PERCENT: 2 % (ref 1–4)
ETHANOL PERCENT: 0.03 %
ETHANOL PERCENT: 0.09 %
ETHANOL: 25 MG/DL
ETHANOL: 94 MG/DL
GFR AFRICAN AMERICAN: >60 ML/MIN
GFR AFRICAN AMERICAN: >60 ML/MIN
GFR NON-AFRICAN AMERICAN: >60 ML/MIN
GFR NON-AFRICAN AMERICAN: >60 ML/MIN
GFR SERPL CREATININE-BSD FRML MDRD: ABNORMAL ML/MIN/{1.73_M2}
GFR SERPL CREATININE-BSD FRML MDRD: ABNORMAL ML/MIN/{1.73_M2}
GLUCOSE BLD-MCNC: 116 MG/DL (ref 70–99)
GLUCOSE BLD-MCNC: 95 MG/DL (ref 70–99)
HCT VFR BLD CALC: 35.1 % (ref 40.7–50.3)
HCT VFR BLD CALC: 36.8 % (ref 40.7–50.3)
HEMOGLOBIN: 12 G/DL (ref 13–17)
HEMOGLOBIN: 12.5 G/DL (ref 13–17)
IMMATURE GRANULOCYTES: 0 %
IMMATURE GRANULOCYTES: 0 %
LYMPHOCYTES # BLD: 37 % (ref 24–43)
LYMPHOCYTES # BLD: 39 % (ref 24–43)
MCH RBC QN AUTO: 30.3 PG (ref 25.2–33.5)
MCH RBC QN AUTO: 30.7 PG (ref 25.2–33.5)
MCHC RBC AUTO-ENTMCNC: 34 G/DL (ref 28.4–34.8)
MCHC RBC AUTO-ENTMCNC: 34.2 G/DL (ref 28.4–34.8)
MCV RBC AUTO: 89.3 FL (ref 82.6–102.9)
MCV RBC AUTO: 89.8 FL (ref 82.6–102.9)
METHADONE SCREEN, URINE: NEGATIVE
MONOCYTES # BLD: 8 % (ref 3–12)
MONOCYTES # BLD: 8 % (ref 3–12)
NRBC AUTOMATED: 0 PER 100 WBC
NRBC AUTOMATED: 0 PER 100 WBC
OPIATES, URINE: NEGATIVE
OXYCODONE SCREEN URINE: NEGATIVE
PDW BLD-RTO: 13.5 % (ref 11.8–14.4)
PDW BLD-RTO: 13.5 % (ref 11.8–14.4)
PHENCYCLIDINE, URINE: NEGATIVE
PLATELET # BLD: 212 K/UL (ref 138–453)
PLATELET # BLD: 221 K/UL (ref 138–453)
PMV BLD AUTO: 10.1 FL (ref 8.1–13.5)
PMV BLD AUTO: 10.2 FL (ref 8.1–13.5)
POTASSIUM SERPL-SCNC: 3.4 MMOL/L (ref 3.7–5.3)
POTASSIUM SERPL-SCNC: 3.7 MMOL/L (ref 3.7–5.3)
RBC # BLD: 3.91 M/UL (ref 4.21–5.77)
RBC # BLD: 4.12 M/UL (ref 4.21–5.77)
SALICYLATE LEVEL: <1 MG/DL (ref 3–10)
SARS-COV-2, RAPID: NOT DETECTED
SEG NEUTROPHILS: 51 % (ref 36–65)
SEG NEUTROPHILS: 54 % (ref 36–65)
SEGMENTED NEUTROPHILS ABSOLUTE COUNT: 3.87 K/UL (ref 1.5–8.1)
SEGMENTED NEUTROPHILS ABSOLUTE COUNT: 4.3 K/UL (ref 1.5–8.1)
SODIUM BLD-SCNC: 138 MMOL/L (ref 135–144)
SODIUM BLD-SCNC: 146 MMOL/L (ref 135–144)
SPECIMEN DESCRIPTION: NORMAL
TEST INFORMATION: ABNORMAL
TOTAL PROTEIN: 6.5 G/DL (ref 6.4–8.3)
TOTAL PROTEIN: 6.5 G/DL (ref 6.4–8.3)
TOXIC TRICYCLIC SC,BLOOD: NEGATIVE
WBC # BLD: 7.6 K/UL (ref 3.5–11.3)
WBC # BLD: 8.1 K/UL (ref 3.5–11.3)

## 2022-04-10 PROCEDURE — 80053 COMPREHEN METABOLIC PANEL: CPT

## 2022-04-10 PROCEDURE — 99284 EMERGENCY DEPT VISIT MOD MDM: CPT

## 2022-04-10 PROCEDURE — 96372 THER/PROPH/DIAG INJ SC/IM: CPT

## 2022-04-10 PROCEDURE — 87635 SARS-COV-2 COVID-19 AMP PRB: CPT

## 2022-04-10 PROCEDURE — 96360 HYDRATION IV INFUSION INIT: CPT

## 2022-04-10 PROCEDURE — 80179 DRUG ASSAY SALICYLATE: CPT

## 2022-04-10 PROCEDURE — 85025 COMPLETE CBC W/AUTO DIFF WBC: CPT

## 2022-04-10 PROCEDURE — 80307 DRUG TEST PRSMV CHEM ANLYZR: CPT

## 2022-04-10 PROCEDURE — 81001 URINALYSIS AUTO W/SCOPE: CPT

## 2022-04-10 PROCEDURE — 80143 DRUG ASSAY ACETAMINOPHEN: CPT

## 2022-04-10 PROCEDURE — G0480 DRUG TEST DEF 1-7 CLASSES: HCPCS

## 2022-04-10 RX ORDER — 0.9 % SODIUM CHLORIDE 0.9 %
1000 INTRAVENOUS SOLUTION INTRAVENOUS ONCE
Status: COMPLETED | OUTPATIENT
Start: 2022-04-11 | End: 2022-04-11

## 2022-04-10 ASSESSMENT — ENCOUNTER SYMPTOMS
CONSTIPATION: 0
ABDOMINAL PAIN: 0
SHORTNESS OF BREATH: 0
NAUSEA: 0
CHEST TIGHTNESS: 0
COUGH: 0
BACK PAIN: 0
VOMITING: 0
DIARRHEA: 0
PHOTOPHOBIA: 0
WHEEZING: 0

## 2022-04-10 NOTE — ED PROVIDER NOTES
[facility] ED     Emergency Department     Faculty Attestation        I performed a history and physical examination of the patient and discussed management with the resident. I reviewed the residents note and agree with the documented findings and plan of care. Any areas of disagreement are noted on the chart. I was personally present for the key portions of any procedures. I have documented in the chart those procedures where I was not present during the key portions. I have reviewed the emergency nurses triage note. I agree with the chief complaint, past medical history, past surgical history, allergies, medications, social and family history as documented unless otherwise noted below. For mid-level providers such as nurse practitioners as well as physicians assistants:    I have personally seen and evaluated the patient. I find the patient's history and physical exam are consistent with NP/PA documentation. I agree with the care provided, treatment rendered, disposition, & follow-up plan. Additional findings are as noted. Vital Signs: /72   Pulse 69   Temp 97.2 °F (36.2 °C) (Oral)   Resp 18   SpO2 98%   PCP:  None Provider    Pertinent Comments:     Patient presents with suicidal ideation. He states for the past 3 days he is young used alcohol crystal meth and marijuana and ecstasy. He states his plan to kill himself is to bobbi a gas station or rubs someone for drugs. He is not homicidal.  No hallucinations. No history of depression or psychiatric disorders per patient. He is never had a history of alcohol withdrawals or seizures. He appears intoxicated but he is awake and cooperative and appropriately interactive.   I will check screening labs discuss with social work      [address], [place]    Attending Emergency Medicine Physician              Mirela Henriquez MD  04/10/22 [id number]

## 2022-04-10 NOTE — ED PROVIDER NOTES
[facility] ED  Emergency Department  Emergency Medicine Resident Sign-out     Care of Disha Sherwood was assumed from Dr. Alexy Skinner and is being seen for Suicidal (wnts to hurt self)   . The patient's initial evaluation and plan have been discussed with the prior provider who initially evaluated the patient. EMERGENCY DEPARTMENT COURSE / MEDICAL DECISION MAKING:       MEDICATIONS GIVEN:  No orders of the defined types were placed in this encounter. LABS / RADIOLOGY:     Labs Reviewed   CBC WITH AUTO DIFFERENTIAL - Abnormal; Notable for the following components:       Result Value    RBC 4.12 (*)     Hemoglobin 12.5 (*)     Hematocrit 36.8 (*)     All other components within normal limits   COMPREHENSIVE METABOLIC PANEL W/ REFLEX TO MG FOR LOW K - Abnormal; Notable for the following components:    CREATININE 0.62 (*)     Sodium 146 (*)     Chloride 111 (*)     ALT 73 (*)     AST 74 (*)     All other components within normal limits   ETHANOL - Abnormal; Notable for the following components:    Ethanol 25 (*)     Ethanol percent 0.025 (*)     All other components within normal limits   URINE DRUG SCREEN - Abnormal; Notable for the following components:    Amphetamine Screen, Ur POSITIVE (*)     Barbiturate Screen, Ur POSITIVE (*)     Cannabinoid Scrn, Ur POSITIVE (*)     All other components within normal limits   COVID-19, RAPID       No orders to display       RECENT VITALS:     Temp: 98.3 °F (36.8 °C),  Pulse: 66, Resp: 18, BP: (!) 110/58, SpO2: 96 %    This patient is a 27 y.o. Male with a history of plasma disease, COPD disorder, depression, schizoaffective disorder who presents with concerns for suicidality, plan as per previous visit is for patient to \"bobbi someone move with heroin on any nausea at all himself. Patient has been seen and evaluated by social work, plan is for patient to go to his left-sided 8 AM when [facility] opens.   Patient reevaluated, agrees with plan to get as of center. OUTSTANDING TASKS / RECOMMENDATIONS:      1. reevaluate     FINAL IMPRESSION:     1. Suicidal ideation        DISPOSITION:       DISPOSITION:  [x]  Discharge   []  Transfer -    []  Admission -     []  Against Medical Advice   []  Eloped   FOLLOW-UP: No follow-up provider specified.    DISCHARGE MEDICATIONS: New Prescriptions    No medications on file          Arvin Caballero MD  Emergency Medicine Resident  [facility]        Arvin Caballero MD  Resident  04/14/22 5925

## 2022-04-10 NOTE — ED PROVIDER NOTES
Faculty Sign-Out Attestation  Handoff taken on the following patient from prior Attending Physician: Mary Edmar    I was available and discussed any additional care issues that arose and coordinated the management plans with the resident(s) caring for the patient during my duty period. Any areas of disagreement with residents documentation of care or procedures are noted on the chart. I was personally present for the key portions of any/all procedures during my duty period. I have documented in the chart those procedures where I was not present during the key portions. 68-year-old male presenting with suicidal ideation. Recent discharge from [facility]. Medically cleared, refused by [facility] psychiatrist for admission.   Plan [facility] MD Sivan  Attending Physician         Connor Nova MD  04/10/22 [id number]

## 2022-04-10 NOTE — ED NOTES
Patient presents to ER with complaints of SI  Patient verbalizes use of alcohol, crystal meth, marijuana and ectasy today  Patient verbalizes he was going to bobbi a gas station or somewhere and bobbi it and then get hurt  Patient is alert and oriented  Denies HI   Denies hallucianations      [] [place]    [] [address]    [x]  [address]      Y  N     [x] [] In the past two weeks have you had thoughts of hurting yourself in any way? [x] [] In the past two weeks have you had thoughts that you would be better off dead? [] [x] Have you made a suicide attempt in the past two months? [x] [] Do you have a plan for hurting yourself or suicide? [] [x] Presence of hallucinations/voices related to hurting himself or herself or someone else. SUICIDE/SECURITY WATCH PRECAUTION CHECKLIST     Orders    [x]  Suicide/Security Watch Precautions initiated as checked below:   4/10/22 1:08 AM EDT BH31/BH31B    [x] Notified physician:  Estefany Reza MD  4/10/22 1:08 AM EDT    [x] Orders obtained as appropriate:     [x] 1:1 Observer     [] Psych Consult     [] Psych Consult    Name:  Date:  Time:    [x] 1:1 Observer, Notified by:  Kami Ratliff RN    Contact Nurse Supervisor    [x] Remove all personal clothes from room and place in snap/paper gown/pants. Slipper only    [x] Remove all personal belongings from room and secured away from patient. Documentation    [x] Initiate Suicide/Security Watch Precaution Flow Sheet    [x] Initiate individualized Care Plan/Problem    [x] Document why precautions initiated on flow sheet (Initiate Nursing Care Plan/Problem)    [x] 1:1 Observer in place; instructions provided. Suicide precautions require observer be within arms length. [x] Nurse-Observer Communication Hand-off initiated by RN, reviewed with Observer. Subsequently used as Hand Off between Observers.     [x] Initiate every 15 minute observations per observer as delegated by the RN. [x] Initiate RN assessment and documentation    Environmental Scan  Search Criteria and Process: OPTIONAL, see Search Policy    [x] Reason for search: SI    [x] Nursing in presence of second person to search patient    [x] Patient notified of reason for body assessment and belongings search:     Persons present during search: [employer] of search and disposition: Belongings locked up       Searchers Name: General Mcgraw or items similar should be removed from the room:   [] Chairs   [x] Telephone   [x] Trash cans and liners   [x] Plastic utensils (order Patient Safety tray)   [x] Empty or remove Sharps containers   [x] All personal clothing/belongings removed   [x] All unnecessary lead wires, electrical cords, draw cords, etc.   [x] Flowers and plants   [x] Double check for lighters, matches, razors, any glass items etc that can be used as weapons. Person completing Checklist: Tara Cartwright RN       GENERAL INFORMATION     Y  N     [x] [] Has the patient been informed that they are on a watch and what that means? [x] [] Can the patient get out of Bed without nursing assistance? [x] [] Can the patient use the restroom without nursing assistance? [] [x] Can the patient walk the halls to [place] their legs? \"   [] [x] Does the patient have metal utensils? [x] [] Have the patient's belongings been placed out of control of the patient? [x] [] Have the patient and his/her belongings been checked for contraband? [x] [] Is the patient under any visitor restrictions? If Yes, explain: SI     [] [x] Is the patient under an alias? [facility] Name:   Authorized visitors (no more than two are to be on the list)   Name/Relationship:   Name/Relationship:    Name of Staff member that you  Received this information from?: Jones LEGER    General Description:    Filomena Ovalles BH31/BH31B male 27 y.o.  Admission weight:      Race: [x]  [] Black  []   []   [] [place] [] Other  Facial Hair:  [] Yes  [] No  If yes, please describe: Identifying Marks (i.e. Visible tattoos, scars, etc... ):     NURSING CARE PLAN    Nursing Diagnosis: Risk of Self Directed Harm  [] Actual  [x] Potential  Date Started: 4/10/22      Etiological Factors: (related to)  [x] Expressed or implied suicidal ideation/behavior  [] Depression  [] Suicide attempt      [] Low self-esteem  [] Hallucinations      [] Feeling of Hopelessness  [x] Substance abuse or withdrawal    [] Dysfunctional family  [] Major traumatic event, eg., divorce, etc   [] Excessive stress/anxiety    4/10/22    Expected Outcomes    Patient will:   [x] Patient will remain safe for the duration of their stay   [x] Patient's environment will be safe, eg. Free of potential suicide weapons   [] Verbalize Recovery from suicidal episode and improvement in self-worth   [x] Discuss feeling that precipitated suicide attempt/thoughts/behavior   [] Will describe available resources for crisis prevention and management   [] Will verbalize positive coping skills     Nursing Intervention   [x] Assessment and Observations hourly   [x] Suicide Precautions implemented with patient, should be 1:1 observation   [x] Document observation [id number] and RN assessment hourly   [] Consult physician for:    [] Psychiatric consult    [] Pharmacological therapy    [] Other:    [x] Patient search completed by security   [x] Initiated appropriate safety protocols by removing from the patient's environment anything that could be used to inflict self injury, eg. Order safe tray, snap gown, etc   [x] Maintain open, warm, caring, non-judgmental attitude/manner towards patient   [] Discuss advantages and disadvantages of existing coping methods/skills   [x] Assist and educate patient with identifying present strengths and coping skills   [x] Keep patient informed regarding plan of care and provide clear concise explanations.   Provide the patient/family education information as well as telephone numbers and other information about crisis centers, hot lines, and counselors.     Discharge Planning:   [] Referral  [] Groups [] Health agencies  [] Other:            Shameka Langford RN  04/10/22 0113

## 2022-04-10 NOTE — ED NOTES
Pt. Discharge instructions reviewed. Pt has no further questions at this time. Pt escorted out by security into black and white cab.      Gonzalez Plummer RN  04/10/22 4336

## 2022-04-10 NOTE — ED NOTES
Resting quietly, eyes closed, respirations unlabored  Awakens easily  Remains in [facility] with one on one sitter     Lethea Osler, RN  04/10/22 6438

## 2022-04-10 NOTE — ED NOTES
SW and patient discussed safety plan. SW gave options of doing an assessment for mental health on Monday or utilizing the [facility] as needed. SW explained their program to the patient. The patient is agreeable to use the Crisis Care as needed. SW explained that it is 24-7 assessments and he states that he will go in the morning to talk to them. SW made patient clearly aware that this is not a transfer their CSU and SW is not guaranteeing patient will be admitted. SW will supply transportation.

## 2022-04-10 NOTE — ED NOTES
The following labs obtained, labeled with pt sticker and tubed to lab: by me  [] Blue     [x] Lavender   [] on ice  [x] Green/yellow  [] Green/black [] on ice  [] Yellow  [] Red  [] Pink      [x] COVID-19 swab    [x] Rapid  [] PCR  [] Flu swab  [] Peds Viral Panel     [x] Urine Sample  [] Pelvic Cultures  [] Blood Cultures            Richar Sanchez RN  04/10/22 7649

## 2022-04-10 NOTE — ED NOTES
Patient discharged. SW to assist patient with a [employer] Cab to [facility], at 8:30am, for follow-up there. Nicola Escobar,.  [address], [address]  04/10/22 0362

## 2022-04-10 NOTE — ED NOTES
Report from [facility]. All questions answered.       Red Torres RN  04/10/22 9733

## 2022-04-10 NOTE — ED NOTES
Pt. Noted banging on door asking for a sprite. Pt given sprite. SW at bedside with writer. Pt told that cab is on the way and he will be leaving soon. Situation has diffused.       Michelle Gibbons RN  04/10/22 2357

## 2022-04-10 NOTE — ED NOTES
Writer at bedside. Pt resting, eyes closed, NAD. Pt resp even and non labored. Pt on 1 to 1 observation.      King Luna RN  04/10/22 2687

## 2022-04-10 NOTE — ED PROVIDER NOTES
[facility]  ED  Emergency Department Encounter  EmergencyMedicine Resident     Pt Viridiana Anderson  MRN: [id number]  Daydaygfcarolyn 1995  Date of evaluation: 4/10/22  PCP:  None Provider    CHIEF COMPLAINT       Chief Complaint   Patient presents with    Suicidal     wnts to hurt self       HISTORY OF PRESENT ILLNESS  (Location/Symptom, Timing/Onset, Context/Setting, Quality, Duration, Modifying Factors, Severity.)      Ray Davila is a 27 y.o. male who presents with suicidal ideation. Patient states he also is alcohol dependent and states he wants to detox from alcohol. No history of withdrawal seizures. Patient missed polysubstance abuse. His suicidal plan will be to do heroin himself and overdose on it. He admits to using alcohol, marijuana, and ecstasy tonight. He states he is also been using meth over the last couple of days. He denies any fevers, chills, chest pain, shortness of breath, abdominal pain, nausea, vomiting. No medical complaints. PAST MEDICAL / SURGICAL / SOCIAL / FAMILY HISTORY      has a past medical history of Anxiety, Bipolar 1 disorder ([facility] [address] Drug abuse in remission ([facility] [address] H/O alcohol abuse, and Hep C w/o coma, chronic ([facility] [address] has a past surgical history that includes Hand surgery (Right) and Abscess Drainage (Right, 10/13/2020). Social History     Socioeconomic History    Marital status: Single     Spouse name: Not on file    Number of children: Not on file    Years of education: Not on file    Highest education level: Not on file   Occupational History    Not on file   Tobacco Use    Smoking status: Current Every Day Smoker     Packs/day: 0.50     Years: 7.00     Pack years: 3.50     Types: Cigarettes    Smokeless tobacco: Former User     Types: Snuff   Vaping Use    Vaping Use: Never used   Substance and Sexual Activity    Alcohol use:  Yes    Drug use: Yes     Frequency: 7.0 times per week     Types: Methamphetamines (Crystal Meth), Opiates , IV, Cocaine, Marijuana (Weed)     Comment: States uses \"whatever I can get my hands on\"    Sexual activity: Not Currently   Other Topics Concern    Not on file   Social History Narrative    Not on file     Social Determinants of Health     Financial Resource Strain:     Difficulty of Paying Living Expenses: Not on file   Food Insecurity:     Worried About Running Out of Food in the Last Year: Not on file    Hansel of Food in the Last Year: Not on file   Transportation Needs:     Lack of Transportation (Medical): Not on file    Lack of Transportation (Non-Medical): Not on file   Physical Activity:     Days of Exercise per Week: Not on file    Minutes of Exercise per Session: Not on file   Stress:     Feeling of Stress : Not on file   Social Connections:     Frequency of Communication with Friends and Family: Not on file    Frequency of Social Gatherings with Friends and Family: Not on file    Attends Protestant Services: Not on file    Active Member of [address] [place] [address] [employer] or Organizations: Not on file    Attends Club or Organization Meetings: Not on file    Marital Status: Not on file   Intimate Partner Violence:     Fear of Current or Ex-Partner: Not on file    Emotionally Abused: Not on file    Physically Abused: Not on file    Sexually Abused: Not on file   Housing Stability:     Unable to Pay for Housing in the Last Year: Not on file    Number of [place] in the Last Year: Not on file    Unstable Housing in the Last Year: Not on file       History reviewed. No pertinent family history. Allergies:  Patient has no known allergies. Home Medications:  Prior to Admission medications    Medication Sig Start Date End Date Taking?  Authorizing Provider   escitalopram (LEXAPRO) 5 MG tablet Take 3 tablets by mouth daily 4/6/22   Viviana Pearson MD   paliperidone (INVEGA) 3 MG extended release tablet Take 1 tablet by mouth every morning 4/6/22   Viviana Pearson MD       REVIEW OF SYSTEMS    (2-9 systems for level 4, 10 or more for level 5)      Review of Systems   Constitutional: Negative for chills, diaphoresis, fatigue and fever. Eyes: Negative for photophobia and visual disturbance. Respiratory: Negative for cough, chest tightness, shortness of breath and wheezing. Cardiovascular: Negative for chest pain, palpitations and leg swelling. Gastrointestinal: Negative for abdominal pain, constipation, diarrhea, nausea and vomiting. Endocrine: Negative for polydipsia, polyphagia and polyuria. Genitourinary: Negative for difficulty urinating, dysuria, flank pain and hematuria. Musculoskeletal: Negative for arthralgias, back pain, neck pain and neck stiffness. Neurological: Negative for dizziness, weakness, light-headedness, numbness and headaches. Psychiatric/Behavioral: Positive for suicidal ideas. The patient is nervous/anxious and is hyperactive. PHYSICAL EXAM   (up to 7 for level 4, 8 or more for level 5)      INITIAL VITALS:   BP (!) 93/44   Pulse 50   Temp 96.6 °F (35.9 °C) (Oral)   Resp 18   SpO2 97%     Physical Exam  Vitals reviewed. Constitutional:       General: He is not in acute distress. Appearance: He is well-developed. He is not diaphoretic. HENT:      Head: Normocephalic and atraumatic. Eyes:      Conjunctiva/sclera: Conjunctivae normal.      Pupils: Pupils are equal, round, and reactive to light. Neck:      Vascular: No JVD. Trachea: No tracheal deviation. Cardiovascular:      Rate and Rhythm: Normal rate and regular rhythm. Heart sounds: Normal heart sounds. No murmur heard. No friction rub. Pulmonary:      Effort: Pulmonary effort is normal. No respiratory distress. Breath sounds: Normal breath sounds. No wheezing or rales. Chest:      Chest wall: No tenderness. Abdominal:      General: Bowel sounds are normal. There is no distension. Palpations: Abdomen is soft. Tenderness:  There is no abdominal tenderness. There is no guarding. Musculoskeletal:      Cervical back: Normal range of motion and neck supple. Skin:     General: Skin is warm and dry. Capillary Refill: Capillary refill takes less than 2 seconds. Coloration: Skin is not pale. Findings: No erythema or rash. Neurological:      General: No focal deficit present. Mental Status: He is alert and oriented to person, place, and time. Cranial Nerves: No cranial nerve deficit. Psychiatric:      Comments: Anxious, fidgeting         DIFFERENTIAL  DIAGNOSIS     PLAN (LABS / IMAGING / EKG):  Orders Placed This Encounter   Procedures    COVID-19, Rapid    CBC with Auto Differential    Comprehensive Metabolic Panel w/ Reflex to MG    Ethanol    Urine Drug Screen       MEDICATIONS ORDERED:  No orders of the defined types were placed in this encounter. DDX: Suicidal ideation, polysubstance abuse, alcohol intoxication, alcohol dependence    MDM/IMPRESSION: This is a 47-year-old male presenting suicidal ideation and alcohol dependence. States wants to detox as well as get help for his suicidal ideations. Plan for medical clearance labs, transfer. DIAGNOSTIC RESULTS / EMERGENCY DEPARTMENT COURSE / MDM   LAB RESULTS:  Results for orders placed or performed during the hospital encounter of 04/10/22   COVID-19, Rapid    Specimen: Nasopharyngeal Swab   Result Value Ref Range    Specimen Description . NASOPHARYNGEAL SWAB     SARS-CoV-2, Rapid Not Detected Not Detected   CBC with Auto Differential   Result Value Ref Range    WBC 7.6 3.5 - 11.3 k/uL    RBC 4.12 (L) 4.21 - 5.77 m/uL    Hemoglobin 12.5 (L) 13.0 - 17.0 g/dL    Hematocrit 36.8 (L) 40.7 - 50.3 %    MCV 89.3 82.6 - 102.9 fL    MCH 30.3 25.2 - 33.5 pg    MCHC 34.0 28.4 - 34.8 g/dL    RDW 13.5 11.8 - 14.4 %    Platelets 749 000 - 521 k/uL    MPV 10.2 8.1 - 13.5 fL    NRBC Automated 0.0 0.0 per 100 WBC    Seg Neutrophils 51 36 - 65 %    Lymphocytes 39 24 - 43 % Monocytes 8 3 - 12 %    Eosinophils % 2 1 - 4 %    Basophils 0 0 - 2 %    Immature Granulocytes 0 0 %    Segs Absolute 3.87 1.50 - 8.10 k/uL    Absolute Lymph # 2.97 1.10 - 3.70 k/uL    Absolute Mono # 0.61 0.10 - 1.20 k/uL    Absolute Eos # 0.15 0.00 - 0.44 k/uL    Basophils Absolute <0.03 0.00 - 0.20 k/uL    Absolute Immature Granulocyte <0.03 0.00 - 0.30 k/uL   Comprehensive Metabolic Panel w/ Reflex to MG   Result Value Ref Range    Glucose 95 70 - 99 mg/dL    BUN 18 6 - 20 mg/dL    CREATININE 0.62 (L) 0.70 - 1.20 mg/dL    Calcium 8.8 8.6 - 10.4 mg/dL    Sodium 146 (H) 135 - 144 mmol/L    Potassium 3.7 3.7 - 5.3 mmol/L    Chloride 111 (H) 98 - 107 mmol/L    CO2 23 20 - 31 mmol/L    Anion Gap 12 9 - 17 mmol/L    Alkaline Phosphatase 68 40 - 129 U/L    ALT 73 (H) 5 - 41 U/L    AST 74 (H) <40 U/L    Total Bilirubin 0.30 0.3 - 1.2 mg/dL    Total Protein 6.5 6.4 - 8.3 g/dL    Albumin 4.2 3.5 - 5.2 g/dL    Albumin/Globulin Ratio 1.8 1.0 - 2.5    GFR Non-African American >60 >60 mL/min    GFR African American >60 >60 mL/min    GFR Comment         Ethanol   Result Value Ref Range    Ethanol 25 (H) <10 mg/dL    Ethanol percent 0.025 (H) <0.010 %   Urine Drug Screen   Result Value Ref Range    Amphetamine Screen, Ur POSITIVE (A) NEGATIVE    Barbiturate Screen, Ur POSITIVE (A) NEGATIVE    Benzodiazepine Screen, Urine NEGATIVE NEGATIVE    Cocaine Metabolite, Urine NEGATIVE NEGATIVE    Methadone Screen, Urine NEGATIVE NEGATIVE    Opiates, Urine NEGATIVE NEGATIVE    Phencyclidine, Urine NEGATIVE NEGATIVE    Cannabinoid Scrn, Ur POSITIVE (A) NEGATIVE    Oxycodone Screen, Ur NEGATIVE NEGATIVE    Test Information       Assay provides medical screening only. The absence of expected drug(s) and/or metabolite(s) may indicate diluted or adulterated urine, limitations of testing or timing of collection.          RADIOLOGY:  No orders to display        EKG      All EKG's are interpreted by the Emergency Department Physician who either signs or Co-signs this chart in the absence of a cardiologist.    EMERGENCY DEPARTMENT COURSE:  ED Course as of 04/11/22 0203   Sun Apr 10, 2022   6846 Medically cleared. [JG]   0438 Declined by [facility]. Will cab to [facility] at 8am [JG]      ED Course User Index  [JG] Courtney Kennedy DO        PROCEDURES:      CONSULTS:  None    CRITICAL CARE:    FINAL IMPRESSION      1.  Suicidal ideation          DISPOSITION / PLAN     DISPOSITION Decision To Discharge 04/10/2022 07:38:05 AM      PATIENT REFERRED TO:  [facility] ED  [address] [place] [address]  [phone]    As needed    None Provider      As needed      DISCHARGE MEDICATIONS:  Discharge Medication List as of 4/10/2022  7:42 AM          Courtney Kennedy DO  Emergency Medicine Resident    (Please note that portions of thisnote were completed with a voice recognition program.  Efforts were made to edit the dictations but occasionally words are mis-transcribed.)     Courtney Kennedy DO  04/10/22 [address],   04/11/22 297
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
## 2022-04-11 ENCOUNTER — APPOINTMENT (OUTPATIENT)
Dept: CT IMAGING | Age: 27
End: 2022-04-11
Payer: COMMERCIAL

## 2022-04-11 VITALS
DIASTOLIC BLOOD PRESSURE: 53 MMHG | SYSTOLIC BLOOD PRESSURE: 98 MMHG | BODY MASS INDEX: 33.38 KG/M2 | TEMPERATURE: 97.3 F | OXYGEN SATURATION: 100 % | HEART RATE: 45 BPM | RESPIRATION RATE: 16 BRPM | WEIGHT: 260 LBS

## 2022-04-11 LAB
-: ABNORMAL
AMPHETAMINE SCREEN URINE: POSITIVE
BARBITURATE SCREEN URINE: POSITIVE
BENZODIAZEPINE SCREEN, URINE: NEGATIVE
BILIRUBIN URINE: NEGATIVE
CANNABINOID SCREEN URINE: POSITIVE
CASTS UA: ABNORMAL /LPF (ref 0–8)
COCAINE METABOLITE, URINE: NEGATIVE
COLOR: YELLOW
EPITHELIAL CELLS UA: ABNORMAL /HPF (ref 0–5)
GLUCOSE URINE: NEGATIVE
KETONES, URINE: ABNORMAL
LEUKOCYTE ESTERASE, URINE: NEGATIVE
METHADONE SCREEN, URINE: NEGATIVE
NITRITE, URINE: NEGATIVE
OPIATES, URINE: NEGATIVE
OXYCODONE SCREEN URINE: NEGATIVE
PH UA: 5.5 (ref 5–8)
PHENCYCLIDINE, URINE: NEGATIVE
PROTEIN UA: NEGATIVE
RBC UA: ABNORMAL /HPF (ref 0–4)
SPECIFIC GRAVITY UA: 1.02 (ref 1–1.03)
TEST INFORMATION: ABNORMAL
TURBIDITY: CLEAR
URINE HGB: NEGATIVE
UROBILINOGEN, URINE: NORMAL
WBC UA: ABNORMAL /HPF (ref 0–5)

## 2022-04-11 PROCEDURE — 99284 EMERGENCY DEPT VISIT MOD MDM: CPT | Performed by: PSYCHIATRY & NEUROLOGY

## 2022-04-11 PROCEDURE — 70450 CT HEAD/BRAIN W/O DYE: CPT

## 2022-04-11 PROCEDURE — 96360 HYDRATION IV INFUSION INIT: CPT

## 2022-04-11 PROCEDURE — 6360000002 HC RX W HCPCS: Performed by: STUDENT IN AN ORGANIZED HEALTH CARE EDUCATION/TRAINING PROGRAM

## 2022-04-11 PROCEDURE — 72125 CT NECK SPINE W/O DYE: CPT

## 2022-04-11 PROCEDURE — 96372 THER/PROPH/DIAG INJ SC/IM: CPT

## 2022-04-11 PROCEDURE — 2580000003 HC RX 258: Performed by: STUDENT IN AN ORGANIZED HEALTH CARE EDUCATION/TRAINING PROGRAM

## 2022-04-11 RX ORDER — MIDAZOLAM HYDROCHLORIDE 2 MG/ML
2 SYRUP ORAL ONCE
Status: DISCONTINUED | OUTPATIENT
Start: 2022-04-11 | End: 2022-04-11 | Stop reason: HOSPADM

## 2022-04-11 RX ORDER — MIDAZOLAM HYDROCHLORIDE 2 MG/2ML
2 INJECTION, SOLUTION INTRAMUSCULAR; INTRAVENOUS ONCE
Status: COMPLETED | OUTPATIENT
Start: 2022-04-11 | End: 2022-04-11

## 2022-04-11 RX ADMIN — SODIUM CHLORIDE 1000 ML: 9 INJECTION, SOLUTION INTRAVENOUS at 00:44

## 2022-04-11 RX ADMIN — MIDAZOLAM HYDROCHLORIDE 2 MG: 1 INJECTION, SOLUTION INTRAMUSCULAR; INTRAVENOUS at 11:02

## 2022-04-11 ASSESSMENT — ENCOUNTER SYMPTOMS
SHORTNESS OF BREATH: 0
COUGH: 0
ABDOMINAL PAIN: 0
BACK PAIN: 0
VOMITING: 0
NAUSEA: 0

## 2022-04-11 NOTE — ED NOTES
Patient angry, yelling  Verbalizes he is SI and wants help with detox  Patient was here last night for same issue  Patient verbalizes he used meth, alcohol and marijuana today      [] [place]    [] [address]    [x]  [address]      Y  N     [x] [] In the past two weeks have you had thoughts of hurting yourself in any way? [x] [] In the past two weeks have you had thoughts that you would be better off dead? [x] [] Have you made a suicide attempt in the past two months? [x] [] Do you have a plan for hurting yourself or suicide? [] [x] Presence of hallucinations/voices related to hurting himself or herself or someone else. SUICIDE/SECURITY WATCH PRECAUTION CHECKLIST     Orders    [x]  Suicide/Security Watch Precautions initiated as checked below:   4/11/22 1:53 AM EDT 01/01    [x] Notified physician:  Celia Juárez MD  4/11/22 1:53 AM EDT    [x] Orders obtained as appropriate:     [x] 1:1 Observer     [] Psych Consult     [] Psych Consult    Name:  Date:  Time:    [x] 1:1 Observer, Notified by:  Precious Koroma RN    Contact Nurse Supervisor    [x] Remove all personal clothes from room and place in snap/paper gown/pants. Slipper only    [x] Remove all personal belongings from room and secured away from patient. Documentation    [x] Initiate Suicide/Security Watch Precaution Flow Sheet    [x] Initiate individualized Care Plan/Problem    [x] Document why precautions initiated on flow sheet (Initiate Nursing Care Plan/Problem)    [x] 1:1 Observer in place; instructions provided. Suicide precautions require observer be within arms length. [x] Nurse-Observer Communication Hand-off initiated by RN, reviewed with Observer. Subsequently used as Hand Off between Observers. [x] Initiate every 15 minute observations per observer as delegated by the RN.     [x] Initiate RN assessment and documentation    Environmental Scan  Search Criteria and Process: OPTIONAL, see Search Policy    [x] Reason for search: SI    [x] Nursing in presence of second person to search patient    [x] Patient notified of reason for body assessment and belongings search:     Persons present during search: [facility]   Results of search and disposition: Clothing and personal belongings locked u     Searchers Name: [employer]    These items or items similar should be removed from the room:   [x] Chairs   [x] Telephone   [x] Trash cans and liners   [x] Plastic utensils (order Patient Safety tray)   [] Empty or remove Sharps containers   [x] All personal clothing/belongings removed   [x] All unnecessary lead wires, electrical cords, draw cords, etc.   [x] Flowers and plants   [x] Double check for lighters, matches, razors, any glass items etc that can be used as weapons. Person completing Checklist: Tripp Michelle RN       GENERAL INFORMATION     Y  N     [x] [] Has the patient been informed that they are on a watch and what that means? [x] [] Can the patient get out of Bed without nursing assistance? [] [x] Can the patient use the restroom without nursing assistance? [] [x] Can the patient walk the halls to [place] their legs? \"   [] [x] Does the patient have metal utensils? [x] [] Have the patient's belongings been placed out of control of the patient? [x] [] Have the patient and his/her belongings been checked for contraband? [x] [] Is the patient under any visitor restrictions? If Yes, explain: SI   [] [x] Is the patient under an alias? [facility] Name:   Authorized visitors (no more than two are to be on the list)   Name/Relationship:   Name/Relationship:    Name of Staff member that you  Received this information from?: Shad LEGER RN    General Description:    Emmanuelle Garcia [birth year] male 27 y.o. Admission weight:      Race: [x]  [] Black  []   []   [] [place] [] Other  Facial Hair:  [] Yes  [] No  If yes, please describe:   Identifying [place] (i.e. Visible tattoos, scars, etc... ):     NURSING CARE PLAN    Nursing Diagnosis: Risk of Self Directed Harm  [] Actual  [x] Potential  Date Started: 4/11/22      Etiological Factors: (related to)  [] Expressed or implied suicidal ideation/behavior  [] Depression  [] Suicide attempt      [] Low self-esteem  [] Hallucinations      [] Feeling of Hopelessness  [] Substance abuse or withdrawal    [] Dysfunctional family  [] Major traumatic event, eg., divorce, etc   [] Excessive stress/anxiety    4/11/22    Expected Outcomes    Patient will:   [x] Patient will remain safe for the duration of their stay   [x] Patient's environment will be safe, eg. Free of potential suicide weapons   [] Verbalize Recovery from suicidal episode and improvement in self-worth   [x] Discuss feeling that precipitated suicide attempt/thoughts/behavior   [] Will describe available resources for crisis prevention and management   [] Will verbalize positive coping skills     Nursing Intervention   [x] Assessment and Observations hourly   [x] Suicide Precautions implemented with patient, should be 1:1 observation   [x] Document observation [id number] and RN assessment hourly   [] Consult physician for:    [] Psychiatric consult    [] Pharmacological therapy    [] Other:    [x] Patient search completed by security   [x] Initiated appropriate safety protocols by removing from the patient's environment anything that could be used to inflict self injury, eg. Order safe tray, snap gown, etc   [x] Maintain open, warm, caring, non-judgmental attitude/manner towards patient   [] Discuss advantages and disadvantages of existing coping methods/skills   [x] Assist and educate patient with identifying present strengths and coping skills   [x] Keep patient informed regarding plan of care and provide clear concise explanations.   Provide the patient/family education information as well as telephone numbers and other information about crisis centers, hot lines, and counselors.     Discharge Planning:   [] Referral  [] Groups [] Health agencies  [] Other:            Solomon Crigler, RN  04/11/22 6124

## 2022-04-11 NOTE — ED PROVIDER NOTES
[address] HANDOFF       Handoff taken on the following patient from prior Attending Physician:  Pt Name: Adina Real  PCP:  None Provider    Attestation  I was available and discussed any additional care issues that arose and coordinated the management plans with the resident(s) caring for the patient during my duty period. Any areas of disagreement with resident's documentation of care or procedures are noted on the chart. I was personally present for the key portions of any/all procedures during my duty period. I have documented in the chart those procedures where I was not present during the key portions. CHIEF COMPLAINT     No chief complaint on file. CURRENT MEDICATIONS     Previous Medications  Previous Medications    ESCITALOPRAM (LEXAPRO) 5 MG TABLET    Take 3 tablets by mouth daily    PALIPERIDONE (INVEGA) 3 MG EXTENDED RELEASE TABLET    Take 1 tablet by mouth every morning       Encounter Medications  Orders Placed This Encounter   Medications    0.9 % sodium chloride bolus       ALLERGIES     has No Known Allergies. RECENT VITALS:   Temp: 97.8 °F (36.6 °C),  Pulse: 61, Resp: 11, BP: (!) 119/59    RADIOLOGY:   CT HEAD WO CONTRAST   Final Result   No acute abnormality of the cervical spine. No acute intracranial abnormality. CT CERVICAL SPINE WO CONTRAST   Final Result   No acute abnormality of the cervical spine. No acute intracranial abnormality.              LABS:  Labs Reviewed   CBC WITH AUTO DIFFERENTIAL - Abnormal; Notable for the following components:       Result Value    RBC 3.91 (*)     Hemoglobin 12.0 (*)     Hematocrit 35.1 (*)     All other components within normal limits   URINE DRUG SCREEN - Abnormal; Notable for the following components:    Amphetamine Screen, Ur POSITIVE (*)     Barbiturate Screen, Ur POSITIVE (*)     Cannabinoid Scrn, Ur POSITIVE (*)     All other components within normal limits   TOX SCR, BLD, ED - Abnormal; Notable for the following components:    Acetaminophen Level <5 (*)     Ethanol 94 (*)     Ethanol percent 2.942 (*)     Salicylate Lvl <1 (*)     All other components within normal limits   COMPREHENSIVE METABOLIC PANEL - Abnormal; Notable for the following components:    Glucose 116 (*)     CREATININE 0.53 (*)     Potassium 3.4 (*)     ALT 63 (*)     AST 72 (*)     All other components within normal limits   URINALYSIS WITH MICROSCOPIC - Abnormal; Notable for the following components:    Ketones, Urine TRACE (*)     All other components within normal limits           PLAN/ TASKS OUTSTANDING     Intermittent and continued issues with suicidal ideations  [place] issues impacting on BHI acceptability  Continued observation and telepsych this morning      (Please note that portions of this note were completed with a voice recognition program.  Efforts were made to edit the dictations but occasionally words are mis-transcribed.)    Kaila Pollock MD,, MD, F.A.C.E.P.   Attending Emergency Physician       Kaila Pollock MD  04/11/22 9415 Bhupendra MORE MD  04/11/22 7620

## 2022-04-11 NOTE — ED NOTES
Per [facility] staff, patient's tele-psych will be 12:30pm today. Franck Dao.  Diane Kelley     [place], [place]  04/11/22 1778

## 2022-04-11 NOTE — ED NOTES
Repositions self on stretcher  Unable to provide urine specimen at present time     Jen Pires RN  04/11/22 0144

## 2022-04-11 NOTE — ED PROVIDER NOTES
[address] [facility]  Emergency Department  Emergency Medicine Resident Sign-out     Care of Derrick Carbajal was assumed from Dr. Jaky Gupta and is being seen for No chief complaint on file. .  The patient's initial evaluation and plan have been discussed with the prior provider who initially evaluated the patient. EMERGENCY DEPARTMENT COURSE / MEDICAL DECISION MAKING:       MEDICATIONS GIVEN:  Orders Placed This Encounter   Medications    0.9 % sodium chloride bolus    DISCONTD: midazolam (VERSED) 2 MG/ML syrup 2 mg    midazolam PF (VERSED) injection 2 mg       LABS / RADIOLOGY:     Labs Reviewed   CBC WITH AUTO DIFFERENTIAL - Abnormal; Notable for the following components:       Result Value    RBC 3.91 (*)     Hemoglobin 12.0 (*)     Hematocrit 35.1 (*)     All other components within normal limits   URINE DRUG SCREEN - Abnormal; Notable for the following components:    Amphetamine Screen, Ur POSITIVE (*)     Barbiturate Screen, Ur POSITIVE (*)     Cannabinoid Scrn, Ur POSITIVE (*)     All other components within normal limits   TOX SCR, BLD, ED - Abnormal; Notable for the following components:    Acetaminophen Level <5 (*)     Ethanol 94 (*)     Ethanol percent 8.659 (*)     Salicylate Lvl <1 (*)     All other components within normal limits   COMPREHENSIVE METABOLIC PANEL - Abnormal; Notable for the following components:    Glucose 116 (*)     CREATININE 0.53 (*)     Potassium 3.4 (*)     ALT 63 (*)     AST 72 (*)     All other components within normal limits   URINALYSIS WITH MICROSCOPIC - Abnormal; Notable for the following components:    Ketones, Urine TRACE (*)     All other components within normal limits       CT HEAD WO CONTRAST   Final Result   No acute abnormality of the cervical spine. No acute intracranial abnormality. CT CERVICAL SPINE WO CONTRAST   Final Result   No acute abnormality of the cervical spine. No acute intracranial abnormality.              RECENT VITALS:     Temp: 97.3 °F (36.3 °C),  Pulse: (!) 45, Resp: 16, BP: (!) 98/53, SpO2: 100 %    This patient is a 27 y.o. Male with suicidal ideation. Patient jumped off a 10 foot bridge to the ground at which time he hit his head. Work-up included CT head that was negative in addition to work-up for possible admission to [facility] for suicidal ideation. Urine drug screen was positive for multiple substances. He will have a telepsych visit scheduled today, unknown time currently. He is otherwise medically cleared for potential admission. Updated by social work that telepsych visit will be at 1230. He did become agitated and stated that he was going into alcohol withdrawal.  2 mg Versed given IM. ED Course as of 04/11/22 1722   Mon Apr 11, 2022   2782 Patient reports that he is no longer suicidal, this was discussed in the presence of myself and possible members of [employer]. Patient states that instead he would like to go to outpatient detox and program.  Social work has provided patient with multiple resources for outpatient detox. Plan for discharge [AN]   3183 Dr. Greer Ward went to evaluate patient and patient is now stating that he is suicidal.   has been made aware [AN]   0502 SI, bridge jump to ground, CT's negative, multiple drugs, telepsych this AM [AR]   0610 Will likely have telehealth time shortly after 8 AM [AR]   0009 Reevaluated patient. Patient sleeping and resting comfortably no acute distress at this time. [AR]      ED Course User Index  [AN] Reed Red MD  [AR] Wilberto Tai MD     Telepsych visit was completed. Psychiatrist felt patient was stable for discharge at this time. Before I was able to print discharge paperwork, patient left the building. OUTSTANDING TASKS / RECOMMENDATIONS:      1. Follow up telepsych consult  2. dispo pending psychiatry recommendations. FINAL IMPRESSION:     1.  Suicidal behavior with attempted self-injury ([facility] [address]        DISPOSITION: DISPOSITION:  [x]  Discharge   []  Transfer -    []  Admission -     []  Against Medical Advice   []  [place]   FOLLOW-UP: [facility]  [address] [place] [address]  [phone]    If symptoms worsen     DISCHARGE MEDICATIONS: Discharge Medication List as of 4/11/2022  1:24 PM             Kaila Baig DO  Emergency Medicine Resident  [facility]       Kaila Baig DO  Resident  04/11/22 9370

## 2022-04-11 NOTE — CONSULTS
Department of Psychiatry  [facility] 96: admission evaluation. CONSULTING PHYSICIAN: Dr. Niru Devine    History obtained from: patient and chart. HISTORY OF PRESENT ILLNESS:    The patient is a 27 y.o. male with significant past psychiatric history of polysubstance abuse who presents with intoxication. I have noted that the patient had initially presented yesterday. He had reported multiple suicide plans. Further noted that the patient has been admitted to inpatient psychiatry at [facility] earlier this month. He did not follow-up with his outpatient appointments and continues to use recreational drugs. The patient was presented yesterday for admission but admission was declined and he was sent to [facility]. The patient presented again yesterday evening and reported that he had jumped from somewhere. He had no injuries. The patient has been noted to have a history of malingering. The patient was seen using telehealth equipment. He said that he had come here yesterday and was discharged to [facility] but they were closed. He got upset and came back to the hospital. He said he was reporting suicidal ideation because he was intoxicated. He does not feel suicidal at this time and wants to go for detox. The patient is irritable. He is fixated upon discharge. He is denying any psychotic symptoms. He is denying any mood or anxiety symptoms. He has no auditory or visual hallucinations or paranoia. He is harinder for safety upon discharge  Patient wants to go to [facility] for detox. The patient is not currently receiving care for the above psychiatric illness.       Psychiatric Review of Systems       ·    Obsessions and Compulsions: Denies    ·    Janet or Hypomania: Denies  ·    Hallucinations: Denies  ·    Panic Attacks:  Denies  ·    Delusions:  Denies  ·    Phobias:  Denies  ·    Trauma: Denies      Substance Abuse History: Noted that the patient urine tox was positive for cannabinoids amphetamines and barbiturates  ETOH: \"quite a lot\". 6-7 [place] a day  Marijuana: has been using Marijuana  Opiates: denies  Other Drugs: uses Meth      Past Psychiatric History:  Prior Diagnosis: Schizoaffective disorder, polysubstance abuse. Hospitalization: yes  Hx of Suicidal Attempts: yes  Hx of violence:  yes      Personal History: Was living in [facility]. Lives with somebody. Has no income. Past Medical History:        Diagnosis Date    Anxiety     [facility]     Drug abuse in remission ([facility]     H/O alcohol abuse     Hep C w/o coma, chronic (HCC)        Past Surgical History:        Procedure Laterality Date    ABSCESS DRAINAGE Right 10/13/2020    Irrigation and debridement    HAND SURGERY Right          Medications Prior to Admission:   Not in a hospital admission. Allergies:  Patient has no known allergies. FAMILY/SOCIAL HISTORY:  No family history on file. Social History     Socioeconomic History    Marital status: Single     Spouse name: Not on file    Number of children: Not on file    Years of education: Not on file    Highest education level: Not on file   Occupational History    Not on file   Tobacco Use    Smoking status: Current Every Day Smoker     Packs/day: 0.50     Years: 7.00     Pack years: 3.50     Types: Cigarettes    Smokeless tobacco: Former User     Types: Snuff   Vaping Use    Vaping Use: Never used   Substance and Sexual Activity    Alcohol use:  Yes    Drug use: Yes     Frequency: 7.0 times per week     Types: Methamphetamines (Crystal Meth), Opiates , IV, Cocaine, Marijuana (Weed)     Comment: States uses \"whatever I can get my hands on\"    Sexual activity: Not Currently   Other Topics Concern    Not on file   Social History Narrative    Not on file     Social Determinants of Health     Financial Resource Strain:     Difficulty of Paying Living Expenses: Not on file   Food Insecurity:     Worried About Running Out of Giphy in the Last Year: Not on file    Ran Out of Food in the Last Year: Not on file   Transportation Needs:     Lack of Transportation (Medical): Not on file    Lack of Transportation (Non-Medical):  Not on file   Physical Activity:     Days of Exercise per Week: Not on file    Minutes of Exercise per Session: Not on file   Stress:     Feeling of Stress : Not on file   Social Connections:     Frequency of Communication with Friends and Family: Not on file    Frequency of Social Gatherings with Friends and Family: Not on file    Attends Mosque Services: Not on file    Active Member of Clubs or Organizations: Not on file    Attends Club or Organization Meetings: Not on file    Marital Status: Not on file   Intimate Partner Violence:     Fear of Current or Ex-Partner: Not on file    Emotionally Abused: Not on file    Physically Abused: Not on file    Sexually Abused: Not on file   Housing Stability:     Unable to Pay for Housing in the Last Year: Not on file    Number of [place] in the Last Year: Not on file    Unstable Housing in the Last Year: Not on file       REVIEW OF SYSTEMS    Constitutional: [] fever  [] chills  [] weight loss  []weakness [] Other:  Eyes:  [] photophobia  [] discharge [] acuity change   [] Diplopia   [] Other:  HENT:  [] sore throat  [] ear pain [] Tinnitus   [] Other  Respiratory:  [] Cough  [] Shortness of breath   [] Sputum   [] Other:   Cardiac: []Chest pain   []Palpitations []Edema  []PND  [] Other:  GI:  []Abdominal pain   []Nausea  []Vomiting  []Diarrhea  [] Other:  :  [] Dysuria   []Frequency  []Hematuria  []Discharge  [] Other:  Possible Pregnancy: []Yes   []No   LMP:   Musculoskeletal:  []Back pain  []Neck pain  []Recent Injury   Skin:  []Rash  [] Itching  [] Other:  Neurologic:  [] Headache  [] Focal weakness  [] Sensory changes []Other:  Endocrine:  [] Polyuria  [] Polydipsia  [] Hair Loss  [] Other:  Lymphatic:   [] Swollen glands   Psychiatric:  As per HPI All other systems negative except as marked or mentioned/indicated in the HPI. Donny Ra      PHYSICAL EXAM:  Vitals:  BP (!) 98/53   Pulse (!) 45   Temp 97.3 °F (36.3 °C) (Axillary)   Resp 16   Wt 260 lb (117.9 kg)   SpO2 100%   BMI 33.38 kg/m²      Neuro Exam:     Involuntary Movements: No    Mental Status Examination:    Level of consciousness:  within normal limits   Appearance:  hospital attire  Behavior/Motor:  no abnormalities noted  Attitude toward examiner:  argumentative and hostile  Speech:  spontaneous, normal rate and normal volume   Mood: irritable  Affect:  mood congruent  Thought processes:  linear, goal directed and coherent   Thought content:  Suicidal Ideation:  denies suicidal ideation  Delusions:  no evidence of delusions  Perceptual Disturbance:  denies any perceptual disturbance  Cognition:  oriented to person, place, and time   Concentration distractible  Memory intact  Insight poor   Judgement fair   Fund of Knowledge adequate        LABS: REVIEWED TODAY:  Recent Labs     04/10/22  0143 04/10/22  2259   WBC 7.6 8.1   HGB 12.5* 12.0*    212     Recent Labs     04/10/22  0143 04/10/22  2259   * 138   K 3.7 3.4*   * 102   CO2 23 26   BUN 18 14   CREATININE 0.62* 0.53*   GLUCOSE 95 116*     Recent Labs     04/10/22  0143 04/10/22  2259   BILITOT 0.30 0.35   ALKPHOS 68 70   AST 74* 72*   ALT 73* 63*     Lab Results   Component Value Date    LABAMPH Neg 03/24/2022    BARBSCNU POSITIVE 04/10/2022    LABBENZ NEGATIVE 04/10/2022    COCAINESCRN Negative 10/19/2021    LABMETH NEGATIVE 04/10/2022    OPIATESCREENURINE Neg 03/24/2022    PHENCYCLIDINESCREENURINE Neg 03/24/2022    PPXUR NOT REPORTED 11/01/2021    ETOH <10 03/24/2022     Lab Results   Component Value Date    TSH 2.440 04/03/2022     Lab Results   Component Value Date    LITHIUM <0.2 (L) 03/20/2019     No results found for: VALPROATE, CBMZ  Lab Results   Component Value Date    LITHIUM <0.2 03/20/2019       FURTHER LABS ORDERED :      Radiology   CT HEAD WO CONTRAST    Result Date: 4/11/2022  EXAMINATION: CT OF THE HEAD WITHOUT CONTRAST; CT OF THE CERVICAL SPINE WITHOUT CONTRAST 4/11/2022 12:21 am TECHNIQUE: CT of the head was performed without the administration of intravenous contrast. Dose modulation, iterative reconstruction, and/or weight based adjustment of the mA/kV was utilized to reduce the radiation dose to as low as reasonably achievable.; CT of the cervical spine was performed without the administration of intravenous contrast. Multiplanar reformatted images are provided for review. Dose modulation, iterative reconstruction, and/or weight based adjustment of the mA/kV was utilized to reduce the radiation dose to as low as reasonably achievable. COMPARISON: 06/12/2021. HISTORY: ORDERING SYSTEM PROVIDED HISTORY: fall from 10 TECHNOLOGIST PROVIDED HISTORY: fall from [facility] if not a suspected or confirmed emergency medical condition->Emergency Medical Condition (MA); ORDERING SYSTEM PROVIDED HISTORY: fall from bridge TECHNOLOGIST PROVIDED HISTORY: fall from bridge Decision Support Exception - unselect if not a suspected or confirmed emergency medical condition->Emergency Medical Condition (MA) FINDINGS: CT CERVICAL SPINE: BONES/ALIGNMENT: There is no acute fracture or traumatic malalignment. DEGENERATIVE CHANGES: No significant degenerative changes. SOFT TISSUES: There is no prevertebral soft tissue swelling. CT HEAD: BRAIN/VENTRICLES: There is no acute intracranial hemorrhage, mass effect or midline shift. No abnormal extra-axial fluid collection. The gray-white differentiation is maintained without evidence of an acute infarct. There is no evidence of hydrocephalus. ORBITS: The visualized portion of the orbits demonstrate no acute abnormality.  SINUSES: Left maxillary sinus mucous retention cyst.  The other visualized paranasal sinuses and mastoid air cells demonstrate no acute abnormality. SOFT TISSUES/SKULL: No acute abnormality of the visualized skull or soft tissues. No acute abnormality of the cervical spine. No acute intracranial abnormality. CT CERVICAL SPINE WO CONTRAST    Result Date: 4/11/2022  EXAMINATION: CT OF THE HEAD WITHOUT CONTRAST; CT OF THE CERVICAL SPINE WITHOUT CONTRAST 4/11/2022 12:21 am TECHNIQUE: CT of the head was performed without the administration of intravenous contrast. Dose modulation, iterative reconstruction, and/or weight based adjustment of the mA/kV was utilized to reduce the radiation dose to as low as reasonably achievable.; CT of the cervical spine was performed without the administration of intravenous contrast. Multiplanar reformatted images are provided for review. Dose modulation, iterative reconstruction, and/or weight based adjustment of the mA/kV was utilized to reduce the radiation dose to as low as reasonably achievable. COMPARISON: 06/12/2021. HISTORY: ORDERING SYSTEM PROVIDED HISTORY: fall from [facility] TECHNOLOGIST PROVIDED HISTORY: fall from [facility] if not a suspected or confirmed emergency medical condition->Emergency Medical Condition (MA); ORDERING SYSTEM PROVIDED HISTORY: fall from [facility] TECHNOLOGIST PROVIDED HISTORY: fall from [facility] Decision Support Exception - unselect if not a suspected or confirmed emergency medical condition->Emergency Medical Condition (MA) FINDINGS: CT CERVICAL SPINE: BONES/ALIGNMENT: There is no acute fracture or traumatic malalignment. DEGENERATIVE CHANGES: No significant degenerative changes. SOFT TISSUES: There is no prevertebral soft tissue swelling. CT HEAD: BRAIN/VENTRICLES: There is no acute intracranial hemorrhage, mass effect or midline shift. No abnormal extra-axial fluid collection. The gray-white differentiation is maintained without evidence of an acute infarct. There is no evidence of hydrocephalus.  ORBITS: The visualized portion of the orbits demonstrate no acute abnormality. SINUSES: Left maxillary sinus mucous retention cyst.  The other visualized paranasal sinuses and mastoid air cells demonstrate no acute abnormality. SOFT TISSUES/SKULL: No acute abnormality of the visualized skull or soft tissues. No acute abnormality of the cervical spine. No acute intracranial abnormality. DIAGNOSIS: Polysubstance abuse            RISK ASSESSMENT: Moderate risk of agitation and violence. Moderate risk of suicide threats and gestures      RECOMMENDATIONS-not likely to benefit from admission to psychiatry. He should be discharged with resources for detox    Risk Management:  routine:  no special precautions necessary    Medications: No psychotropic medications have been ordered for him  Discussed with the treating physician/ team about the patient and treatment plan  Reviewed the chart    Discussed with the patient risk, benefit, alternative and common side effects for the  proposed medication treatment. Patient is consenting to the treatment. Thanks for the consult. Please call me if needed. Sujatha Sykes is a 27 y.o. male being evaluated by a Virtual Visit (video visit) encounter to address concerns as mentioned above. A caregiver was present in the room along with the patient. Pursuant to the emergency declaration under the [address] [place] [address] and the [employer] Act, this Virtual Visit was conducted with patient's (and/or legal guardian's) consent, to reduce the patient's risk of exposure to COVID-19 and provide necessary medical care. Services were provided through a video synchronous discussion virtually to substitute for in-person visit by provider.    Patient is present at [place]  and I am physically present at my office in [facility]     --Yza Benitez MD on 4/11/2022 at 1:03 PM    An electronic signature was used to authenticate this note. **This report has been created using voice recognition software. It may contain minor errors which are inherent in voice recognition technology. **

## 2022-04-11 NOTE — ED NOTES
SW perfect faxed consent for telepysc/ sent perfect service to [facility] hub to schedule. Awaiting appt time.

## 2022-04-11 NOTE — ED NOTES
[facility] and [facility] that the referral for tele-psych has been sent to Dr. Adrian Patel. [facility] are waiting for him to set up a time with them and will let us know then. RN updated. Gary Locke.  [address]  04/11/22 1528

## 2022-04-11 NOTE — ED NOTES
Report given to [facility]  No change in patient status  Continues to rest quietly       Rk Nance [facility]  04/11/22 3846

## 2022-04-11 NOTE — ED PROVIDER NOTES
Citlalli Sandoval  ED  Emergency Department  Emergency Medicine Resident Sign-out   Care of Morris Jacobson was assumed from Dr. Theresa De La Torre and is being seen for No chief complaint on file. .  The patient's initial evaluation and plan have been discussed with the prior provider who initially evaluated the patient.      EMERGENCY DEPARTMENT COURSE / MEDICAL DECISION MAKING:       MEDICATIONS GIVEN:  Orders Placed This Encounter   Medications    0.9 % sodium chloride bolus       LABS / RADIOLOGY:     Labs Reviewed   CBC WITH AUTO DIFFERENTIAL - Abnormal; Notable for the following components:       Result Value    RBC 3.91 (*)     Hemoglobin 12.0 (*)     Hematocrit 35.1 (*)     All other components within normal limits   URINE DRUG SCREEN - Abnormal; Notable for the following components:    Amphetamine Screen, Ur POSITIVE (*)     Barbiturate Screen, Ur POSITIVE (*)     Cannabinoid Scrn, Ur POSITIVE (*)     All other components within normal limits   TOX SCR, BLD, ED - Abnormal; Notable for the following components:    Acetaminophen Level <5 (*)     Ethanol 94 (*)     Ethanol percent 6.817 (*)     Salicylate Lvl <1 (*)     All other components within normal limits   COMPREHENSIVE METABOLIC PANEL - Abnormal; Notable for the following components:    Glucose 116 (*)     CREATININE 0.53 (*)     Potassium 3.4 (*)     ALT 63 (*)     AST 72 (*)     All other components within normal limits   URINALYSIS WITH MICROSCOPIC - Abnormal; Notable for the following components:    Ketones, Urine TRACE (*)     All other components within normal limits       CT HEAD WO CONTRAST    Result Date: 4/11/2022  EXAMINATION: CT OF THE HEAD WITHOUT CONTRAST; CT OF THE CERVICAL SPINE WITHOUT CONTRAST 4/11/2022 12:21 am TECHNIQUE: CT of the head was performed without the administration of intravenous contrast. Dose modulation, iterative reconstruction, and/or weight based adjustment of the mA/kV was utilized to reduce the radiation dose to as low as reasonably achievable.; CT of the cervical spine was performed without the administration of intravenous contrast. Multiplanar reformatted images are provided for review. Dose modulation, iterative reconstruction, and/or weight based adjustment of the mA/kV was utilized to reduce the radiation dose to as low as reasonably achievable. COMPARISON: 06/12/2021. HISTORY: ORDERING SYSTEM PROVIDED HISTORY: fall from 10 TECHNOLOGIST PROVIDED HISTORY: fall from [facility] if not a suspected or confirmed emergency medical condition->Emergency Medical Condition (MA); ORDERING SYSTEM PROVIDED HISTORY: fall from [facility] TECHNOLOGIST PROVIDED HISTORY: fall from bridge Decision Support Exception - unselect if not a suspected or confirmed emergency medical condition->Emergency Medical Condition (MA) FINDINGS: CT CERVICAL SPINE: BONES/ALIGNMENT: There is no acute fracture or traumatic malalignment. DEGENERATIVE CHANGES: No significant degenerative changes. SOFT TISSUES: There is no prevertebral soft tissue swelling. CT HEAD: BRAIN/VENTRICLES: There is no acute intracranial hemorrhage, mass effect or midline shift. No abnormal extra-axial fluid collection. The gray-white differentiation is maintained without evidence of an acute infarct. There is no evidence of hydrocephalus. ORBITS: The visualized portion of the orbits demonstrate no acute abnormality. SINUSES: Left maxillary sinus mucous retention cyst.  The other visualized paranasal sinuses and mastoid air cells demonstrate no acute abnormality. SOFT TISSUES/SKULL: No acute abnormality of the visualized skull or soft tissues. No acute abnormality of the cervical spine. No acute intracranial abnormality.      CT CERVICAL SPINE WO CONTRAST    Result Date: 4/11/2022  EXAMINATION: CT OF THE HEAD WITHOUT CONTRAST; CT OF THE CERVICAL SPINE WITHOUT CONTRAST 4/11/2022 12:21 am TECHNIQUE: CT of the head was performed without the administration of intravenous contrast. Dose modulation, iterative reconstruction, and/or weight based adjustment of the mA/kV was utilized to reduce the radiation dose to as low as reasonably achievable.; CT of the cervical spine was performed without the administration of intravenous contrast. Multiplanar reformatted images are provided for review. Dose modulation, iterative reconstruction, and/or weight based adjustment of the mA/kV was utilized to reduce the radiation dose to as low as reasonably achievable. COMPARISON: 06/12/2021. HISTORY: ORDERING SYSTEM PROVIDED HISTORY: fall from [facility] TECHNOLOGIST PROVIDED HISTORY: fall from [facility] if not a suspected or confirmed emergency medical condition->Emergency Medical Condition (MA); ORDERING SYSTEM PROVIDED HISTORY: fall from [facility] TECHNOLOGIST PROVIDED HISTORY: fall from bridge Decision Support Exception - unselect if not a suspected or confirmed emergency medical condition->Emergency Medical Condition (MA) FINDINGS: CT CERVICAL SPINE: BONES/ALIGNMENT: There is no acute fracture or traumatic malalignment. DEGENERATIVE CHANGES: No significant degenerative changes. SOFT TISSUES: There is no prevertebral soft tissue swelling. CT HEAD: BRAIN/VENTRICLES: There is no acute intracranial hemorrhage, mass effect or midline shift. No abnormal extra-axial fluid collection. The gray-white differentiation is maintained without evidence of an acute infarct. There is no evidence of hydrocephalus. ORBITS: The visualized portion of the orbits demonstrate no acute abnormality. SINUSES: Left maxillary sinus mucous retention cyst.  The other visualized paranasal sinuses and mastoid air cells demonstrate no acute abnormality. SOFT TISSUES/SKULL: No acute abnormality of the visualized skull or soft tissues. No acute abnormality of the cervical spine. No acute intracranial abnormality.        RECENT VITALS:     Temp: 97.7 °F (36.5 °C),  Pulse: 82, Resp: 16, BP: (!) 90/46, SpO2: 92 %      This patient is a 27 y.o. Male with complaints of suicidal ideation with plan to jump off a bridge. Patient notes he actually did jump off a bridge earlier today and was found by fellow personnel. Had CTs head and C-spine that were negative. UDS positive for multiple drugs. Plan to have telepsych consult later this morning. Dispo pending telepsych recommendations    Signed out to  at [place] [address] pending telepsych. ED Course as of 04/11/22 0742   Mon Apr 11, 2022   1278 Patient reports that he is no longer suicidal, this was discussed in the presence of myself and possible members of [employer]. Patient states that instead he would like to go to outpatient detox and program.  Social work has provided patient with multiple resources for outpatient detox. Plan for discharge [AN]   3379 Dr. Jesusita Graham went to evaluate patient and patient is now stating that he is suicidal.   has been made aware [AN]   0502 SI, bridge jump to ground, CT's negative, multiple drugs, telepsych this AM [AR]   0610 Will likely have telehealth time shortly after 8 AM [AR]   4491 Reevaluated patient. Patient sleeping and resting comfortably no acute distress at this time. [AR]      ED Course User Index  [AN] Juli Chapa MD  [AR] Carlyle Pritchard MD       OUTSTANDING TASKS / RECOMMENDATIONS:    1. Dispo  2. Telepsych consult     FINAL IMPRESSION:   No diagnosis found.     DISPOSITION:         DISPOSITION:  []  Discharge   []  Transfer -    []  Admission -     []  Against Medical Advice   []  Eloped   FOLLOW-UP: [facility]  [address] [place] [address]  [phone]    If symptoms worsen     DISCHARGE MEDICATIONS: New Prescriptions    No medications on file          Yani Mcfadden MD  Emergency Medicine Resident  [facility]      Carlyle Pritchard MD  Resident  04/11/22 3003

## 2022-04-11 NOTE — ED PROVIDER NOTES
[facility]   Emergency Department  Emergency Medicine Attending Sign-out     Care of Ronn Joaquin was assumed from previous attending Dr. Rd Eugene and is being seen for No chief complaint on file. .  The patient's initial evaluation and plan have been discussed with the prior provider who initially evaluated the patient. Attestation  I was available and discussed any additional care issues that arose and coordinated the management plans with the resident(s) caring for the patient during my duty period. Any areas of disagreement with resident's documentation of care or procedures are noted on the chart. I was personally present for the key portions of any/all procedures, during my duty period. I have documented in the chart those procedures where I was not present during the key portions. BRIEF PATIENT SUMMARY/MDM COURSE PER INITIAL PROVIDER:   RECENT VITALS:     Temp: 97.8 °F (36.6 °C),  Pulse: (!) 45, Resp: 9, BP: (!) 101/59, SpO2: 98 %    This patient is a 27 y.o. Male with suicidal ideations and wanting detox. Has a h/o violent behavor and was declined for [facility]HI. PLan for telepsych tomorrow.      DIAGNOSTICS/MEDICATIONS:     MEDICATIONS GIVEN:  ED Medication Orders (From admission, onward)    Start Ordered     Status Ordering Provider    04/11/22 0000 04/10/22 2764  0.9 % sodium chloride bolus  ONCE         Last MAR action: Stopped - by Randal Zee on 04/11/22 at 0200 AGNIESZKA ROTHMAN          LABS    Labs Reviewed   CBC WITH AUTO DIFFERENTIAL - Abnormal; Notable for the following components:       Result Value    RBC 3.91 (*)     Hemoglobin 12.0 (*)     Hematocrit 35.1 (*)     All other components within normal limits   URINE DRUG SCREEN - Abnormal; Notable for the following components:    Amphetamine Screen, Ur POSITIVE (*)     Barbiturate Screen, Ur POSITIVE (*)     Cannabinoid Scrn, Ur POSITIVE (*)     All other components within normal limits   TOX SCR, BLD, ED - Abnormal; Notable for the following components:    Acetaminophen Level <5 (*)     Ethanol 94 (*)     Ethanol percent 3.452 (*)     Salicylate Lvl <1 (*)     All other components within normal limits   COMPREHENSIVE METABOLIC PANEL - Abnormal; Notable for the following components:    Glucose 116 (*)     CREATININE 0.53 (*)     Potassium 3.4 (*)     ALT 63 (*)     AST 72 (*)     All other components within normal limits   URINALYSIS WITH MICROSCOPIC - Abnormal; Notable for the following components:    Ketones, Urine TRACE (*)     All other components within normal limits       RADIOLOGY  CT HEAD WO CONTRAST    Result Date: 4/11/2022  EXAMINATION: CT OF THE HEAD WITHOUT CONTRAST; CT OF THE CERVICAL SPINE WITHOUT CONTRAST 4/11/2022 12:21 am TECHNIQUE: CT of the head was performed without the administration of intravenous contrast. Dose modulation, iterative reconstruction, and/or weight based adjustment of the mA/kV was utilized to reduce the radiation dose to as low as reasonably achievable.; CT of the cervical spine was performed without the administration of intravenous contrast. Multiplanar reformatted images are provided for review. Dose modulation, iterative reconstruction, and/or weight based adjustment of the mA/kV was utilized to reduce the radiation dose to as low as reasonably achievable. COMPARISON: 06/12/2021. HISTORY: ORDERING SYSTEM PROVIDED HISTORY: fall from [facility] TECHNOLOGIST PROVIDED HISTORY: fall from [facility] if not a suspected or confirmed emergency medical condition->Emergency Medical Condition (MA); ORDERING SYSTEM PROVIDED HISTORY: fall from [facility] TECHNOLOGIST PROVIDED HISTORY: fall from bridge Decision Support Exception - unselect if not a suspected or confirmed emergency medical condition->Emergency Medical Condition (MA) FINDINGS: CT CERVICAL SPINE: BONES/ALIGNMENT: There is no acute fracture or traumatic malalignment.  DEGENERATIVE CHANGES: No significant degenerative changes. SOFT TISSUES: There is no prevertebral soft tissue swelling. CT HEAD: BRAIN/VENTRICLES: There is no acute intracranial hemorrhage, mass effect or midline shift. No abnormal extra-axial fluid collection. The gray-white differentiation is maintained without evidence of an acute infarct. There is no evidence of hydrocephalus. ORBITS: The visualized portion of the orbits demonstrate no acute abnormality. SINUSES: Left maxillary sinus mucous retention cyst.  The other visualized paranasal sinuses and mastoid air cells demonstrate no acute abnormality. SOFT TISSUES/SKULL: No acute abnormality of the visualized skull or soft tissues. No acute abnormality of the cervical spine. No acute intracranial abnormality. CT CERVICAL SPINE WO CONTRAST    Result Date: 4/11/2022  EXAMINATION: CT OF THE HEAD WITHOUT CONTRAST; CT OF THE CERVICAL SPINE WITHOUT CONTRAST 4/11/2022 12:21 am TECHNIQUE: CT of the head was performed without the administration of intravenous contrast. Dose modulation, iterative reconstruction, and/or weight based adjustment of the mA/kV was utilized to reduce the radiation dose to as low as reasonably achievable.; CT of the cervical spine was performed without the administration of intravenous contrast. Multiplanar reformatted images are provided for review. Dose modulation, iterative reconstruction, and/or weight based adjustment of the mA/kV was utilized to reduce the radiation dose to as low as reasonably achievable. COMPARISON: 06/12/2021.  HISTORY: ORDERING SYSTEM PROVIDED HISTORY: fall from [facility] TECHNOLOGIST PROVIDED HISTORY: fall from [facility] if not a suspected or confirmed emergency medical condition->Emergency Medical Condition (MA); ORDERING SYSTEM PROVIDED HISTORY: fall from bridge TECHNOLOGIST PROVIDED HISTORY: fall from bridge Decision Support Exception - unselect if not a suspected or confirmed emergency medical condition->Emergency Medical Condition (MA) FINDINGS: CT CERVICAL SPINE: BONES/ALIGNMENT: There is no acute fracture or traumatic malalignment. DEGENERATIVE CHANGES: No significant degenerative changes. SOFT TISSUES: There is no prevertebral soft tissue swelling. CT HEAD: BRAIN/VENTRICLES: There is no acute intracranial hemorrhage, mass effect or midline shift. No abnormal extra-axial fluid collection. The gray-white differentiation is maintained without evidence of an acute infarct. There is no evidence of hydrocephalus. ORBITS: The visualized portion of the orbits demonstrate no acute abnormality. SINUSES: Left maxillary sinus mucous retention cyst.  The other visualized paranasal sinuses and mastoid air cells demonstrate no acute abnormality. SOFT TISSUES/SKULL: No acute abnormality of the visualized skull or soft tissues. No acute abnormality of the cervical spine. No acute intracranial abnormality. OUTSTANDING TASKS / ADDITIONAL COMMENTS   1. Notified that patient no longer suicidal and was upset that he could not go to detox. Patient did discuss this with SW and resident. However, I personally went to evaluate patient and while he was angry and dispondant he stated that he came here because he was suicidal and that he had wanted to hang himself adding that he has been looking around the room makinbg mental notes of what  He could use to do this. Do not feel that patient can be discharged at this time as he is still endorsing overt suicidal plans and is not able to contract to a safety plan.      Signed out to oncoming physician    Armond Ruiz MD  Emergency Medicine Attending  [facility]        Monisha Zambrano MD  04/11/22 4596

## 2022-04-11 NOTE — ED NOTES
The patient is a 27year old male that has a history of Schizoaffective Disorder. The patient came to the ED today due to suicidal thoughts. Per the patient he has a plan to jump from a bridge. Patient is homeless and malingering. Patient was has frequent and recent multiple inpatient psychiatry and detox hospitalizations. The patient self reports a history of leaving psychiatric facilities and discontinuing medications and follow up advice. Patient also self reports that after leaving detox facilities he starts using and ignores follow up outpatient. The patient was here in this ED less then 24 hours ago with similar complaint. This SW contacted Dr Ty Pozo earlier today and per Dr Ty Pozo, \" the patient will not benefit from inpatient admission,\" and declined the patient. Dr Ty Pozo at that time recommended outpatient. SW cabbed the patient to [facility] for safety planning and outpatient assistance. Patient admits that he did not follow up with this  recommendations and discharge planning. Since the patient is presenting the same and did not comply with any of the discharge recommendations, I feel that contacting on call psychiatry is futile and would recommend that the patient meet with psychiatric provider for telepsyc.

## 2022-04-11 NOTE — ED NOTES
Patient now stating that he is not suicidal. Patient requesting to go to a detox center. SW reminded patient that he was sent for an assessment at a detox center and he ended up not taking advantage of the help. PAULA explained that if discharged he will be given resources for detox in the area.

## 2022-04-11 NOTE — ED NOTES
Pt is verbally aggressive and is upset because he can't \"go to detox\". Pt is angry because he was told by SW that because he is suicidal he can't be transferred to a normal detox center. Pt then states he is not longer suicidal, and then throws his drink across the room, rips out his IV, and is screaming at staff members. Pt is verbally aggressive and screaming at staff, and is escalating. [facility] PD is called and is at bedside. Pt continues to be belligerent .       Deidra Rodríguez RN  04/11/22 4378

## 2022-04-11 NOTE — ED NOTES
Patient completed tele-psych assessment with plan to discharge. Patient requesting to go to [facility], social work to assist with transportation if needed.       JOSEPH Greene  04/11/22 0332

## 2022-04-11 NOTE — ED NOTES
Report received from Angie Ordoñez [place]. All questions answered. Pt is resting comfortably on stretcher, NAD.       Abrahan Rivera RN  04/11/22 8064

## 2022-04-11 NOTE — ED PROVIDER NOTES
[facility] ED  Emergency Department Encounter  Emergency Medicine Resident     Pt Name: Tristan Le  MRN: [id number]  Daydaygfcarolyn 1995  Date of evaluation: 4/10/22  PCP:  None Provider    CHIEF COMPLAINT       No chief complaint on file. HISTORY OFPRESENT ILLNESS  (Location/Symptom, Timing/Onset, Context/Setting, Quality, Duration, Modifying Factors,Severity.)      Tristan Le is a 27 y. o.yo male who presents to the emergency room due to complaints of suicidal ideation with a plan to jump off a bridge. Patient reports that he did actually jump off a bridge earlier, was found by fellow personnel. Patient reports that he jumped from 10-20ft. Denies homicidal ideation. He reports that he currently does not have any place to stay. Patient was recently here earlier this morning for similar complaints, he was denied by [facility]. Otherwise denies any chest pain, shortness of breath, neck pain or back pain even after the fall. He states that he landed on his back. States that he has been able to ambulate since the fall. PAST MEDICAL / SURGICAL / SOCIAL / FAMILY HISTORY      has a past medical history of Anxiety, Bipolar 1 disorder ([facility] [address] Drug abuse in remission ([facility] [address] H/O alcohol abuse, and Hep C w/o coma, chronic ([facility] [address] has a past surgical history that includes Hand surgery (Right) and Abscess Drainage (Right, 10/13/2020). Social History     Socioeconomic History    Marital status: Single     Spouse name: Not on file    Number of children: Not on file    Years of education: Not on file    Highest education level: Not on file   Occupational History    Not on file   Tobacco Use    Smoking status: Current Every Day Smoker     Packs/day: 0.50     Years: 7.00     Pack years: 3.50     Types: Cigarettes    Smokeless tobacco: Former User     Types: Snuff   Vaping Use    Vaping Use: Never used   Substance and Sexual Activity    Alcohol use: Yes    Drug use:  Yes Frequency: 7.0 times per week     Types: Methamphetamines (Crystal Meth), Opiates , IV, Cocaine, Marijuana (Weed)     Comment: States uses \"whatever I can get my hands on\"    Sexual activity: Not Currently   Other Topics Concern    Not on file   Social History Narrative    Not on file     Social Determinants of Health     Financial Resource Strain:     Difficulty of Paying Living Expenses: Not on file   Food Insecurity:     Worried About Running Out of Food in the Last Year: Not on file    Hansel of Food in the Last Year: Not on file   Transportation Needs:     Lack of Transportation (Medical): Not on file    Lack of Transportation (Non-Medical): Not on file   Physical Activity:     Days of Exercise per Week: Not on file    Minutes of Exercise per Session: Not on file   Stress:     Feeling of Stress : Not on file   Social Connections:     Frequency of Communication with Friends and Family: Not on file    Frequency of Social Gatherings with Friends and Family: Not on file    Attends [employer] Services: Not on file    Active Member of [address] [place] [address] or Organizations: Not on file    Attends Club or Organization Meetings: Not on file    Marital Status: Not on file   Intimate Partner Violence:     Fear of Current or Ex-Partner: Not on file    Emotionally Abused: Not on file    Physically Abused: Not on file    Sexually Abused: Not on file   Housing Stability:     Unable to Pay for Housing in the Last Year: Not on file    Number of [place] in the Last Year: Not on file    Unstable Housing in the Last Year: Not on file       No family history on file. Allergies:  Patient has no known allergies. Home Medications:  Prior to Admission medications    Medication Sig Start Date End Date Taking?  Authorizing Provider   escitalopram (LEXAPRO) 5 MG tablet Take 3 tablets by mouth daily 4/6/22   Meli Best MD   paliperidone (INVEGA) 3 MG extended release tablet Take 1 tablet by mouth every morning 4/6/22   Sintia Doty MD       REVIEW OFSYSTEMS    (2-9 systems for level 4, 10 or more for level 5)      Review of Systems   Constitutional: Negative for fatigue and fever. Respiratory: Negative for cough and shortness of breath. Cardiovascular: Negative for chest pain and leg swelling. Gastrointestinal: Negative for abdominal pain, nausea and vomiting. Musculoskeletal: Negative for back pain and gait problem. Skin: Negative for rash and wound. Neurological: Negative for facial asymmetry and headaches. Psychiatric/Behavioral: Positive for suicidal ideas. PHYSICAL EXAM   (up to 7 for level 4, 8 or more forlevel 5)      INITIAL VITALS:   ED Triage Vitals   BP Temp Temp src Pulse Resp SpO2 Height Weight   -- -- -- -- -- -- -- --       Physical Exam  Constitutional:       Appearance: Normal appearance. HENT:      Head: Normocephalic and atraumatic. Mouth/Throat:      Mouth: Mucous membranes are moist.   Eyes:      Extraocular Movements: Extraocular movements intact. Pupils: Pupils are equal, round, and reactive to light. Cardiovascular:      Rate and Rhythm: Normal rate. Pulmonary:      Effort: Pulmonary effort is normal. No respiratory distress. Abdominal:      General: There is no distension. Tenderness: There is no abdominal tenderness. Musculoskeletal:         General: No swelling or tenderness. Cervical back: No rigidity or tenderness. Skin:     Capillary Refill: Capillary refill takes less than 2 seconds. Neurological:      General: No focal deficit present. Mental Status: He is alert.    Psychiatric:         Mood and Affect: Mood normal.         Behavior: Behavior normal.         DIFFERENTIAL  DIAGNOSIS     PLAN (LABS / IMAGING / EKG):  Orders Placed This Encounter   Procedures    CT HEAD WO CONTRAST    CT CERVICAL SPINE WO CONTRAST    CBC with Auto Differential    Urine Drug Screen    TOX SCR, BLD, ED    Comprehensive Metabolic Panel    Urinalysis with Microscopic    Inpatient consult to Psychiatry       MEDICATIONS ORDERED:  Orders Placed This Encounter   Medications    0.9 % sodium chloride bolus         Initial MDM/Plan: 27 y.o. male who presents with suicidal ideation   Plan for behavioral health labs of CBC, CMP, tox screen, UA urine drug screen. He just recently got a Covid swab done earlier and does we will not get a Covid swab now.   DIAGNOSTIC RESULTS / EMERGENCYDEPARTMENT COURSE / MDM     LABS:  Labs Reviewed   CBC WITH AUTO DIFFERENTIAL - Abnormal; Notable for the following components:       Result Value    RBC 3.91 (*)     Hemoglobin 12.0 (*)     Hematocrit 35.1 (*)     All other components within normal limits   URINE DRUG SCREEN - Abnormal; Notable for the following components:    Amphetamine Screen, Ur POSITIVE (*)     Barbiturate Screen, Ur POSITIVE (*)     Cannabinoid Scrn, Ur POSITIVE (*)     All other components within normal limits   TOX SCR, BLD, ED - Abnormal; Notable for the following components:    Acetaminophen Level <5 (*)     Ethanol 94 (*)     Ethanol percent 0.597 (*)     Salicylate Lvl <1 (*)     All other components within normal limits   COMPREHENSIVE METABOLIC PANEL - Abnormal; Notable for the following components:    Glucose 116 (*)     CREATININE 0.53 (*)     Potassium 3.4 (*)     ALT 63 (*)     AST 72 (*)     All other components within normal limits   URINALYSIS WITH MICROSCOPIC - Abnormal; Notable for the following components:    Ketones, Urine TRACE (*)     All other components within normal limits         RADIOLOGY:  CT HEAD WO CONTRAST    Result Date: 4/11/2022  EXAMINATION: CT OF THE HEAD WITHOUT CONTRAST; CT OF THE CERVICAL SPINE WITHOUT CONTRAST 4/11/2022 12:21 am TECHNIQUE: CT of the head was performed without the administration of intravenous contrast. Dose modulation, iterative reconstruction, and/or weight based adjustment of the mA/kV was utilized to reduce the radiation dose to as low as reasonably achievable.; CT of the cervical spine was performed without the administration of intravenous contrast. Multiplanar reformatted images are provided for review. Dose modulation, iterative reconstruction, and/or weight based adjustment of the mA/kV was utilized to reduce the radiation dose to as low as reasonably achievable. COMPARISON: 06/12/2021. HISTORY: ORDERING SYSTEM PROVIDED HISTORY: fall from [facility] TECHNOLOGIST PROVIDED HISTORY: fall from [facility] if not a suspected or confirmed emergency medical condition->Emergency Medical Condition (MA); ORDERING SYSTEM PROVIDED HISTORY: fall from [facility] TECHNOLOGIST PROVIDED HISTORY: fall from [facility] Decision Support Exception - unselect if not a suspected or confirmed emergency medical condition->Emergency Medical Condition (MA) FINDINGS: CT CERVICAL SPINE: BONES/ALIGNMENT: There is no acute fracture or traumatic malalignment. DEGENERATIVE CHANGES: No significant degenerative changes. SOFT TISSUES: There is no prevertebral soft tissue swelling. CT HEAD: BRAIN/VENTRICLES: There is no acute intracranial hemorrhage, mass effect or midline shift. No abnormal extra-axial fluid collection. The gray-white differentiation is maintained without evidence of an acute infarct. There is no evidence of hydrocephalus. ORBITS: The visualized portion of the orbits demonstrate no acute abnormality. SINUSES: Left maxillary sinus mucous retention cyst.  The other visualized paranasal sinuses and mastoid air cells demonstrate no acute abnormality. SOFT TISSUES/SKULL: No acute abnormality of the visualized skull or soft tissues. No acute abnormality of the cervical spine. No acute intracranial abnormality.      CT CERVICAL SPINE WO CONTRAST    Result Date: 4/11/2022  EXAMINATION: CT OF THE HEAD WITHOUT CONTRAST; CT OF THE CERVICAL SPINE WITHOUT CONTRAST 4/11/2022 12:21 am TECHNIQUE: CT of the head was performed without the administration of intravenous contrast. Dose modulation, iterative reconstruction, and/or weight based adjustment of the mA/kV was utilized to reduce the radiation dose to as low as reasonably achievable.; CT of the cervical spine was performed without the administration of intravenous contrast. Multiplanar reformatted images are provided for review. Dose modulation, iterative reconstruction, and/or weight based adjustment of the mA/kV was utilized to reduce the radiation dose to as low as reasonably achievable. COMPARISON: 06/12/2021. HISTORY: ORDERING SYSTEM PROVIDED HISTORY: fall from [facility] TECHNOLOGIST PROVIDED HISTORY: fall from [facility] if not a suspected or confirmed emergency medical condition->Emergency Medical Condition (MA); ORDERING SYSTEM PROVIDED HISTORY: fall from [facility] TECHNOLOGIST PROVIDED HISTORY: fall from [facility] Decision Support Exception - unselect if not a suspected or confirmed emergency medical condition->Emergency Medical Condition (MA) FINDINGS: CT CERVICAL SPINE: BONES/ALIGNMENT: There is no acute fracture or traumatic malalignment. DEGENERATIVE CHANGES: No significant degenerative changes. SOFT TISSUES: There is no prevertebral soft tissue swelling. CT HEAD: BRAIN/VENTRICLES: There is no acute intracranial hemorrhage, mass effect or midline shift. No abnormal extra-axial fluid collection. The gray-white differentiation is maintained without evidence of an acute infarct. There is no evidence of hydrocephalus. ORBITS: The visualized portion of the orbits demonstrate no acute abnormality. SINUSES: Left maxillary sinus mucous retention cyst.  The other visualized paranasal sinuses and mastoid air cells demonstrate no acute abnormality. SOFT TISSUES/SKULL: No acute abnormality of the visualized skull or soft tissues. No acute abnormality of the cervical spine. No acute intracranial abnormality.          EKG      All EKG's are interpreted by the Emergency Department Physicianwho either signs or Co-signs this chart in the absence of a cardiologist.    EMERGENCY DEPARTMENT COURSE:  ED Course as of 04/11/22 0517   Mon Apr 11, 2022   3273 Patient reports that he is no longer suicidal, this was discussed in the presence of myself and possible members of [employer]. Patient states that instead he would like to go to outpatient detox and program.  Social work has provided patient with multiple resources for outpatient detox. Plan for discharge [AN]   1725 Dr. Cristal Moya went to evaluate patient and patient is now stating that he is suicidal.   has been made aware [AN]   0502 SI, bridge jump to ground, CT's negative, multiple drugs, telepsych this AM [AR]      ED Course User Index  [AN] Nahomi Perez MD  [AR] Huber Hernandez MD          PROCEDURES:  None    CONSULTS:  IP CONSULT TO PSYCHIATRY    CRITICAL CARE:      FINAL IMPRESSION      1.  Suicidal behavior with attempted self-injury [facility])          DISPOSITION / PLAN     DISPOSITION Decision To Transfer 04/11/2022 05:06:33 AM      PATIENT REFERRED TO:  [facility]  [address] [place] [address]  [phone]    If symptoms worsen      DISCHARGE MEDICATIONS:  New Prescriptions    No medications on file       Nahomi Perez MD  Emergency Medicine Resident    (Please note that portions of this note were completed with a voice recognition program.Efforts were made to edit the dictations but occasionally words are mis-transcribed.)        Nahomi Perez MD  Resident  04/11/22 2641

## 2022-04-11 NOTE — ED PROVIDER NOTES
[facility]     Emergency Department     Faculty Attestation    I performed a history and physical examination of the patient and discussed management with the resident. I reviewed the resident´s note and agree with the documented findings and plan of care. Any areas of disagreement are noted on the chart. I was personally present for the key portions of any procedures. I have documented in the chart those procedures where I was not present during the key portions. I have reviewed the emergency nurses triage note. I agree with the chief complaint, past medical history, past surgical history, allergies, medications, social and family history as documented unless otherwise noted below. For Physician Assistant/ Nurse Practitioner cases/documentation I have personally evaluated this patient and have completed at least one if not all key elements of the E/M (history, physical exam, and MDM). Additional findings are as noted. Awake alert and oriented,  clinically intoxicated, skin warm and dry, no ataxia or nystagmus, no muscle rigidity, cranial nerves intact, cerebellar testing normal, good airway, no meningeal signs. Patient states he jumped off a bridge 20 feet and states he was unconscious, no signs of head trauma. Good airway, abdomen soft and nontender.        Rene Cruz MD  04/10/22 7119

## 2022-05-16 ENCOUNTER — HOSPITAL ENCOUNTER (EMERGENCY)
Age: 27
Discharge: ANOTHER HOSPITAL | End: 2022-05-17
Payer: COMMERCIAL

## 2022-05-16 DIAGNOSIS — F39 MOOD DISORDER (HCC): Primary | ICD-10-CM

## 2022-05-16 LAB
ACETAMINOPHEN LEVEL: <5 UG/ML (ref 10–30)
ALBUMIN SERPL-MCNC: 4 G/DL (ref 3.5–4.6)
ALP BLD-CCNC: 75 U/L (ref 35–104)
ALT SERPL-CCNC: 14 U/L (ref 0–41)
AMPHETAMINE SCREEN, URINE: POSITIVE
ANION GAP SERPL CALCULATED.3IONS-SCNC: 14 MEQ/L (ref 9–15)
AST SERPL-CCNC: 24 U/L (ref 0–40)
BARBITURATE SCREEN URINE: ABNORMAL
BASOPHILS ABSOLUTE: 0 K/UL (ref 0–0.2)
BASOPHILS RELATIVE PERCENT: 0.3 %
BENZODIAZEPINE SCREEN, URINE: ABNORMAL
BILIRUB SERPL-MCNC: 0.3 MG/DL (ref 0.2–0.7)
BILIRUBIN URINE: NEGATIVE
BLOOD, URINE: NEGATIVE
BUN BLDV-MCNC: 11 MG/DL (ref 6–20)
CALCIUM SERPL-MCNC: 8.7 MG/DL (ref 8.5–9.9)
CANNABINOID SCREEN URINE: POSITIVE
CHLORIDE BLD-SCNC: 105 MEQ/L (ref 95–107)
CHOLESTEROL, TOTAL: 107 MG/DL (ref 0–199)
CLARITY: CLEAR
CO2: 23 MEQ/L (ref 20–31)
COCAINE METABOLITE SCREEN URINE: ABNORMAL
COLOR: YELLOW
CREAT SERPL-MCNC: 0.66 MG/DL (ref 0.7–1.2)
EOSINOPHILS ABSOLUTE: 0.1 K/UL (ref 0–0.7)
EOSINOPHILS RELATIVE PERCENT: 0.8 %
ETHANOL PERCENT: NORMAL G/DL
ETHANOL: <10 MG/DL (ref 0–0.08)
GFR AFRICAN AMERICAN: >60
GFR NON-AFRICAN AMERICAN: >60
GLOBULIN: 2.5 G/DL (ref 2.3–3.5)
GLUCOSE BLD-MCNC: 110 MG/DL (ref 70–99)
GLUCOSE URINE: NEGATIVE MG/DL
HCT VFR BLD CALC: 38.1 % (ref 42–52)
HDLC SERPL-MCNC: 35 MG/DL (ref 40–59)
HEMOGLOBIN: 13 G/DL (ref 14–18)
KETONES, URINE: NEGATIVE MG/DL
LDL CHOLESTEROL CALCULATED: 57 MG/DL (ref 0–129)
LEUKOCYTE ESTERASE, URINE: NEGATIVE
LYMPHOCYTES ABSOLUTE: 1.6 K/UL (ref 1–4.8)
LYMPHOCYTES RELATIVE PERCENT: 21.1 %
Lab: ABNORMAL
MCH RBC QN AUTO: 30.8 PG (ref 27–31.3)
MCHC RBC AUTO-ENTMCNC: 34.1 % (ref 33–37)
MCV RBC AUTO: 90.4 FL (ref 80–100)
METHADONE SCREEN, URINE: ABNORMAL
MONOCYTES ABSOLUTE: 0.6 K/UL (ref 0.2–0.8)
MONOCYTES RELATIVE PERCENT: 7.7 %
NEUTROPHILS ABSOLUTE: 5.3 K/UL (ref 1.4–6.5)
NEUTROPHILS RELATIVE PERCENT: 70.1 %
NITRITE, URINE: NEGATIVE
OPIATE SCREEN URINE: ABNORMAL
OXYCODONE URINE: ABNORMAL
PDW BLD-RTO: 14.1 % (ref 11.5–14.5)
PH UA: 5.5 (ref 5–9)
PHENCYCLIDINE SCREEN URINE: ABNORMAL
PLATELET # BLD: 200 K/UL (ref 130–400)
POTASSIUM SERPL-SCNC: 3.8 MEQ/L (ref 3.4–4.9)
PROPOXYPHENE SCREEN: ABNORMAL
PROTEIN UA: ABNORMAL MG/DL
RBC # BLD: 4.22 M/UL (ref 4.7–6.1)
SALICYLATE, SERUM: <0.3 MG/DL (ref 15–30)
SARS-COV-2, NAAT: NOT DETECTED
SODIUM BLD-SCNC: 142 MEQ/L (ref 135–144)
SPECIFIC GRAVITY UA: 1.03 (ref 1–1.03)
TOTAL CK: 117 U/L (ref 0–190)
TOTAL PROTEIN: 6.5 G/DL (ref 6.3–8)
TRIGL SERPL-MCNC: 74 MG/DL (ref 0–150)
TSH SERPL DL<=0.05 MIU/L-ACNC: 1.41 UIU/ML (ref 0.44–3.86)
URINE REFLEX TO CULTURE: ABNORMAL
UROBILINOGEN, URINE: 1 E.U./DL
VALPROIC ACID LEVEL: <2.8 UG/ML (ref 50–100)
WBC # BLD: 7.5 K/UL (ref 4.8–10.8)

## 2022-05-16 PROCEDURE — 80164 ASSAY DIPROPYLACETIC ACD TOT: CPT

## 2022-05-16 PROCEDURE — 82077 ASSAY SPEC XCP UR&BREATH IA: CPT

## 2022-05-16 PROCEDURE — 84443 ASSAY THYROID STIM HORMONE: CPT

## 2022-05-16 PROCEDURE — 36415 COLL VENOUS BLD VENIPUNCTURE: CPT

## 2022-05-16 PROCEDURE — 99285 EMERGENCY DEPT VISIT HI MDM: CPT

## 2022-05-16 PROCEDURE — 82550 ASSAY OF CK (CPK): CPT

## 2022-05-16 PROCEDURE — 81003 URINALYSIS AUTO W/O SCOPE: CPT

## 2022-05-16 PROCEDURE — 80307 DRUG TEST PRSMV CHEM ANLYZR: CPT

## 2022-05-16 PROCEDURE — 80061 LIPID PANEL: CPT

## 2022-05-16 PROCEDURE — 80053 COMPREHEN METABOLIC PANEL: CPT

## 2022-05-16 PROCEDURE — 85025 COMPLETE CBC W/AUTO DIFF WBC: CPT

## 2022-05-16 PROCEDURE — 80143 DRUG ASSAY ACETAMINOPHEN: CPT

## 2022-05-16 PROCEDURE — 93005 ELECTROCARDIOGRAM TRACING: CPT | Performed by: EMERGENCY MEDICINE

## 2022-05-16 PROCEDURE — 87635 SARS-COV-2 COVID-19 AMP PRB: CPT

## 2022-05-16 PROCEDURE — 80179 DRUG ASSAY SALICYLATE: CPT

## 2022-05-16 ASSESSMENT — PAIN SCALES - GENERAL: PAINLEVEL_OUTOF10: 5

## 2022-05-16 ASSESSMENT — LIFESTYLE VARIABLES: HOW OFTEN DO YOU HAVE A DRINK CONTAINING ALCOHOL: NEVER

## 2022-05-16 ASSESSMENT — PAIN DESCRIPTION - PAIN TYPE: TYPE: ACUTE PAIN

## 2022-05-16 ASSESSMENT — PAIN DESCRIPTION - DESCRIPTORS: DESCRIPTORS: ACHING

## 2022-05-16 ASSESSMENT — PAIN - FUNCTIONAL ASSESSMENT: PAIN_FUNCTIONAL_ASSESSMENT: 0-10

## 2022-05-16 ASSESSMENT — PAIN DESCRIPTION - LOCATION: LOCATION: FOOT

## 2022-05-16 ASSESSMENT — PAIN DESCRIPTION - ORIENTATION: ORIENTATION: LEFT;RIGHT

## 2022-05-16 NOTE — ED TRIAGE NOTES
Presents to ED stating he is suicidal with a plan to overdose. States he is also wanting detox from Meth and benzo's. States last use was yesterday.

## 2022-05-17 VITALS
BODY MASS INDEX: 34.65 KG/M2 | TEMPERATURE: 97.9 F | SYSTOLIC BLOOD PRESSURE: 155 MMHG | DIASTOLIC BLOOD PRESSURE: 53 MMHG | RESPIRATION RATE: 18 BRPM | WEIGHT: 270 LBS | HEIGHT: 74 IN | OXYGEN SATURATION: 100 % | HEART RATE: 71 BPM

## 2022-05-17 PROCEDURE — 6370000000 HC RX 637 (ALT 250 FOR IP): Performed by: PHYSICIAN ASSISTANT

## 2022-05-17 RX ORDER — LOPERAMIDE HYDROCHLORIDE 2 MG/1
2 CAPSULE ORAL 4 TIMES DAILY PRN
Status: DISCONTINUED | OUTPATIENT
Start: 2022-05-17 | End: 2022-05-17 | Stop reason: HOSPADM

## 2022-05-17 RX ORDER — HYDROXYZINE PAMOATE 50 MG/1
50 CAPSULE ORAL 4 TIMES DAILY PRN
Status: DISCONTINUED | OUTPATIENT
Start: 2022-05-17 | End: 2022-05-17 | Stop reason: HOSPADM

## 2022-05-17 RX ORDER — HALOPERIDOL 10 MG/1
10 TABLET ORAL ONCE
Status: COMPLETED | OUTPATIENT
Start: 2022-05-17 | End: 2022-05-17

## 2022-05-17 RX ORDER — NICOTINE 21 MG/24HR
1 PATCH, TRANSDERMAL 24 HOURS TRANSDERMAL ONCE
Status: DISCONTINUED | OUTPATIENT
Start: 2022-05-17 | End: 2022-05-17 | Stop reason: HOSPADM

## 2022-05-17 RX ADMIN — HALOPERIDOL 10 MG: 10 TABLET ORAL at 11:30

## 2022-05-17 RX ADMIN — LOPERAMIDE HYDROCHLORIDE 2 MG: 2 CAPSULE ORAL at 08:55

## 2022-05-17 RX ADMIN — HYDROXYZINE PAMOATE 50 MG: 50 CAPSULE ORAL at 11:15

## 2022-05-17 RX ADMIN — HYDROXYZINE PAMOATE 50 MG: 50 CAPSULE ORAL at 08:55

## 2022-05-17 ASSESSMENT — ENCOUNTER SYMPTOMS
VOMITING: 0
COUGH: 0
ABDOMINAL DISTENTION: 0
EYE DISCHARGE: 0
NAUSEA: 0
BACK PAIN: 0
VOICE CHANGE: 0
APNEA: 0
ANAL BLEEDING: 0

## 2022-05-17 ASSESSMENT — PAIN - FUNCTIONAL ASSESSMENT: PAIN_FUNCTIONAL_ASSESSMENT: NONE - DENIES PAIN

## 2022-05-17 NOTE — ED NOTES
Spoke to Dr. Oscar Machado and pt is to be set up with [facility] for dual dx placement with Dx: substance induced mood disorder.         Jaxon Hickey RN  05/17/22 8466

## 2022-05-17 NOTE — ED NOTES
Received call from [facility] stating that pt has been accepted to:    [facility]  [address]  Dr. Meenakshi Delacruz: [address] [facility] for p/u~1100     Jerrell Ayaal RN  05/17/22 2896

## 2022-05-17 NOTE — ED NOTES
Patient to [facility]. Patient changed into psych approved clothing  Skin assessment completed.   COVID swab sent to lab  Urine sample sent to lab     Sherri Mccoy RN  05/16/22 2007

## 2022-05-17 NOTE — ED NOTES
Fredi sent to [facility] as requested by Isabell Bustamante RN  05/17/22 1764

## 2022-05-17 NOTE — ED NOTES
Patient threatening to act out if he doesn't get discharged from ED. Denying SI. Patient explained he is already pink slipped, and accepted to a facility. Patient demanding Ativan for his anxiety, states he will start \"flipping out\" if he isn't given anything. Patient advised again acting out, and educated on outcomes of doing so. Educated the reason for not being able to leave is SI with plan, as well as many recent hospitalizations for similar where he was very clear if discharged he would kill himself. Nanda MANRIQUEZ updated on behaviors.      Kathryn Ho, HAKAN  05/17/22 1122

## 2022-05-17 NOTE — ED NOTES
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   -4/21/22: Admitted to [facility] for methamphetamine abuse/SI. C-SSRS Summary:     Patient: C-SSRS Suicide Screening  1) Within the past month, have you wished you were dead or wished you could go to sleep and not wake up? : Yes  2) Have you actually had any thoughts of killing yourself? : Yes  3) Have you been thinking about how you might kill yourself? : Yes  4) Have you had these thoughts and had some intention of acting on them? : Yes  5) Have you started to work out or worked out the details of how to kill yourself? Do you intend to carry out this plan? :  (states he would overdose on pills)  6) Have you ever done anything, started to do anything, or prepared to do anything to end your life?: No  Risk of Suicide: High Risk    Family: Denies. Agency: Denies any outpatient services      C-SSRS Risk Assessment  Suicidal and Self-Injurious Behavior : Actual suicide attempt - Past 3 months  Suicidal Ideation (Most Severe in Past Month): Wish to be dead,Suicidal thoughts,Suicidal thoughts with method (but without specific plan or intent to act)  Treatment History: Hopeless or dissatisfied with treatment,Previous psychiatric diagnosis and treatments  Clinical Status (Recent): Hopelessness,Substance abuse or dependence,Method for suicide available (gun, pills, etc.)    Abuse Assessment  Physical Abuse: Denies  Verbal Abuse: Denies  Emotional abuse: Denies  Financial Abuse: Denies  Sexual abuse: Denies    Clinical Summary:    Pt BIB Self with c/o SI (plan to OD) if he did not get help with substance abuse treatment.  Pt reports that he has been smoking meth for last 8 years, with longest period of sobriety being 9 months. Pt states that he smoked 5 g of meth earlier this am. Pt also admits to marijuana use. BAL on admission (-) . Pt reports that for the last 8 years he has AH, command in nature, that will say his name or encourage him to kill himself. He also reports VH of \"demons hanging self from ceiling \" . Pt denies reports non compliance with outpatient services and states currently has not been on any prescribed medication. Level of Care Disposition:     [facility] for dual Dx placement    Per Dr LEELEE George, RN  05/16/22 4359       Rossana Tomlin, HAKAN  05/17/22 2656

## 2022-05-17 NOTE — ED NOTES
Patient requesting medication for anxiety, Nanda MANRIQUEZ updated.      Ervin Grimm RN  05/17/22 0783

## 2022-05-17 NOTE — ED NOTES
Awaiting transfer to [facility] with Quynh Steele 91 @ 1100 per report from [facility]. Resting with eyes closed & no acute distress noted.      Medina Latif RN  05/17/22 6660

## 2022-05-17 NOTE — ED PROVIDER NOTES
[address] ED  eMERGENCY dEPARTMENT eNCOUnter      Pt Name: Yaritza Lynn  MRN: [id number]  [place] 1995  Date of evaluation: 5/16/2022  Provider: Nancy Kerr PA-C    CHIEF COMPLAINT       Chief Complaint   Patient presents with    Suicidal         HISTORY OF PRESENT ILLNESS   (Location/Symptom, Timing/Onset,Context/Setting, Quality, Duration, Modifying Factors, Severity)  Note limiting factors. Yaritza Lynn is a 27 y.o. male who presents to the emergency department reportedly suicidal.  He does smoke states he does have some foot pain from walking all day denies any additional injuries denies fever chills nausea vomiting. Symptoms mild to moderate severity nothing improves or worsens his symptoms he is not taking his medications as prescribed. HPI    NursingNotes were reviewed. REVIEW OF SYSTEMS    (2-9 systems for level 4, 10 or more for level 5)     Review of Systems   Constitutional: Negative for activity change, appetite change and unexpected weight change. HENT: Negative for ear discharge, nosebleeds and voice change. Eyes: Negative for discharge. Respiratory: Negative for apnea and cough. Cardiovascular: Negative for chest pain. Gastrointestinal: Negative for abdominal distention, anal bleeding, nausea and vomiting. Musculoskeletal: Negative for back pain, joint swelling and neck pain. Skin: Negative for pallor. Neurological: Negative for seizures and facial asymmetry. Psychiatric/Behavioral: Positive for suicidal ideas. Negative for self-injury. All other systems reviewed and are negative. Except as noted above the remainder of the review of systems was reviewed and negative.        PAST MEDICAL HISTORY     Past Medical History:   Diagnosis Date    Anxiety     Bipolar 1 disorder ([facility]     Drug abuse in remission ([facility]     H/O alcohol abuse     Hep C w/o coma, chronic (HCC)          SURGICALHISTORY       Past Surgical History:   Procedure Laterality Date    ABSCESS DRAINAGE Right 10/13/2020    Irrigation and debridement    HAND SURGERY Right          CURRENT MEDICATIONS       Previous Medications    ESCITALOPRAM (LEXAPRO) 5 MG TABLET    Take 3 tablets by mouth daily    PALIPERIDONE (INVEGA) 3 MG EXTENDED RELEASE TABLET    Take 1 tablet by mouth every morning            Patient has no known allergies. FAMILY HISTORY     History reviewed. No pertinent family history. SOCIAL HISTORY       Social History     Socioeconomic History    Marital status: Single     Spouse name: None    Number of children: None    Years of education: None    Highest education level: None   Occupational History    None   Tobacco Use    Smoking status: Current Every Day Smoker     Packs/day: 0.50     Years: 7.00     Pack years: 3.50     Types: Cigarettes    Smokeless tobacco: Former User     Types: Snuff   Vaping Use    Vaping Use: Never used   Substance and Sexual Activity    Alcohol use: Not Currently    Drug use: Yes     Frequency: 7.0 times per week     Types: Other-see comments     Comment: benzo's    Sexual activity: Not Currently   Other Topics Concern    None   Social History Narrative    None     Social Determinants of Health     Financial Resource Strain:     Difficulty of Paying Living Expenses: Not on file   Food Insecurity:     Worried About Running Out of Food in the Last Year: Not on file    Hansel of Food in the Last Year: Not on file   Transportation Needs:     Lack of Transportation (Medical): Not on file    Lack of Transportation (Non-Medical):  Not on file   Physical Activity:     Days of Exercise per Week: Not on file    Minutes of Exercise per Session: Not on file   Stress:     Feeling of Stress : Not on file   Social Connections:     Frequency of Communication with Friends and Family: Not on file    Frequency of Social Gatherings with Friends and Family: Not on file    Attends Yazidism Services: Not on file   [facility] Active Member of Clubs or Organizations: Not on file    Attends Club or Organization Meetings: Not on file    Marital Status: Not on file   Intimate Partner Violence:     Fear of Current or Ex-Partner: Not on file    Emotionally Abused: Not on file    Physically Abused: Not on file    Sexually Abused: Not on file   Housing Stability:     Unable to Pay for Housing in the Last Year: Not on file    Number of [place] in the Last Year: Not on file    Unstable Housing in the Last Year: Not on file       SCREENINGS   Marita Coma Scale  Eye Opening: Spontaneous  Best Verbal Response: Oriented  Best Motor Response: Obeys commands  Marita Coma Scale Score: 15  Ebola Virus Disease (EVD) Screening   Temp: 98 °F (36.7 °C)  CIWA Assessment  BP: 108/62  Pulse: 58    PHYSICAL EXAM    (up to 7 for level 4, 8 or more for level 5)     ED Triage Vitals [05/16/22 1945]   BP Temp Temp Source Pulse Resp SpO2 Height Weight   111/80 98.3 °F (36.8 °C) Oral 90 18 97 % 6' 2\" (1.88 m) 270 lb (122.5 kg)       Physical Exam  Vitals and nursing note reviewed. Constitutional:       General: He is not in acute distress. Appearance: He is well-developed. HENT:      Head: Normocephalic and atraumatic. Right Ear: External ear normal.      Left Ear: External ear normal.      Nose: Nose normal.      Mouth/Throat:      Mouth: Mucous membranes are moist.   Eyes:      General:         Right eye: No discharge. Left eye: No discharge. Pupils: Pupils are equal, round, and reactive to light. Cardiovascular:      Rate and Rhythm: Normal rate and regular rhythm. Heart sounds: Normal heart sounds. Pulmonary:      Effort: Pulmonary effort is normal. No respiratory distress. Breath sounds: Normal breath sounds. No stridor. Abdominal:      General: Bowel sounds are normal. There is no distension. Palpations: Abdomen is soft. Musculoskeletal:         General: Normal range of motion.       Cervical back: Normal range of motion and neck supple. Skin:     General: Skin is warm. Findings: No erythema. Neurological:      Mental Status: He is alert and oriented to person, place, and time. Psychiatric:         Mood and Affect: Mood normal.         RESULTS     EKG: All EKG's are interpreted by the Emergency Department Physician who either signs or Co-signsthis chart in the absence of a cardiologist.    EKG normal sinus rhythm rate 66 negative ST segment elevation.     RADIOLOGY:   Non-plain filmimages such as CT, Ultrasound and MRI are read by the radiologist. Plain radiographic images are visualized and preliminarily interpreted by the emergency physician with the below findings:           Interpretation per the Radiologist below, if available at the time ofthis note:    No orders to display         ED BEDSIDE ULTRASOUND:   Performed by ED Physician - none    LABS:  Labs Reviewed   ACETAMINOPHEN LEVEL - Abnormal; Notable for the following components:       Result Value    Acetaminophen Level <5 (*)     All other components within normal limits   CBC WITH AUTO DIFFERENTIAL - Abnormal; Notable for the following components:    RBC 4.22 (*)     Hemoglobin 13.0 (*)     Hematocrit 38.1 (*)     All other components within normal limits   COMPREHENSIVE METABOLIC PANEL - Abnormal; Notable for the following components:    Glucose 110 (*)     CREATININE 0.66 (*)     All other components within normal limits   LIPID PANEL - Abnormal; Notable for the following components:    HDL 35 (*)     All other components within normal limits   SALICYLATE LEVEL - Abnormal; Notable for the following components:    Salicylate, Serum <1.6 (*)     All other components within normal limits   URINE DRUG SCREEN - Abnormal; Notable for the following components:    Amphetamine Screen, Urine POSITIVE (*)     Cannabinoid Scrn, Ur POSITIVE (*)     All other components within normal limits   URINALYSIS WITH REFLEX TO CULTURE - Abnormal; Notable for the following components:    Protein, UA TRACE (*)     All other components within normal limits   VALPROIC ACID LEVEL, TOTAL - Abnormal; Notable for the following components:    Valproic Acid Lvl <2.8 (*)     All other components within normal limits   COVID-19, RAPID   CK   ETHANOL   TSH       All other labs were within normal range or not returned as of this dictation. EMERGENCY DEPARTMENT COURSE and DIFFERENTIAL DIAGNOSIS/MDM:   Vitals:    Vitals:    05/16/22 1945 05/17/22 0201 05/17/22 0624 05/17/22 1000   BP: 111/80 (!) 102/53 115/62 108/62   Pulse: 90 57 57 58   Resp: 18 18 17 18   Temp: 98.3 °F (36.8 °C)   98 °F (36.7 °C)   TempSrc: Oral   Oral   SpO2: 97% 96% 96% 99%   Weight: 270 lb (122.5 kg)      Height: 6' 2\" (1.88 m)               MDM  Number of Diagnoses or Management Options  Mood disorder ([facility]  Diagnosis management comments: Medically stable       Amount and/or Complexity of Data Reviewed  Clinical lab tests: reviewed and ordered        CRITICAL CARE TIME       CONSULTS:  None    PROCEDURES:  Unless otherwise noted below, none     Procedures    FINAL IMPRESSION      1. Mood disorder ([facility]          DISPOSITION/PLAN   DISPOSITION        PATIENT REFERRED TO:  No follow-up provider specified.     DISCHARGE MEDICATIONS:  New Prescriptions    No medications on file          (Please note that portions of this note were completed with a voice recognition program.  Efforts were made to edit the dictations but occasionally words are mis-transcribed.)    Lashae Delgadillo PA-C (electronically signed)  Attending Emergency Physician       Lashae Delgadillo PA-C  05/17/22 4386

## 2022-05-17 NOTE — ED NOTES
Per [facility], a squad in route to ED to transport Patient for transfer to [address] [place] [address]  05/17/22 9893

## 2022-05-17 NOTE — ED NOTES
Pt resting in bed with eyes closed and RR even and unlabored. Easily arousable and cooperative with obtaining EKG. Awaiting medical clearance.        Dede Vásquez RN  05/16/22 5882 [facility],Mami Butt, HAKAN  05/16/22 0109

## 2022-05-17 NOTE — ED NOTES
Patient had episode of bowel incontinence, request medication for it. Request made with [facility] PA.      Eliel Osborn RN  05/17/22 9770

## 2022-05-17 NOTE — ED NOTES
Breakfast tray given & sitting up in bed eating breakfast. Voices no complaints.      Darlin Olguin RN  05/17/22 1977

## 2022-05-18 LAB
EKG ATRIAL RATE: 66 BPM
EKG P AXIS: 50 DEGREES
EKG P-R INTERVAL: 172 MS
EKG Q-T INTERVAL: 408 MS
EKG QRS DURATION: 96 MS
EKG QTC CALCULATION (BAZETT): 427 MS
EKG R AXIS: 35 DEGREES
EKG T AXIS: 44 DEGREES
EKG VENTRICULAR RATE: 66 BPM

## 2022-05-18 PROCEDURE — 93010 ELECTROCARDIOGRAM REPORT: CPT | Performed by: INTERNAL MEDICINE

## 2022-06-01 NOTE — GROUP NOTE
Group Therapy Note    Date: 6/15/2021    Group Start Time: 1100  Group End Time: 1055  Group Topic: Cognitive Skills    GARY Abrams, CTRS    Pt did not attend 1100 cognitive skills group d/t resting in room despite staff invitation to attend. 1:1 talk time offered as alternative to group session, pt declined.             Signature:  Josr Brewer Operative Note      Patient: Vincenzo Hu  YOB: 1948  MRN: [id number]    Date of Procedure: 6/1/2022    Preprocedure diagnosis:  Bacteremia  Procedures: CHAN             Primary procedure  Written informed consent was obtained, the CHAN was performed under stable fasting condition. The patient was set up for monitoring of surface EKG and pulse oximetry continuously. Blood pressure was monitored and with automatic cuff measurements. The procedure was performed while patient was intubated and sedated. Full CHAN report to follow. No evidence of endocarditis. Severe TR. Moderate PH. Complication: None     EBL : 0 ml.    Patient was monitored    Electronically signed by Lopez Espinoza MD on 6/1/2022 at 11:05 AM

## 2022-06-13 ENCOUNTER — HOSPITAL ENCOUNTER (EMERGENCY)
Age: 27
Discharge: HOME | End: 2022-06-13
Payer: COMMERCIAL

## 2022-06-13 VITALS
WEIGHT: 240 LBS | HEIGHT: 74 IN | TEMPERATURE: 98.7 F | DIASTOLIC BLOOD PRESSURE: 66 MMHG | SYSTOLIC BLOOD PRESSURE: 111 MMHG | HEART RATE: 66 BPM | RESPIRATION RATE: 18 BRPM | BODY MASS INDEX: 30.8 KG/M2 | OXYGEN SATURATION: 98 %

## 2022-06-13 DIAGNOSIS — Z00.8 MEDICAL CLEARANCE FOR PSYCHIATRIC ADMISSION: Primary | ICD-10-CM

## 2022-06-13 LAB
ALBUMIN SERPL-MCNC: 4.7 G/DL (ref 3.5–4.6)
ALP BLD-CCNC: 69 U/L (ref 35–104)
ALT SERPL-CCNC: 30 U/L (ref 0–41)
AMPHETAMINE SCREEN, URINE: ABNORMAL
ANION GAP SERPL CALCULATED.3IONS-SCNC: 11 MEQ/L (ref 9–15)
AST SERPL-CCNC: 30 U/L (ref 0–40)
BARBITURATE SCREEN URINE: POSITIVE
BASOPHILS ABSOLUTE: 0 K/UL (ref 0–0.2)
BASOPHILS RELATIVE PERCENT: 0.2 %
BENZODIAZEPINE SCREEN, URINE: ABNORMAL
BILIRUB SERPL-MCNC: 0.6 MG/DL (ref 0.2–0.7)
BUN BLDV-MCNC: 15 MG/DL (ref 6–20)
CALCIUM SERPL-MCNC: 9.3 MG/DL (ref 8.5–9.9)
CANNABINOID SCREEN URINE: POSITIVE
CHLORIDE BLD-SCNC: 106 MEQ/L (ref 95–107)
CO2: 24 MEQ/L (ref 20–31)
COCAINE METABOLITE SCREEN URINE: POSITIVE
CREAT SERPL-MCNC: 0.56 MG/DL (ref 0.7–1.2)
EOSINOPHILS ABSOLUTE: 0 K/UL (ref 0–0.7)
EOSINOPHILS RELATIVE PERCENT: 0.5 %
GFR AFRICAN AMERICAN: >60
GFR NON-AFRICAN AMERICAN: >60
GLOBULIN: 2.3 G/DL (ref 2.3–3.5)
GLUCOSE BLD-MCNC: 101 MG/DL (ref 70–99)
HCT VFR BLD CALC: 38.5 % (ref 42–52)
HEMOGLOBIN: 12.9 G/DL (ref 14–18)
LYMPHOCYTES ABSOLUTE: 1.8 K/UL (ref 1–4.8)
LYMPHOCYTES RELATIVE PERCENT: 29.7 %
Lab: ABNORMAL
MCH RBC QN AUTO: 30.9 PG (ref 27–31.3)
MCHC RBC AUTO-ENTMCNC: 33.5 % (ref 33–37)
MCV RBC AUTO: 92.2 FL (ref 80–100)
METHADONE SCREEN, URINE: ABNORMAL
MONOCYTES ABSOLUTE: 0.5 K/UL (ref 0.2–0.8)
MONOCYTES RELATIVE PERCENT: 7.6 %
NEUTROPHILS ABSOLUTE: 3.8 K/UL (ref 1.4–6.5)
NEUTROPHILS RELATIVE PERCENT: 62 %
OPIATE SCREEN URINE: ABNORMAL
OXYCODONE URINE: ABNORMAL
PDW BLD-RTO: 14.7 % (ref 11.5–14.5)
PHENCYCLIDINE SCREEN URINE: ABNORMAL
PLATELET # BLD: 200 K/UL (ref 130–400)
POTASSIUM SERPL-SCNC: 4.1 MEQ/L (ref 3.4–4.9)
PROPOXYPHENE SCREEN: ABNORMAL
RBC # BLD: 4.18 M/UL (ref 4.7–6.1)
SODIUM BLD-SCNC: 141 MEQ/L (ref 135–144)
TOTAL PROTEIN: 7 G/DL (ref 6.3–8)
TSH REFLEX: 0.29 UIU/ML (ref 0.44–3.86)
WBC # BLD: 6.2 K/UL (ref 4.8–10.8)

## 2022-06-13 PROCEDURE — 99284 EMERGENCY DEPT VISIT MOD MDM: CPT

## 2022-06-13 PROCEDURE — 36415 COLL VENOUS BLD VENIPUNCTURE: CPT

## 2022-06-13 PROCEDURE — 87389 HIV-1 AG W/HIV-1&-2 AB AG IA: CPT

## 2022-06-13 PROCEDURE — 84443 ASSAY THYROID STIM HORMONE: CPT

## 2022-06-13 PROCEDURE — 80307 DRUG TEST PRSMV CHEM ANLYZR: CPT

## 2022-06-13 PROCEDURE — 80053 COMPREHEN METABOLIC PANEL: CPT

## 2022-06-13 PROCEDURE — 93005 ELECTROCARDIOGRAM TRACING: CPT | Performed by: PERSONAL EMERGENCY RESPONSE ATTENDANT

## 2022-06-13 PROCEDURE — 86592 SYPHILIS TEST NON-TREP QUAL: CPT

## 2022-06-13 PROCEDURE — 85025 COMPLETE CBC W/AUTO DIFF WBC: CPT

## 2022-06-13 ASSESSMENT — ENCOUNTER SYMPTOMS
COUGH: 0
ABDOMINAL PAIN: 0
NAUSEA: 0
SORE THROAT: 0
DIARRHEA: 0
SHORTNESS OF BREATH: 0
BLOOD IN STOOL: 0
RHINORRHEA: 0
COLOR CHANGE: 0
VOMITING: 0

## 2022-06-13 ASSESSMENT — LIFESTYLE VARIABLES
HOW MANY STANDARD DRINKS CONTAINING ALCOHOL DO YOU HAVE ON A TYPICAL DAY: 5 OR 6
HOW OFTEN DO YOU HAVE A DRINK CONTAINING ALCOHOL: 4 OR MORE TIMES A WEEK

## 2022-06-13 NOTE — ED PROVIDER NOTES
[facility] ED  eMERGENCY dEPARTMENT eNCOUnter      Pt Name: Venita Birch  MRN: [id number]  [place] 1995  Date of evaluation: 6/13/2022  Provider: MITRA Fletcher      HISTORY OF PRESENT ILLNESS    Venita Birch is a 27 y.o. male with PMHx of anxiety, bipolar 1, polysubstance drug abuse, hepatitis C, schizoaffective presents to the emergency department with medical clearance for [facility]. Pt is voluntarily admitting himself to [facility] for anxiety, depression and visual hallucinations of trailing lights. He denies any physical complaints. [facility]    Nursing Notes were reviewed. REVIEW OF SYSTEMS       Review of Systems   Constitutional: Negative for appetite change, chills and fever. HENT: Negative for congestion, rhinorrhea and sore throat. Respiratory: Negative for cough and shortness of breath. Cardiovascular: Negative for chest pain. Gastrointestinal: Negative for abdominal pain, blood in stool, diarrhea, nausea and vomiting. Genitourinary: Negative for difficulty urinating. Musculoskeletal: Negative for neck stiffness. Skin: Negative for color change and rash. Neurological: Negative for dizziness, syncope, weakness, light-headedness, numbness and headaches. Psychiatric/Behavioral: Positive for dysphoric mood. The patient is nervous/anxious. All other systems reviewed and are negative.             PAST MEDICAL HISTORY     Past Medical History:   Diagnosis Date    Anxiety     [facility]     Drug abuse in remission [facility]     H/O alcohol abuse     Hep C w/o coma, chronic (HCC)          SURGICAL HISTORY       Past Surgical History:   Procedure Laterality Date    ABSCESS DRAINAGE Right 10/13/2020    Irrigation and debridement    HAND SURGERY Right          CURRENT MEDICATIONS       Previous Medications    ESCITALOPRAM (LEXAPRO) 5 MG TABLET    Take 3 tablets by mouth daily    PALIPERIDONE (INVEGA) 3 MG EXTENDED RELEASE TABLET    Take 1 tablet by mouth every morning       ALLERGIES     Patient has no known allergies. FAMILY HISTORY     History reviewed. No pertinent family history. SOCIAL HISTORY       Social History     Socioeconomic History    Marital status: Single     Spouse name: None    Number of children: None    Years of education: None    Highest education level: None   Occupational History    None   Tobacco Use    Smoking status: Current Every Day Smoker     Packs/day: 0.50     Years: 7.00     Pack years: 3.50     Types: Cigarettes    Smokeless tobacco: Former User     Types: Snuff   Vaping Use    Vaping Use: Never used   Substance and Sexual Activity    Alcohol use: Not Currently    Drug use: Yes     Frequency: 7.0 times per week     Types: Other-see comments     Comment: benzo's    Sexual activity: Not Currently   Other Topics Concern    None   Social History Narrative    None     Social Determinants of Health     Financial Resource Strain:     Difficulty of Paying Living Expenses: Not on file   Food Insecurity:     Worried About Running Out of Food in the Last Year: Not on file    Hansel of Food in the Last Year: Not on file   Transportation Needs:     Lack of Transportation (Medical): Not on file    Lack of Transportation (Non-Medical):  Not on file   Physical Activity:     Days of Exercise per Week: Not on file    Minutes of Exercise per Session: Not on file   Stress:     Feeling of Stress : Not on file   Social Connections:     Frequency of Communication with Friends and Family: Not on file    Frequency of Social Gatherings with Friends and Family: Not on file    Attends Jehovah's witness Services: Not on file    Active Member of Clubs or Organizations: Not on file    Attends Club or Organization Meetings: Not on file    Marital Status: Not on file   Intimate Partner Violence:     Fear of Current or Ex-Partner: Not on file    Emotionally Abused: Not on file    Physically Abused: Not on file    Sexually Abused: Not on file   Housing Stability:     Unable to Pay for Housing in the Last Year: Not on file    Number of Places Lived in the Last Year: Not on file    Unstable Housing in the Last Year: Not on file         PHYSICAL EXAM         ED Triage Vitals [06/13/22 1931]   BP Temp Temp Source Heart Rate Resp SpO2 Height Weight   111/66 98.7 °F (37.1 °C) Oral 66 18 98 % 6' 2\" (1.88 m) 240 lb (108.9 kg)       Physical Exam  Constitutional:       Appearance: He is well-developed. HENT:      Head: Normocephalic and atraumatic. Eyes:      Conjunctiva/sclera: Conjunctivae normal.      Pupils: Pupils are equal, round, and reactive to light. Neck:      Trachea: No tracheal deviation. Cardiovascular:      Heart sounds: Normal heart sounds. Pulmonary:      Effort: Pulmonary effort is normal. No respiratory distress. Breath sounds: Normal breath sounds. No stridor. Abdominal:      General: Bowel sounds are normal. There is no distension. Palpations: Abdomen is soft. There is no mass. Tenderness: There is no abdominal tenderness. There is no guarding or rebound. Musculoskeletal:         General: Normal range of motion. Cervical back: Normal range of motion and neck supple. Skin:     General: Skin is warm and dry. Capillary Refill: Capillary refill takes less than 2 seconds. Findings: No rash. Neurological:      Mental Status: He is alert and oriented to person, place, and time. Deep Tendon Reflexes: Reflexes are normal and symmetric. Psychiatric:         Behavior: Behavior normal.         Thought Content:  Thought content normal.         Judgment: Judgment normal.         DIAGNOSTIC RESULTS     EKG:All EKG's are interpreted by the Emergency Department Physician who either signs or Co-signs this chart in the absence of a cardiologist.    Normal sinus rhythm with sinus arrhythmia, rate 65, no ST segment changes    RADIOLOGY:   Non-plain film images such as CT, Ultrasound and MRI are read by theradiologist. Plain radiographic images are visualized and preliminarily interpreted by the emergency physician with the below findings:    Interpretation per theRadiologist below, if available at the time of this note:    No orders to display           LABS:  [address] - Abnormal; Notable for the following components:       Result Value    Glucose 101 (*)     CREATININE 0.56 (*)     Albumin 4.7 (*)     All other components within normal limits   CBC WITH AUTO DIFFERENTIAL - Abnormal; Notable for the following components:    RBC 4.18 (*)     Hemoglobin 12.9 (*)     Hematocrit 38.5 (*)     RDW 14.7 (*)     All other components within normal limits   TSH WITH REFLEX - Abnormal; Notable for the following components:    TSH 0.286 (*)     All other components within normal limits   URINE DRUG SCREEN - Abnormal; Notable for the following components:    Barbiturate Screen, Ur POSITIVE (*)     Cannabinoid Scrn, Ur POSITIVE (*)     Cocaine Metabolite Screen, Urine POSITIVE (*)     All other components within normal limits   RPR   HIV SCREEN       All other labs were within normal range or not returned as of this dictation. EMERGENCY DEPARTMENT COURSE and DIFFERENTIAL DIAGNOSIS/MDM:   Vitals:    Vitals:    06/13/22 1931   BP: 111/66   Pulse: 66   Resp: 18   Temp: 98.7 °F (37.1 °C)   TempSrc: Oral   SpO2: 98%   Weight: 240 lb (108.9 kg)   Height: 6' 2\" (1.88 m)         MDM    positive barbiturates, THC, cocaine. TSH 0.286. Sent with EKG and lab results. Standard anticipatory guidance given to patient upon discharge. Have given them a specific time frame in which to follow-up and who to follow-up with. I have also advised them that they should return to the emergency department if they get worse, or not getting better or develop any new or concerning symptoms. Patient demonstrates understanding.         CRITICAL CARE TIME   Total Critical Caretime was 0 minutes, excluding separately reportable procedures. There was a high probability of clinically significant/life threatening deterioration in the patient's condition which required my urgent intervention. Procedures    FINAL IMPRESSION      1. Medical clearance for psychiatric admission          DISPOSITION/PLAN   DISPOSITION        PATIENT REFERRED TO:  [address] [id number]  [phone]            DISCHARGE MEDICATIONS:  New Prescriptions    No medications on file          (Please notethat portions of this note were completed with a voice recognition program.  Efforts were made to edit the dictations but occasionally words are mis-transcribed. )    MITRA Collins (electronically signed)  Emergency Physician Assistant         MITRA Quinones  06/13/22 [address], [address]  06/13/22 2004

## 2022-06-13 NOTE — ED TRIAGE NOTES
Patient to ED with need for medical clearance for ruben. Patient states he wants to get help for his drinking.    Patient denies nausea or vomiting  Patient has an order from [facility] for labs and x rays  Patient afebrile  No visible tremors  Patient states he last drank a few days ago and smoked crack today

## 2022-06-14 LAB
HIV AG/AB: NONREACTIVE
RPR: NORMAL

## 2022-06-16 LAB
EKG ATRIAL RATE: 65 BPM
EKG P AXIS: 42 DEGREES
EKG P-R INTERVAL: 160 MS
EKG Q-T INTERVAL: 388 MS
EKG QRS DURATION: 92 MS
EKG QTC CALCULATION (BAZETT): 403 MS
EKG R AXIS: 2 DEGREES
EKG T AXIS: 12 DEGREES
EKG VENTRICULAR RATE: 65 BPM

## 2022-07-10 ENCOUNTER — HOSPITAL ENCOUNTER (OUTPATIENT)
Age: 27
Setting detail: OBSERVATION
Discharge: HOME | End: 2022-07-12
Attending: PSYCHIATRY & NEUROLOGY | Admitting: PSYCHIATRY & NEUROLOGY
Payer: COMMERCIAL

## 2022-07-10 ENCOUNTER — HOSPITAL ENCOUNTER (EMERGENCY)
Age: 27
Discharge: NURSING, REHAB OR LONG-TERM CARE | End: 2022-07-10
Attending: EMERGENCY MEDICINE
Payer: COMMERCIAL

## 2022-07-10 VITALS
OXYGEN SATURATION: 98 % | HEIGHT: 74 IN | RESPIRATION RATE: 14 BRPM | TEMPERATURE: 98.1 F | DIASTOLIC BLOOD PRESSURE: 85 MMHG | SYSTOLIC BLOOD PRESSURE: 120 MMHG | HEART RATE: 72 BPM | WEIGHT: 240 LBS | BODY MASS INDEX: 30.8 KG/M2

## 2022-07-10 DIAGNOSIS — R45.851 SUICIDAL IDEATION: Primary | ICD-10-CM

## 2022-07-10 LAB
ABSOLUTE EOS #: 0.11 K/UL (ref 0–0.44)
ABSOLUTE IMMATURE GRANULOCYTE: <0.03 K/UL (ref 0–0.3)
ABSOLUTE LYMPH #: 3.22 K/UL (ref 1.1–3.7)
ABSOLUTE MONO #: 0.95 K/UL (ref 0.1–1.2)
ACETAMINOPHEN LEVEL: <5 UG/ML (ref 10–30)
ALBUMIN SERPL-MCNC: 4.5 G/DL (ref 3.5–5.2)
ALBUMIN/GLOBULIN RATIO: 2 (ref 1–2.5)
ALP BLD-CCNC: 69 U/L (ref 40–129)
ALT SERPL-CCNC: 35 U/L (ref 5–41)
AMPHETAMINE SCREEN URINE: POSITIVE
ANION GAP SERPL CALCULATED.3IONS-SCNC: 11 MMOL/L (ref 9–17)
AST SERPL-CCNC: 49 U/L
BARBITURATE SCREEN URINE: NEGATIVE
BASOPHILS # BLD: 0 % (ref 0–2)
BASOPHILS ABSOLUTE: <0.03 K/UL (ref 0–0.2)
BENZODIAZEPINE SCREEN, URINE: NEGATIVE
BILIRUB SERPL-MCNC: 0.71 MG/DL (ref 0.3–1.2)
BUN BLDV-MCNC: 12 MG/DL (ref 6–20)
CALCIUM SERPL-MCNC: 9 MG/DL (ref 8.6–10.4)
CANNABINOID SCREEN URINE: POSITIVE
CHLORIDE BLD-SCNC: 103 MMOL/L (ref 98–107)
CO2: 26 MMOL/L (ref 20–31)
COCAINE METABOLITE, URINE: NEGATIVE
CREAT SERPL-MCNC: 0.54 MG/DL (ref 0.7–1.2)
EOSINOPHILS RELATIVE PERCENT: 1 % (ref 1–4)
ETHANOL PERCENT: <0.01 %
ETHANOL: <10 MG/DL
GFR AFRICAN AMERICAN: >60 ML/MIN
GFR NON-AFRICAN AMERICAN: >60 ML/MIN
GFR SERPL CREATININE-BSD FRML MDRD: ABNORMAL ML/MIN/{1.73_M2}
GLUCOSE BLD-MCNC: 96 MG/DL (ref 70–99)
HCT VFR BLD CALC: 35.8 % (ref 40.7–50.3)
HEMOGLOBIN: 12.2 G/DL (ref 13–17)
IMMATURE GRANULOCYTES: 0 %
LYMPHOCYTES # BLD: 39 % (ref 24–43)
MAGNESIUM: 1.8 MG/DL (ref 1.6–2.6)
MCH RBC QN AUTO: 31 PG (ref 25.2–33.5)
MCHC RBC AUTO-ENTMCNC: 34.1 G/DL (ref 28.4–34.8)
MCV RBC AUTO: 91.1 FL (ref 82.6–102.9)
METHADONE SCREEN, URINE: NEGATIVE
MONOCYTES # BLD: 12 % (ref 3–12)
NRBC AUTOMATED: 0 PER 100 WBC
OPIATES, URINE: NEGATIVE
OXYCODONE SCREEN URINE: NEGATIVE
PDW BLD-RTO: 13.5 % (ref 11.8–14.4)
PHENCYCLIDINE, URINE: NEGATIVE
PLATELET # BLD: 178 K/UL (ref 138–453)
PMV BLD AUTO: 10.1 FL (ref 8.1–13.5)
POTASSIUM SERPL-SCNC: 3.2 MMOL/L (ref 3.7–5.3)
RBC # BLD: 3.93 M/UL (ref 4.21–5.77)
SALICYLATE LEVEL: <1 MG/DL (ref 3–10)
SARS-COV-2, RAPID: NOT DETECTED
SEG NEUTROPHILS: 48 % (ref 36–65)
SEGMENTED NEUTROPHILS ABSOLUTE COUNT: 3.87 K/UL (ref 1.5–8.1)
SODIUM BLD-SCNC: 140 MMOL/L (ref 135–144)
SPECIMEN DESCRIPTION: NORMAL
TEST INFORMATION: ABNORMAL
TOTAL PROTEIN: 6.7 G/DL (ref 6.4–8.3)
TOXIC TRICYCLIC SC,BLOOD: NEGATIVE
WBC # BLD: 8.2 K/UL (ref 3.5–11.3)

## 2022-07-10 PROCEDURE — 93005 ELECTROCARDIOGRAM TRACING: CPT | Performed by: STUDENT IN AN ORGANIZED HEALTH CARE EDUCATION/TRAINING PROGRAM

## 2022-07-10 PROCEDURE — 80179 DRUG ASSAY SALICYLATE: CPT

## 2022-07-10 PROCEDURE — 80053 COMPREHEN METABOLIC PANEL: CPT

## 2022-07-10 PROCEDURE — 6370000000 HC RX 637 (ALT 250 FOR IP): Performed by: STUDENT IN AN ORGANIZED HEALTH CARE EDUCATION/TRAINING PROGRAM

## 2022-07-10 PROCEDURE — 85025 COMPLETE CBC W/AUTO DIFF WBC: CPT

## 2022-07-10 PROCEDURE — 99285 EMERGENCY DEPT VISIT HI MDM: CPT

## 2022-07-10 PROCEDURE — 83735 ASSAY OF MAGNESIUM: CPT

## 2022-07-10 PROCEDURE — 1240000000 HC EMOTIONAL WELLNESS R&B

## 2022-07-10 PROCEDURE — G0480 DRUG TEST DEF 1-7 CLASSES: HCPCS

## 2022-07-10 PROCEDURE — 87635 SARS-COV-2 COVID-19 AMP PRB: CPT

## 2022-07-10 PROCEDURE — 6370000000 HC RX 637 (ALT 250 FOR IP): Performed by: PSYCHIATRY & NEUROLOGY

## 2022-07-10 PROCEDURE — 80143 DRUG ASSAY ACETAMINOPHEN: CPT

## 2022-07-10 PROCEDURE — 80307 DRUG TEST PRSMV CHEM ANLYZR: CPT

## 2022-07-10 RX ORDER — ACETAMINOPHEN 325 MG/1
650 TABLET ORAL EVERY 6 HOURS PRN
Status: DISCONTINUED | OUTPATIENT
Start: 2022-07-10 | End: 2022-07-12 | Stop reason: HOSPADM

## 2022-07-10 RX ORDER — MAGNESIUM HYDROXIDE/ALUMINUM HYDROXICE/SIMETHICONE 120; 1200; 1200 MG/30ML; MG/30ML; MG/30ML
30 SUSPENSION ORAL EVERY 6 HOURS PRN
Status: DISCONTINUED | OUTPATIENT
Start: 2022-07-10 | End: 2022-07-12 | Stop reason: HOSPADM

## 2022-07-10 RX ORDER — HYDROXYZINE 50 MG/1
50 TABLET, FILM COATED ORAL 3 TIMES DAILY PRN
Status: DISCONTINUED | OUTPATIENT
Start: 2022-07-10 | End: 2022-07-12 | Stop reason: HOSPADM

## 2022-07-10 RX ORDER — POLYETHYLENE GLYCOL 3350 17 G/17G
17 POWDER, FOR SOLUTION ORAL DAILY PRN
Status: DISCONTINUED | OUTPATIENT
Start: 2022-07-10 | End: 2022-07-12 | Stop reason: HOSPADM

## 2022-07-10 RX ORDER — IBUPROFEN 400 MG/1
400 TABLET ORAL EVERY 6 HOURS PRN
Status: DISCONTINUED | OUTPATIENT
Start: 2022-07-10 | End: 2022-07-12 | Stop reason: HOSPADM

## 2022-07-10 RX ORDER — TRAZODONE HYDROCHLORIDE 50 MG/1
50 TABLET ORAL NIGHTLY PRN
Status: DISCONTINUED | OUTPATIENT
Start: 2022-07-11 | End: 2022-07-10

## 2022-07-10 RX ORDER — TRAZODONE HYDROCHLORIDE 50 MG/1
50 TABLET ORAL NIGHTLY PRN
Status: DISCONTINUED | OUTPATIENT
Start: 2022-07-10 | End: 2022-07-12 | Stop reason: HOSPADM

## 2022-07-10 RX ADMIN — POTASSIUM BICARBONATE 40 MEQ: 782 TABLET, EFFERVESCENT ORAL at 16:19

## 2022-07-10 RX ADMIN — HYDROXYZINE HYDROCHLORIDE 50 MG: 50 TABLET, FILM COATED ORAL at 22:18

## 2022-07-10 RX ADMIN — TRAZODONE HYDROCHLORIDE 50 MG: 50 TABLET ORAL at 22:18

## 2022-07-10 ASSESSMENT — SLEEP AND FATIGUE QUESTIONNAIRES
SLEEP PATTERN: DISTURBED/INTERRUPTED SLEEP
DO YOU USE A SLEEP AID: NO
DO YOU HAVE DIFFICULTY SLEEPING: YES
AVERAGE NUMBER OF SLEEP HOURS: 4

## 2022-07-10 ASSESSMENT — PAIN DESCRIPTION - LOCATION: LOCATION: LEG

## 2022-07-10 ASSESSMENT — PATIENT HEALTH QUESTIONNAIRE - PHQ9: SUM OF ALL RESPONSES TO PHQ QUESTIONS 1-9: 8

## 2022-07-10 ASSESSMENT — ENCOUNTER SYMPTOMS
ABDOMINAL PAIN: 0
BACK PAIN: 0
VOMITING: 0
RHINORRHEA: 0
DIARRHEA: 0
NAUSEA: 0
SHORTNESS OF BREATH: 0

## 2022-07-10 ASSESSMENT — LIFESTYLE VARIABLES
HOW OFTEN DO YOU HAVE A DRINK CONTAINING ALCOHOL: 2-3 TIMES A WEEK
HOW MANY STANDARD DRINKS CONTAINING ALCOHOL DO YOU HAVE ON A TYPICAL DAY: 3 OR 4

## 2022-07-10 ASSESSMENT — PAIN SCALES - GENERAL: PAINLEVEL_OUTOF10: 3

## 2022-07-10 NOTE — ED PROVIDER NOTES
[facility] ED  Emergency Department Encounter  EmergencyMedicine Resident     Pt Opal Man  MRN: [id number]  [place] 1995  Date of evaluation: 7/10/22  PCP:  None Provider      CHIEF COMPLAINT       Chief Complaint   Patient presents with    Addiction Problem     using fentanyl to kill himself     Suicidal     wants to kill himself by shooting himself with a gun       HISTORY OF PRESENT ILLNESS  (Location/Symptom, Timing/Onset, Context/Setting, Quality, Duration, Modifying Factors, Severity.)      Pma Perez is a 27 y.o. male who presents with suicidal ideations as well as plan. Past medical history significant for anxiety, bipolar 1 disorder, drug and alcohol abuse. Patient states that he started himself 2 times now with fentanyl. Has been unsuccessful. States that he has a plan to get his friend's gun and shoot himself in the head. States he has not been on his medications. Denies any auditory, visual hallucinations. Denies any homicidal ideations. Otherwise denies any chest pain, shortness of breath, nausea, vomiting, diarrhea, blurry vision, double vision, headache, one-sided wheeze, slurred speech, difficulty swallowing. PAST MEDICAL / SURGICAL / SOCIAL / FAMILY HISTORY      has a past medical history of Anxiety, Bipolar 1 disorder ([facility] [address] Drug abuse in remission ([facility] [address] [facility] H/O alcohol abuse, and Hep C w/o coma, chronic ([facility] [address] [facility] has a past surgical history that includes Hand surgery (Right) and Abscess Drainage (Right, 10/13/2020).       Social History     Socioeconomic History    Marital status: Single     Spouse name: Not on file    Number of children: Not on file    Years of education: Not on file    Highest education level: Not on file   Occupational History    Not on file   Tobacco Use    Smoking status: Current Every Day Smoker     Packs/day: 0.50     Years: 7.00     Pack years: 3.50     Types: Cigarettes    Smokeless tobacco: Former User     Types: Snuff   Vaping Use    Vaping Use: Never used   Substance and Sexual Activity    Alcohol use: Yes    Drug use: Yes     Frequency: 7.0 times per week     Types: Other-see comments     Comment: benzo's, fentanyl    Sexual activity: Not Currently   Other Topics Concern    Not on file   Social History Narrative    Not on file     Social Determinants of Health     Financial Resource Strain:     Difficulty of Paying Living Expenses: Not on file   Food Insecurity:     Worried About Running Out of Food in the Last Year: Not on file    Hansel of Food in the Last Year: Not on file   Transportation Needs:     Lack of Transportation (Medical): Not on file    Lack of Transportation (Non-Medical): Not on file   Physical Activity:     Days of Exercise per Week: Not on file    Minutes of Exercise per Session: Not on file   Stress:     Feeling of Stress : Not on file   Social Connections:     Frequency of Communication with Friends and Family: Not on file    Frequency of Social Gatherings with Friends and Family: Not on file    Attends Shinto Services: Not on file    Active Member of [address] [place] [address] or Organizations: Not on file    Attends Club or Organization Meetings: Not on file    Marital Status: Not on file   Intimate Partner Violence:     Fear of Current or Ex-Partner: Not on file    Emotionally Abused: Not on file    Physically Abused: Not on file    Sexually Abused: Not on file   Housing Stability:     Unable to Pay for Housing in the Last Year: Not on file    Number of [place] in the Last Year: Not on file    Unstable Housing in the Last Year: Not on file       No family history on file. Allergies:  Patient has no known allergies. Home Medications:  Prior to Admission medications    Medication Sig Start Date End Date Taking?  Authorizing Provider   escitalopram (LEXAPRO) 5 MG tablet Take 3 tablets by mouth daily  Patient not taking: Reported on 5/16/2022 4/6/22   Jennifer Winters MD paliperidone (INVEGA) 3 MG extended release tablet Take 1 tablet by mouth every morning  Patient not taking: Reported on 5/16/2022 4/6/22   Deb Coy MD       REVIEW OF SYSTEMS    (2-9 systems for level 4, 10 or more for level 5)      Review of Systems   Constitutional: Negative for chills and fever. HENT: Negative for congestion and rhinorrhea. Respiratory: Negative for shortness of breath. Cardiovascular: Negative for chest pain. Gastrointestinal: Negative for abdominal pain, diarrhea, nausea and vomiting. Musculoskeletal: Negative for back pain and neck pain. Skin: Negative for rash and wound. Neurological: Negative for weakness and headaches. Psychiatric/Behavioral: Positive for suicidal ideas. Negative for confusion and hallucinations. The patient is nervous/anxious. PHYSICAL EXAM   (up to 7 for level 4, 8 or more for level 5)      INITIAL VITALS:   /85   Pulse 72   Temp 98.1 °F (36.7 °C) (Oral)   Resp 14   Ht 6' 2\" (1.88 m)   Wt 240 lb (108.9 kg)   SpO2 98%   BMI 30.81 kg/m²     Physical Exam  Constitutional:       General: He is not in acute distress. Appearance: He is not ill-appearing, toxic-appearing or diaphoretic. HENT:      Head: Normocephalic and atraumatic. Eyes:      Extraocular Movements: Extraocular movements intact. Pupils: Pupils are equal, round, and reactive to light. Cardiovascular:      Rate and Rhythm: Normal rate and regular rhythm. Pulmonary:      Effort: Pulmonary effort is normal. No respiratory distress. Chest:      Chest wall: No tenderness. Abdominal:      General: There is no distension. Palpations: There is no mass. Tenderness: There is no abdominal tenderness. There is no guarding or rebound. Hernia: No hernia is present. Musculoskeletal:         General: No swelling or tenderness. Skin:     Capillary Refill: Capillary refill takes less than 2 seconds.       Coloration: Skin is not jaundiced or pale. Findings: No bruising, erythema, lesion or rash. Neurological:      Mental Status: He is alert and oriented to person, place, and time. Psychiatric:         Attention and Perception: He is attentive. He does not perceive auditory or visual hallucinations. Mood and Affect: Mood is anxious. Mood is not depressed. Thought Content: Thought content is not paranoid or delusional. Thought content includes suicidal ideation. Thought content does not include homicidal ideation. Thought content includes suicidal plan. Thought content does not include homicidal plan. DIFFERENTIAL  DIAGNOSIS     PLAN (LABS / IMAGING / EKG):  Orders Placed This Encounter   Procedures    COVID-19, Rapid    CBC with Auto Differential    CMP    Magnesium    Urine Drug Screen    TOX SCR, BLD, ED    ADULT DIET; Regular    CONSTANT OBSERVATION    Inpatient consult to Social Work    EKG 12 Lead    Suicide precautions       MEDICATIONS ORDERED:  Orders Placed This Encounter   Medications    potassium bicarb-citric acid (EFFER-K) effervescent tablet 40 mEq       DDX: Suicidal ideations, drug intoxication, electrolyte abnormalities    DIAGNOSTIC RESULTS / EMERGENCY DEPARTMENT COURSE / MDM   LAB RESULTS:  Results for orders placed or performed during the hospital encounter of 07/10/22   COVID-19, Rapid    Specimen: Nasopharyngeal Swab   Result Value Ref Range    Specimen Description . NASOPHARYNGEAL SWAB     SARS-CoV-2, Rapid Not Detected Not Detected   CBC with Auto Differential   Result Value Ref Range    WBC 8.2 3.5 - 11.3 k/uL    RBC 3.93 (L) 4.21 - 5.77 m/uL    Hemoglobin 12.2 (L) 13.0 - 17.0 g/dL    Hematocrit 35.8 (L) 40.7 - 50.3 %    MCV 91.1 82.6 - 102.9 fL    MCH 31.0 25.2 - 33.5 pg    MCHC 34.1 28.4 - 34.8 g/dL    RDW 13.5 11.8 - 14.4 %    Platelets 881 893 - 593 k/uL    MPV 10.1 8.1 - 13.5 fL    NRBC Automated 0.0 0.0 per 100 WBC    Seg Neutrophils 48 36 - 65 %    Lymphocytes 39 24 - 43 %    Monocytes 12 3 - 12 %    Eosinophils % 1 1 - 4 %    Basophils 0 0 - 2 %    Immature Granulocytes 0 0 %    Segs Absolute 3.87 1.50 - 8.10 k/uL    Absolute Lymph # 3.22 1.10 - 3.70 k/uL    Absolute Mono # 0.95 0.10 - 1.20 k/uL    Absolute Eos # 0.11 0.00 - 0.44 k/uL    Basophils Absolute <0.03 0.00 - 0.20 k/uL    Absolute Immature Granulocyte <0.03 0.00 - 0.30 k/uL   CMP   Result Value Ref Range    Glucose 96 70 - 99 mg/dL    BUN 12 6 - 20 mg/dL    CREATININE 0.54 (L) 0.70 - 1.20 mg/dL    Calcium 9.0 8.6 - 10.4 mg/dL    Sodium 140 135 - 144 mmol/L    Potassium 3.2 (L) 3.7 - 5.3 mmol/L    Chloride 103 98 - 107 mmol/L    CO2 26 20 - 31 mmol/L    Anion Gap 11 9 - 17 mmol/L    Alkaline Phosphatase 69 40 - 129 U/L    ALT 35 5 - 41 U/L    AST 49 (H) <40 U/L    Total Bilirubin 0.71 0.3 - 1.2 mg/dL    Total Protein 6.7 6.4 - 8.3 g/dL    Albumin 4.5 3.5 - 5.2 g/dL    Albumin/Globulin Ratio 2.0 1.0 - 2.5    GFR Non-African American >60 >60 mL/min    GFR African American >60 >60 mL/min    GFR Comment         Magnesium   Result Value Ref Range    Magnesium 1.8 1.6 - 2.6 mg/dL   Urine Drug Screen   Result Value Ref Range    Amphetamine Screen, Ur POSITIVE (A) NEGATIVE    Barbiturate Screen, Ur NEGATIVE NEGATIVE    Benzodiazepine Screen, Urine NEGATIVE NEGATIVE    Cocaine Metabolite, Urine NEGATIVE NEGATIVE    Methadone Screen, Urine NEGATIVE NEGATIVE    Opiates, Urine NEGATIVE NEGATIVE    Phencyclidine, Urine NEGATIVE NEGATIVE    Cannabinoid Scrn, Ur POSITIVE (A) NEGATIVE    Oxycodone Screen, Ur NEGATIVE NEGATIVE    Test Information       Assay provides medical screening only. The absence of expected drug(s) and/or metabolite(s) may indicate diluted or adulterated urine, limitations of testing or timing of collection.    TOX SCR, BLD, ED   Result Value Ref Range    Acetaminophen Level <5 (L) 10 - 30 ug/mL    Ethanol <10 <10 mg/dL    Ethanol percent <9.735 <0.137 %    Salicylate Lvl <1 (L) 3 - 10 mg/dL    Toxic Tricyclic Sc,Blood NEGATIVE NEGATIVE       IMPRESSION: Drug screen positive for cannabis as well as amphetamines. Slightly hypokalemic. Replace emergency department. EKG  Normal sinus rhythm, rate 69, normal axis, intervals within normal limits, no acute ST elevation or depressions noted, no reciprocal changes, normal sinus rhythm with sinus arrhythmia. All EKG's are interpreted by the Emergency Department Physician who either signs or Co-signs this chart in the absence of a cardiologist.    EMERGENCY DEPARTMENT COURSE:  64-year-old male with suicidal ideations with plan. Has attempted suicide multiple times according to him. History of bipolar 1 disorder. Drug screen is positive for cannabis as well amphetamines. No concern for overdose at this time. Patient tolerating p.o. intake in emergency department. Vital signs stable. Labs mostly unremarkable. Replace potassium in emergency department. Patient is medically stable for transfer to [facility]. Patient excepted at [facility] awaiting transport. Care signed out to Dr. Jonny Saldana     ED Course as of 07/10/22 1825   Sun Jul 10, 2022   1311 Patient reassessed. States he still is unable to pee. Is drinking water will try to give us urine sample. Otherwise patient is medically stable for discharge [MS]   1518 Awaiting urine drug screen. Otherwise patient medically stable for [facility] [MS]   1525 Urine drug screen is positive for amphetamines as well as cannabis. Otherwise patient is medically stable for [facility] [MS]      ED Course User Index  [MS] Stefania ReynoldsDO       No notes of [facility] Admission Criteria type on file. PROCEDURES:  n/a    CONSULTS:  IP CONSULT TO SOCIAL WORK    CRITICAL CARE:  n/a    ED Course as of 07/10/22 1825   Sun Jul 10, 2022   1311 Patient reassessed. States he still is unable to pee. Is drinking water will try to give us urine sample. Otherwise patient is medically stable for discharge [MS]   1518 Awaiting urine drug screen.   Otherwise patient medically stable for [facility] [MS]   1525 Urine drug screen is positive for amphetamines as well as cannabis. Otherwise patient is medically stable for [facility] [MS]      ED Course User Index  [MS] Larna Kehr, DO       FINAL IMPRESSION      1. Suicidal ideation          DISPOSITION / PLAN     DISPOSITION Decision To Transfer 07/10/2022 03:31:29 PM      PATIENT REFERRED TO:  No follow-up provider specified.     DISCHARGE MEDICATIONS:  New Prescriptions    No medications on file       Luis Manuel Luna DO  Emergency Medicine Resident    (Please note that portions of thisnote were completed with a voice recognition program.  Efforts were made to edit the dictations but occasionally words are mis-transcribed.)        Larna Kehr, DO  Resident  07/10/22 1915

## 2022-07-10 NOTE — ED NOTES
Pt resting comfortably on cot at this time, no distress noted. Pt accepted at [facility], awaiting bed placement. Pt denies any current needs, will continue with plan of care.       Paulette Gaytan RN  07/10/22 8759

## 2022-07-10 NOTE — ED NOTES
Dr. Pj Hines @ [facility] accepted patient. Dx: Depression with SI. Voluntary admission form faxed. [facility] called requesting patient receive one dose of potassium. Physician notified.   Room assignment: [address] [place] [address] unit  [address]: 7:30pm     LYNNE Hernandez  07/10/22 8382

## 2022-07-10 NOTE — ED NOTES
Patient is 26 y/o/m presenting to ED for suicidal ideations and detox. Patient stated he has been suicidal the last 1-2 days and wants life to end. Patient stated \"I have been trying to kill myself by using Fentanyl, but since it's not working, I have a plan to shoot myself with a gun. \" Patient stated he access to a gun at his friend's house. Patient stated his last attempt was in April when he wanted to jump off a damaris and die. He was admitted to [place] [address] 4/3/22. Patient has had several ED visits for SI or detox needs and has been sent to [address] [place] [address] several times. Patient stated he has not taken his medications since April and is not following with a mental health agency. Patient stated he is having passive homicidal thoughts. Patient denied hallucinations or delusions. Patient stated he is sleeping and eating okay. Patient stated he has been using Cocaine, Heroin, Marijuana, and Fentanyl since he was a teenager. Patient stated he is \"desperate to get better and get clean. \" Patient denied alcohol use. Patient stated he doesn't want to be homeless anymore. Patient denied having a job and stated he needs to get clean first. Patient has Hep C but is not receiving treatment. Patient denied legal issues. Patient stated he wants to go to [facility] for help. Will contact [facility] after all labs are resulted.      Nubia 33, LSW  07/10/22 9806

## 2022-07-10 NOTE — ED NOTES
[facility]# [phone] [facility] notified.       JOSEPH Ashley  07/10/22 1548

## 2022-07-10 NOTE — ED NOTES
Pt sleeping on cot, rr even and non labored, no distress noted at this time. Pt to go to [facility], transport ETA 1930.       Merna Munoz RN  07/10/22 4137

## 2022-07-10 NOTE — ED NOTES
[] Deshawn    [] [address]    [x]  One [facility] ASSESSMENT      Y  N     [x] [] In the past two weeks have you had thoughts of hurting yourself in any way? [x] [] In the past two weeks have you had thoughts that you would be better off dead? [x] [] Have you made a suicide attempt in the past two months? [x] [] Do you have a plan for hurting yourself or suicide? [] [x] Presence of hallucinations/voices related to hurting himself or herself or someone else. SUICIDE/SECURITY WATCH PRECAUTION CHECKLIST     Orders    [x]  Suicide/Security Watch Precautions initiated as checked below:   7/10/22 12:48 PM EDT 27/27    [x] Notified physician:  Emre Herzog MD  7/10/22 12:48 PM EDT    [x] Orders obtained as appropriate:     [x] 1:1 Observer     [] Psych Consult     [] Psych Consult    Name:  Date:  Time:    [x] 1:1 Observer, Notified by:  Esmer Smyth RN    Contact Nurse Supervisor    [x] Remove all personal clothes from room and place in snap/paper gown/pants. Slipper only    [x] Remove all personal belongings from room and secured away from patient. Documentation    [x] Initiate Suicide/Security Watch Precaution Flow Sheet    [x] Initiate individualized Care Plan/Problem    [x] Document why precautions initiated on flow sheet (Initiate Nursing Care Plan/Problem)    [x] 1:1 Observer in place; instructions provided. Suicide precautions require observer be within arms length. [x] Nurse-Observer Communication Hand-off initiated by RN, reviewed with Observer. Subsequently used as Hand Off between Observers. [x] Initiate every 15 minute observations per observer as delegated by the RN.     [x] Initiate RN assessment and documentation    Environmental Scan  Search Criteria and Process: OPTIONAL, see Search Policy    [] Reason for search:    [] Nursing in presence of second person to search patient    [] Patient notified of reason for body assessment and belongings search:     Persons present during search:   Results of search and disposition:       Searchers Name: security    These items or items similar should be removed from the room:   [] Chairs   [] Telephone   [] Trash cans and liners   [] Plastic utensils (order Patient Safety tray)   [] Empty or remove Sharps containers   [] All personal clothing/belongings removed   [] All unnecessary lead wires, electrical cords, draw cords, etc.   [] Flowers and plants   [] Double check for lighters, matches, razors, any glass items etc that can be used as weapons. Person completing Checklist: Vika Madera RN       GENERAL INFORMATION     Y  N     [x] [] Has the patient been informed that they are on a watch and what that means? [x] [] Can the patient get out of Bed without nursing assistance? [x] [] Can the patient use the restroom without nursing assistance? [] [x] Can the patient walk the halls to [place] their legs? \"   [] [x] Does the patient have metal utensils? [x] [] Have the patient's belongings been placed out of control of the patient? [x] [] Have the patient and his/her belongings been checked for contraband? [] [x] Is the patient under any visitor restrictions? If Yes, explain:   [] [x] Is the patient under an alias? [facility] Name:   Authorized visitors (no more than two are to be on the list)   Name/Relationship:   Name/Relationship:    Name of Staff member that you  Received this information from?: security    General Description:    Adan Reece 28/29 male 29 y.o. Admission weight: 240 lb (108.9 kg) Height: 6' 2\" (188 cm)  Race: [x]  [] Black  []   []   [] [place] [] Other  Facial Hair:  [x] Yes  [] No  If yes, please describe: Identifying Marks (i.e. Visible tattoos, scars, etc... ):     NURSING CARE PLAN    Nursing Diagnosis: Risk of Self Directed Harm  [x] Actual  [] Potential  Date Started: 7/10/22      Etiological Factors: (related to)  [x] Expressed or implied suicidal ideation/behavior  [] Depression  [] Suicide attempt      [] Low self-esteem  [] Hallucinations      [] Feeling of Hopelessness  [x] Substance abuse or withdrawal    [] Dysfunctional family  [] Major traumatic event, eg., divorce, etc   [] Excessive stress/anxiety    7/10/22    Expected Outcomes    Patient will:   [x] Patient will remain safe for the duration of their stay   [x] Patient's environment will be safe, eg. Free of potential suicide weapons   [] Verbalize Recovery from suicidal episode and improvement in self-worth   [x] Discuss feeling that precipitated suicide attempt/thoughts/behavior   [] Will describe available resources for crisis prevention and management   [] Will verbalize positive coping skills     Nursing Intervention   [x] Assessment and Observations hourly   [x] Suicide Precautions implemented with patient, should be 1:1 observation   [x] Document observation [id number] and RN assessment hourly   [] Consult physician for:    [] Psychiatric consult    [] Pharmacological therapy    [] Other:    [x] Patient search completed by security   [x] Initiated appropriate safety protocols by removing from the patient's environment anything that could be used to inflict self injury, eg. Order safe tray, snap gown, etc   [x] Maintain open, warm, caring, non-judgmental attitude/manner towards patient   [] Discuss advantages and disadvantages of existing coping methods/skills   [x] Assist and educate patient with identifying present strengths and coping skills   [x] Keep patient informed regarding plan of care and provide clear concise explanations. Provide the patient/family education information as well as telephone numbers and other information about crisis centers, hot lines, and counselors.     Discharge Planning:   [] Referral  [] Groups [] Health agencies  [] Other:          Milagros Aparicio RN  07/10/22 1478
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
## 2022-07-10 NOTE — ED NOTES
The following labs labeled with pt sticker and tubed to lab:     [x] Blue     [x] Lavender   [] on ice  [x] Green/yellow  [x] Green/black [] on ice  [] Yellow  [] Red  [] Pink      [x] COVID-19 swab    [x] Rapid  [] PCR  [] Flu swab  [] Peds Viral Panel     [] Urine Sample  [] Pelvic Cultures  [] Blood Cultures          Crista Griffith RN  07/10/22 3799

## 2022-07-10 NOTE — ED NOTES
Pt sleeping on cot at this time, eyes closed, rr even and non labored, no distress noted. Will continue with plan of care.       Jessi Haas RN  07/10/22 2054

## 2022-07-10 NOTE — ED NOTES
Pt reminded again for the need for urine sample. Pt has urinal at bedside.       Jaun Schaumann, RN  07/10/22 8115

## 2022-07-10 NOTE — ED PROVIDER NOTES
[facility]  Emergency Department  Faculty Attestation     I performed a history and physical examination of the patient and discussed management with the resident. I reviewed the residents note and agree with the documented findings and plan of care. Any areas of disagreement are noted on the chart. I was personally present for the key portions of any procedures. I have documented in the chart those procedures where I was not present during the key portions. I have reviewed the emergency nurses triage note. I agree with the chief complaint, past medical history, past surgical history, allergies, medications, social and family history as documented unless otherwise noted below. For Physician Assistant/ Nurse Practitioner cases/documentation I have personally evaluated this patient and have completed at least one if not all key elements of the E/M (history, physical exam, and MDM). Additional findings are as noted.       Primary Care Physician:  None Provider    Screenings:  [unfilled]    CHIEF COMPLAINT       Chief Complaint   Patient presents with    Addiction Problem     using fentanyl to kill himself     Suicidal     wants to kill himself by shooting himself with a gun       RECENT VITALS:   Temp: 98.1 °F (36.7 °C),  Heart Rate: 80, Resp: 16, BP: 118/78    LABS:  Labs Reviewed   CBC WITH AUTO DIFFERENTIAL - Abnormal; Notable for the following components:       Result Value    RBC 3.93 (*)     Hemoglobin 12.2 (*)     Hematocrit 35.8 (*)     All other components within normal limits   COMPREHENSIVE METABOLIC PANEL - Abnormal; Notable for the following components:    CREATININE 0.54 (*)     Potassium 3.2 (*)     AST 49 (*)     All other components within normal limits   TOX SCR, BLD, ED - Abnormal; Notable for the following components:    Acetaminophen Level <5 (*)     Salicylate Lvl <1 (*)     All other components within normal limits   COVID-19, RAPID   MAGNESIUM URINE DRUG SCREEN       Radiology  No orders to display       Attending Physician Additional  Notes    Suicidal thoughts. His plan is to overdose on fentanyl. He states he does not use opiates on a regular basis. He did take fentanyl earlier but was unsuccessful with his attempt. Had prior attempts by cutting himself. He has had injections previously. No overdose of medications today. He denies alcohol. He does not have a gun. He has no medical complaints at this time such as headache chest pain shortness of breath stroke symptoms vomiting or diarrhea. No fevers chills or sweats. On exam he is sleepy but arousable vital signs normal.  Normal pupils. Skin is warm and dry. He is restless and fidgety. No warm skin or red skin. No dilated pupils. No diaphoresis. Heart rate is normal.  Impression is suicidal thoughts/plan, recent opioid use. Plan is medical clearance for SU[facility]TE Lamb.  Fred Barnes MD, [facility]  Attending Emergency  Physician               Glenn Corbin MD  07/10/22 4910

## 2022-07-10 NOTE — ED NOTES
Pt updated on need for urine sample when able. Pt provided 2 additional hailey mist drinks.       Ed Tobar RN  07/10/22 1970

## 2022-07-10 NOTE — ED NOTES
Security at bedside to change pt out, secure belongings and check pt for contraband.       Jose Lockett RN  07/10/22 8419

## 2022-07-10 NOTE — ED NOTES
Urine sample collected from pt,  labeled and sent to lab for testing.       Marlen Bernard RN  07/10/22 1531

## 2022-07-11 PROBLEM — F18.10 ABUSE OF SMOKED SUBSTANCE (HCC): Status: ACTIVE | Noted: 2022-07-11

## 2022-07-11 PROBLEM — E87.6 HYPOKALEMIA: Status: ACTIVE | Noted: 2022-07-11

## 2022-07-11 PROCEDURE — 6370000000 HC RX 637 (ALT 250 FOR IP): Performed by: PSYCHIATRY & NEUROLOGY

## 2022-07-11 PROCEDURE — 99223 1ST HOSP IP/OBS HIGH 75: CPT | Performed by: PSYCHIATRY & NEUROLOGY

## 2022-07-11 PROCEDURE — 99223 1ST HOSP IP/OBS HIGH 75: CPT | Performed by: INTERNAL MEDICINE

## 2022-07-11 PROCEDURE — 1240000000 HC EMOTIONAL WELLNESS R&B

## 2022-07-11 PROCEDURE — APPSS60 APP SPLIT SHARED TIME 46-60 MINUTES: Performed by: PSYCHIATRY & NEUROLOGY

## 2022-07-11 RX ORDER — POTASSIUM CHLORIDE 20 MEQ/1
20 TABLET, EXTENDED RELEASE ORAL
Status: DISCONTINUED | OUTPATIENT
Start: 2022-07-12 | End: 2022-07-12 | Stop reason: HOSPADM

## 2022-07-11 RX ORDER — PALIPERIDONE 3 MG/1
3 TABLET, EXTENDED RELEASE ORAL EVERY MORNING
Status: DISCONTINUED | OUTPATIENT
Start: 2022-07-12 | End: 2022-07-12 | Stop reason: HOSPADM

## 2022-07-11 RX ORDER — ESCITALOPRAM OXALATE 10 MG/1
15 TABLET ORAL DAILY
Status: DISCONTINUED | OUTPATIENT
Start: 2022-07-11 | End: 2022-07-12 | Stop reason: HOSPADM

## 2022-07-11 RX ADMIN — TRAZODONE HYDROCHLORIDE 50 MG: 50 TABLET ORAL at 20:52

## 2022-07-11 RX ADMIN — ESCITALOPRAM OXALATE 15 MG: 10 TABLET ORAL at 15:42

## 2022-07-11 RX ADMIN — HYDROXYZINE HYDROCHLORIDE 50 MG: 50 TABLET, FILM COATED ORAL at 20:52

## 2022-07-11 NOTE — BH NOTE
[facility]  Admission Note     Admission Type:   Admission Type: Voluntary    Reason for admission:  Reason for Admission: suicidal states he tried to overdose on Fentanyl a few times in the past few days, also reports plan to shoot self with gun      Addictive Behavior:   Addictive Behavior  In the Past 3 Months, Have You Felt or Has Someone Told You That You Have a Problem With  : None    Medical Problems:   Past Medical History:   Diagnosis Date    Anxiety     Bipolar 1 disorder ([facility]     Drug abuse in remission ([facility]     H/O alcohol abuse     Hep C w/o coma, chronic (HCC)        Status EXAM:  Mental Status and Behavioral Exam  Normal: No  Level of Assistance: Independent/Self  Facial Expression: Flat,Worried  Affect: Appropriate,Congruent  Level of Consciousness: Alert  Frequency of Checks: 4 times per hour, close  Mood:Normal: No  Mood: Depressed,Anxious,Helpless  Motor Activity:Normal: Yes  Eye Contact: Poor  Observed Behavior: Withdrawn,Cooperative,Preoccupied  Sexual Misconduct History: Current - no  Preception: [place] to person,[place] to time,[place] to place,[place] to situation  Attention:Normal: No  Attention: Distractible  Thought Processes: Other (comment) (coherent)  Thought Content:Normal: No  Thought Content: Paranoia,Preoccupations  Depression Symptoms: Feelings of helplessness,Feelings of hopelessess,Loss of interest,Impaired concentration  Anxiety Symptoms: Generalized  Janet Symptoms: No problems reported or observed. Hallucinations:  Other (comment) (pt reports only hearing voices when doing drugs)  Delusions: No  Memory:Normal: No  Memory: Poor remote  Insight and Judgment: No  Insight and Judgment: Poor judgment,Poor insight    Tobacco Screening:  Practical Counseling, on admission, raon X, if applicable and completed (first 3 are required if patient doesn't refuse)            (x ) Recognizing danger situations (included triggers and roadblocks)                    (x ) Coping skills (new ways to manage stress,relaxation techniques, changing routine, distraction)                                                           (x ) Basic information about quitting (benefits of quitting, techniques in how to quit, available resources  ( ) Referral for counseling faxed to [facility]                                                                                                                   ( ) Patient refused counseling  ( ) Patient has not smoked in the last 30 days    Metabolic Screening:    Lab Results   Component Value Date    LABA1C 5.0 08/15/2019       Lab Results   Component Value Date    CHOL 107 05/16/2022    CHOL 126 03/24/2022    CHOL 132 08/15/2019    CHOL 144 03/20/2019    CHOL 128 01/22/2019     Lab Results   Component Value Date    TRIG 74 05/16/2022    TRIG 79 03/24/2022    TRIG 200 (H) 08/15/2019    TRIG 83 03/20/2019    TRIG 146 01/22/2019     Lab Results   Component Value Date    HDL 35 (L) 05/16/2022    HDL 43 03/24/2022    HDL 41 08/15/2019    HDL 42 03/20/2019    HDL 37 (L) 01/22/2019     No components found for: LDLCAL  No results found for: LABVLDL      Body mass index is 30.81 kg/m². BP Readings from Last 2 Encounters:   07/10/22 116/77   07/10/22 120/85           Pt admitted with followings belongings:  Dental Appliances: None  Vision - Corrective Lenses: None  Hearing Aid: None  Jewelry: None  Body Piercings Removed: N/A  Clothing: Footwear,Pants,Shirt  Other Valuables: Lighter/Matches     Patient admitted to [facility] via [facility]. Rudy's LUCIO related to suicidal ideations with plan to shoot self with friend's gun. Patient also reports that he has been trying to overdose on Fentanyl. Patient reports using cocaine, heroin,and marijuana recently, in addition to Fentanyl daily. Patient has history of previous psych admits and was last discharged from [place] [address] in April 2022.   Patient reports that he has not taken medication or followed up with out patient treatment since last admission. Patient also reports previous detox. treatment at [facility] several times. Patient reports that he is currently homeless and denies pending legal issues. Patient is cooperative with admission, signed all necessary consents.              Mary Bryant RN

## 2022-07-11 NOTE — PLAN OF CARE
Problem: Self Harm/Suicidality  Goal: Will have no self-injury during hospital stay  Description: INTERVENTIONS:  1. Q 15 MINUTES: Routine safety checks  2. Q SHIFT & PRN: Assess risk to determine if routine checks are adequate to maintain patient safety  7/11/2022 1507 by Deep Costa  Outcome: Progressing    Patient has been isolative to room, sleeping for long intervals, comes out for meals. Lability noted, is very pleasant and cooperative at times, becomes angry at times easily, especially with male peers. Spoke with the doctor and took his medication this afternoon. Selectively cooperative, refusing vitals this morning. Admits to fleeting suicidal thoughts, but says he is tired and just sleeping a lot. Problem: Pain  Goal: Verbalizes/displays adequate comfort level or baseline comfort level  7/11/2022 1507 by Deep Costa  Outcome: Progressing    Sleeping a lot but gets focused on pain and is edgy and irritable at times. Poor insight, does not attend groups, preoccupied.  Aloof and asocial.

## 2022-07-11 NOTE — GROUP NOTE
Group Therapy Note    Date: 7/11/2022    Group Start Time: 1430  Group End Time: 5414  Group Topic: Relaxation    GARY BHI SIENA Crisostomo    Recreation Group Note        Date: July 11, 2022 Start Time: 2:30pm  End Time: 3:15pm      Number of Participants in Group & Unit Census:  5/20    Topic: Recreation     Goal of Group: improve concentration skills       Comments:     Patient did not participate in recreation group, despite staff encouragement and explanation of benefits. Patient remain seclusive to self. Q15 minute safety checks maintained for patient safety and will continue to encourage patient to attend unit programming.             Signature:  Tyler Rodriguez

## 2022-07-11 NOTE — GROUP NOTE
Group Therapy Note    Date: 7/11/2022    Group Start Time: 1100  Group End Time: 1130  Group Topic: Cognitive Skills    GARY Moreira, CTRS    Pt did not attend [id number] cognitive skills group d/t resting in room despite staff invitation to attend. 1:1 talk time offered as alternative to group session, pt declined. 9/24 attended.           Signature:  Bashir Sesay

## 2022-07-11 NOTE — CARE COORDINATION
[facility] Biopsychosocial Assessment    Current Level of Psychosocial Functioning     Independent: xx  Dependent:   Minimal Assist:     Psychosocial High Risk Factors (check all that apply)    Unable to obtain meds:   Chronic illness/pain:   Substance abuse: xx  Lack of Family Support: xx  Financial stress:   Isolation:  Inadequate Community Resources:   Suicide attempt(s): xx  Not taking medications: xx  Victim of crime:  Developmental Delay:   Unable to manage personal needs:   Age 72 or older:   Homeless: xx  No transportation:   Readmission within 30 days:   Unemployment:   Traumatic Event:     Psychiatric Advanced Directives:None reported    Family to Involve in Treatment: None reported    Sexual Orientation: ARYAN    Patient Strengths: patient has insurance    Patient Barriers: substance use, non compliance with mental health treatment    Opiate Education: provided to patient    CMHC/mental health history: unknown, patient does not participate in assessment    Plan of Care   medication management, group/individual therapies, family meetings, psycho -education, treatment team meetings to assist with stabilization    Initial Discharge Plan: Patient reports wanting to go to inpatient or residential treatment in [facility] [place] or shelter in [facility] [place]. Clinical Summary:  Patient is a 27year old male who presents to [place] with reports suicidal ideations. Patient reports he has attempted multiple time to overdose on fentanyl, now states he will shoot himself after these failed attempts. Patient is not willing to participate in assessment, aside for expressing wanting \"rehab or shelter\" in [facility] [place]. Patient was provided with AOD packet and shelter information for [place].

## 2022-07-11 NOTE — H&P
Department of Psychiatry  Attending Physician Psychiatric Assessment     Reason for Admission to Psychiatric Unit:  A mental disorder causing major disability in social, interpersonal, occupational, and/or educational functioning that is leading to dangerous or life-threatening functioning, and that can only be addressed in an acute inpatient setting   Concerns about patient's safety in the community    CHIEF COMPLAINT:  Depression with thoughts to kill self by overdosing on fentanyl    History obtained from: Patient, electronic medical record          HISTORY OF PRESENT ILLNESS:    Carol Lawson is a 27 y.o. male who has a past medical history of Schizoaffective, Bipolar, Hep C, and Opioid Abuse . Patient presented to the ED stating he was having thoughts to kill himself by overdosing on fentanyl. Per ED documentation:  Carol Lawson is a 27 y.o. male who presents with suicidal ideations as well as plan. Past medical history significant for anxiety, bipolar 1 disorder, drug and alcohol abuse. Patient states that he started himself 2 times now with fentanyl. Has been unsuccessful. States that he has a plan to get his friend's gun and shoot himself in the head. States he has not been on his medications. Denies any auditory, visual hallucinations. Denies any homicidal ideations. Otherwise denies any chest pain, shortness of breath, nausea, vomiting, diarrhea, blurry vision, double vision, headache, one-sided wheeze, slurred speech, difficulty swallowing. Patient was laying in bed upon approached eyes closed and was agreeable to answering only a few questions at this time. He showed no insight regarding his illness and even though he admits to attempting to end his life yesterday but overdosing on fentanyl he denies low mood, difficulty with sleep or appetite or lack of interest or decreased energy.  He does endorse symptoms of anxiety such as excess worry, feeling restless and edgy, and displays poor concentration. The patient denies going days without sleep or engaging in risk taking behavior despite his many years of substance abuse. He denies hallucinations or feeling like people or watching or talking about him. He denies anxiety episodes in which he experiences nausea, palpitations or trembling. He denies intrusive persistent thoughts or behaviors. He denies experiencing any traumatic events in his lifetime. He denies any phobias, intense anger outbursts or poor self esteem. He stated his appetite is fair and denies any binging or purging. Patient denies any legal history. The patient was laying in his bed in and out of sleep throughout the interview. His attention was poor and he appeared irritable. He stated he is here from [place] visiting friends and now he just wants to get back home. He admits to being off his psychiatric medications for the past 10 days due to running out of them. After a few minutes the patient stated, \"no more questions. \"    Later in the morning patient is willing to discuss his medications and feels that Abilify and Lexapro have been beneficial. He again states his last doses were \"bout 10 days ago\". He is agreeable to restart and titrate to prior dosages.        History of head trauma: [] Yes [x] No    History of seizures: [] Yes [x] No    History of violence or aggression: [x] Yes [] No  - history of verbal aggression against medical staff         PSYCHIATRIC HISTORY:  [x] Yes [] No    Currently follows with: no one  Three lifetime suicide attempts  Multiple psychiatric hospital admissions - last admit April 2022    Home Medication Compliance: [] Yes [x] No    Past psychiatric medications includes: Lexapro, Abilify, Trazodone, Invega    Adverse reactions from psychotropic medications: [] Yes [x] No         Lifetime Psychiatric Review of Systems         Depression: Denies     Anxiety: Endorses     Panic Attacks: Denies     Janet or Hypomania: Denies     Phobias: Denies Obsessions and Compulsions: Denies     Body or Vocal Tics: Denies     Visual Hallucinations: Denies     Auditory Hallucinations: Denies     Delusions/Paranoia: Denies     PTSD: Denies    Past Medical History:        Diagnosis Date    Anxiety     [facility]     [facility] in remission ([facility]     H/O alcohol abuse     Hep C w/o coma, chronic (HCC)        Past Surgical History:        Procedure Laterality Date    ABSCESS DRAINAGE Right 10/13/2020    Irrigation and debridement    HAND SURGERY Right        Allergies:  Patient has no known allergies. Social History:     Born in: [facility]  Family: Mom and Dad are still , Only child  Highest Level of Education: Refused to answer  Occupation: None  Marital Status: Single  Children: None  Residence: Homeless  Stressors: Drug withdrawal, lack of finances  Patient Assets/Supportive Factors: None per patient         DRUG USE HISTORY  Social History     Tobacco Use   Smoking Status Current Every Day Smoker    Packs/day: 0.50    Years: 7.00    Pack years: 3.50    Types: Cigarettes   Smokeless Tobacco Former User    Types: Snuff     Social History     Substance and Sexual Activity   Alcohol Use Yes     Social History     Substance and Sexual Activity   Drug Use Yes    Frequency: 7.0 times per week    Types: Other-see comments    Comment: benzo's, fentanyl     Admits to fentanyl use yesterday. Tox screen positive for amphetamines and cannabinoids         LEGAL HISTORY:   HISTORY OF INCARCERATION: [] Yes [x] No    Family History:   History reviewed. No pertinent family history. Psychiatric Family History  Patient denies psychiatric family history.      Suicides in family: [] Yes [x] No    Substance use in family: [x] Yes [] No - patient stated not sure who or what         PHYSICAL EXAM:  Vitals:  /77   Pulse 60   Temp 98 °F (36.7 °C) (Oral)   Resp 14   Ht 6' 2\" (1.88 m)   Wt 240 lb (108.9 kg)   BMI 30.81 kg/m²   Pain Level: 0/10    LABS:  Labs reviewed: [x] Yes  Potassium 3.2  Creatinine 0.54  AST 49  RBC 3.93  Hgb 12.2  Hct 35.8  Last EKG in EMR reviewed: [x] Yes  Qtc: 403 - 6/13/22          Review of Systems   Constitutional: Negative for chills and weight loss. HENT: Negative for ear pain and nosebleeds. Eyes: Negative for blurred vision and photophobia. Respiratory: Negative for cough, shortness of breath and wheezing. Cardiovascular: Negative for chest pain and palpitations. Gastrointestinal: Negative for abdominal pain, diarrhea and vomiting. Genitourinary: Negative for dysuria and urgency. Musculoskeletal: Negative for falls and joint pain. Skin: Negative for itching and rash. Neurological: Negative for tremors, seizures and weakness. Endo/Heme/Allergies: Does not bruise/bleed easily. Physical Exam:   Constitutional:  Appears well-developed and well-nourished, no acute distress. HENT:   Head: Normocephalic and atraumatic. Eyes: Conjunctivae are normal. Right eye exhibits no discharge. Left eye exhibits no discharge. No scleral icterus. Neck: Normal range of motion. Neck supple. Pulmonary/Chest:  No respiratory distress or accessory muscle use, no wheezing. Cardiac: Regular rate and rhythm. Abdominal: Soft. Non-tender. Exhibits no distension. Musculoskeletal: Normal range of motion. Exhibits no edema. Neurological: cranial nerves II-XII grossly in tact, normal gait and station. Skin: Skin is warm and dry. Patient is not diaphoretic. No erythema. Mental Status Examination:    Level of consciousness: Lethargic  Appearance:  Appropriate attire, resting in bed, fair grooming   Behavior/Motor: Approachable, no psychomotor abnormalities noted  Attitude toward examiner:  Cooperative, attentive, good eye contact  Speech: Normal rate, volume, and tone. Mood:  \"Fine\"  Affect: Blunted  Thought processes:  Goal directed, linear  Thought content: Reports improvement in suicidal ideations, without current plan or intent, contracts for safety on the unit. Denies homicidal ideations               Denies hallucinations              Denies delusions              Denies paranoia  Cognition:  Oriented to self, location, time, situation  Concentration: Clinically adequate  Memory: Fair  Insight &Judgment: Poor         DSM-5 Diagnosis    Principal Problem: Depression with Suicidal Ideation           Opiate use disorder/Fentanyl      Psychosocial and Contextual factors:  Financial X  Occupational   Relationship   Legal   Living situation X  Educational     Past Medical History:   Diagnosis Date    Anxiety     [facility] 1 disorder ([facility]     Drug abuse in remission ([facility]     H/O alcohol abuse     Hep C w/o coma, chronic ([facility]         TREATMENT CONSIDERATIONS    Continue inpatient psychiatric treatment. Home medications reviewed. Medications as discussed with attending:  Consider restarting medications Lexapro and Abilify and titrate as tolerated  Monitor need and frequency of PRN medications. Attempt to develop insight. Follow-up daily while inpatient. Reviewed risks and benefits as well as potential side effects with patient. CONSULT:  [x] Yes [] No  Internal medicine for medical management/medical H&P    Risk Management: close watch per standard protocol      Psychotherapy: participation in milieu and group and individual sessions with Attending Physician,  and Physician Assistant/CNP      Estimated length of stay:  2-14 days      GENERAL PATIENT/FAMILY EDUCATION  Patient will understand basic signs and symptoms, patient will understand benefits/risks and potential side effects from proposed medications, and patient will understand their role in recovery. Family is not active in patient's care.    Patient assets that may be helpful during treatment include: Intent to participate and engage in treatment, sufficient fund of knowledge and intellect to understand and utilize treatments. Goals:    1) Remission of suicidal ideation. 2) Stabilization of symptoms prior to discharge. 3) Establish efficacy and tolerability of medications. Behavioral Services  Medicare Certification     Admission Day 1  I certify that this patient's inpatient psychiatric hospital admission is medically necessary for:    x (1) treatment which could reasonably be expected to improve this patient's condition, or    x (2) diagnostic study or its equivalent. Time Spent: 60 minutes    Patsy Cazares is a 27 y.o. male being evaluated face to face    JOSE Wahl & STEFAN Faye Student on 7/11/2022 at 11:23 AM    An electronic signature was used to authenticate this note. I independently saw and evaluated the patient. I reviewed the  documentation above. Any additional comments or changes to the   documentation are stated below otherwise agree with assessment. I have noted that the patient was admitted to the hospital for suicidal ideation. He has been using fentanyl with suicidal intent. The patient was seen at bedside. He is irritable and restless. He said he is probably going through some fentanyl withdrawal but he says is not too bad. The patient has been on Suboxone in the past but does not want to be on Suboxone treatment at this time. The patient is denying any auditory or visual hallucinations at this time. He admits to some paranoia in the past.    The patient's labs were reviewed. His urine tox was positive for amphetamines and cannabinoids. He had hypokalemia at the time of admission. His magnesium was normal.  The patient has a history of low TSH levels. A repeat TSH has been ordered    The patient's prior to admission medications which consist of Lexapro 15 mg daily and Invega 3 mg daily have been ordered. PLAN  Medications as noted above  Attempt to develop insight  Psycho-education conducted.   Estimated Length of Stay is 3-9 days  Supportive Therapy conducted.   Follow-up daily while on inpatient unit    Electronically signed by Preston Winters MD on 7/11/22 at 1:20 PM EDT

## 2022-07-11 NOTE — PROGRESS NOTES
Behavioral Services  Medicare Certification Upon Admission    I certify that this patient's inpatient psychiatric hospital admission is medically necessary for:    [x] (1) Treatment which could reasonably be expected to improve this patient's condition,       [x] (2) Or for diagnostic study;     AND     [x](2) The inpatient psychiatric services are provided while the individual is under the care of a physician and are included in the individualized plan of care.     Estimated length of stay/service 2-9 days    Plan for post-hospital care -outpatient care    Electronically signed by Tasha Palomares MD on 7/11/2022 at 1:29 PM

## 2022-07-11 NOTE — BH NOTE
Community Meeting Group Note        Date: July 11, 2022 Start Time: 9am  End Time: 0930      Number of Participants in Group & Unit Census:  10/24  Topic: Goal Setting/Support group    Goal of Group: Attend and participate in groups      Comments:     Patient did not participate in [employer] group, despite staff encouragement and explanation of benefits. Patient remain seclusive to self. Q15 minute safety checks maintained for patient safety and will continue to encourage patient to attend unit programming.

## 2022-07-11 NOTE — BH NOTE
On call provider, Dr. Gm Castaneda, notified of best practice advisory suggesting to place patient on suicide precautions. Provider to discontinue the order as patient does not meet criteria for suicide precautions at this time. Continue to observe patient on every 15 minute checks.

## 2022-07-11 NOTE — H&P
[facility] Internal Medicine  Beverly Perez MD; Aime Altamirano MD; Kaylan Torres MD; Jenna Ruff, MD Rayburn Canavan, MD; Stephanie Ndiaye MD    [facility] Internal Medicine   [address] / HISTORY AND PHYSICAL EXAMINATION            Date:   7/11/2022  Patient name:  Pooja Johnson  Date of admission:  7/10/2022  9:19 PM  MRN:   [id number]  Account:  [de-identified]  YOB: 1995  PCP:    None Provider  Room:   [address] [place] [address]  Code Status:    Full Code    Physician Requesting Consult: Ashli Del Cid MD    Reason for Consult: Medical management    Chief Complaint:     No chief complaint on file. Suicidal ideations    History Obtained From:     patient    History of Present Illness:   26-year gentleman with underlying history of anxiety and depression, polysubstance abuse, schizoaffective disorder, bipolar, hepatitis C, opioid abuse disorder admitted to inpatient psych for suicidal ideations      Past Medical History:     Past Medical History:   Diagnosis Date    Anxiety     [facility]     Drug abuse in [facility] ([facility]     H/O alcohol abuse     Hep C w/o coma, chronic ([facility]         Past Surgical History:     Past Surgical History:   Procedure Laterality Date    ABSCESS DRAINAGE Right 10/13/2020    Irrigation and debridement    HAND SURGERY Right         Medications Prior to Admission:     Prior to Admission medications    Medication Sig Start Date End Date Taking? Authorizing Provider   escitalopram (LEXAPRO) 5 MG tablet Take 3 tablets by mouth daily  Patient not taking: Reported on 5/16/2022 4/6/22   Leonora Candelario MD   paliperidone (INVEGA) 3 MG extended release tablet Take 1 tablet by mouth every morning  Patient not taking: Reported on 5/16/2022 4/6/22   Leonora Candelario MD        Allergies:     Patient has no known allergies.     Social History:     Tobacco:    reports that he has been smoking cigarettes. He has a 3.50 pack-year smoking history. He has quit using smokeless tobacco.  His smokeless tobacco use included snuff. Alcohol:      reports current alcohol use. Drug Use:  reports current drug use. Frequency: 7.00 times per week. Drug: Other-see comments. Family History:     History reviewed. No pertinent family history. Review of Systems:     Positive and Negative as described in HPI. CONSTITUTIONAL:  negative for fevers, chills, sweats, fatigue, weight loss  HEENT:  negative for vision, hearing changes, runny nose, throat pain  RESPIRATORY:  negative for shortness of breath, cough, congestion, wheezing. CARDIOVASCULAR:  negative for chest pain, palpitations. GASTROINTESTINAL:  negative for nausea, vomiting, diarrhea, constipation, change in bowel habits, abdominal pain   GENITOURINARY:  negative for difficulty of urination, burning with urination, frequency   INTEGUMENT:  negative for rash, skin lesions, easy bruising   HEMATOLOGIC/LYMPHATIC:  negative for swelling/edema   ALLERGIC/IMMUNOLOGIC:  negative for urticaria , itching  ENDOCRINE:  negative increase in drinking, increase in urination, hot or cold intolerance  MUSCULOSKELETAL:  negative joint pains, muscle aches, swelling of joints  NEUROLOGICAL:  negative for headaches, dizziness, lightheadedness, numbness, pain, tingling extremities      Physical Exam:     /77   Pulse 60   Temp 98 °F (36.7 °C) (Oral)   Resp 14   Ht 6' 2\" (1.88 m)   Wt 240 lb (108.9 kg)   BMI 30.81 kg/m²   Temp (24hrs), Av.1 °F (36.7 °C), Min:98 °F (36.7 °C), Max:98.1 °F (36.7 °C)    No results for input(s): POCGLU in the last 72 hours. No intake or output data in the 24 hours ending 22 1159    General Appearance:  alert, well appearing, and in no acute distress  Mental status: oriented to person, place, and time with normal affect  Head:  normocephalic, atraumatic.   Eye: no icterus, redness, pupils equal and reactive, extraocular eye movements intact, conjunctiva clear  Ear: normal external ear, no discharge, hearing intact  Nose:  no drainage noted  Mouth: mucous membranes moist  Neck: supple, no carotid bruits, thyroid not palpable  Lungs: Bilateral equal air entry, clear to ausculation, no wheezing, rales or rhonchi, normal effort  Cardiovascular: normal rate, regular rhythm, no murmur, gallop, rub.   Abdomen: Soft, nontender, nondistended, normal bowel sounds, no hepatomegaly or splenomegaly  Neurologic: There are no new focal motor or sensory deficits, normal muscle tone and bulk, no abnormal sensation, normal speech, cranial nerves II through XII grossly intact  Skin: No gross lesions, rashes, bruising or bleeding on exposed skin area  Extremities:  peripheral pulses palpable, no pedal edema or calf pain with palpation  Psych: normal affect    Investigations:      Laboratory Testing:  Recent Results (from the past 24 hour(s))   CBC with Auto Differential    Collection Time: 07/10/22 12:31 PM   Result Value Ref Range    WBC 8.2 3.5 - 11.3 k/uL    RBC 3.93 (L) 4.21 - 5.77 m/uL    Hemoglobin 12.2 (L) 13.0 - 17.0 g/dL    Hematocrit 35.8 (L) 40.7 - 50.3 %    MCV 91.1 82.6 - 102.9 fL    MCH 31.0 25.2 - 33.5 pg    MCHC 34.1 28.4 - 34.8 g/dL    RDW 13.5 11.8 - 14.4 %    Platelets 837 476 - 440 k/uL    MPV 10.1 8.1 - 13.5 fL    NRBC Automated 0.0 0.0 per 100 WBC    Seg Neutrophils 48 36 - 65 %    Lymphocytes 39 24 - 43 %    Monocytes 12 3 - 12 %    Eosinophils % 1 1 - 4 %    Basophils 0 0 - 2 %    Immature Granulocytes 0 0 %    Segs Absolute 3.87 1.50 - 8.10 k/uL    Absolute Lymph # 3.22 1.10 - 3.70 k/uL    Absolute Mono # 0.95 0.10 - 1.20 k/uL    Absolute Eos # 0.11 0.00 - 0.44 k/uL    Basophils Absolute <0.03 0.00 - 0.20 k/uL    Absolute Immature Granulocyte <0.03 0.00 - 0.30 k/uL   CMP    Collection Time: 07/10/22 12:31 PM   Result Value Ref Range    Glucose 96 70 - 99 mg/dL    BUN 12 6 - 20 mg/dL    CREATININE 0.54 (L) 0.70 - 1.20 mg/dL Calcium 9.0 8.6 - 10.4 mg/dL    Sodium 140 135 - 144 mmol/L    Potassium 3.2 (L) 3.7 - 5.3 mmol/L    Chloride 103 98 - 107 mmol/L    CO2 26 20 - 31 mmol/L    Anion Gap 11 9 - 17 mmol/L    Alkaline Phosphatase 69 40 - 129 U/L    ALT 35 5 - 41 U/L    AST 49 (H) <40 U/L    Total Bilirubin 0.71 0.3 - 1.2 mg/dL    Total Protein 6.7 6.4 - 8.3 g/dL    Albumin 4.5 3.5 - 5.2 g/dL    Albumin/Globulin Ratio 2.0 1.0 - 2.5    GFR Non-African American >60 >60 mL/min    GFR African American >60 >60 mL/min    GFR Comment         Magnesium    Collection Time: 07/10/22 12:31 PM   Result Value Ref Range    Magnesium 1.8 1.6 - 2.6 mg/dL   TOX SCR, BLD, ED    Collection Time: 07/10/22 12:31 PM   Result Value Ref Range    Acetaminophen Level <5 (L) 10 - 30 ug/mL    Ethanol <10 <10 mg/dL    Ethanol percent <6.123 <9.417 %    Salicylate Lvl <1 (L) 3 - 10 mg/dL    Toxic Tricyclic Sc,Blood NEGATIVE NEGATIVE   COVID-19, Rapid    Collection Time: 07/10/22 12:31 PM    Specimen: Nasopharyngeal Swab   Result Value Ref Range    Specimen Description . NASOPHARYNGEAL SWAB     SARS-CoV-2, Rapid Not Detected Not Detected   Urine Drug Screen    Collection Time: 07/10/22  2:53 PM   Result Value Ref Range    Amphetamine Screen, Ur POSITIVE (A) NEGATIVE    Barbiturate Screen, Ur NEGATIVE NEGATIVE    Benzodiazepine Screen, Urine NEGATIVE NEGATIVE    Cocaine Metabolite, Urine NEGATIVE NEGATIVE    Methadone Screen, Urine NEGATIVE NEGATIVE    Opiates, Urine NEGATIVE NEGATIVE    Phencyclidine, Urine NEGATIVE NEGATIVE    Cannabinoid Scrn, Ur POSITIVE (A) NEGATIVE    Oxycodone Screen, Ur NEGATIVE NEGATIVE    Test Information       Assay provides medical screening only. The absence of expected drug(s) and/or metabolite(s) may indicate diluted or adulterated urine, limitations of testing or timing of collection. Imaging/[employer]:  No results found.     Assessment :      Hospital Problems           Last Modified POA    * (Principal) Depression with suicidal ideation 7/11/2022 Yes    Hypokalemia 7/11/2022 Yes    Abuse of smoked substance ([facility] 7/11/2022 Yes    Severe opioid use disorder ([facility] (Chronic) 7/11/2022 Yes    IVDU (intravenous drug user) 7/11/2022 Yes    Polysubstance abuse ([facility] (Chronic) 7/11/2022 Yes    Methamphetamine use ([facility] 7/11/2022 Yes          Plan:     1. 26-year gentleman with underlying history of anxiety and depression admitted to inpatient psych with suicidal ideations,  2. Hypokalemia, will replace,  3. Smoking advised to quit smoking,  4. Severe opioid abuse, watch for any withdrawal,  5. IV drug abuser, watch for any signs and symptoms of bloodstream infections and endocarditis,  6. Methamphetamine abuse, watch for any withdrawal,  7. DVT prophylaxis, patient mobile,  8. Full CODE STATUS    Consultations:   Rosa Glynn MD  7/11/2022  11:59 AM    Copy sent to Dr. Livan Whipple Provider    Please note that this chart was generated using voice recognition Dragon dictation software. Although every effort was made to ensure the accuracy of this automated transcription, some errors in transcription may have occurred.

## 2022-07-11 NOTE — BH NOTE
Patient given tobacco quitline number 64801198263 at this time, refusing to call at this time, states \" I just dont want to quit now\"- patient given information as to the dangers of long term tobacco use. Continue to reinforce the importance of tobacco cessation.

## 2022-07-11 NOTE — CARE COORDINATION
BHI Biopsychosocial Assessment     Current Level of Psychosocial Functioning      Independent   Dependent  X  Minimal Assist      Comments:  Client has a principle diagnosis of Depression with suicidal ideations, which is the is the condition established to be chiefly responsible for the admission of the client on this date. Client documentation provides prior diagnoses of Schizoaffective disorder, bipolar type; Alcohol-Use Disorder, severe; Stimulant-Use Disorder, amphetamine (meth) type, severe; Opioid-Use Disorder, IV heroin/fentanyl, severe     Psychosocial High-Risk Factors (check all that apply)     Unable to obtain meds   Chronic illness/pain    Not taking medications X  Substance abuse X  Lack of Family Support X  Addictive Behaviors X  Financial stress X  Isolation  Inadequate Community Resources X  Suicide attempt(s)   Homicidal ideations  Self-mutilation  Victim of crime   Legal issues  Developmental Delay  Unable to manage personal needs    Age 72 or older   Homeless X  No transportation   Readmission within 30 days   Unemployment  Traumatic Event     Psychiatric Advanced Directives:   Nothing reported     Family to Involve in Treatment:  Client lists Andi Garcia, grandfather at [phone] as emergency contact. Client states no family involvement in treatment.   No JESSA's signed     Sexual Orientation:   Client reports as a single male     Patient Strengths:  [facility], motivated for treatment     Patient Barriers:  Substance abuse, new to area, homeless, no legal form of income     Opiate/AOD Referral and/or Education Provided:   Tox positive for alcohol; client reports history of meth, IV heroin/fentanyl, marijuana abuse and states \"anything I can really get my hands on\"     CMHC/mental health history:   Client is new to [facility] and will be linked at time of discharge     Plan of Care:  Medication management, group/individual therapies, family meetings, psychoeducation, 1:1 counseling, treatment team meetings to assist with stabilization     Safety Plan:  Writer initiates safety plan at time of assessment and will be reviewed again in a group setting      Initial Discharge Plan:  Stabilize mood and medications; explore social support systems within community to increase socialization; provide 24/7 local and national hotline numbers for additional support; safety plan to be completed; disclose/discuss suicidal ideas will improve, decrease depressive symptoms, absence of self-harm; TBD. PAULA working on AOD inpatient treatment and then will be linked with a [address]     Clinical Summary:   Client is met on this date and was alert, fully oriented, and cooperative. Clients mood is congruent with his facial expressions. Client presents with good eye contact and cooperative during assessment. Client does present with bad body odor and given a sweat-suit to put on after he showers. Eduardo Aguero is a 24-year old male who presents to [facility] ED with a suicide plan to overdose on medications and later states he is having homicidal ideations to shoot his parents with a shotgun. Client reports to ED staff he does \"not have access to a shotgun\" so duty to warn was not needed. Client endorses a history of depression.  He reports that his symptoms have been building up for more than 2 weeks lately and he has noticed difficulty falling asleep and staying asleep, anhedonia, feelings of worthlessness and helplessness, decreased energy and concentration, hopelessness for the future, decreased appetite, and intermittent suicidal ideation. Felicie Pretty denies a current plan or intent but states that he thinks about suicide \"all the time\". Client also reports an increase in drug use and states he drinks alcohol, IV use of heroin/fentanyl, marijuana and meth. Client reports he has been diagnosed with Schizoaffective disorder in the past but has been off his medications for a long time.   Client reports he has attempted suicide in the past via drug overdose and has been \"Narcan back to life\". Client reports both his parents has substance abuse and mental health issues. Client reports he has had hallucinations and delusions in the past and feels they are starting to come back because of being off his medications. Client reports he using drugs to stop his \"bad thoughts and bad behaviors\". Client reports he hitchhiked to [facility] from [facility] [place] to get into [employer]. Client states he was going to go there but was able to make some money so he started using drugs. Client states he graduated from high school, went to college but never graduated because \"I started using all kinds of drugs to keep me up. \"  Client endorses a history of 7-months of sobriety and \"wants to get better again\". Client reports he has been in and out of inpatient psychiatric hospitals as well as AOD treatment centers \"ever since I started using drugs in college\". Client states his whole family has disowned him other than his grandfather Marianna Ahumada who lives in [facility]. Client reports he can't live with him or anyone else because he stole from them and has a history of multiple burglary charges. Client is originally from [place], [place] and that is where he went to high school and his family lives. Client reports his mother and sister have schizophrenia, father has bipolar and both parents use drugs and alcohol.     Client is interested in a long-term, inpatient AOD treatment facility and writer provides him with the selection of places and talks to him about either a large facility like [facility], [place] or [facility].   Client prefers to stay in the [facility] area.

## 2022-07-11 NOTE — PLAN OF CARE
[facility]  Initial Interdisciplinary Treatment Plan NO      Original treatment plan Date & Time: 7/11/2022 0756    Admission Type:  Admission Type: Voluntary    Reason for admission:   Reason for Admission: suicidal states he tried to overdose on Fentanyl a few times in the past few days, also reports plan to shoot self with gun    Estimated Length of Stay:  5-7days  Estimated Discharge Date: to be determined by physician    PATIENT STRENGTHS:  Patient Strengths:   Patient Strengths and Limitations:   Addictive Behavior: Addictive Behavior  In the Past 3 Months, Have You Felt or Has Someone Told You That You Have a Problem With  : None  Medical Problems:  Past Medical History:   Diagnosis Date    Anxiety     Bipolar 1 disorder ([facility]     Drug abuse in remission ([facility]     H/O alcohol abuse     Hep C w/o coma, chronic (HCC)      Status EXAM:Mental Status and Behavioral Exam  Normal: No  Level of Assistance: Independent/Self  Facial Expression: Flat,Worried  Affect: Appropriate,Congruent  Level of Consciousness: Alert  Frequency of Checks: 4 times per hour, close  Mood:Normal: No  Mood: Depressed,Anxious,Helpless  Motor Activity:Normal: Yes  Eye Contact: Poor  Observed Behavior: Withdrawn,Cooperative,Preoccupied  Sexual Misconduct History: Current - no  Preception: [place] to person,[place] to time,[place] to place,[place] to situation  Attention:Normal: No  Attention: Distractible  Thought Processes: Other (comment) (coherent)  Thought Content:Normal: No  Thought Content: Paranoia,Preoccupations  Depression Symptoms: Feelings of helplessness,Feelings of hopelessess,Loss of interest,Impaired concentration  Anxiety Symptoms: Generalized  Janet Symptoms: No problems reported or observed. Hallucinations:  Other (comment) (pt reports only hearing voices when doing drugs)  Delusions: No  Memory:Normal: No  Memory: Poor remote  Insight and Judgment: No  Insight and Judgment: Poor judgment,Poor insight    EDUCATION:   Learner Progress Toward Treatment Goals: reviewed group plans and strategies for care    Method:group therapy, medication compliance, individualized assessments and care planning    Outcome: needs reinforcement    PATIENT GOALS: to be discussed with patient within 72 hours    PLAN/TREATMENT RECOMMENDATIONS:     continue group therapy , medications compliance, goal setting, individualized assessments and care, continue to monitor pt on unit      SHORT-TERM GOALS:   Time frame for Short-Term Goals: 5-7 days    LONG-TERM GOALS:  Time frame for Long-Term Goals: 6 months  Members Present in Team Meeting: See Signature Sheet    BENJAMIN Law

## 2022-07-12 VITALS
BODY MASS INDEX: 30.8 KG/M2 | WEIGHT: 240 LBS | HEIGHT: 74 IN | RESPIRATION RATE: 14 BRPM | DIASTOLIC BLOOD PRESSURE: 69 MMHG | TEMPERATURE: 97.9 F | HEART RATE: 40 BPM | SYSTOLIC BLOOD PRESSURE: 113 MMHG

## 2022-07-12 LAB — TSH SERPL DL<=0.05 MIU/L-ACNC: 0.42 UIU/ML (ref 0.3–5)

## 2022-07-12 PROCEDURE — G0378 HOSPITAL OBSERVATION PER HR: HCPCS

## 2022-07-12 PROCEDURE — 36415 COLL VENOUS BLD VENIPUNCTURE: CPT

## 2022-07-12 PROCEDURE — 6370000000 HC RX 637 (ALT 250 FOR IP): Performed by: INTERNAL MEDICINE

## 2022-07-12 PROCEDURE — 6370000000 HC RX 637 (ALT 250 FOR IP): Performed by: PSYCHIATRY & NEUROLOGY

## 2022-07-12 PROCEDURE — 99239 HOSP IP/OBS DSCHRG MGMT >30: CPT | Performed by: PSYCHIATRY & NEUROLOGY

## 2022-07-12 PROCEDURE — 84443 ASSAY THYROID STIM HORMONE: CPT

## 2022-07-12 RX ORDER — ESCITALOPRAM OXALATE 5 MG/1
15 TABLET ORAL DAILY
Qty: 90 TABLET | Refills: 0 | Status: SHIPPED | OUTPATIENT
Start: 2022-07-12

## 2022-07-12 RX ORDER — PALIPERIDONE 3 MG/1
3 TABLET, EXTENDED RELEASE ORAL EVERY MORNING
Qty: 30 TABLET | Refills: 3 | Status: SHIPPED | OUTPATIENT
Start: 2022-07-13

## 2022-07-12 RX ORDER — POTASSIUM CHLORIDE 20 MEQ/1
20 TABLET, EXTENDED RELEASE ORAL
Qty: 60 TABLET | Refills: 3 | Status: SHIPPED | OUTPATIENT
Start: 2022-07-13

## 2022-07-12 RX ORDER — NICOTINE 21 MG/24HR
1 PATCH, TRANSDERMAL 24 HOURS TRANSDERMAL DAILY
Status: DISCONTINUED | OUTPATIENT
Start: 2022-07-12 | End: 2022-07-12 | Stop reason: HOSPADM

## 2022-07-12 RX ADMIN — PALIPERIDONE 3 MG: 3 TABLET, EXTENDED RELEASE ORAL at 11:01

## 2022-07-12 RX ADMIN — POTASSIUM CHLORIDE 20 MEQ: 1500 TABLET, EXTENDED RELEASE ORAL at 11:01

## 2022-07-12 RX ADMIN — ESCITALOPRAM OXALATE 15 MG: 10 TABLET ORAL at 11:01

## 2022-07-12 NOTE — GROUP NOTE
Group Therapy Note    Date: 7/12/2022    Group Start Time: 1000  Group End Time: 4374  Group Topic: Psychotherapy    [facility]    Gillian Miner        Group Therapy Note         Patient refused to attend psychotherapy group after encouragement from staff. 1:1 talk time offered but refused. Signature:   Gillian Miner

## 2022-07-12 NOTE — DISCHARGE SUMMARY
DISCHARGE SUMMARY      Patient ID:  Pooja Johnson  [id number]  62 y.o.  1995    Admit date: 7/10/2022    Discharge date and time: 7/12/2022    Disposition: Home     Admitting Physician: Ashli Del Cid MD     Discharge Physician: Dr Valentino Oiler MD    Admission Diagnoses: Depression with SI    Admission Condition: poor    Discharged Condition: stable    Admission Circumstance: Pooja Johnson is a 27 y.o. male who has a past medical history of Schizoaffective, Bipolar, Hep C, and Opioid Abuse . Patient presented to the ED stating he was having thoughts to kill himself by overdosing on fentanyl.     Per ED documentation:  Mckinley Gonzalez a 27 y. o. male who presents with suicidal ideations as well as plan.  Past medical history significant for anxiety, bipolar 1 disorder, drug and alcohol abuse.  Patient states that he started himself 2 times now with fentanyl.  Has been unsuccessful.  States that he has a plan to get his friend's gun and shoot himself in the head.  States he has not been on his medications.  Denies any auditory, visual hallucinations.  Denies any homicidal ideations.  Otherwise denies any chest pain, shortness of breath, nausea, vomiting, diarrhea, blurry vision, double vision, headache, one-sided wheeze, slurred speech, difficulty swallowing.     Patient was laying in bed upon approached eyes closed and was agreeable to answering only a few questions at this time. He showed no insight regarding his illness and even though he admits to attempting to end his life yesterday but overdosing on fentanyl he denies low mood, difficulty with sleep or appetite or lack of interest or decreased energy. He does endorse symptoms of anxiety such as excess worry, feeling restless and edgy, and displays poor concentration.     The patient denies going days without sleep or engaging in risk taking behavior despite his many years of substance abuse.  He denies hallucinations or feeling like people or watching or talking about him. He denies anxiety episodes in which he experiences nausea, palpitations or trembling. He denies intrusive persistent thoughts or behaviors. He denies experiencing any traumatic events in his lifetime. He denies any phobias, intense anger outbursts or poor self esteem. He stated his appetite is fair and denies any binging or purging. Patient denies any legal history.     The patient was laying in his bed in and out of sleep throughout the interview. His attention was poor and he appeared irritable. He stated he is here from [place] visiting friends and now he just wants to get back home. He admits to being off his psychiatric medications for the past 10 days due to running out of them. After a few minutes the patient stated, \"no more questions. \"     Later in the morning patient is willing to discuss his medications and feels that Abilify and Lexapro have been beneficial. He again states his last doses were \"bout 10 days ago\". He is agreeable to restart and titrate to prior dosages. PAST MEDICAL/PSYCHIATRIC HISTORY:   Past Medical History:   Diagnosis Date    Anxiety     Bipolar 1 disorder ([facility]     Drug abuse in remission ([facility]     H/O alcohol abuse     Hep C w/o coma, chronic (HCC)        FAMILY/SOCIAL HISTORY:  History reviewed. No pertinent family history. Social History     Socioeconomic History    Marital status: Single     Spouse name: Not on file    Number of children: Not on file    Years of education: Not on file    Highest education level: Not on file   Occupational History    Not on file   Tobacco Use    Smoking status: Current Every Day Smoker     Packs/day: 0.50     Years: 7.00     Pack years: 3.50     Types: Cigarettes    Smokeless tobacco: Former User     Types: Snuff   Vaping Use    Vaping Use: Never used   Substance and Sexual Activity    Alcohol use: Yes    Drug use: Yes     Frequency: 7.0 times per week     Types:  Other-see comments     Comment: benzo's, fentanyl  Sexual activity: Not Currently   Other Topics Concern    Not on file   Social History Narrative    Not on file     Social Determinants of Health     Financial Resource Strain:     Difficulty of Paying Living Expenses: Not on file   Food Insecurity:     Worried About Running Out of Food in the Last Year: Not on file    Hansel of Food in the Last Year: Not on file   Transportation Needs:     Lack of Transportation (Medical): Not on file    Lack of Transportation (Non-Medical):  Not on file   Physical Activity:     Days of Exercise per Week: Not on file    Minutes of Exercise per Session: Not on file   Stress:     Feeling of Stress : Not on file   Social Connections:     Frequency of Communication with Friends and Family: Not on file    Frequency of Social Gatherings with Friends and Family: Not on file    Attends Rastafarian Services: Not on file    Active Member of [address] [place] [address] [employer] or Organizations: Not on file    Attends Club or Organization Meetings: Not on file    Marital Status: Not on file   Intimate Partner Violence:     Fear of Current or Ex-Partner: Not on file    Emotionally Abused: Not on file    Physically Abused: Not on file    Sexually Abused: Not on file   Housing Stability:     Unable to Pay for Housing in the Last Year: Not on file    Number of [place] in the Last Year: Not on file    Unstable Housing in the Last Year: Not on file       MEDICATIONS:    Current Facility-Administered Medications:     nicotine (NICODERM CQ) 14 MG/24HR 1 patch, 1 patch, TransDERmal, Daily, Uday Adams MD, 1 patch at 07/12/22 1101    potassium chloride (KLOR-CON M) extended release tablet 20 mEq, 20 mEq, Oral, Daily with breakfast, Chato Brock MD, 20 mEq at 07/12/22 1101    escitalopram (LEXAPRO) tablet 15 mg, 15 mg, Oral, Daily, Uday Adams MD, 15 mg at 07/12/22 1101    paliperidone (INVEGA) extended release tablet 3 mg, 3 mg, Oral, QAM, Uday Adams MD, 3 mg at 07/12/22 1101   acetaminophen (TYLENOL) tablet 650 mg, 650 mg, Oral, Q6H PRN, Paige Ferraro MD    ibuprofen (ADVIL;MOTRIN) tablet 400 mg, 400 mg, Oral, Q6H PRN, Paige Ferraro MD    hydrOXYzine HCl (ATARAX) tablet 50 mg, 50 mg, Oral, TID PRN, Paige Ferraro MD, 50 mg at 07/11/22 2052    polyethylene glycol (GLYCOLAX) packet 17 g, 17 g, Oral, Daily PRN, Paige Ferraro MD    aluminum & magnesium hydroxide-simethicone (MAALOX) 200-200-20 MG/5ML suspension 30 mL, 30 mL, Oral, Q6H PRN, Paige Ferraro MD    traZODone (DESYREL) tablet 50 mg, 50 mg, Oral, Nightly PRN, Paige Ferraro MD, 50 mg at 07/11/22 2052    Examination:  /69   Pulse (!) 40   Temp 97.9 °F (36.6 °C) (Oral)   Resp 14   Ht 6' 2\" (1.88 m)   Wt 240 lb (108.9 kg)   BMI 30.81 kg/m²   Gait - steady    HOSPITAL COURSE[de-identified]  Following admission to the hospital, patient had a complete physical exam and blood work up. The patient was referred to Internal Medicine. Patient was monitored closely with suicide precaution  Patient was started on Invega and Lexapro. He responded well to that. Was encouraged to participate in group and other milieu activity  Patient started to feel better with this combination of treatment. Significant progress in the symptoms since admission. Mood is improved  The patient denies AVH or paranoid thoughts  The patient denies any hopelessness or worthlessness  No active SI/HI  Appetite:  [x] Normal  [] Increased  [] Decreased    Sleep:       [x] Normal  [] Fair       [] Poor            Energy:    [x] Normal  [] Increased  [] Decreased     SI [] Present  [x] Absent  HI  []Present  [x] Absent   Aggression:  [] yes  [] no  Patient is [x] able  [] unable to CONTRACT FOR SAFETY   Medication side effects(SE):  [x] None(Psych.  Meds.) [] Other      Mental Status Examination on discharge:    Level of consciousness:  within normal limits   Appearance:  well-appearing  Behavior/Motor:  no abnormalities noted  Attitude toward examiner:  attentive and good eye contact  Speech:  spontaneous, normal rate and normal volume   Mood: anxious  Affect:  mood congruent  Thought processes:  linear, goal directed and coherent   Thought content:  Suicidal Ideation:  denies suicidal ideation  Delusions:  no evidence of delusions  Perceptual Disturbance:  denies any perceptual disturbance  Cognition:  oriented to person, place, and time   Concentration intact  Memory intact  Insight good   Judgement fair   Fund of Knowledge adequate      ASSESSMENT:  Patient symptoms are:  [x] Well controlled  [x] Improving  [] Worsening  [] No change      Diagnosis:  Principal Problem:    Depression with suicidal ideation  Active Problems:    Hypokalemia    Abuse of smoked substance ([facility]    Severe opioid use disorder ([facility]    IVDU (intravenous drug user)    Polysubstance abuse ([facility]    Methamphetamine use ([facility]  Resolved Problems:    * No resolved hospital problems. *      LABS:    Recent Labs     07/10/22  1231   WBC 8.2   HGB 12.2*        Recent Labs     07/10/22  1231      K 3.2*      CO2 26   BUN 12   CREATININE 0.54*   GLUCOSE 96     Recent Labs     07/10/22  1231   BILITOT 0.71   ALKPHOS 69   AST 49*   ALT 35     Lab Results   Component Value Date/Time    LABAMPH Neg 06/13/2022 07:45 PM    BARBSCNU NEGATIVE 07/10/2022 02:53 PM    LABBENZ NEGATIVE 07/10/2022 02:53 PM    COCAINESCRN Negative 10/19/2021 10:06 PM    LABMETH NEGATIVE 07/10/2022 02:53 PM    OPIATESCREENURINE Neg 06/13/2022 07:45 PM    PHENCYCLIDINESCREENURINE Neg 06/13/2022 07:45 PM    PPXUR NOT REPORTED 11/01/2021 01:38 AM    ETOH <10 05/16/2022 08:16 PM     Lab Results   Component Value Date/Time    TSH 0.42 07/12/2022 06:47 AM     Lab Results   Component Value Date    LITHIUM <0.2 (L) 03/20/2019     Lab Results   Component Value Date    VALPROATE <2.8 (L) 05/16/2022       RISK ASSESSMENT AT DISCHARGE: Low risk for suicide and homicide.

## 2022-07-12 NOTE — BH NOTE
Patient given tobacco quitline number 37065792710 at this time, refusing to call at this time, states \" I just dont want to quit now\"- patient given information as to the dangers of long term tobacco use. Continue to reinforce the importance of tobacco cessation.

## 2022-07-12 NOTE — BH NOTE
Pt reports \"Look, I'm about to need shots. I just want to go home. Just tell the pharmacy to transfer my meds. I probably won't take them anyway\". Message left for Ebenezer Dao in outpatient pharmacy to return call for phone number to transfer scripts.

## 2022-07-12 NOTE — GROUP NOTE
Group Therapy Note    Date: 7/11/2022    Group Start Time: 2030  Group End Time: 2050  Group Topic: Wrap-Up    [facility]    Moni Tomlinson RN        Group Therapy Note    Attendees: 11/20         Patient's Goal:  Better outlook      Status After Intervention:  Unchanged    Participation Level:  Active Listener    Participation Quality: Appropriate      Speech:  normal      Thought Process/Content: Logical      Affective Functioning: Blunted      Level of consciousness:  Alert      Response to Learning: Able to verbalize current knowledge/experience      Endings: None Reported    Modes of Intervention: Education      Discipline Responsible: [employer]hav[employer]rey [employer]      Signature:  Moni Tomlinson RN

## 2022-07-12 NOTE — BH NOTE
Spoke with Ana Rosa Rahman in out patient pharmacy. Requested that scripts are sent to [facility] on [address]. In [facility]. [phone].

## 2022-07-12 NOTE — BH NOTE
[facility]  Discharge Note    Pt discharged with followings belongings:   Dental Appliances: None  Vision - Corrective Lenses: None  Hearing Aid: None  Jewelry: None  Body Piercings Removed: N/A  Clothing: Footwear,Pants,Shirt  Other Valuables: Lighter/Matches   Valuables sent home with patient or returned to patient. Patient education on aftercare instructions: Yes  Information faxed to [address] [place] [address] by Argelia Fernández  at 3:27 PM .Patient verbalize understanding of AVS:  yes. Status EXAM upon discharge: Alert and oriented x 4, Denies suicidal ideation and thoughts of harm to self and others. Mental Status and Behavioral Exam  Normal: No  Level of Assistance: Independent/Self  Facial Expression: Flat  Affect: Appropriate  Level of Consciousness: Alert  Frequency of Checks: 4 times per hour, close  Mood:Normal: No  Mood: Depressed,Anxious  Motor Activity:Normal: Yes  Eye Contact: Good  Observed Behavior: Cooperative,Preoccupied  Sexual Misconduct History: Current - no  Preception: Ideal to person,Ideal to time,Ideal to place,Ideal to situation  Attention:Normal: No  Attention: Distractible  Thought Processes: Circumstantial  Thought Content:Normal: No  Thought Content: Preoccupations  Depression Symptoms: Change in energy level  Anxiety Symptoms: Generalized  Janet Symptoms: No problems reported or observed.   Hallucinations: None  Delusions: No  Memory:Normal: No  Memory: Poor recent,Poor remote  Insight and Judgment: No  Insight and Judgment: Poor judgment,Poor insight,Unmotivated    Tobacco Screening:  Practical Counseling, on admission, aron X, if applicable and completed (first 3 are required if patient doesn't refuse):            ( ) Recognizing danger situations (included triggers and roadblocks)                    ( ) Coping skills (new ways to manage stress,relaxation techniques, changing routine, distraction)                                                           ( ) Basic information about quitting (benefits of quitting, techniques in how to quit, available resources  ( ) Referral for counseling faxed to [facility]( ) Patient refused counseling  ( ) Patient refused referral  ( ) Patient refused prescription upon discharge  ( ) Patient has not smoked in the last 30 days    Metabolic Screening:    Lab Results   Component Value Date    LABA1C 5.0 08/15/2019       Lab Results   Component Value Date    CHOL 107 05/16/2022    CHOL 126 03/24/2022    CHOL 132 08/15/2019    CHOL 144 03/20/2019    CHOL 128 01/22/2019     Lab Results   Component Value Date    TRIG 74 05/16/2022    TRIG 79 03/24/2022    TRIG 200 (H) 08/15/2019    TRIG 83 03/20/2019    TRIG 146 01/22/2019     Lab Results   Component Value Date    HDL 35 (L) 05/16/2022    HDL 43 03/24/2022    HDL 41 08/15/2019    HDL 42 03/20/2019    HDL 37 (L) 01/22/2019     No components found for: LDLCAL  No results found for: LABVLDL    Mr. Yani Tobias was discharged to the [address], via Lyft/insurrance cab. All personal and valuable belongings were on his person at discharge.      Cristel Watts RN

## 2022-07-12 NOTE — BH NOTE
Writer faxed AVS to [facility] [place] @ [phone], confirmation form received.

## 2022-07-13 LAB
EKG ATRIAL RATE: 69 BPM
EKG P AXIS: 48 DEGREES
EKG P-R INTERVAL: 150 MS
EKG Q-T INTERVAL: 422 MS
EKG QRS DURATION: 92 MS
EKG QTC CALCULATION (BAZETT): 452 MS
EKG R AXIS: 32 DEGREES
EKG T AXIS: 19 DEGREES
EKG VENTRICULAR RATE: 69 BPM

## 2022-07-13 NOTE — CARE COORDINATION
Name: Won Coy    : 1995    Discharge Date:     Primary Auth/Cert #: N/A-obs status    Destination: [facility]    Discharge Medications:      Medication List      START taking these medications    potassium chloride 20 MEQ extended release tablet  Commonly known as: KLOR-CON M  Take 1 tablet by mouth daily (with breakfast)  Notes to patient: Supplement        CONTINUE taking these medications    escitalopram 5 MG tablet  Commonly known as: LEXAPRO  Take 3 tablets by mouth daily  Notes to patient: Depression and anxiety     paliperidone 3 MG extended release tablet  Commonly known as: INVEGA  Take 1 tablet by mouth every morning  Notes to patient: Clear thoughts           Where to Get Your Medications      These medications were sent to [facility] [address]  [facility] [address], [address] [facility] [address]    Phone: [phone]   · escitalopram 5 MG tablet  · paliperidone 3 MG extended release tablet  · potassium chloride 20 MEQ extended release tablet         Follow Up Appointment: Please discharge PT to:   [address], [place] [address]  [phone]  On 2022  Ptvwill be transported by [facility] Medicaid cab     [address]   [address] [facility] [phone]  On 2022  Please attend a walk in appointment Monday Through Thursday from 8:00am -2:00pm to re-establish services  with [address] [place], [place] [address]  Phone Number: [phone]   Go on 2022  Please call [facility] transportation cab to [place] for shelter services

## 2022-07-30 ENCOUNTER — HOSPITAL ENCOUNTER (EMERGENCY)
Age: 27
Discharge: HOME | End: 2022-07-30
Attending: EMERGENCY MEDICINE
Payer: COMMERCIAL

## 2022-07-30 VITALS
WEIGHT: 220 LBS | BODY MASS INDEX: 28.23 KG/M2 | TEMPERATURE: 97.9 F | HEART RATE: 64 BPM | SYSTOLIC BLOOD PRESSURE: 119 MMHG | RESPIRATION RATE: 17 BRPM | HEIGHT: 74 IN | DIASTOLIC BLOOD PRESSURE: 76 MMHG | OXYGEN SATURATION: 100 %

## 2022-07-30 DIAGNOSIS — F19.10 POLYSUBSTANCE ABUSE (HCC): Primary | ICD-10-CM

## 2022-07-30 PROCEDURE — 99283 EMERGENCY DEPT VISIT LOW MDM: CPT

## 2022-07-30 ASSESSMENT — ENCOUNTER SYMPTOMS
SHORTNESS OF BREATH: 0
ABDOMINAL PAIN: 0
CHEST TIGHTNESS: 0
EYE PAIN: 0
VOMITING: 0
SORE THROAT: 0
NAUSEA: 0

## 2022-07-30 ASSESSMENT — PAIN - FUNCTIONAL ASSESSMENT: PAIN_FUNCTIONAL_ASSESSMENT: NONE - DENIES PAIN

## 2022-07-30 NOTE — ED PROVIDER NOTES
[address] [facility] ED  EMERGENCY DEPARTMENT ENCOUNTER      Pt Name: Jagdeep Amin. MRN: [id number]  Daydaygfcarolyn 1995  Date of evaluation: 7/30/2022  Provider: Kajal Sifuentes DO    CHIEF COMPLAINT       Chief Complaint   Patient presents with    Other     [facility] crisis center called and stated pt is on meth and did not want him there  Pt states that he \"I shoot up 4g meth a day\"  Pt states he has been doing this since 12yrs old and can only be off of it for 6 hrs  Pt states that he wants help          HISTORY OF PRESENT ILLNESS   (Location/Symptom, Timing/Onset, Context/Setting, Quality, Duration, Modifying Factors, Severity)  Note limiting factors. Jagdeep Barton is a 32 y.o. male who presents to the emergency department . Patient with history of IV meth use and IV Suboxone use presents wanting help with his drug addiction. Patient admits that he was sent to [facility] in [facility] 10 days ago. He states that they offered him an outpatient treatment program and he felt that there were too many temptations and that was not going to work. Patient does have schizoaffective bipolar disorder and has not been taking his [place] because he states when you are on meth you do not take your medications. He is not suicidal or homicidal.  He states he has been using meth since he was 11years old and his father also did meth. HPI    Nursing Notes were reviewed. REVIEW OF SYSTEMS    (2-9 systems for level 4, 10 or more for level 5)     Review of Systems   Constitutional:  Negative for activity change, appetite change and fatigue. HENT:  Negative for congestion and sore throat. Eyes:  Negative for pain and visual disturbance. Respiratory:  Negative for chest tightness and shortness of breath. Cardiovascular:  Negative for chest pain. Gastrointestinal:  Negative for abdominal pain, nausea and vomiting. Endocrine: Negative for polydipsia.    Genitourinary:  Negative for flank pain and urgency. Musculoskeletal:  Negative for gait problem and neck stiffness. Skin:  Negative for rash. Neurological:  Negative for weakness, light-headedness and headaches. Psychiatric/Behavioral:  Positive for agitation. Negative for confusion, sleep disturbance and suicidal ideas. The patient is nervous/anxious. Except as noted above the remainder of the review of systems was reviewed and negative. PAST MEDICAL HISTORY     Past Medical History:   Diagnosis Date    Anxiety     Bipolar 1 disorder ([facility]     Drug abuse in remission ([facility]     H/O alcohol abuse     Hep C w/o coma, chronic ([facility]          SURGICAL HISTORY       Past Surgical History:   Procedure Laterality Date    ABSCESS DRAINAGE Right 10/13/2020    Irrigation and debridement    HAND SURGERY Right          CURRENT MEDICATIONS       Discharge Medication List as of 7/30/2022 11:21 AM        CONTINUE these medications which have NOT CHANGED    Details   paliperidone (INVEGA) 3 MG extended release tablet Take 1 tablet by mouth every morning, Disp-30 tablet, R-3Normal      escitalopram (LEXAPRO) 5 MG tablet Take 3 tablets by mouth daily, Disp-90 tablet, R-0Normal      potassium chloride (KLOR-CON M) 20 MEQ extended release tablet Take 1 tablet by mouth daily (with breakfast), Disp-60 tablet, R-3Normal             ALLERGIES     Patient has no known allergies. FAMILY HISTORY     No family history on file. SOCIAL HISTORY       Social History     Socioeconomic History    Marital status: Single   Tobacco Use    Smoking status: Every Day     Packs/day: 0.50     Years: 7.00     Pack years: 3.50     Types: Cigarettes    Smokeless tobacco: Former     Types: Snuff   Vaping Use    Vaping Use: Never used   Substance and Sexual Activity    Alcohol use: Yes    Drug use: Yes     Frequency: 7.0 times per week     Types:  Other-see comments, Methamphetamines (Crystal Meth)     Comment: benzo's, fentanyl       SCREENINGS        [place] Coma Scale  Eye Opening: Spontaneous  Best Verbal Response: Oriented  Best Motor Response: Obeys commands  [place] Coma Scale Score: 15               PHYSICAL EXAM    (up to 7 for level 4, 8 or more for level 5)     ED Triage Vitals [07/30/22 0802]   BP Temp Temp Source Heart Rate Resp SpO2 Height Weight   119/76 97.9 °F (36.6 °C) Temporal 64 17 100 % 6' 2\" (1.88 m) 220 lb (99.8 kg)       Physical Exam  Constitutional:       General: He is not in acute distress. Appearance: He is well-developed. He is not diaphoretic. HENT:      Head: Normocephalic and atraumatic. Right Ear: External ear normal.      Left Ear: External ear normal.      Nose: Nose normal.      Mouth/Throat:      Mouth: Mucous membranes are moist.      Pharynx: No oropharyngeal exudate. Eyes:      Extraocular Movements: Extraocular movements intact. Conjunctiva/sclera: Conjunctivae normal.      Pupils: Pupils are equal, round, and reactive to light. Neck:      Thyroid: No thyromegaly. Vascular: No JVD. Trachea: No tracheal deviation. Cardiovascular:      Rate and Rhythm: Normal rate. Heart sounds: Normal heart sounds. No murmur heard. Pulmonary:      Effort: Pulmonary effort is normal. No respiratory distress. Breath sounds: Normal breath sounds. No wheezing. Abdominal:      General: Bowel sounds are normal.      Palpations: Abdomen is soft. Tenderness: There is no abdominal tenderness. There is no guarding. Musculoskeletal:         General: Normal range of motion. Cervical back: Normal range of motion and neck supple. Right lower leg: No edema. Left lower leg: No edema. Skin:     General: Skin is warm and dry. Findings: No rash. Neurological:      General: No focal deficit present. Mental Status: He is alert and oriented to person, place, and time. Cranial Nerves: No cranial nerve deficit. Psychiatric:      Comments: Patient very agitated perseverating.   Has difficulty staying on 1 thought. DIAGNOSTIC RESULTS     EKG: All EKG's are interpreted by the Emergency Department Physician who either signs or Co-signs this chart in the absence of a cardiologist.        RADIOLOGY:   Non-plain film images such as CT, Ultrasound and MRI are read by the radiologist. Plain radiographic images are visualized and preliminarily interpreted by the emergency physician with the below findings:        Interpretation per the Radiologist below, if available at the time of this note:    No orders to display         ED BEDSIDE ULTRASOUND:   Performed by ED Physician - none    LABS:  Labs Reviewed - No data to display    All other labs were within normal range or not returned as of this dictation. EMERGENCY DEPARTMENT COURSE and DIFFERENTIAL DIAGNOSIS/MDM:   Vitals:    Vitals:    07/30/22 0802   BP: 119/76   Pulse: 64   Resp: 17   Temp: 97.9 °F (36.6 °C)   TempSrc: Temporal   SpO2: 100%   Weight: 220 lb (99.8 kg)   Height: 6' 2\" (1.88 m)       Patient is here with polysubstance abuse. Lets get real came to see him and he will be going to [address] [place] [address]   Total Critical Care time was 0 minutes, excluding separately reportable procedures. There was a high probability of clinically significant/life threatening deterioration in the patient's condition which required my urgent intervention. CONSULTS:  None    PROCEDURES:  Unless otherwise noted below, none     Procedures      FINAL IMPRESSION      1. Polysubstance abuse [facility])          DISPOSITION/PLAN   DISPOSITION Decision To Discharge 07/30/2022 11:21:15 AM      PATIENT REFERRED TO:  [facility]          DISCHARGE MEDICATIONS:  Discharge Medication List as of 7/30/2022 11:21 AM        Controlled Substances Monitoring:     No flowsheet data found.     (Please note that portions of this note were completed with a voice recognition program.  Efforts were made to edit the dictations but occasionally words are mis-transcribed.)    Francia Morelos DO (electronically signed)  Attending Emergency Physician            Francia Morelos DO  07/30/22 0447

## 2022-07-30 NOTE — ED NOTES
Received report from Wai Gomez, [address] [place] [address].       Luisana Gandhi RN  07/30/22 4586

## 2022-09-17 ENCOUNTER — HOSPITAL ENCOUNTER (EMERGENCY)
Age: 27
Discharge: PSYCHIATRIC OR SUBSTANCE USE FACILITY | End: 2022-09-18
Attending: EMERGENCY MEDICINE
Payer: COMMERCIAL

## 2022-09-17 VITALS
BODY MASS INDEX: 29.52 KG/M2 | RESPIRATION RATE: 20 BRPM | WEIGHT: 230 LBS | SYSTOLIC BLOOD PRESSURE: 132 MMHG | HEART RATE: 92 BPM | TEMPERATURE: 98.3 F | DIASTOLIC BLOOD PRESSURE: 78 MMHG | HEIGHT: 74 IN | OXYGEN SATURATION: 97 %

## 2022-09-17 DIAGNOSIS — F19.10 POLYSUBSTANCE ABUSE (HCC): ICD-10-CM

## 2022-09-17 DIAGNOSIS — R45.851 SUICIDAL IDEATION: Primary | ICD-10-CM

## 2022-09-17 LAB
ACETAMINOPHEN LEVEL: <5 UG/ML (ref 15–30)
ALBUMIN SERPL-MCNC: 4.7 GM/DL (ref 3.4–5)
ALCOHOL SCREEN SERUM: <0.01 %WT/VOL
ALP BLD-CCNC: 72 IU/L (ref 40–129)
ALT SERPL-CCNC: 26 U/L (ref 10–40)
AMPHETAMINES: ABNORMAL
ANION GAP SERPL CALCULATED.3IONS-SCNC: 11 MMOL/L (ref 4–16)
AST SERPL-CCNC: 38 IU/L (ref 15–37)
BARBITURATE SCREEN URINE: NEGATIVE
BASOPHILS ABSOLUTE: 0 K/CU MM
BASOPHILS RELATIVE PERCENT: 0.3 % (ref 0–1)
BENZODIAZEPINE SCREEN, URINE: NEGATIVE
BILIRUB SERPL-MCNC: 0.7 MG/DL (ref 0–1)
BUN BLDV-MCNC: 10 MG/DL (ref 6–23)
CALCIUM SERPL-MCNC: 9.3 MG/DL (ref 8.3–10.6)
CANNABINOID SCREEN URINE: ABNORMAL
CHLORIDE BLD-SCNC: 107 MMOL/L (ref 99–110)
CO2: 24 MMOL/L (ref 21–32)
COCAINE METABOLITE: NEGATIVE
CREAT SERPL-MCNC: 0.4 MG/DL (ref 0.9–1.3)
DIFFERENTIAL TYPE: ABNORMAL
DOSE AMOUNT: ABNORMAL
DOSE AMOUNT: ABNORMAL
DOSE TIME: ABNORMAL
DOSE TIME: ABNORMAL
EOSINOPHILS ABSOLUTE: 0.2 K/CU MM
EOSINOPHILS RELATIVE PERCENT: 1.9 % (ref 0–3)
GFR AFRICAN AMERICAN: >60 ML/MIN/1.73M2
GFR NON-AFRICAN AMERICAN: >60 ML/MIN/1.73M2
GLUCOSE BLD-MCNC: 88 MG/DL (ref 70–99)
HCT VFR BLD CALC: 36.2 % (ref 42–52)
HEMOGLOBIN: 12.4 GM/DL (ref 13.5–18)
IMMATURE NEUTROPHIL %: 0.3 % (ref 0–0.43)
LYMPHOCYTES ABSOLUTE: 2.5 K/CU MM
LYMPHOCYTES RELATIVE PERCENT: 32.6 % (ref 24–44)
MCH RBC QN AUTO: 30.8 PG (ref 27–31)
MCHC RBC AUTO-ENTMCNC: 34.3 % (ref 32–36)
MCV RBC AUTO: 90 FL (ref 78–100)
MONOCYTES ABSOLUTE: 0.5 K/CU MM
MONOCYTES RELATIVE PERCENT: 6.2 % (ref 0–4)
NUCLEATED RBC %: 0 %
OPIATES, URINE: NEGATIVE
OXYCODONE: NEGATIVE
PDW BLD-RTO: 13.7 % (ref 11.7–14.9)
PHENCYCLIDINE, URINE: NEGATIVE
PLATELET # BLD: 237 K/CU MM (ref 140–440)
PMV BLD AUTO: 9.9 FL (ref 7.5–11.1)
POTASSIUM SERPL-SCNC: 3.8 MMOL/L (ref 3.5–5.1)
RBC # BLD: 4.02 M/CU MM (ref 4.6–6.2)
SALICYLATE LEVEL: <0.3 MG/DL (ref 15–30)
SARS-COV-2, NAAT: NOT DETECTED
SEGMENTED NEUTROPHILS ABSOLUTE COUNT: 4.6 K/CU MM
SEGMENTED NEUTROPHILS RELATIVE PERCENT: 58.7 % (ref 36–66)
SODIUM BLD-SCNC: 142 MMOL/L (ref 135–145)
SOURCE: NORMAL
TOTAL IMMATURE NEUTOROPHIL: 0.02 K/CU MM
TOTAL NUCLEATED RBC: 0 K/CU MM
TOTAL PROTEIN: 6.8 GM/DL (ref 6.4–8.2)
WBC # BLD: 7.8 K/CU MM (ref 4–10.5)

## 2022-09-17 PROCEDURE — 87635 SARS-COV-2 COVID-19 AMP PRB: CPT

## 2022-09-17 PROCEDURE — G0480 DRUG TEST DEF 1-7 CLASSES: HCPCS

## 2022-09-17 PROCEDURE — 6370000000 HC RX 637 (ALT 250 FOR IP): Performed by: EMERGENCY MEDICINE

## 2022-09-17 PROCEDURE — 99284 EMERGENCY DEPT VISIT MOD MDM: CPT | Performed by: PSYCHIATRY & NEUROLOGY

## 2022-09-17 PROCEDURE — 80307 DRUG TEST PRSMV CHEM ANLYZR: CPT

## 2022-09-17 PROCEDURE — 99285 EMERGENCY DEPT VISIT HI MDM: CPT

## 2022-09-17 PROCEDURE — 80053 COMPREHEN METABOLIC PANEL: CPT

## 2022-09-17 PROCEDURE — 85025 COMPLETE CBC W/AUTO DIFF WBC: CPT

## 2022-09-17 RX ORDER — HALOPERIDOL 5 MG
5 TABLET ORAL ONCE
Status: COMPLETED | OUTPATIENT
Start: 2022-09-17 | End: 2022-09-17

## 2022-09-17 RX ORDER — LORAZEPAM 1 MG/1
1 TABLET ORAL ONCE
Status: COMPLETED | OUTPATIENT
Start: 2022-09-17 | End: 2022-09-17

## 2022-09-17 RX ADMIN — LORAZEPAM 1 MG: 1 TABLET ORAL at 18:58

## 2022-09-17 RX ADMIN — HALOPERIDOL 5 MG: 5 TABLET ORAL at 18:57

## 2022-09-17 RX ADMIN — LORAZEPAM 1 MG: 1 TABLET ORAL at 17:05

## 2022-09-17 NOTE — ED NOTES
Pt is currently hiding under his blanket stating \"I'm hiding from them because they are here to get me. I know they're in the building because I can sense their energy\".      Baron Chaves  09/17/22 1819

## 2022-09-17 NOTE — ED NOTES
Pt changed into green safety gown and belongings collected. Security notified to collect and secure belongings.      Tino Every  09/17/22 1732

## 2022-09-17 NOTE — ED NOTES
Pt states that if we can not get him into a psych/detox facility \"I will have to walk out of these doors and kill myself\". Pt states he has a hx of schizophrenia (unconfirmed). However, pt is talking to a \"Vanesa\" that he believes to be present (she is not). He also seems to be paranoid and believes he is being followed.      Anabel Roca  09/17/22 3133

## 2022-09-17 NOTE — ED NOTES
Patient agreed to ask for medication in the future instead of acting out.       Christian Morales, RN  09/17/22 3779

## 2022-09-17 NOTE — ED PROVIDER NOTES
Pack years: 3.50     Types: Cigarettes    Smokeless tobacco: Former     Types: Snuff   Vaping Use    Vaping Use: Never used   Substance and Sexual Activity    Alcohol use: Yes    Drug use: Yes     Frequency: 7.0 times per week     Types: Other-see comments, Methamphetamines (Crystal Meth), Opiates , IV     Comment: benzo's, fentanyl    Sexual activity: Not on file   Other Topics Concern    Not on file   Social History Narrative    Not on file     Social Determinants of Health     Financial Resource Strain: Not on file   Food Insecurity: Not on file   Transportation Needs: Not on file   Physical Activity: Not on file   Stress: Not on file   Social Connections: Not on file   Intimate Partner Violence: Not on file   Housing Stability: Not on file     No current facility-administered medications for this encounter. Current Outpatient Medications   Medication Sig Dispense Refill    paliperidone (INVEGA) 3 MG extended release tablet Take 1 tablet by mouth every morning 30 tablet 3    escitalopram (LEXAPRO) 5 MG tablet Take 3 tablets by mouth daily 90 tablet 0    potassium chloride (KLOR-CON M) 20 MEQ extended release tablet Take 1 tablet by mouth daily (with breakfast) 60 tablet 3     No Known Allergies    Nursing Notes Reviewed    Physical Exam:  ED Triage Vitals [09/17/22 1612]   Enc Vitals Group      /78      Heart Rate 92      Resp 20      Temp 98.3 °F (36.8 °C)      Temp Source Oral      SpO2 97 %      Weight 230 lb (104.3 kg)      Height 6' 2\" (1.88 m)      Head Circumference       Peak Flow       Pain Score       Pain Loc       Pain Edu? Excl. in HOSP [facility]? GENERAL APPEARANCE: Awake and alert. Disheveled. Mildly psychomotor agitation. HEAD: Normocephalic. Atraumatic. EYES: EOM's grossly intact. Sclera anicteric. ENT: Tolerates saliva. No trismus. NECK: Supple. Trachea midline. CARDIO: RRR. Radial pulse 2+. LUNGS: Respirations unlabored. CTAB. ABDOMEN: Soft. Non-distended. Non-tender. EXTREMITIES: No acute deformities. SKIN: Warm and dry. Track marks on bilateral upper extremities. NEUROLOGICAL: No gross facial drooping. Moves all 4 extremities spontaneously. PSYCHIATRIC: Pressured speech.     Labs:  Results for orders placed or performed during the hospital encounter of 09/17/22   COVID-19, Rapid    Specimen: Nasopharyngeal   Result Value Ref Range    Source UNKNOWN     SARS-CoV-2, NAAT NOT DETECTED NOT DETECTED   CBC with Auto Differential   Result Value Ref Range    WBC 7.8 4.0 - 10.5 K/CU MM    RBC 4.02 (L) 4.6 - 6.2 M/CU MM    Hemoglobin 12.4 (L) 13.5 - 18.0 GM/DL    Hematocrit 36.2 (L) 42 - 52 %    MCV 90.0 78 - 100 FL    MCH 30.8 27 - 31 PG    MCHC 34.3 32.0 - 36.0 %    RDW 13.7 11.7 - 14.9 %    Platelets 154 610 - 555 K/CU MM    MPV 9.9 7.5 - 11.1 FL    Differential Type AUTOMATED DIFFERENTIAL     Segs Relative 58.7 36 - 66 %    Lymphocytes % 32.6 24 - 44 %    Monocytes % 6.2 (H) 0 - 4 %    Eosinophils % 1.9 0 - 3 %    Basophils % 0.3 0 - 1 %    Segs Absolute 4.6 K/CU MM    Lymphocytes Absolute 2.5 K/CU MM    Monocytes Absolute 0.5 K/CU MM    Eosinophils Absolute 0.2 K/CU MM    Basophils Absolute 0.0 K/CU MM    Nucleated RBC % 0.0 %    Total Nucleated RBC 0.0 K/CU MM    Total Immature Neutrophil 0.02 K/CU MM    Immature Neutrophil % 0.3 0 - 0.43 %   Comprehensive Metabolic Panel w/ Reflex to MG   Result Value Ref Range    Sodium 142 135 - 145 MMOL/L    Potassium 3.8 3.5 - 5.1 MMOL/L    Chloride 107 99 - 110 mMol/L    CO2 24 21 - 32 MMOL/L    BUN 10 6 - 23 MG/DL    Creatinine 0.4 (L) 0.9 - 1.3 MG/DL    Glucose 88 70 - 99 MG/DL    Calcium 9.3 8.3 - 10.6 MG/DL    Albumin 4.7 3.4 - 5.0 GM/DL    Total Protein 6.8 6.4 - 8.2 GM/DL    Total Bilirubin 0.7 0.0 - 1.0 MG/DL    ALT 26 10 - 40 U/L    AST 38 (H) 15 - 37 IU/L    Alkaline Phosphatase 72 40 - 129 IU/L    GFR Non-African American >60 >60 mL/min/1.73m2    GFR African American >60 >60 mL/min/1.73m2    Anion Gap 11 4 - 16   Urine Drug

## 2022-09-17 NOTE — ED NOTES
Report received from Joni Cheadle, [facility]. Assumed care @ this time.       Sherwin Shukla RN  09/17/22 3906

## 2022-09-18 NOTE — ED NOTES
Pink slip faxed to [facility].  Pt was accepted to [address] [facility] [address]  09/18/22 8325

## 2022-09-18 NOTE — ED PROVIDER NOTES
CARE RECEIVED FROM: Dr. Kacy Powell  I reviewed the key elements of the history, physical exam and initial treatment plan at the bedside. ANCILLARY DATA:  I reviewed the images. Radiologist interpretation:   No orders to display     Labs Reviewed   CBC WITH AUTO DIFFERENTIAL - Abnormal; Notable for the following components:       Result Value    RBC 4.02 (*)     Hemoglobin 12.4 (*)     Hematocrit 36.2 (*)     Monocytes % 6.2 (*)     All other components within normal limits   COMPREHENSIVE METABOLIC PANEL W/ REFLEX TO MG FOR LOW K - Abnormal; Notable for the following components:    Creatinine 0.4 (*)     AST 38 (*)     All other components within normal limits   URINE DRUG SCREEN - Abnormal; Notable for the following components:    Cannabinoid Scrn, Ur UNCONFIRMED POSITIVE (*)     Amphetamines UNCONFIRMED POSITIVE (*)     All other components within normal limits   SALICYLATE LEVEL - Abnormal; Notable for the following components:    Salicylate Lvl <8.6 (*)     All other components within normal limits   ACETAMINOPHEN LEVEL - Abnormal; Notable for the following components:    Acetaminophen Level <5.0 (*)     All other components within normal limits   COVID-19, RAPID   ETHANOL     MEDICAL DECISION MAKING / PLAN:  This is a 24-year-old male that presented the emergency department with complaints of suicidal ideation. Has known history of schizophrenia previously. His urine drug screen was positive for marijuana and amphetamines. Patient was medically cleared otherwise. Evaluated by psychiatry with recommendations for inpatient psychiatric care. At time of signout, patient is awaiting psych placement. [facility]      FINAL IMPRESSION:  1. Suicidal ideation    2. Polysubstance abuse ([facility]      ? Electronically signed by:  [address], DO, 9/18/2022        [address], DO  09/18/22 6030

## 2022-09-18 NOTE — CARE COORDINATION
CM paged Dr. Mary Mattson at 20:16 requesting assessment. 20:25 Dr. Mary Mattson speaking with patient. Dr. Mary Mattson recommends patient to seek inpatient psych placement. Patient DOES NOT WISH to go to [facility] -- per patients' wishes. CM called [facility] @ [phone] and made referral and shared that patient did not wish to go to [facility]. CM requested that they attempt [facility] first.     [facility] to call this CM if before 06:00 with placement; if after 06:00 [facility]--Mushtaq-- will call unit clerk at [phone]. CM will arrange transportation if placement is arranged before 06:00. Pink slip in Patients' chart. [facility] wanting a copy of patients' pink slip and wanting to know why patient had been given Ativan earlier. CM asked RN. It was not on present Rns shift when patient came to hospital, but as patient was coming in to hospital; patient was frustrated and aggravated and was given medication to calm him down, but hospital staff has had not problem with patient after patient had been initially brought in to ED. CM called [facility] and left message requesting return call to explain situation. Will fax pink slip. [facility] returned call and CM explained the above. [facility] reported that they were unable to take patient at this time. CM requested that [facility] call [facility] and let them know. [facility] in agreement. INSURANCE was verified through [facility] Registration. Insurance went through stating that patient does have [employer] Patient to go to [facility].     [facility] ETA 08:45 am to 09:00 am

## 2022-09-18 NOTE — CONSULTS
Hailey Lara  [id number]  9/17/2022 09/17/22      ID: Patient is a 32 y.o. male    CC: I am depressed. .... I just came off a meth garza    HPI:  Pt is a 31 yo  male who presents for exacerbation of methamphetamine use disorder, depression and suicidal ideations. Pt noted recent exacarbation of mood with thoughts to harm himself. Pt noted he currently feels safe and comfortable on the unit. Pt was in agreement with treatment team.  Pt was polite and cordial during the interview process. Pt noted he is doing \"not too good today. \"  Pt noted he is sleeping \"okay. ..about 6 hours last night. \"  Pt noted his apptetite is \"down. \"  Pt rated his depresssion a \"9,\" on a scale of zero to ten with ten being the worst and zero being none. Pt rated his anxiety a \"9,\" on the same scale. Pt denied any thoughts to harm anyone else. Pt noted passive thoughts to harm himself with no plan at this time. Pt denied any auditory or visiual hallucintations. Pt denied any hx of seizures, TBIs, Hep C or HIV  No TD noted, AIMS=0,     Pt noted hx of previous inpt psychiatric admissions  Pt denied any previous suicide attempts  Pt denied any family completion of suicide  Pt noted family mental health hx of depression    Pt denied any hx of abuse trauma or neglect, physical sexual or emotional.      Alcohol: denies any current  Street drugs: Methamphetamine daily  Tobacco: 1 ppd  Caffeine: 2-3 per day      Past Psychiatric History:   See note above     Family Psychiatric History:   No family history on file.      Allergies:  No Known Allergies     OBJECTIVE  Vital Signs:  Vitals:    09/17/22 1612   BP: 132/78   Pulse: 92   Resp: 20   Temp: 98.3 °F (36.8 °C)   SpO2: 97%       Labs:  Recent Results (from the past 48 hour(s))   CBC with Auto Differential    Collection Time: 09/17/22  3:44 PM   Result Value Ref Range    WBC 7.8 4.0 - 10.5 K/CU MM    RBC 4.02 (L) 4.6 - 6.2 M/CU MM    Hemoglobin 12.4 (L) 13.5 - 18.0 GM/DL Hematocrit 36.2 (L) 42 - 52 %    MCV 90.0 78 - 100 FL    MCH 30.8 27 - 31 PG    MCHC 34.3 32.0 - 36.0 %    RDW 13.7 11.7 - 14.9 %    Platelets 276 192 - 464 K/CU MM    MPV 9.9 7.5 - 11.1 FL    Differential Type AUTOMATED DIFFERENTIAL     Segs Relative 58.7 36 - 66 %    Lymphocytes % 32.6 24 - 44 %    Monocytes % 6.2 (H) 0 - 4 %    Eosinophils % 1.9 0 - 3 %    Basophils % 0.3 0 - 1 %    Segs Absolute 4.6 K/CU MM    Lymphocytes Absolute 2.5 K/CU MM    Monocytes Absolute 0.5 K/CU MM    Eosinophils Absolute 0.2 K/CU MM    Basophils Absolute 0.0 K/CU MM    Nucleated RBC % 0.0 %    Total Nucleated RBC 0.0 K/CU MM    Total Immature Neutrophil 0.02 K/CU MM    Immature Neutrophil % 0.3 0 - 0.43 %   Comprehensive Metabolic Panel w/ Reflex to MG    Collection Time: 09/17/22  3:44 PM   Result Value Ref Range    Sodium 142 135 - 145 MMOL/L    Potassium 3.8 3.5 - 5.1 MMOL/L    Chloride 107 99 - 110 mMol/L    CO2 24 21 - 32 MMOL/L    BUN 10 6 - 23 MG/DL    Creatinine 0.4 (L) 0.9 - 1.3 MG/DL    Glucose 88 70 - 99 MG/DL    Calcium 9.3 8.3 - 10.6 MG/DL    Albumin 4.7 3.4 - 5.0 GM/DL    Total Protein 6.8 6.4 - 8.2 GM/DL    Total Bilirubin 0.7 0.0 - 1.0 MG/DL    ALT 26 10 - 40 U/L    AST 38 (H) 15 - 37 IU/L    Alkaline Phosphatase 72 40 - 129 IU/L    GFR Non-African American >60 >60 mL/min/1.73m2    GFR African American >60 >60 mL/min/1.73m2    Anion Gap 11 4 - 16   Salicylate    Collection Time: 09/17/22  3:44 PM   Result Value Ref Range    Salicylate Lvl <0.6 (L) 15 - 30 MG/DL    DOSE AMOUNT DOSE AMT. GIVEN - UNKNOWN     DOSE TIME DOSE TIME GIVEN - UNKNOWN    Acetaminophen Level    Collection Time: 09/17/22  3:44 PM   Result Value Ref Range    Acetaminophen Level <5.0 (L) 15 - 30 ug/ml    DOSE AMOUNT DOSE AMT.  GIVEN - UNKNOWN     DOSE TIME DOSE TIME GIVEN - UNKNOWN    Ethanol    Collection Time: 09/17/22  3:44 PM   Result Value Ref Range    Alcohol Scrn <0.01 <0.01 %WT/VOL   COVID-19, Rapid    Collection Time: 09/17/22  3:50 PM Specimen: Nasopharyngeal   Result Value Ref Range    Source UNKNOWN     SARS-CoV-2, NAAT NOT DETECTED NOT DETECTED   Urine Drug Screen    Collection Time: 09/17/22  4:20 PM   Result Value Ref Range    Cannabinoid Scrn, Ur UNCONFIRMED POSITIVE (A) NEGATIVE    Amphetamines UNCONFIRMED POSITIVE (A) NEGATIVE    Cocaine Metabolite NEGATIVE NEGATIVE    Benzodiazepine Screen, Urine NEGATIVE NEGATIVE    Barbiturate Screen, Ur NEGATIVE NEGATIVE    Opiates, Urine NEGATIVE NEGATIVE    Phencyclidine, Urine NEGATIVE NEGATIVE    Oxycodone NEGATIVE NEGATIVE            Allergies:  No Known Allergies     OBJECTIVE  Vital Signs:        Review of Systems:  Reports of no current cardiovascular, respiratory, gastrointestinal, genitourinary, integumentary, neurological, muscuoskeletal, or immunological symptoms today. PSYCHIATRIC: See HPI above. Review of Systems:  Reports of no current cardiovascular, respiratory, gastrointestinal, genitourinary, integumentary, neurological, muscuoskeletal, or immunological symptoms today. PSYCHIATRIC: See HPI above. Neurologic examination:  Mental status: The patient is alert, attentive, and oriented. Speech is clear and fluent with good repetition, comprehension, and naming. She recalls 3/3 objects at 5 minutes. PSYCHIATRIC EXAMINATION / MENTAL STATUS EXAM         General appearance: [x] appears age, []  appears older than stated age,               [x]  adequately dressed and groomed, [] disheveled,               [x]  in no acute distress, [] appears mildly distressed, [] other           MUSCULOSKELETAL:   Gait:   [] normal, [] antalgic, [] unsteady, [x] gait not evaluated   Station:             [] erect, [] sitting, [x] recumbent, [] other        Strength/tone:  [x] muscle strength and tone appear consistent with age and                                        condition     [] atrophy      [] abnormal movements  PSYCHIATRIC:    Appearance: appears stated age.  alert and oriented to Continue current medications-  12. Supportive Therapy Provided  13. Pt had an opportunity to ask questions and address concerns  14. Pt encouraged to continue outpt  Therapy. 15. Pt was in agreement with treatment plan. 16. The risks benefits and side effects of medications were discussed with the patient, including alternatives and no treatment.

## 2022-10-27 PROBLEM — R45.1 AGITATION: Status: ACTIVE | Noted: 2022-10-27

## 2023-02-14 NOTE — ED PROVIDER NOTES
[facility] ED  eMERGENCY dEPARTMENT eNCOUnter      Pt Name: Estephanie Saleh  MRN: [id number]  Daydaygfcarolyn 1995  Date of evaluation: 3/24/2022  Provider: MITRA Cuba        HISTORY OF PRESENT ILLNESS    Estephanie Saleh is a 27 y.o. male per chart review has ah/o schizoaffective, opiate abuse, depression, IV drug abuse. Patient presents emergency department stating that he wishes to be transferred to a rehabilitation facility to get clean. States he has been abusing meth, marijuana, alcohol, Suboxone. States he was previously prescribed Suboxone but lost follow-up and he has not been buying them on the street. While selling them. States he wishes to stop all his behaviors. States he did last used Suboxone today, as well as multiple marijuana Gummies. Had 1 beer earlier today. States last meth use was a couple of days ago. At present denying all other complaints, states he has not yet been in contact with any rehabilitation facilities. Does report that he has schizoaffective and does not take medications for this right now. He is denying all self-harm, suicidal or homicidal ideation or plan, anxiety. States he is having hallucinations but feels related to his marijuana use prior to arrival.  When asked where he lives he states he lives in a \"trap house\" and would like to remove himself from that situation. REVIEW OF SYSTEMS       Review of Systems   Constitutional: Negative for chills and fever. HENT: Negative for congestion. Eyes: Negative for photophobia. Respiratory: Negative for cough and shortness of breath. Cardiovascular: Negative for chest pain. Gastrointestinal: Negative for abdominal pain, diarrhea, nausea and vomiting. Genitourinary: Negative for difficulty urinating. Musculoskeletal: Negative for back pain and myalgias. Neurological: Negative for headaches. Psychiatric/Behavioral: Positive for hallucinations.  Negative for confusion, decreased concentration, self-injury, sleep disturbance and suicidal ideas. The patient is not nervous/anxious. Except as noted above the remainder of the review of systems was reviewed and negative. PAST MEDICAL HISTORY     Past Medical History:   Diagnosis Date    Anxiety     [facility]     Drug abuse in remission [facility]     H/O alcohol abuse     Hep C w/o coma, chronic ([facility] [facility]          SURGICAL HISTORY       Past Surgical History:   Procedure Laterality Date    ABSCESS DRAINAGE Right 10/13/2020    Irrigation and debridement    HAND SURGERY Right          CURRENT MEDICATIONS       Discharge Medication List as of 3/25/2022  9:41 AM      CONTINUE these medications which have NOT CHANGED    Details   buprenorphine-naloxone (SUBOXONE) 8-2 MG FILM SL film Place 2 Film under the tongue daily. Historical Med      atomoxetine (STRATTERA) 40 MG capsule Take 40 mg by mouth dailyHistorical Med      escitalopram (LEXAPRO) 10 MG tablet Take 10 mg by mouth dailyHistorical Med      traZODone (DESYREL) 100 MG tablet Take 100 mg by mouth nightlyHistorical Med      acetaminophen (TYLENOL) 500 MG tablet Take 2 tablets by mouth every 6 hours as needed for Pain or Fever, Disp-60 tablet, R-0Normal      ibuprofen (IBU) 400 MG tablet Take 1 tablet by mouth every 6 hours as needed for Pain, Disp-120 tablet, R-0Normal      ARIPiprazole (ABILIFY) 10 MG tablet Take 1 tablet by mouth daily, Disp-30 tablet, R-0Normal             ALLERGIES     Patient has no known allergies. FAMILY HISTORY     History reviewed. No pertinent family history.        SOCIAL HISTORY       Social History     Socioeconomic History    Marital status: Single     Spouse name: None    Number of children: None    Years of education: None    Highest education level: None   Occupational History    None   Tobacco Use    Smoking status: Current Every Day Smoker     Packs/day: 0.50     Years: 7.00     Pack years: 3.50     Types: Cigarettes    Smokeless tobacco: Former User     Types: Snuff   Vaping Use    Vaping Use: Never used   Substance and Sexual Activity    Alcohol use: Yes    Drug use: Not Currently     Types: Methamphetamines (Crystal Meth), Opiates , IV, Cocaine, Marijuana (Weed)     Comment: States uses \"whatever I can get my hands on\"    Sexual activity: Not Currently   Other Topics Concern    None   Social History Narrative    None     Social Determinants of Health     Financial Resource Strain:     Difficulty of Paying Living Expenses: Not on file   Food Insecurity:     Worried About Running Out of Food in the Last Year: Not on file    Hansel of Food in the Last Year: Not on file   Transportation Needs:     Lack of Transportation (Medical): Not on file    Lack of Transportation (Non-Medical):  Not on file   Physical Activity:     Days of Exercise per Week: Not on file    Minutes of Exercise per Session: Not on file   Stress:     Feeling of Stress : Not on file   Social Connections:     Frequency of Communication with Friends and Family: Not on file    Frequency of Social Gatherings with Friends and Family: Not on file    Attends Orthodoxy Services: Not on file    Active Member of [address] [place] [address] [employer] or Organizations: Not on file    Attends Club or Organization Meetings: Not on file    Marital Status: Not on file   Intimate Partner Violence:     Fear of Current or Ex-Partner: Not on file    Emotionally Abused: Not on file    Physically Abused: Not on file    Sexually Abused: Not on file   Housing Stability:     Unable to Pay for Housing in the Last Year: Not on file    Number of [place] in the Last Year: Not on file    Unstable Housing in the Last Year: Not on file         PHYSICAL EXAM        ED Triage Vitals [03/24/22 1817]   BP Temp Temp Source Pulse Resp SpO2 Height Weight   132/83 98.5 °F (36.9 °C) Temporal 135 14 99 % 6' 2\" (1.88 m) 279 lb (126.6 kg)       Physical Exam  Constitutional:       General: He is not in acute distress. Appearance: Normal appearance. HENT:      Head: Normocephalic and atraumatic. Right Ear: External ear normal.      Left Ear: External ear normal.      Nose: Nose normal.      Mouth/Throat:      Mouth: Mucous membranes are moist.      Pharynx: Oropharynx is clear. Eyes:      Extraocular Movements: Extraocular movements intact. Cardiovascular:      Rate and Rhythm: Normal rate and regular rhythm. Pulmonary:      Effort: Pulmonary effort is normal. No respiratory distress. Breath sounds: Normal breath sounds. Abdominal:      General: Bowel sounds are normal.      Palpations: Abdomen is soft. Tenderness: There is no abdominal tenderness. Musculoskeletal:         General: Normal range of motion. Cervical back: Normal range of motion. Skin:     General: Skin is warm. Neurological:      Mental Status: He is alert and oriented to person, place, and time.    Psychiatric:         Mood and Affect: Mood normal.         Behavior: Behavior normal.           LABS:  Labs Reviewed   URINE DRUG SCREEN - Abnormal; Notable for the following components:       Result Value    Cannabinoid Scrn, Ur POSITIVE (*)     All other components within normal limits   COMPREHENSIVE METABOLIC PANEL - Abnormal; Notable for the following components:    Glucose 100 (*)     CREATININE 0.61 (*)     All other components within normal limits   CBC WITH AUTO DIFFERENTIAL - Abnormal; Notable for the following components:    RBC 4.25 (*)     Hemoglobin 12.8 (*)     Hematocrit 37.8 (*)     All other components within normal limits   SALICYLATE LEVEL - Abnormal; Notable for the following components:    Salicylate, Serum <6.0 (*)     All other components within normal limits   ACETAMINOPHEN LEVEL - Abnormal; Notable for the following components:    Acetaminophen Level <5 (*)     All other components within normal limits   COVID-19, RAPID   ETHANOL   URINALYSIS WITH REFLEX TO CULTURE   CK   TSH   LIPID PANEL MDM:   Vitals:    Vitals:    03/25/22 0335 03/25/22 0511 03/25/22 0701 03/25/22 0945   BP: 136/83   135/80   Pulse: 71 77 89 85   Resp: 16 16 18 16   Temp:       TempSrc:       SpO2: 100% 100% 100% 100%   Weight:       Height:           Patient presents for help with detoxification from several substances including meth, alcohol, Suboxone, marijuana. He is afebrile, hemodynamically stable. UDS positive for cannabis. Multiple attempts were made to contact [facility] in the emergency department. Patient states he does not want to be discharged home prior to rehabilitation as he is concerned about the environment he lives in, states he will continue to use if he is in that environment. Patient is medically cleared for behavioral health evaluation. Pt will be discharged with [facility]  in stable condition. CRITICAL CARE TIME   Total CriticalCare time was 0 minutes, excluding separately reportable procedures. There was a high probability of clinically significant/life threatening deterioration in the patient's condition which required my urgent intervention. PROCEDURES:  Unlessotherwise noted below, none      Procedures      FINAL IMPRESSION      1.  Polysubstance abuse [facility])          DISPOSITION/PLAN   DISPOSITION Decision To Discharge 03/25/2022 09:39:58 AM          MITRA Deng (electronically signed)  Attending Emergency Physician          Ric Deng  03/27/22 2011 TBA

## 2023-05-21 ENCOUNTER — HOSPITAL ENCOUNTER (EMERGENCY)
Age: 28
Discharge: HOME | End: 2023-05-23
Attending: EMERGENCY MEDICINE
Payer: COMMERCIAL

## 2023-05-21 DIAGNOSIS — F29 PSYCHOSIS, UNSPECIFIED PSYCHOSIS TYPE (HCC): ICD-10-CM

## 2023-05-21 DIAGNOSIS — F19.10 SUBSTANCE ABUSE (HCC): Primary | ICD-10-CM

## 2023-05-21 LAB
ALBUMIN SERPL-MCNC: 4.2 G/DL (ref 3.5–5.2)
ALP SERPL-CCNC: 77 U/L (ref 40–129)
ALT SERPL-CCNC: 101 U/L (ref 0–40)
ANION GAP SERPL CALCULATED.3IONS-SCNC: 12 MMOL/L (ref 7–16)
APAP SERPL-MCNC: <5 MCG/ML (ref 10–30)
AST SERPL-CCNC: 90 U/L (ref 0–39)
BASOPHILS # BLD: 0.02 E9/L (ref 0–0.2)
BASOPHILS NFR BLD: 0.3 % (ref 0–2)
BILIRUB SERPL-MCNC: 0.6 MG/DL (ref 0–1.2)
BUN SERPL-MCNC: 13 MG/DL (ref 6–20)
CALCIUM SERPL-MCNC: 9.1 MG/DL (ref 8.6–10.2)
CHLORIDE SERPL-SCNC: 107 MMOL/L (ref 98–107)
CK SERPL-CCNC: 367 U/L (ref 20–200)
CO2 SERPL-SCNC: 23 MMOL/L (ref 22–29)
CREAT SERPL-MCNC: 0.7 MG/DL (ref 0.7–1.2)
EOSINOPHIL # BLD: 0.2 E9/L (ref 0.05–0.5)
EOSINOPHIL NFR BLD: 2.9 % (ref 0–6)
ERYTHROCYTE [DISTWIDTH] IN BLOOD BY AUTOMATED COUNT: 13.4 FL (ref 11.5–15)
ETHANOLAMINE SERPL-MCNC: <10 MG/DL (ref 0–0.08)
GLUCOSE SERPL-MCNC: 121 MG/DL (ref 74–99)
HCT VFR BLD AUTO: 36.7 % (ref 37–54)
HGB BLD-MCNC: 12.4 G/DL (ref 12.5–16.5)
IMM GRANULOCYTES # BLD: 0.01 E9/L
IMM GRANULOCYTES NFR BLD: 0.1 % (ref 0–5)
LYMPHOCYTES # BLD: 2.29 E9/L (ref 1.5–4)
LYMPHOCYTES NFR BLD: 33.3 % (ref 20–42)
MCH RBC QN AUTO: 30.3 PG (ref 26–35)
MCHC RBC AUTO-ENTMCNC: 33.8 % (ref 32–34.5)
MCV RBC AUTO: 89.7 FL (ref 80–99.9)
MONOCYTES # BLD: 0.81 E9/L (ref 0.1–0.95)
MONOCYTES NFR BLD: 11.8 % (ref 2–12)
NEUTROPHILS # BLD: 3.54 E9/L (ref 1.8–7.3)
NEUTS SEG NFR BLD: 51.6 % (ref 43–80)
PLATELET # BLD AUTO: 232 E9/L (ref 130–450)
PMV BLD AUTO: 9.6 FL (ref 7–12)
POTASSIUM SERPL-SCNC: 4 MMOL/L (ref 3.5–5)
PROT SERPL-MCNC: 6.8 G/DL (ref 6.4–8.3)
RBC # BLD AUTO: 4.09 E12/L (ref 3.8–5.8)
SALICYLATES SERPL-MCNC: <0.3 MG/DL (ref 0–30)
SODIUM SERPL-SCNC: 142 MMOL/L (ref 132–146)
TRICYCLIC ANTIDEPRESSANTS SCREEN SERUM: NEGATIVE NG/ML
WBC # BLD: 6.9 E9/L (ref 4.5–11.5)

## 2023-05-21 PROCEDURE — 93005 ELECTROCARDIOGRAM TRACING: CPT | Performed by: NURSE PRACTITIONER

## 2023-05-21 PROCEDURE — 80053 COMPREHEN METABOLIC PANEL: CPT

## 2023-05-21 PROCEDURE — 99284 EMERGENCY DEPT VISIT MOD MDM: CPT

## 2023-05-21 PROCEDURE — 80307 DRUG TEST PRSMV CHEM ANLYZR: CPT

## 2023-05-21 PROCEDURE — 85025 COMPLETE CBC W/AUTO DIFF WBC: CPT

## 2023-05-21 PROCEDURE — 80179 DRUG ASSAY SALICYLATE: CPT

## 2023-05-21 PROCEDURE — 80143 DRUG ASSAY ACETAMINOPHEN: CPT

## 2023-05-21 PROCEDURE — 82077 ASSAY SPEC XCP UR&BREATH IA: CPT

## 2023-05-21 PROCEDURE — 6360000002 HC RX W HCPCS: Performed by: EMERGENCY MEDICINE

## 2023-05-21 PROCEDURE — 82550 ASSAY OF CK (CPK): CPT

## 2023-05-21 PROCEDURE — 96372 THER/PROPH/DIAG INJ SC/IM: CPT

## 2023-05-21 RX ORDER — LORAZEPAM 2 MG/ML
2 INJECTION INTRAMUSCULAR ONCE
Status: COMPLETED | OUTPATIENT
Start: 2023-05-21 | End: 2023-05-21

## 2023-05-21 RX ORDER — HALOPERIDOL 5 MG/ML
10 INJECTION INTRAMUSCULAR ONCE
Status: COMPLETED | OUTPATIENT
Start: 2023-05-21 | End: 2023-05-21

## 2023-05-21 RX ADMIN — HALOPERIDOL LACTATE 10 MG: 5 INJECTION, SOLUTION INTRAMUSCULAR at 20:27

## 2023-05-21 RX ADMIN — LORAZEPAM 2 MG: 2 INJECTION INTRAMUSCULAR; INTRAVENOUS at 20:27

## 2023-05-22 LAB
AMPHET UR QL SCN: POSITIVE
BARBITURATES UR QL SCN: NOT DETECTED
BENZODIAZ UR QL SCN: NOT DETECTED
BILIRUB UR QL STRIP: NEGATIVE
CANNABINOIDS UR QL SCN: POSITIVE
CLARITY UR: CLEAR
COCAINE UR QL SCN: NOT DETECTED
COLOR UR: ABNORMAL
DRUG SCREEN COMMENT UR-IMP: ABNORMAL
EKG ATRIAL RATE: 63 BPM
EKG P AXIS: 49 DEGREES
EKG P-R INTERVAL: 146 MS
EKG Q-T INTERVAL: 392 MS
EKG QRS DURATION: 90 MS
EKG QTC CALCULATION (BAZETT): 401 MS
EKG R AXIS: 35 DEGREES
EKG T AXIS: 20 DEGREES
EKG VENTRICULAR RATE: 63 BPM
FENTANYL SCREEN, URINE: NOT DETECTED
GLUCOSE UR STRIP-MCNC: NEGATIVE MG/DL
HGB UR QL STRIP: NEGATIVE
KETONES UR STRIP-MCNC: NEGATIVE MG/DL
LEUKOCYTE ESTERASE UR QL STRIP: NEGATIVE
METHADONE UR QL SCN: NOT DETECTED
NITRITE UR QL STRIP: NEGATIVE
OPIATES UR QL SCN: NOT DETECTED
OXYCODONE URINE: NOT DETECTED
PCP UR QL SCN: NOT DETECTED
PH UR STRIP: 6 [PH] (ref 5–9)
PROT UR STRIP-MCNC: NEGATIVE MG/DL
SP GR UR STRIP: 1.02 (ref 1–1.03)
UROBILINOGEN UR STRIP-ACNC: 0.2 E.U./DL

## 2023-05-22 PROCEDURE — 93010 ELECTROCARDIOGRAM REPORT: CPT | Performed by: INTERNAL MEDICINE

## 2023-05-22 PROCEDURE — 2580000003 HC RX 258: Performed by: EMERGENCY MEDICINE

## 2023-05-22 PROCEDURE — 81003 URINALYSIS AUTO W/O SCOPE: CPT

## 2023-05-22 PROCEDURE — 80307 DRUG TEST PRSMV CHEM ANLYZR: CPT

## 2023-05-22 RX ORDER — 0.9 % SODIUM CHLORIDE 0.9 %
1000 INTRAVENOUS SOLUTION INTRAVENOUS ONCE
Status: COMPLETED | OUTPATIENT
Start: 2023-05-22 | End: 2023-05-22

## 2023-05-22 RX ADMIN — SODIUM CHLORIDE 1000 ML: 9 INJECTION, SOLUTION INTRAVENOUS at 00:34

## 2023-05-22 ASSESSMENT — PATIENT HEALTH QUESTIONNAIRE - PHQ9: SUM OF ALL RESPONSES TO PHQ QUESTIONS 1-9: 8

## 2023-05-22 NOTE — ED PROVIDER NOTES
HPI:  5/21/23,   Time: 8:01 PM EDT       David Sahu is a 32 y.o. male presenting to the ED for substance abuse/agitatations, beginning r days ago. The complaint has been persistent, severe in severity, and worsened by nothing. Patient extremely difficult historian. History of subs abuse on meth amphetamines and Suboxone. Stacey Childers ran out Suboxone 4 days ago. States is manic. No SI/HI. States is using meth to help compensate for running of Suboxone. No fever/chill/sweats/nausea/vomit/diarrhea. Review of Systems:   Pertinent positives and negatives are stated within HPI, all other systems reviewed and are negative.          --------------------------------------------- PAST HISTORY ---------------------------------------------  Past Medical History:  has a past medical history of Anxiety, Bipolar 1 disorder ([facility] [address] Drug abuse in remission ([facility] [address] H/O alcohol abuse, and Hep C w/o coma, chronic ([facility] [address] Past Surgical History:  has a past surgical history that includes Hand surgery (Right) and Abscess Drainage (Right, 10/13/2020). Social History:  reports that he has been smoking cigarettes. He has a 3.50 pack-year smoking history. He has quit using smokeless tobacco.  His smokeless tobacco use included snuff. He reports current alcohol use. He reports current drug use. Frequency: 7.00 times per week. Drugs: Other-see comments, Methamphetamines (Crystal Meth), Opiates , and IV. Family History: family history is not on file. The patients home medications have been reviewed. Allergies: Patient has no known allergies.         ---------------------------------------------------PHYSICAL EXAM--------------------------------------    Constitutional/General: Alert and oriented x3,   Head: Normocephalic and atraumatic  Eyes: PERRL, EOMI, conjunctive normal, sclera non icteric  Mouth: Oropharynx clear, handling secretions,   Neck: Supple, full ROM,   Respiratory:  Not in respiratory

## 2023-05-22 NOTE — ED NOTES
[facility] SW attempted to assess this patient. Patient was sleeping soundly. Patient did wake up to the sound of his name but went immediately back to sleep. [facility] SW prompted patient again and asked if he was suicidal and he mumbled no and went straight back to sleep. Patient was medicated early to help calm him down and is unable to stay awake long enough to be assessed. [facility] SW did attempt to try to get patient to give a urine sample but that did not happen either    Patient will be assessed by on coming SW on day shift.      Shimon Vu, MSW, Nael Ibarra  05/22/23 6546

## 2023-05-22 NOTE — ED NOTES
Mario Bo called from [place] and stated that they cannot take the pt because he was in restraints last night.      Marcelino Mon, [facility]  05/22/23 6859

## 2023-05-22 NOTE — ED NOTES
Received report from [place], [facility] who brought patient to [facility] via cart.        Sakshi David RN  05/22/23 8306

## 2023-05-22 NOTE — ED NOTES
LUCIO PAULA reviewing chart prior to assessment. Patient is medically clear at [phone]    Patient still needs to provide a urine sample for UDS and urinalysis.     LUCIO HAKAN Ervin made aware     Tatiana Moody, MSW, LSW  05/22/23 5786

## 2023-05-22 NOTE — ED NOTES
Call from [address] [place] [address] who stated that she has tried everywhere and there are no beds. The pt stated that he has a bed for [facility] tomorrow but she stated that there are no beds for days at that facility so he is not sure why he is saying this.        Vandana Cifuentes, [facility]  05/22/23 9080

## 2023-05-22 NOTE — ED NOTES
Patient was medicated prior to being brought back to [facility]. Patient continues to sleep, no distress noted. Urinal is at bedside.       Idalia Hopkins RN  05/22/23 3724

## 2023-05-22 NOTE — ED NOTES
Behavioral Health Crisis Assessment      Chief Complaint: \"Trying to get to rehab. \"    Mental Status Exam: Pt is alert and oriented x3, tired, lethargic, denies suicidal and homicidal ideations, denies auditory and visual hallucinations, insight     Legal Status:  [x] Voluntary:  [] Involuntary, Issued by:    Gender:  [x] Male [] Female [] Transgender  [] Other    Sexual Orientation:  [x] Heterosexual [] Homosexual [] Bisexual [] Other    Brief Clinical Summary: Pt is a 32year old male presenting to the ED with withdrawals, stating he is coming off of Suboxone and recently used meth. Pt denies any suicidal ideation, plan or intent at this time. Pt states he attempted suicide \"once in the past\" but could not recall timeframe. Pt denies homicidal ideations. Pt reports he is looking to go to rehab to help stop his meth use. Pt denies any hallucinations or delusions at this time. Pt denies having an outpatient [place] [address] provider, stating he gets his meds \"from the psych wards. \" Pt was at [facility] on 7/10/22. Pt reports he is homeless and has been \"for a while. \" Pt denies any violent history. Pt would benefit from inpatient AoD treatment.      Collateral Information: none    Risk Factors:  Mental health diagnosis-  Schizoaffective, Depression  Substance use- meth, alcohol, and Suboxone  Lack of housing  History of trauma  Lack of self care    Protective Factors:  Help seeking behavior  Initiated this ER visit  No access to weapons    Suicidal Ideations:  [] Reports:    [] Past [] Present   [x] Denies pt denies however per chart review pt has multiple trips to the ER for history of suicidal ideations    Suicide Attempts:  [x] Reports: \"in the past\"  [] Denies    C-SSRS Screening Completed by RN: Current Suicide Risk:  [x] No Risk [] Low [] Moderate [] High    Homicidal Ideations:  [] Reports:   [] Past [] Present   [x] Denies     Self Injurious/Self Mutilation Behaviors:  [] Reports:    [] Past []

## 2023-05-22 NOTE — ED NOTES
Call to [facility]- referred pt- spoke to NEAL.      Indiana Bettencourt, [facility]  05/22/23 9553

## 2023-05-22 NOTE — CARE COORDINATION
Peer Recovery Support Note    Name: Kyara Santacruz. Date: 5/22/2023    Chief Complaint   Patient presents with    Withdrawal       Peer Support met with patient. [] Support and education provided  [] Resources provided   [x] Treatment referral: [facility]  [] Other:   [] Patient declined peer recovery services     Referred By: Laura(PAULA)    Notes: Patient agreed to treatment. [facility] faxed clinicals. Waiting for call back. [phone] was the number given.    Signed: Malou Logan, 5/22/2023

## 2023-05-23 VITALS
BODY MASS INDEX: 26.31 KG/M2 | WEIGHT: 205 LBS | SYSTOLIC BLOOD PRESSURE: 108 MMHG | TEMPERATURE: 98.2 F | DIASTOLIC BLOOD PRESSURE: 78 MMHG | HEART RATE: 97 BPM | HEIGHT: 74 IN | RESPIRATION RATE: 16 BRPM | OXYGEN SATURATION: 97 %

## 2023-05-23 ASSESSMENT — PAIN - FUNCTIONAL ASSESSMENT: PAIN_FUNCTIONAL_ASSESSMENT: NONE - DENIES PAIN

## 2023-05-23 NOTE — ED NOTES
Before pt left out of the [facility] and threw his paperwork in the shredder.          LYNNE Hernandez  05/23/23 0444

## 2023-05-23 NOTE — ED NOTES
Patient denied wanting to change anything to his safety plan from a previous admission. He refused vital signs or signing his discharge instructions. He did deny any SI/HI A/V hallucinations. He did accept a hard copy of his discharge instructions which did include the safety plan. Patient left [facility] with insurance number to call for a cab.       Karlene Barrera RN  05/23/23 1612

## 2023-05-23 NOTE — ED NOTES
Call to [facility] regarding pt- he stated that he did not have a call back number so he did not review the pt- number provided as requested VIA fax.      [address], [place]  05/22/23 5676

## 2023-05-23 NOTE — ED NOTES
Pt has been declined to [facility] due to his insurance. [facility] has attempted placement without any success    Pt will need to follow up on his own. Discussed with Dr. Winston Camargo , who agreed with plan for pt to follow up on his own with out pt services for AOD. Pt's main concern was getting a ride to his grandparents house in [place]. He has contact information for [employer] rides.        LYNNE Giordano  05/23/23 0922

## 2023-05-23 NOTE — DISCHARGE INSTRUCTIONS
Please reach out to one of the following community providers for treatment and support. Help Hotline: [phone] or 1818 [address] and [facility] [phone]  [place] [address] and [address]25 [facility] [phone]  If you are in need of help and experiencing any barriers to care please contact help hotline 24 hours a day and/or your local board of mental health and recovery during normal business hours. Detox program inpatient:   Catrina Mickyarash [phone]  [facility] [phone]  [facility] [phone]  [facility] [phone]  [facility]ly [facility], [facility] [phone]  [place] services hotline [phone]    Outpatient programs  [address] outpatient treatment [phone] ext. [facility] [phone]  [facility] [phone]  [facility] services:   [place] [phone]  [address] [phone]  [address]  [phone]  [place] [phone]   [facility] Family services [phone]  [facility] [place] [phone]    For additional resource support please feel free to contact the behavioral access line at [phone] or the Intensive outpatient department at [phone].

## 2023-05-23 NOTE — ED NOTES
Follow up call to [facility]- they stated they need the facesheet. Faxed the facesheet.      Viridiana Brush, [facility]  05/22/23 2184

## 2023-05-23 NOTE — ED NOTES
Called Francisco Oro spoke Kassie - who reports that pt is still being reviewed    Awaiting call back     Opal Arroyo [place]  05/23/23 0909

## 2023-07-07 ENCOUNTER — HOSPITAL ENCOUNTER (EMERGENCY)
Age: 28
Discharge: HOME | End: 2023-07-08
Attending: EMERGENCY MEDICINE
Payer: COMMERCIAL

## 2023-07-07 VITALS
WEIGHT: 230 LBS | SYSTOLIC BLOOD PRESSURE: 119 MMHG | TEMPERATURE: 98 F | HEIGHT: 74 IN | RESPIRATION RATE: 17 BRPM | HEART RATE: 85 BPM | DIASTOLIC BLOOD PRESSURE: 79 MMHG | OXYGEN SATURATION: 97 % | BODY MASS INDEX: 29.52 KG/M2

## 2023-07-07 DIAGNOSIS — F19.20 DRUG ADDICTION (HCC): Primary | ICD-10-CM

## 2023-07-07 PROCEDURE — 99282 EMERGENCY DEPT VISIT SF MDM: CPT

## 2023-07-07 ASSESSMENT — ENCOUNTER SYMPTOMS
SORE THROAT: 0
EYE DISCHARGE: 0
VOMITING: 0
PHOTOPHOBIA: 0
SHORTNESS OF BREATH: 0
WHEEZING: 0
ABDOMINAL DISTENTION: 0
ABDOMINAL PAIN: 0
CHEST TIGHTNESS: 0
COUGH: 0

## 2023-07-08 NOTE — ED PROVIDER NOTES
TempSrc: Oral     SpO2: 97% 98% 97%   Weight: 230 lb (104.3 kg)     Height: 6' 2\" (1.88 m)          [facility]  Patient seen by lets get real.  Patient will be taken to [place] [address] for rehab by staff      CONSULTS:  None    PROCEDURES:  Unless otherwise noted below, none     Procedures    FINAL IMPRESSION      1. Drug addiction [facility])          DISPOSITION/PLAN   DISPOSITION Decision To Discharge 07/07/2023 11:09:28 PM      PATIENT REFERRED TO:  No follow-up provider specified.     DISCHARGE MEDICATIONS:  New Prescriptions    No medications on file          (Please note that portions of this note were completed with a voice recognition program.  Efforts were made to edit the dictations but occasionally words are mis-transcribed.)    Mayra Albrecht MD (electronically signed)  Attending Emergency Physician         Mayar Albrecht MD  07/07/23 4949

## 2023-07-08 NOTE — ED NOTES
Report given to [facility], RN      Dickey Duverney, HAKAN  07/07/23 7224

## 2023-07-08 NOTE — ED TRIAGE NOTES
Pt arrived to triage via private vehicle. Pt states \"I want help getting into rehab\"  Pt states he needs help with alcohol and benzo's. Pt states he last used yesterday. Pt denies any SI/HI at this time. Pt ambulatory in triage. Skin w/p/d. Resp regular, even and unlabored.

## 2023-12-23 ENCOUNTER — APPOINTMENT (OUTPATIENT)
Dept: ULTRASOUND IMAGING | Age: 28
End: 2023-12-23
Payer: COMMERCIAL

## 2023-12-23 ENCOUNTER — HOSPITAL ENCOUNTER (EMERGENCY)
Age: 28
Discharge: OTHER INSTITUTION | End: 2023-12-24
Payer: COMMERCIAL

## 2023-12-23 DIAGNOSIS — L02.91 ABSCESS: Primary | ICD-10-CM

## 2023-12-23 LAB
ALBUMIN SERPL-MCNC: 3.9 G/DL (ref 3.5–4.6)
ALP SERPL-CCNC: 65 U/L (ref 35–104)
ALT SERPL-CCNC: 43 U/L (ref 0–41)
ANION GAP SERPL CALCULATED.3IONS-SCNC: 6 MEQ/L (ref 9–15)
AST SERPL-CCNC: 39 U/L (ref 0–40)
BASOPHILS # BLD: 0 K/UL (ref 0–0.2)
BASOPHILS NFR BLD: 0.3 %
BILIRUB SERPL-MCNC: 0.4 MG/DL (ref 0.2–0.7)
BUN SERPL-MCNC: 12 MG/DL (ref 6–20)
CALCIUM SERPL-MCNC: 9.1 MG/DL (ref 8.5–9.9)
CHLORIDE SERPL-SCNC: 103 MEQ/L (ref 95–107)
CO2 SERPL-SCNC: 31 MEQ/L (ref 20–31)
CREAT SERPL-MCNC: 0.58 MG/DL (ref 0.7–1.2)
EOSINOPHIL # BLD: 0.2 K/UL (ref 0–0.7)
EOSINOPHIL NFR BLD: 2.6 %
ERYTHROCYTE [DISTWIDTH] IN BLOOD BY AUTOMATED COUNT: 13.2 % (ref 11.5–14.5)
GLOBULIN SER CALC-MCNC: 2.6 G/DL (ref 2.3–3.5)
GLUCOSE SERPL-MCNC: 79 MG/DL (ref 70–99)
HCT VFR BLD AUTO: 40.4 % (ref 42–52)
HGB BLD-MCNC: 13.6 G/DL (ref 14–18)
LYMPHOCYTES # BLD: 2.1 K/UL (ref 1–4.8)
LYMPHOCYTES NFR BLD: 32.1 %
MCH RBC QN AUTO: 31.6 PG (ref 27–31.3)
MCHC RBC AUTO-ENTMCNC: 33.7 % (ref 33–37)
MCV RBC AUTO: 94 FL (ref 79–92.2)
MONOCYTES # BLD: 0.6 K/UL (ref 0.2–0.8)
MONOCYTES NFR BLD: 9.6 %
NEUTROPHILS # BLD: 3.6 K/UL (ref 1.4–6.5)
NEUTS SEG NFR BLD: 55.1 %
PLATELET # BLD AUTO: 241 K/UL (ref 130–400)
POTASSIUM SERPL-SCNC: 3.7 MEQ/L (ref 3.4–4.9)
PROT SERPL-MCNC: 6.5 G/DL (ref 6.3–8)
RBC # BLD AUTO: 4.3 M/UL (ref 4.7–6.1)
SODIUM SERPL-SCNC: 140 MEQ/L (ref 135–144)
WBC # BLD AUTO: 6.6 K/UL (ref 4.8–10.8)

## 2023-12-23 PROCEDURE — 99284 EMERGENCY DEPT VISIT MOD MDM: CPT

## 2023-12-23 PROCEDURE — 10060 I&D ABSCESS SIMPLE/SINGLE: CPT

## 2023-12-23 PROCEDURE — 2580000003 HC RX 258: Performed by: PERSONAL EMERGENCY RESPONSE ATTENDANT

## 2023-12-23 PROCEDURE — 93971 EXTREMITY STUDY: CPT

## 2023-12-23 PROCEDURE — 36415 COLL VENOUS BLD VENIPUNCTURE: CPT

## 2023-12-23 PROCEDURE — 80053 COMPREHEN METABOLIC PANEL: CPT

## 2023-12-23 PROCEDURE — 96375 TX/PRO/DX INJ NEW DRUG ADDON: CPT

## 2023-12-23 PROCEDURE — 85025 COMPLETE CBC W/AUTO DIFF WBC: CPT

## 2023-12-23 PROCEDURE — 84145 PROCALCITONIN (PCT): CPT

## 2023-12-23 PROCEDURE — 96367 TX/PROPH/DG ADDL SEQ IV INF: CPT

## 2023-12-23 PROCEDURE — 87070 CULTURE OTHR SPECIMN AEROBIC: CPT

## 2023-12-23 PROCEDURE — 6360000002 HC RX W HCPCS: Performed by: PERSONAL EMERGENCY RESPONSE ATTENDANT

## 2023-12-23 PROCEDURE — 96365 THER/PROPH/DIAG IV INF INIT: CPT

## 2023-12-23 RX ORDER — 0.9 % SODIUM CHLORIDE 0.9 %
1000 INTRAVENOUS SOLUTION INTRAVENOUS ONCE
Status: COMPLETED | OUTPATIENT
Start: 2023-12-23 | End: 2023-12-24

## 2023-12-23 RX ORDER — BACITRACIN ZINC 500 [USP'U]/G
OINTMENT TOPICAL ONCE
Status: COMPLETED | OUTPATIENT
Start: 2023-12-23 | End: 2023-12-24

## 2023-12-23 RX ORDER — DOXYCYCLINE HYCLATE 100 MG
100 TABLET ORAL 2 TIMES DAILY
Qty: 20 TABLET | Refills: 0 | Status: SHIPPED | OUTPATIENT
Start: 2023-12-23 | End: 2024-01-02

## 2023-12-23 RX ORDER — KETOROLAC TROMETHAMINE 30 MG/ML
30 INJECTION, SOLUTION INTRAMUSCULAR; INTRAVENOUS ONCE
Status: COMPLETED | OUTPATIENT
Start: 2023-12-23 | End: 2023-12-23

## 2023-12-23 RX ADMIN — PIPERACILLIN AND TAZOBACTAM 3375 MG: 3; .375 INJECTION, POWDER, LYOPHILIZED, FOR SOLUTION INTRAVENOUS at 22:33

## 2023-12-23 RX ADMIN — SODIUM CHLORIDE 1000 ML: 9 INJECTION, SOLUTION INTRAVENOUS at 22:25

## 2023-12-23 RX ADMIN — VANCOMYCIN HYDROCHLORIDE 2000 MG: 1 INJECTION, POWDER, LYOPHILIZED, FOR SOLUTION INTRAVENOUS at 23:10

## 2023-12-23 RX ADMIN — KETOROLAC TROMETHAMINE 30 MG: 30 INJECTION, SOLUTION INTRAMUSCULAR at 22:27

## 2023-12-24 VITALS
HEART RATE: 77 BPM | BODY MASS INDEX: 29.52 KG/M2 | HEIGHT: 74 IN | WEIGHT: 230 LBS | RESPIRATION RATE: 14 BRPM | TEMPERATURE: 98.5 F | DIASTOLIC BLOOD PRESSURE: 60 MMHG | OXYGEN SATURATION: 100 % | SYSTOLIC BLOOD PRESSURE: 102 MMHG

## 2023-12-24 LAB — PROCALCITONIN SERPL IA-MCNC: <0.02 NG/ML (ref 0–0.15)

## 2023-12-24 PROCEDURE — 96366 THER/PROPH/DIAG IV INF ADDON: CPT

## 2023-12-24 RX ADMIN — BACITRACIN ZINC: 500 OINTMENT TOPICAL at 01:14

## 2023-12-24 NOTE — ED TRIAGE NOTES
Patient arrived by EMS from [facility] for abscess of right arm and hand. Patient c/o pain. Patient states was recently seen for same, currently taking antibiotics with little improvement.

## 2023-12-24 NOTE — ED PROVIDER NOTES
[facility] ED  eMERGENCY dEPARTMENT eNCOUnter      Pt Name: Arslan Pinon. MRN: [id number]  Birthdate 1995  Date of evaluation: 12/23/2023  Provider: MITRA Lezama    My attending is Dr. Tavo Titus. is a 25 y.o. male with PMHx of anxiety, bipolar 1, alcohol abuse, hepatitis C, opioid abuse, depression, methamphetamine use presents to the emergency department with right upper arm abscess and right hand swelling. December 21 patient seen in [facility] emergency room for depression with suicidal ideation and right arm/hand swelling from IV drug use. He was started on Keflex and sent to [facility]    Patient comes from [facility] today and states abscess has now formed to right upper arm and right hand swelling has not improved. He does have right hand pain. He denies fevers, chills, nausea, vomiting. He admits to injecting meth into the area. Last used opiates months ago. HPI    Nursing Notes were reviewed. REVIEW OF SYSTEMS       Review of Systems   Constitutional:  Negative for appetite change, chills and fever. HENT:  Negative for congestion, rhinorrhea and sore throat. Respiratory:  Negative for cough and shortness of breath. Cardiovascular:  Negative for chest pain. Gastrointestinal:  Negative for abdominal pain, blood in stool, diarrhea, nausea and vomiting. Genitourinary:  Negative for difficulty urinating. Musculoskeletal:  Positive for joint swelling. Negative for neck stiffness. Skin:  Positive for wound. Negative for color change and rash. Neurological:  Negative for dizziness, syncope, weakness, light-headedness, numbness and headaches. All other systems reviewed and are negative.             PAST MEDICAL HISTORY     Past Medical History:   Diagnosis Date    Anxiety     Bipolar 1 disorder ([facility])     Drug abuse in remission ([facility])     H/O alcohol abuse     Hep C w/o coma, chronic ([facility])          SURGICAL

## 2023-12-26 LAB — BACTERIA SPEC ANAEROBE+AEROBE CULT: NORMAL

## 2024-02-27 ENCOUNTER — HOSPITAL ENCOUNTER (EMERGENCY)
Facility: HOSPITAL | Age: 29
Discharge: OTHER INSTITUTION | End: 2024-02-28
Attending: EMERGENCY MEDICINE
Payer: COMMERCIAL

## 2024-02-27 ENCOUNTER — APPOINTMENT (OUTPATIENT)
Dept: CARDIOLOGY | Facility: HOSPITAL | Age: 29
End: 2024-02-27
Payer: COMMERCIAL

## 2024-02-27 DIAGNOSIS — F30.10 MANIC BEHAVIOR (MULTI): ICD-10-CM

## 2024-02-27 DIAGNOSIS — F22 ACUTE PARANOIA (MULTI): Primary | ICD-10-CM

## 2024-02-27 LAB
ALBUMIN SERPL BCP-MCNC: 4.3 G/DL (ref 3.4–5)
ALP SERPL-CCNC: 63 U/L (ref 33–120)
ALT SERPL W P-5'-P-CCNC: 184 U/L (ref 10–52)
AMPHETAMINES UR QL SCN: NORMAL
ANION GAP SERPL CALC-SCNC: 15 MMOL/L (ref 10–20)
APPEARANCE UR: CLEAR
AST SERPL W P-5'-P-CCNC: 182 U/L (ref 9–39)
BARBITURATES UR QL SCN: NORMAL
BASOPHILS # BLD AUTO: 0.03 X10*3/UL (ref 0–0.1)
BASOPHILS NFR BLD AUTO: 0.4 %
BENZODIAZ UR QL SCN: NORMAL
BILIRUB SERPL-MCNC: 0.7 MG/DL (ref 0–1.2)
BILIRUB UR STRIP.AUTO-MCNC: NEGATIVE MG/DL
BUN SERPL-MCNC: 10 MG/DL (ref 6–23)
BZE UR QL SCN: NORMAL
CALCIUM SERPL-MCNC: 9.4 MG/DL (ref 8.6–10.3)
CANNABINOIDS UR QL SCN: NORMAL
CHLORIDE SERPL-SCNC: 107 MMOL/L (ref 98–107)
CK SERPL-CCNC: 692 U/L (ref 0–325)
CO2 SERPL-SCNC: 20 MMOL/L (ref 21–32)
COLOR UR: YELLOW
CREAT SERPL-MCNC: 0.61 MG/DL (ref 0.5–1.3)
EGFRCR SERPLBLD CKD-EPI 2021: >90 ML/MIN/1.73M*2
EOSINOPHIL # BLD AUTO: 0.09 X10*3/UL (ref 0–0.7)
EOSINOPHIL NFR BLD AUTO: 1.3 %
ERYTHROCYTE [DISTWIDTH] IN BLOOD BY AUTOMATED COUNT: 13.6 % (ref 11.5–14.5)
ETHANOL SERPL-MCNC: <10 MG/DL
FENTANYL+NORFENTANYL UR QL SCN: NORMAL
GLUCOSE SERPL-MCNC: 149 MG/DL (ref 74–99)
GLUCOSE UR STRIP.AUTO-MCNC: NEGATIVE MG/DL
HCT VFR BLD AUTO: 38.5 % (ref 41–52)
HGB BLD-MCNC: 13.1 G/DL (ref 13.5–17.5)
IMM GRANULOCYTES # BLD AUTO: 0.02 X10*3/UL (ref 0–0.7)
IMM GRANULOCYTES NFR BLD AUTO: 0.3 % (ref 0–0.9)
KETONES UR STRIP.AUTO-MCNC: NEGATIVE MG/DL
LEUKOCYTE ESTERASE UR QL STRIP.AUTO: NEGATIVE
LYMPHOCYTES # BLD AUTO: 1.9 X10*3/UL (ref 1.2–4.8)
LYMPHOCYTES NFR BLD AUTO: 28.4 %
MCH RBC QN AUTO: 31.3 PG (ref 26–34)
MCHC RBC AUTO-ENTMCNC: 34 G/DL (ref 32–36)
MCV RBC AUTO: 92 FL (ref 80–100)
MONOCYTES # BLD AUTO: 0.54 X10*3/UL (ref 0.1–1)
MONOCYTES NFR BLD AUTO: 8.1 %
NEUTROPHILS # BLD AUTO: 4.1 X10*3/UL (ref 1.2–7.7)
NEUTROPHILS NFR BLD AUTO: 61.5 %
NITRITE UR QL STRIP.AUTO: NEGATIVE
NRBC BLD-RTO: 0 /100 WBCS (ref 0–0)
OPIATES UR QL SCN: NORMAL
OXYCODONE+OXYMORPHONE UR QL SCN: NORMAL
PCP UR QL SCN: NORMAL
PH UR STRIP.AUTO: 6 [PH]
PLATELET # BLD AUTO: 231 X10*3/UL (ref 150–450)
POTASSIUM SERPL-SCNC: 3.7 MMOL/L (ref 3.5–5.3)
PROT SERPL-MCNC: 7.2 G/DL (ref 6.4–8.2)
PROT UR STRIP.AUTO-MCNC: NEGATIVE MG/DL
RBC # BLD AUTO: 4.18 X10*6/UL (ref 4.5–5.9)
RBC # UR STRIP.AUTO: NEGATIVE /UL
SODIUM SERPL-SCNC: 138 MMOL/L (ref 136–145)
SP GR UR STRIP.AUTO: 1
UROBILINOGEN UR STRIP.AUTO-MCNC: <2 MG/DL
WBC # BLD AUTO: 6.7 X10*3/UL (ref 4.4–11.3)

## 2024-02-27 PROCEDURE — 82077 ASSAY SPEC XCP UR&BREATH IA: CPT | Performed by: PHYSICIAN ASSISTANT

## 2024-02-27 PROCEDURE — 36415 COLL VENOUS BLD VENIPUNCTURE: CPT | Performed by: PHYSICIAN ASSISTANT

## 2024-02-27 PROCEDURE — 81003 URINALYSIS AUTO W/O SCOPE: CPT | Performed by: PHYSICIAN ASSISTANT

## 2024-02-27 PROCEDURE — 85025 COMPLETE CBC W/AUTO DIFF WBC: CPT | Performed by: PHYSICIAN ASSISTANT

## 2024-02-27 PROCEDURE — 2500000004 HC RX 250 GENERAL PHARMACY W/ HCPCS (ALT 636 FOR OP/ED)

## 2024-02-27 PROCEDURE — 82550 ASSAY OF CK (CPK): CPT | Performed by: PHYSICIAN ASSISTANT

## 2024-02-27 PROCEDURE — 96372 THER/PROPH/DIAG INJ SC/IM: CPT

## 2024-02-27 PROCEDURE — 80307 DRUG TEST PRSMV CHEM ANLYZR: CPT | Performed by: PHYSICIAN ASSISTANT

## 2024-02-27 PROCEDURE — 93005 ELECTROCARDIOGRAM TRACING: CPT

## 2024-02-27 PROCEDURE — 99285 EMERGENCY DEPT VISIT HI MDM: CPT | Mod: 25

## 2024-02-27 PROCEDURE — 80053 COMPREHEN METABOLIC PANEL: CPT | Performed by: PHYSICIAN ASSISTANT

## 2024-02-27 PROCEDURE — 2500000001 HC RX 250 WO HCPCS SELF ADMINISTERED DRUGS (ALT 637 FOR MEDICARE OP): Performed by: STUDENT IN AN ORGANIZED HEALTH CARE EDUCATION/TRAINING PROGRAM

## 2024-02-27 RX ORDER — LORAZEPAM 2 MG/1
2 TABLET ORAL ONCE AS NEEDED
Status: COMPLETED | OUTPATIENT
Start: 2024-02-27 | End: 2024-02-27

## 2024-02-27 RX ORDER — LORAZEPAM 2 MG/ML
2 INJECTION INTRAMUSCULAR ONCE AS NEEDED
Status: COMPLETED | OUTPATIENT
Start: 2024-02-27 | End: 2024-02-27

## 2024-02-27 RX ORDER — DIPHENHYDRAMINE HCL 25 MG
50 CAPSULE ORAL ONCE AS NEEDED
Status: COMPLETED | OUTPATIENT
Start: 2024-02-27 | End: 2024-02-27

## 2024-02-27 RX ORDER — HALOPERIDOL 5 MG/1
5 TABLET ORAL ONCE AS NEEDED
Status: COMPLETED | OUTPATIENT
Start: 2024-02-27 | End: 2024-02-27

## 2024-02-27 RX ORDER — DIPHENHYDRAMINE HYDROCHLORIDE 50 MG/ML
INJECTION INTRAMUSCULAR; INTRAVENOUS
Status: COMPLETED
Start: 2024-02-27 | End: 2024-02-27

## 2024-02-27 RX ORDER — HALOPERIDOL 5 MG/ML
INJECTION INTRAMUSCULAR
Status: COMPLETED
Start: 2024-02-27 | End: 2024-02-27

## 2024-02-27 RX ORDER — LORAZEPAM 2 MG/ML
INJECTION INTRAMUSCULAR
Status: COMPLETED
Start: 2024-02-27 | End: 2024-02-27

## 2024-02-27 RX ORDER — HALOPERIDOL 5 MG/ML
5 INJECTION INTRAMUSCULAR ONCE AS NEEDED
Status: COMPLETED | OUTPATIENT
Start: 2024-02-27 | End: 2024-02-27

## 2024-02-27 RX ORDER — DIPHENHYDRAMINE HYDROCHLORIDE 50 MG/ML
50 INJECTION INTRAMUSCULAR; INTRAVENOUS ONCE AS NEEDED
Status: COMPLETED | OUTPATIENT
Start: 2024-02-27 | End: 2024-02-27

## 2024-02-27 RX ADMIN — DIPHENHYDRAMINE HYDROCHLORIDE 50 MG: 50 INJECTION, SOLUTION INTRAMUSCULAR; INTRAVENOUS at 16:03

## 2024-02-27 RX ADMIN — LORAZEPAM 2 MG: 2 INJECTION INTRAMUSCULAR at 16:02

## 2024-02-27 RX ADMIN — LORAZEPAM 2 MG: 2 TABLET ORAL at 20:59

## 2024-02-27 RX ADMIN — LORAZEPAM 2 MG: 2 INJECTION, SOLUTION INTRAMUSCULAR; INTRAVENOUS at 16:02

## 2024-02-27 RX ADMIN — DIPHENHYDRAMINE HYDROCHLORIDE 50 MG: 50 INJECTION INTRAMUSCULAR; INTRAVENOUS at 16:03

## 2024-02-27 RX ADMIN — HALOPERIDOL 5 MG: 5 INJECTION INTRAMUSCULAR at 16:02

## 2024-02-27 RX ADMIN — HALOPERIDOL 5 MG: 5 TABLET ORAL at 20:59

## 2024-02-27 RX ADMIN — HALOPERIDOL LACTATE 5 MG: 5 INJECTION, SOLUTION INTRAMUSCULAR at 16:02

## 2024-02-27 SDOH — HEALTH STABILITY: MENTAL HEALTH: BEHAVIORS/MOOD: AGITATED

## 2024-02-27 SDOH — HEALTH STABILITY: MENTAL HEALTH: IN THE PAST FEW WEEKS, HAVE YOU FELT THAT YOU OR YOUR FAMILY WOULD BE BETTER OFF IF YOU WERE DEAD?: NO

## 2024-02-27 SDOH — HEALTH STABILITY: MENTAL HEALTH: BEHAVIORS/MOOD: SLEEPING

## 2024-02-27 SDOH — HEALTH STABILITY: MENTAL HEALTH: ACTIVE SUICIDAL IDEATION WITH SPECIFIC PLAN AND INTENT (PAST 1 MONTH): NO

## 2024-02-27 SDOH — HEALTH STABILITY: MENTAL HEALTH: WISH TO BE DEAD (PAST 1 MONTH): NO

## 2024-02-27 SDOH — HEALTH STABILITY: MENTAL HEALTH: BEHAVIORS/MOOD: AGITATED;PACING;HYPER-VERBAL

## 2024-02-27 SDOH — HEALTH STABILITY: MENTAL HEALTH: SUICIDAL BEHAVIOR (3 MONTHS): NO

## 2024-02-27 SDOH — ECONOMIC STABILITY: HOUSING INSECURITY: FEELS SAFE LIVING IN HOME: YES

## 2024-02-27 SDOH — HEALTH STABILITY: MENTAL HEALTH: ACTIVE SUICIDAL IDEATION WITH SOME INTENT TO ACT, WITHOUT SPECIFIC PLAN (PAST 1 MONTH): NO

## 2024-02-27 SDOH — HEALTH STABILITY: MENTAL HEALTH: IN THE PAST WEEK, HAVE YOU BEEN HAVING THOUGHTS ABOUT KILLING YOURSELF?: NO

## 2024-02-27 SDOH — HEALTH STABILITY: MENTAL HEALTH: ANXIETY SYMPTOMS: GENERALIZED

## 2024-02-27 SDOH — HEALTH STABILITY: MENTAL HEALTH: HAVE YOU EVER TRIED TO KILL YOURSELF?: NO

## 2024-02-27 SDOH — HEALTH STABILITY: MENTAL HEALTH: SUICIDAL BEHAVIOR (LIFETIME): NO

## 2024-02-27 SDOH — HEALTH STABILITY: MENTAL HEALTH: BEHAVIORS/MOOD: CALM;SLEEPING

## 2024-02-27 SDOH — HEALTH STABILITY: MENTAL HEALTH: DEPRESSION SYMPTOMS: NO PROBLEMS REPORTED OR OBSERVED.

## 2024-02-27 SDOH — HEALTH STABILITY: MENTAL HEALTH: IN THE PAST FEW WEEKS, HAVE YOU WISHED YOU WERE DEAD?: NO

## 2024-02-27 SDOH — HEALTH STABILITY: MENTAL HEALTH: ARE YOU HAVING THOUGHTS OF KILLING YOURSELF RIGHT NOW?: NO

## 2024-02-27 SDOH — HEALTH STABILITY: MENTAL HEALTH: NON-SPECIFIC ACTIVE SUICIDAL THOUGHTS (PAST 1 MONTH): NO

## 2024-02-27 ASSESSMENT — COLUMBIA-SUICIDE SEVERITY RATING SCALE - C-SSRS
5. HAVE YOU STARTED TO WORK OUT OR WORKED OUT THE DETAILS OF HOW TO KILL YOURSELF? DO YOU INTEND TO CARRY OUT THIS PLAN?: NO
4. HAVE YOU HAD THESE THOUGHTS AND HAD SOME INTENTION OF ACTING ON THEM?: YES
6. HAVE YOU EVER DONE ANYTHING, STARTED TO DO ANYTHING, OR PREPARED TO DO ANYTHING TO END YOUR LIFE?: YES
6. HAVE YOU EVER DONE ANYTHING, STARTED TO DO ANYTHING, OR PREPARED TO DO ANYTHING TO END YOUR LIFE?: YES
2. HAVE YOU ACTUALLY HAD ANY THOUGHTS OF KILLING YOURSELF?: YES
1. IN THE PAST MONTH, HAVE YOU WISHED YOU WERE DEAD OR WISHED YOU COULD GO TO SLEEP AND NOT WAKE UP?: YES

## 2024-02-27 ASSESSMENT — LIFESTYLE VARIABLES
PRESCIPTION_ABUSE_PAST_12_MONTHS: NO
SUBSTANCE_ABUSE_PAST_12_MONTHS: YES

## 2024-02-27 ASSESSMENT — PAIN SCALES - GENERAL: PAINLEVEL_OUTOF10: 4

## 2024-02-27 ASSESSMENT — PAIN - FUNCTIONAL ASSESSMENT: PAIN_FUNCTIONAL_ASSESSMENT: 0-10

## 2024-02-27 NOTE — ED PROVIDER NOTES
EMERGENCY MEDICINE EVALUATION NOTE    History of Present Illness     Chief Complaint:   Chief Complaint   Patient presents with    pt to ER for higher lever of care for behavioral issues     Pt from a facility c/o paranoia, states he needs higher lever of care than the facility can provide. Hx of schizoaffective disorders, paranoia, drug use.        HPI: Anmol Burk is a 28 y.o. male presents with a chief complaint of some paranoia and potential placement by [facility].  Patient currently is in a rehab facility for methamphetamine and fentanyl use.  He has not used in over a week.  He states that he does have a history of schizoaffective disorder and also has not been taking his medication for that week.  He states that he starting to feel slightly paranoid and occasionally has a little bit of delusion and hallucinations but states that he is able to differentiate these from reality.  Patient states that he supposed be taking Abilify.  Patient reports that he was sent here from the rehab facility as they cannot take care of him currently.  They think that he potentially would need to be placed by [facility] or need a psychiatric consult.  Patient reports that he currently does not have any thoughts of suicide or homicide.  Patient reports he does not want to harm himself as he does want to seek treatment for his substance abuse.    Previous History   No past medical history on file.  No past surgical history on file.     No family history on file.  No Known Allergies  No current outpatient medications    Physical Exam     Appearance: Alert, oriented , cooperative     Skin: Intact,  dry skin, no lesions, rash, petechiae or purpura.      Eyes: PERRLA, EOMs intact,  Conjunctiva pink      ENT: Hearing grossly intact.      Neck: Supple. Trachea at midline.      Pulmonary: Clear bilaterally. No rales, rhonchi or wheezing. No accessory muscle use or stridor.     Cardiac: Normal rate and rhythm without murmur     Abdomen:  Soft, nontender, active bowel sounds.     Musculoskeletal: Full range of motion.      Neurological:Cranial nerves II through XII are grossly intact, normal sensation, no weakness, no focal findings identified.    Psych: Slightly anxious and some pressured speech.     Results     Labs Reviewed   CBC WITH AUTO DIFFERENTIAL - Abnormal       Result Value    WBC 6.7      nRBC 0.0      RBC 4.18 (*)     Hemoglobin 13.1 (*)     Hematocrit 38.5 (*)     MCV 92      MCH 31.3      MCHC 34.0      RDW 13.6      Platelets 231      Neutrophils % 61.5      Immature Granulocytes %, Automated 0.3      Lymphocytes % 28.4      Monocytes % 8.1      Eosinophils % 1.3      Basophils % 0.4      Neutrophils Absolute 4.10      Immature Granulocytes Absolute, Automated 0.02      Lymphocytes Absolute 1.90      Monocytes Absolute 0.54      Eosinophils Absolute 0.09      Basophils Absolute 0.03     COMPREHENSIVE METABOLIC PANEL - Abnormal    Glucose 149 (*)     Sodium 138      Potassium 3.7      Chloride 107      Bicarbonate 20 (*)     Anion Gap 15      Urea Nitrogen 10      Creatinine 0.61      eGFR >90      Calcium 9.4      Albumin 4.3      Alkaline Phosphatase 63      Total Protein 7.2       (*)     Bilirubin, Total 0.7       (*)    CREATINE KINASE - Abnormal    Creatine Kinase 692 (*)    URINALYSIS WITH REFLEX CULTURE AND MICROSCOPIC - Abnormal    Color, Urine Yellow      Appearance, Urine Clear      Specific Gravity, Urine 1.004 (*)     pH, Urine 6.0      Protein, Urine NEGATIVE      Glucose, Urine NEGATIVE      Blood, Urine NEGATIVE      Ketones, Urine NEGATIVE      Bilirubin, Urine NEGATIVE      Urobilinogen, Urine <2.0      Nitrite, Urine NEGATIVE      Leukocyte Esterase, Urine NEGATIVE     DRUG SCREEN,URINE - Normal    Amphetamine Screen, Urine Presumptive Negative      Barbiturate Screen, Urine Presumptive Negative      Benzodiazepines Screen, Urine Presumptive Negative      Cannabinoid Screen, Urine Presumptive Negative       Cocaine Metabolite Screen, Urine Presumptive Negative      Fentanyl Screen, Urine Presumptive Negative      Opiate Screen, Urine Presumptive Negative      Oxycodone Screen, Urine Presumptive Negative      PCP Screen, Urine Presumptive Negative      Narrative:     Drug screen results are presumptive and should not be used to assess   compliance with prescribed medication. Contact the performing [facility] laboratory   to add-on definitive confirmatory testing if clinically indicated.    Toxicology screening results are reported qualitatively. The concentration must   be greater than or equal to the cutoff to be reported as positive. The concentration   at which the screening test can detect an individual drug or metabolite varies.   The absence of expected drug(s) and/or drug metabolite(s) may indicate non-compliance,   inappropriate timing of specimen collection relative to drug administration, poor drug   absorption, diluted/adulterated urine, or limitations of testing. For medical purposes   only; not valid for forensic use.    Interpretive questions should be directed to the laboratory medical directors.   ALCOHOL - Normal    Alcohol <10     URINALYSIS WITH REFLEX CULTURE AND MICROSCOPIC    Narrative:     The following orders were created for panel order Urinalysis with Reflex Culture and Microscopic.  Procedure                               Abnormality         Status                     ---------                               -----------         ------                     Urinalysis with Reflex C...[[id number]]  Abnormal            Final result               Extra Urine Gray Tube[[id number]]                            In process                   Please view results for these tests on the individual orders.   EXTRA URINE GRAY TUBE     No orders to display         ED Course & Medical Decision Making     Medications   LORazepam (Ativan) tablet 2 mg (has no administration in time range)     Or   LORazepam (Ativan)  "injection 2 mg (has no administration in time range)   haloperidol (Haldol) tablet 5 mg (has no administration in time range)     Or   haloperidol lactate (Haldol) injection 5 mg (has no administration in time range)   diphenhydrAMINE (BENADryl) capsule 50 mg (has no administration in time range)     Or   diphenhydrAMINE (BENADryl) injection 50 mg (has no administration in time range)   haloperidol lactate (Haldol) injection  - Omnicell Override Pull (has no administration in time range)   diphenhydrAMINE (BENADryl) injection  - Omnicell Override Pull (has no administration in time range)   LORazepam (Ativan) injection  - Omnicell Override Pull (has no administration in time range)     Heart Rate:  [86]   Temperature:  [37 °C (98.6 °F)]   Respirations:  [20]   BP: (139)/(69)   Height:  [188 cm (6' 2\")]   Weight:  [118 kg (260 lb)]   Pulse Ox:  [99 %]    ED Course as of 02/27/24 1559 Tue Feb 27, 2024   1434 Called EPAT and discussed the patient's plan of care with them.  I informed them that the patient should be sober as has been in a rehab for at least a week and the rehab facility member brought him in and spoke to us in triage.  The plan was to get him placed by [facility].  EPAT states that if we place a consult order they will evaluate him at this time and if he is cleared by them he can go through [facility]. [CJ]   1542 Discussed the plan of care with the EPAT.  She spoke to the patient as well as his grandfather.  She really is concerned due to the patient not having a safe discharge plan.  Grandfather relates that if he goes home he does not feel safe with the patient there and is concerned he will use him additionally having further decompensation.  I do not necessarily disagree with this so I think the patient would benefit from inpatient treatment.  At this time psychiatric services recommends inpatient treatment for the patient and they will work on placement. [CJ]   6757 Patient was updated on the plan of " care of placement per psychiatric recommendation as well as mine.  Patient displayed understanding.  He requested to be medicated so patient will receive Haldol, Benadryl, and Ativan.  At this time EPAT is working on placement. [CJ]      ED Course User Index  [CJ] Felice Pierce PA-C         Diagnoses as of 02/27/24 2623   Acute paranoia (CMS/HCC)   Manic behavior (CMS/HCC)         Procedures   Procedures    Diagnosis     1. Acute paranoia (CMS/HCC)    2. Manic behavior (CMS/HCC)        Disposition   Pending psychiatric placement    ED Prescriptions    None         Disclaimer: This note was dictated by speech recognition. Minor errors in transcription may be present. Please call if questions.       Felice Pierce PA-C  02/27/24 0858

## 2024-02-27 NOTE — PROGRESS NOTES
EPAT - Social Work Psychiatric Assessment    Arrival Details  Mode of Arrival: Ambulatory  Admission Source: Other (Comment) (Rehab facility)  Admission Type: Voluntary  EPAT Assessment Start Date: 02/27/24  EPAT Assessment Start Time: 1455  Name of : EDUARDA Loredo    History of Present Illness  Admission Reason: Psych evaluation  HPI: Pt is a 27 y/o  male that was transported to the ED via [facility] staff. Per provider, staff at the rehab center felt as though pt required a higher level of care. SW reviewed pt’s chart, labs, and past ED documentation prior to completing the assessment. Pt denied SI, HI, and Self- harming behaviors. Pt presented with pressured speech, and confirmed that he is experiencing “extreme paranoia”. Pt reported that he was at the rehab facility for two weeks and came to the ED voluntary for [facility] services. However, pt is now declining to speak with a [facility] representative.    SW Readmission Information   Readmission within 30 Days: No    Psychiatric Symptoms  Anxiety Symptoms: Generalized  Depression Symptoms: No problems reported or observed.  Sissy Symptoms: Flight of ideas, Less need to sleep, Pressured speech    Psychosis Symptoms  Hallucination Type: No problems reported or observed.  Delusion Type: Paranoid    Additional Symptoms - Adult  Generalized Anxiety Disorder: Excessive anxiety/worry  Obsessive Compulsive Disorder: No problems reported or observed.  Panic Attack: No problems reported or observed.  Post Traumatic Stress Disorder: No problems reported or observed.  Delirium: No problems reported or observed.    Past Psychiatric History/Meds/Treatments  Past Psychiatric History: Pt has a past history of Schizoaffective Bipolar and Substance abuse disorder.  Past Psychiatric Meds/Treatments: Buspar, trileptal and Abilify/ drug rehab.  Past Violence/Victimization History: Pt reported none.    Current Mental Health Contacts   Name/Phone  Number: -   Last Appointment Date: -  Provider Name/Phone Number: -  Provider Last Appointment Date: -    Support System: Immediate family, Friends    Living Arrangement: House, Lives with someone    Home Safety  Feels Safe Living in Home: Yes  Potentially Unsafe Housing Conditions: Unable to Assess    Income Information  Employment Status for: Patient  Employment Status: Unemployed    [place] Service/Education History  Current or Previous  Service: None  Education Level: College  History of Learning Problems: No  History of School Behavior Problems: No    Social/Cultural History  Cultural Requests During Hospitalization: None requested  Spiritual Requests During Hospitalization: None requested  Important Activities: Hobbies, Exercise, Family    Legal  Legal Considerations: Patient/ Family Ability to Make Healthcare Decisions  Criminal Activity/ Legal Involvement Pertinent to Current Situation/ Hospitalization: Pt denied  Legal Concerns: None reported  Legal Comments: None reported    Drug Screening  Have you used any substances (canabis, cocaine, heroin, hallucinogens, inhalants, etc.) in the past 12 months?: Yes  Have you used any prescription drugs other than prescribed in the past 12 months?: No  Is a toxicology screen needed?: Yes    Stage of Change  Stage of Change: Precontemplation         Orientation  Orientation Level: Oriented X4    General Appearance  Motor Activity: Agitation  Speech Pattern: Pressured    Thought Process  Coherency: Disorganized, Tangential  Delusions: Paranoid  Hallucination: None  Judgment/Insight: Limited  Confusion: Mild  Cognition: Cognitive delay    Sleep Pattern  Sleep Pattern: Insomnia    Risk Factors  Self Harm/Suicidal Ideation Plan: Pt denies  Previous Self Harm/Suicidal Plans: Pt denies, however, SW reviewed pt’s chart and observed numerous ED visits for SI.  Risk Factors: Major mental illness, Lower socioeconomic status, Male, Substance abuse,  Unemployment    Violence Risk Assessment  Assessment of Violence: None noted  Thoughts of Harm to Others: No    Ability to Assess Risk Screen  Risk Screen - Ability to Assess: Able to be screened  Ask Suicide-Screening Questions  1. In the past few weeks, have you wished you were dead?: No  2. In the past few weeks, have you felt that you or your family would be better off if you were dead?: No  3. In the past week, have you been having thoughts about killing yourself?: No  4. Have you ever tried to kill yourself?: No  5. Are you having thoughts of killing yourself right now?: No  Calculated Risk Score: No intervention is necessary  [place] Suicide Severity Rating Scale (Screener/Recent Self-Report)  1. Wish to be Dead (Past 1 Month): No  2. Non-Specific Active Suicidal Thoughts (Past 1 Month): No  3. Active Suicidal Ideation with any Methods (Not Plan) Without Intent to Act (Past 1 Month): No  4. Active Suicidal Ideation with Some Intent to Act, Without Specific Plan (Past 1 Month): No  5. Active Suicidal Ideation with Specific Plan and Intent (Past 1 Month): No  6. Suicidal Behavior (Lifetime): No  6. Suicidal Behavior (3 Months): No  Calculated C-SSRS Risk Score (Lifetime/Recent): No Risk Indicated  Step 1: Risk Factors  Current & Past Psychiatric Dx: Psychotic disorder, Alcohol/substance abuse disorders  Presenting Symptoms: Impulsivity, Anxiety and/or panic, Insomia, Psychosis  Precipitants/Stressors: Substance intoxication or withdrawal, Inadequate social supports  Change in Treatment: Non-compliant or not receiving treatment  Access to Lethal Methods : No  Step 2: Protective Factors   Protective Factors Internal: Identifies reasons for living  Protective Factors External: Supportive social network or family or friends  Step 5: Documentation  Risk Level: Moderate suicide risk    Psychiatric Impression and Plan of Care    Pt is a 29 y/o male with a past history of Schizoaffective Bipolar disorder and Substance  abuse disorder. Pt reported that he is currently prescribed Abilify, but is not compliant with his medication. Pt is denying SI, HI, and Self-harming behaviors. Pt was admitted to [facility] rehab facility, however, pt was transported to the ED after two weeks to receive a higher level of care.    Pt reported experiencing extreme paranoia. Pt believes that people are following him. SW attempted to ask pt detailed questions but pt was guarded. During the assessment pt continuously stated “I would like to go home today”. Pt currently reside with his grandfather Andrew Burkett [phone]. SW contacted pt's grandfather for collateral information.     Per grandfather, pt has Dromomania. Grandfather stated “He is always on the move. He goes from facility to facility, but never get the help he need”. Grandfather reported that pt has never been admitted to a psychiatric facility. Pt's grandfather is aware of pt's meth use and does not feel comfortable with pt in the home at this time. Grandfather also disclosed that pt is always paranoid. “He told me that the [employer]umin[employer] is after him”. Grandfather would like for pt to be admitted to a psychiatric facility.     SW reviewed all information with the provider. Pt does not meet criteria for inpatient services, however, the provider would like for pt to receive necessary assistance regarding medication compliance and psychiatric care.     Diagnostic impression:   Schizophrenia, Inpatient   Specific Resources Provided to Patient: Inpatient treatment  CM Notified: -  PHP/IOP Recommended: -  Specific Information Provided for PHP/IOP: -  Plan Comments: -    Outcome/Disposition  Patient's Perception of Outcome Achieved: Pt wishes to be discharged  Assessment, Recommendations and Risk Level Reviewed with: Dr. Felice Pierce  Contact Name: Andrew Mark  Contact Number(s): [phone]  Contact Relationship: Grandfather  EPAT Assessment Completed Date: 02/27/24  EPAT Assessment Completed Time:  1518  Patient Disposition:  Adult Inpatient Psych

## 2024-02-27 NOTE — ED TRIAGE NOTES
The patient was seen and examined in triage.    History of Present Illness: The patient is a 28-year-old male who presents emergency department for visual and auditory hallucinations.  He is currently in rehab for methamphetamine abuse as well as fentanyl abuse.  Last time he used was about a week and a half ago.  He reports that he also has schizoaffective disorder and has not been taking his medications.  He denies any suicidal or homicidal ideation.    Brief Physical Exam:  Exam is limited by the patient sitting in a chair in triage.   Heart: Regular rate and rhythm.   Lungs: Clear to auscultation bilaterally.   Abdomen: Soft, nondistended, normoactive bowel sounds, nontender     Plan: Appropriate labs and diagnostic imaging were ordered.      For the remainder of the patient's workup and ED course, please refer to the main ED provider note. We discussed need for diagnostic testing including laboratory studies and imaging.  We also discussed that they may be asked to wait in the waiting room while these tests are pending.  They understand that if they choose to leave without having the testing completed or resulted that we cannot rule out acute life threatening illnesses and the risks involved could lead to worsening condition, permanent disability or even death.      Disclaimer: This note was dictated by speech recognition. Minor errors in transcription may be present. Please call if questions.

## 2024-02-28 VITALS
SYSTOLIC BLOOD PRESSURE: 194 MMHG | WEIGHT: 260 LBS | RESPIRATION RATE: 18 BRPM | DIASTOLIC BLOOD PRESSURE: 94 MMHG | HEIGHT: 74 IN | TEMPERATURE: 98.8 F | HEART RATE: 78 BPM | OXYGEN SATURATION: 98 % | BODY MASS INDEX: 33.37 KG/M2

## 2024-02-28 LAB — HOLD SPECIMEN: NORMAL

## 2024-02-28 PROCEDURE — 2500000004 HC RX 250 GENERAL PHARMACY W/ HCPCS (ALT 636 FOR OP/ED): Performed by: EMERGENCY MEDICINE

## 2024-02-28 PROCEDURE — 96372 THER/PROPH/DIAG INJ SC/IM: CPT

## 2024-02-28 RX ORDER — HALOPERIDOL 5 MG/ML
5 INJECTION INTRAMUSCULAR ONCE
Status: COMPLETED | OUTPATIENT
Start: 2024-02-28 | End: 2024-02-28

## 2024-02-28 RX ORDER — MIDAZOLAM HYDROCHLORIDE 5 MG/ML
5 INJECTION INTRAMUSCULAR; INTRAVENOUS ONCE
Status: COMPLETED | OUTPATIENT
Start: 2024-02-28 | End: 2024-02-28

## 2024-02-28 RX ADMIN — HALOPERIDOL LACTATE 5 MG: 5 INJECTION, SOLUTION INTRAMUSCULAR at 05:14

## 2024-02-28 RX ADMIN — MIDAZOLAM HYDROCHLORIDE 5 MG: 5 INJECTION, SOLUTION INTRAMUSCULAR; INTRAVENOUS at 05:14

## 2024-02-28 SDOH — HEALTH STABILITY: MENTAL HEALTH: BEHAVIORS/MOOD: SLEEPING

## 2024-02-28 SDOH — HEALTH STABILITY: MENTAL HEALTH: BEHAVIORS/MOOD: APPROPRIATE FOR SITUATION;CALM;COOPERATIVE;GUARDED

## 2024-02-28 ASSESSMENT — PAIN SCALES - GENERAL
PAINLEVEL_OUTOF10: 0 - NO PAIN
PAINLEVEL_OUTOF10: 0 - NO PAIN

## 2024-02-28 ASSESSMENT — PAIN - FUNCTIONAL ASSESSMENT: PAIN_FUNCTIONAL_ASSESSMENT: 0-10

## 2024-02-28 NOTE — SIGNIFICANT EVENT
Application for Emergency Admission      Ready for Transfer?  Is the patient medically cleared for transfer to inpatient psychiatry: Yes  Has the patient been accepted to an inpatient psychiatric hospital: Yes    Application for Emergency Admission  IN ACCORDANCE WITH [place] 5122[place]10 [place]  The Chief Clinical Officer of: Jen 2/28/2024 .5:07 AM    Reason for Hospitalization  The undersigned has reason to believe that: Anmol Burk Is a mentally ill person subject to hospitalization by court order under division B Section 5122.01 of the Revised Code, i.e., this person:    1.No  Represents a substantial risk of physical harm to self as manifested by evidence of threats of, or attempts at, suicide or serious self-inflicted bodily harm    2.No Represents a substantial risk of physical harm to others as manifested by evidence of recent homicidal or other violent behavior, evidence of recent threats that place another in reasonable fear of violent behavior and serious physical harm, or other evidence of present dangerousness    3.Yes Represents a substantial and immediate risk of serious physical impairment or injury to self as manifested by  evidence that the person is unable to provide for and is not providing for the person's basic physical needs because of the person's mental illness and that appropriate provision for those needs cannot be made  immediately available in the community    4.Yes Would benefit from treatment in a hospital for his mental illness and is in need of such treatment as manifested by evidence of behavior that creates a grave and imminent risk to substantial rights of others or  himself.    5.No Would benefit from treatment as manifested by evidence of behavior that indicates all of the following:       (a) The person is unlikely to survive safely in the community without supervision, based on a clinical determination.       (b) The person has a history of lack of  compliance with treatment for mental illness and one of the following applies:      (i) At least twice within the thirty-six months prior to the filing of an affidavit seeking court-ordered treatment of the person under section 5122.111 of the Revised Code, the lack of compliance has been a significant factor in necessitating hospitalization in a hospital or receipt of services in a forensic or other mental health unit of a correctional facility, provided that the thirty-six-month period shall be extended by the length of any hospitalization or incarceration of the person that occurred within the thirty-six-month period.      (ii) Within the forty-eight months prior to the filing of an affidavit seeking court-ordered treatment of the person under section 5122.111 of the Revised Code, the lack of compliance resulted in one or more acts of serious violent behavior toward self or others or threats of, or attempts at, serious physical harm to self or others, provided that the forty-eight-month period shall be extended by the length of any hospitalization or incarceration of the person that occurred within the forty-eight-month period.      (c) The person, as a result of mental illness, is unlikely to voluntarily participate in necessary treatment.       (d) In view of the person's treatment history and current behavior, the person is in need of treatment in order to prevent a relapse or deterioration that would be likely to result in substantial risk of serious harm to the person or others.    (e) Represents a substantial risk of physical harm to self or others if allowed to remain at liberty pending examination.    Therefore, it is requested that said person be admitted to the above named facility.    STATEMENT OF BELIEF    Must be filled out by one of the following: a psychiatrist, licensed physician, licensed clinical psychologist, health or ,  or .  (Statement shall include the  circumstances under which the individual was taken into custody and the reason for the person's belief that hospitalization is necessary. The statement shall also include a reference to efforts made to secure the individual's property at his residence if he was taken into custody there. Every reasonable and appropriate effort should be made to take this person into custody in the least conspicuous manner possible.)    Significant paranoia and delusions requiring inpatient psychiatric stabilization     Jag Longo MD 2/28/2024     _____________________________________________________________   Place of Employment: [facility]    STATEMENT OF OBSERVATION BY PSYCHIATRIST, LICENSED PHYSICIAN, OR LICENSED CLINICAL PSYCHOLOGIST, IF APPLICABLE    Place of Observation (e.g., [facility], general hospital, office, emergency facility)  (If applicable, please complete)    Jag Longo MD 2/28/2024    _____________________________________________________________

## 2024-02-28 NOTE — PROGRESS NOTES
This patient was seen by the offgoing provider.  Please see their note for full history and physical exam.    Briefly, this is a 28 y.o. male presenting to the ED with paranoia.  He was signed out to me pending EPAT placement.  He was able to be placed in [facility].  A pink slip and transfer note were signed.  The patient will be monitored in our ED until he can be moved to his new treatment location.  While during my care, he did become a little bit more agitated and ultimately received additional 5 mg of Haldol and 5 mg of Versed IM.        Jag Longo MD  Emergency Medicine Attending

## 2024-03-10 LAB
ATRIAL RATE: 66 BPM
P AXIS: 28 DEGREES
PR INTERVAL: 152 MS
Q ONSET: 253 MS
QRS COUNT: 11 BEATS
QRS DURATION: 93 MS
QT INTERVAL: 410 MS
QTC CALCULATION(BAZETT): 430 MS
QTC FREDERICIA: 423 MS
R AXIS: 3 DEGREES
T AXIS: 8 DEGREES
T OFFSET: 458 MS
VENTRICULAR RATE: 66 BPM

## 2024-11-08 NOTE — ED NOTES
[place], Lactic, and rapid COVID sent to radha Caldera  09/17/22 0039 You can access the FollowMyHealth Patient Portal offered by [facility] by registering at the following website: [email or web address]. By joining [employer]’s FollowMyHealth portal, you will also be able to view your health information using other applications (apps) compatible with our system.

## 2025-04-05 ENCOUNTER — HOSPITAL ENCOUNTER (EMERGENCY)
Age: 30
Discharge: PSYCHIATRIC OR SUBSTANCE USE FACILITY | End: 2025-04-05
Attending: STUDENT IN AN ORGANIZED HEALTH CARE EDUCATION/TRAINING PROGRAM
Payer: COMMERCIAL

## 2025-04-05 VITALS
HEIGHT: 74 IN | OXYGEN SATURATION: 98 % | DIASTOLIC BLOOD PRESSURE: 42 MMHG | WEIGHT: 250 LBS | BODY MASS INDEX: 32.08 KG/M2 | TEMPERATURE: 97.3 F | HEART RATE: 68 BPM | SYSTOLIC BLOOD PRESSURE: 113 MMHG | RESPIRATION RATE: 16 BRPM

## 2025-04-05 DIAGNOSIS — F19.10 POLYSUBSTANCE ABUSE: ICD-10-CM

## 2025-04-05 DIAGNOSIS — R45.851 SUICIDAL IDEATION: Primary | ICD-10-CM

## 2025-04-05 DIAGNOSIS — R45.850 HOMICIDAL IDEATIONS: ICD-10-CM

## 2025-04-05 DIAGNOSIS — R44.3 HALLUCINATIONS: ICD-10-CM

## 2025-04-05 LAB
ALBUMIN SERPL-MCNC: 5 G/DL (ref 3.5–5.2)
ALP SERPL-CCNC: 65 U/L (ref 40–129)
ALT SERPL-CCNC: 16 U/L (ref 0–40)
AMPHET UR QL SCN: POSITIVE
ANION GAP SERPL CALCULATED.3IONS-SCNC: 19 MMOL/L (ref 7–16)
APAP SERPL-MCNC: <5 UG/ML (ref 10–30)
AST SERPL-CCNC: 44 U/L (ref 0–39)
BACTERIA URNS QL MICRO: ABNORMAL
BARBITURATES UR QL SCN: POSITIVE
BASOPHILS # BLD: 0.04 K/UL (ref 0–0.2)
BASOPHILS NFR BLD: 0 % (ref 0–2)
BENZODIAZ UR QL: NEGATIVE
BILIRUB SERPL-MCNC: 1.7 MG/DL (ref 0–1.2)
BILIRUB UR QL STRIP: ABNORMAL
BUN SERPL-MCNC: 23 MG/DL (ref 6–20)
BUPRENORPHINE UR QL: POSITIVE
CALCIUM SERPL-MCNC: 9.3 MG/DL (ref 8.6–10.2)
CANNABINOIDS UR QL SCN: POSITIVE
CHLORIDE SERPL-SCNC: 101 MMOL/L (ref 98–107)
CK SERPL-CCNC: 494 U/L (ref 20–200)
CK SERPL-CCNC: 702 U/L (ref 20–200)
CLARITY UR: CLEAR
CO2 SERPL-SCNC: 15 MMOL/L (ref 22–29)
COCAINE UR QL SCN: NEGATIVE
COLOR UR: YELLOW
CREAT SERPL-MCNC: 0.7 MG/DL (ref 0.7–1.2)
EKG ATRIAL RATE: 77 BPM
EKG P AXIS: 51 DEGREES
EKG P-R INTERVAL: 154 MS
EKG Q-T INTERVAL: 406 MS
EKG QRS DURATION: 78 MS
EKG QTC CALCULATION (BAZETT): 459 MS
EKG R AXIS: 17 DEGREES
EKG T AXIS: 27 DEGREES
EKG VENTRICULAR RATE: 77 BPM
EOSINOPHIL # BLD: 0.08 K/UL (ref 0.05–0.5)
EOSINOPHILS RELATIVE PERCENT: 1 % (ref 0–6)
ERYTHROCYTE [DISTWIDTH] IN BLOOD BY AUTOMATED COUNT: 13.7 % (ref 11.5–15)
ETHANOLAMINE SERPL-MCNC: <10 MG/DL (ref 0–0.08)
FENTANYL UR QL: NEGATIVE
GFR, ESTIMATED: >90 ML/MIN/1.73M2
GLUCOSE SERPL-MCNC: 106 MG/DL (ref 74–99)
GLUCOSE UR STRIP-MCNC: NEGATIVE MG/DL
HCT VFR BLD AUTO: 38.2 % (ref 37–54)
HGB BLD-MCNC: 13.2 G/DL (ref 12.5–16.5)
HGB UR QL STRIP.AUTO: ABNORMAL
IMM GRANULOCYTES # BLD AUTO: 0.04 K/UL (ref 0–0.58)
IMM GRANULOCYTES NFR BLD: 0 % (ref 0–5)
KETONES UR STRIP-MCNC: >80 MG/DL
LEUKOCYTE ESTERASE UR QL STRIP: NEGATIVE
LYMPHOCYTES NFR BLD: 2.49 K/UL (ref 1.5–4)
LYMPHOCYTES RELATIVE PERCENT: 21 % (ref 20–42)
MCH RBC QN AUTO: 30.9 PG (ref 26–35)
MCHC RBC AUTO-ENTMCNC: 34.6 G/DL (ref 32–34.5)
MCV RBC AUTO: 89.5 FL (ref 80–99.9)
METHADONE UR QL: POSITIVE
MONOCYTES NFR BLD: 1.33 K/UL (ref 0.1–0.95)
MONOCYTES NFR BLD: 11 % (ref 2–12)
NEUTROPHILS NFR BLD: 67 % (ref 43–80)
NEUTS SEG NFR BLD: 8.06 K/UL (ref 1.8–7.3)
NITRITE UR QL STRIP: NEGATIVE
OPIATES UR QL SCN: NEGATIVE
OXYCODONE UR QL SCN: NEGATIVE
PCP UR QL SCN: NEGATIVE
PH UR STRIP: 6 [PH] (ref 5–8)
PLATELET # BLD AUTO: 226 K/UL (ref 130–450)
PMV BLD AUTO: 10 FL (ref 7–12)
POTASSIUM SERPL-SCNC: 3.8 MMOL/L (ref 3.5–5)
PROT SERPL-MCNC: 7.4 G/DL (ref 6.4–8.3)
PROT UR STRIP-MCNC: ABNORMAL MG/DL
RBC # BLD AUTO: 4.27 M/UL (ref 3.8–5.8)
RBC #/AREA URNS HPF: ABNORMAL /HPF
SALICYLATES SERPL-MCNC: <0.3 MG/DL (ref 0–30)
SODIUM SERPL-SCNC: 135 MMOL/L (ref 132–146)
SP GR UR STRIP: >1.03 (ref 1–1.03)
TEST INFORMATION: ABNORMAL
TOXIC TRICYCLIC SC,BLOOD: NEGATIVE
UROBILINOGEN UR STRIP-ACNC: 1 EU/DL (ref 0–1)
WBC #/AREA URNS HPF: ABNORMAL /HPF
WBC OTHER # BLD: 12 K/UL (ref 4.5–11.5)

## 2025-04-05 PROCEDURE — 80053 COMPREHEN METABOLIC PANEL: CPT

## 2025-04-05 PROCEDURE — 80143 DRUG ASSAY ACETAMINOPHEN: CPT

## 2025-04-05 PROCEDURE — 82550 ASSAY OF CK (CPK): CPT

## 2025-04-05 PROCEDURE — 99285 EMERGENCY DEPT VISIT HI MDM: CPT

## 2025-04-05 PROCEDURE — 81001 URINALYSIS AUTO W/SCOPE: CPT

## 2025-04-05 PROCEDURE — 93005 ELECTROCARDIOGRAM TRACING: CPT | Performed by: STUDENT IN AN ORGANIZED HEALTH CARE EDUCATION/TRAINING PROGRAM

## 2025-04-05 PROCEDURE — 80307 DRUG TEST PRSMV CHEM ANLYZR: CPT

## 2025-04-05 PROCEDURE — 80179 DRUG ASSAY SALICYLATE: CPT

## 2025-04-05 PROCEDURE — 96372 THER/PROPH/DIAG INJ SC/IM: CPT

## 2025-04-05 PROCEDURE — 85025 COMPLETE CBC W/AUTO DIFF WBC: CPT

## 2025-04-05 PROCEDURE — 6360000002 HC RX W HCPCS: Performed by: EMERGENCY MEDICINE

## 2025-04-05 PROCEDURE — G0480 DRUG TEST DEF 1-7 CLASSES: HCPCS

## 2025-04-05 PROCEDURE — 90791 PSYCH DIAGNOSTIC EVALUATION: CPT | Performed by: SOCIAL WORKER

## 2025-04-05 RX ORDER — DROPERIDOL 2.5 MG/ML
5 INJECTION, SOLUTION INTRAMUSCULAR; INTRAVENOUS ONCE
Status: COMPLETED | OUTPATIENT
Start: 2025-04-05 | End: 2025-04-05

## 2025-04-05 RX ORDER — MIDAZOLAM HYDROCHLORIDE 2 MG/2ML
2 INJECTION, SOLUTION INTRAMUSCULAR; INTRAVENOUS ONCE
Status: COMPLETED | OUTPATIENT
Start: 2025-04-05 | End: 2025-04-05

## 2025-04-05 RX ADMIN — MIDAZOLAM 2 MG: 1 INJECTION INTRAMUSCULAR; INTRAVENOUS at 07:03

## 2025-04-05 RX ADMIN — DROPERIDOL 5 MG: 2.5 INJECTION, SOLUTION INTRAMUSCULAR; INTRAVENOUS at 07:03

## 2025-04-05 ASSESSMENT — PAIN - FUNCTIONAL ASSESSMENT
PAIN_FUNCTIONAL_ASSESSMENT: NONE - DENIES PAIN
PAIN_FUNCTIONAL_ASSESSMENT: NONE - DENIES PAIN

## 2025-04-05 NOTE — ED NOTES
Pt accepted to [facility] by Dr. Kirby [facility]    N2N [phone]    Involuntary faxed to [facility]    [facility] to transport ETA [phone]

## 2025-04-05 NOTE — ED NOTES
PAULA spoke with Kayy from [facility] and informed Wendy Driscoll needed a CK level of 350 or lower. Dowling was trying to connect to the nurse about the medications that were given to pt. PAULA stated she will let the nurse know to redraw pt's CK level.

## 2025-04-05 NOTE — ED NOTES
Called again to give N2N. Spoke with HAKAN Pedro, including patient presentation complaint, pink slip, medications, lab results EKG Qtc, and past medical/psych history and admitting orders.

## 2025-04-05 NOTE — ED PROVIDER NOTES
[facility] EMERGENCY DEPARTMENT  EMERGENCY DEPARTMENT ENCOUNTER        Pt Name: Ernie Ken Jr.  MRN: [id number]  Birthdate 1995  Date of evaluation: 4/5/2025  Provider: Moni Gonzales DO  PCP: Not, On File (Inactive)  Note Started: 5:29 AM EDT 4/5/25    CHIEF COMPLAINT       Chief Complaint   Patient presents with    Suicidal     Suicidal +Plan \"to OD on fentanyl and slam it in my neck last used meth 2200 last evening nonmed compliant +visual hallucination \"shadow people\" denies AH, +HI \"this couple and their evon friend because they ripped me off, I want to burn down their tents or intentionally make them overdose if burning the tent doesn't work       HISTORY OF PRESENT ILLNESS: 1 or more Elements   History From: patient    Limitations to history : None    Ernie Ken Jr. is a 29 y.o. male with a history of depression, schizoaffective disorder, polysubstance abuse presenting to the emergency department with suicidal thoughts.  Patient states that he suicidal with a plan to overdose on fentanyl slam into his neck.  He states he last used meth around 10 PM last evening.  He states that he is not compliant with his medications and just does not take them.  He is also spearing seeing visual hallucinations states that he sees shadows of people.  He is not experiencing any auditory hallucinations.  Patient states that he is also experiencing homicidal ideation and there is a couple and other evon friend that ripped him off and he wants to burn down there tends or intentionally make them overdose if burning down their tent does not work.  He states that he also would like to go to rehab and stop using drugs and received some help with this in addition to his mental health.     Nursing Notes were all reviewed and agreed with or any disagreements were addressed in the HPI.    REVIEW OF SYSTEMS :      Review of Systems    Positives and Pertinent negatives as per HPI.  rhythm, normal axis, left atrial lodgment, no acute elevations or depressions, QTc is 459  Comparison: no previous EKG available    [KG]      ED Course User Index  [KG] Moni Gonzales DO            Medical Decision Making/Differential Diagnosis:    CC/HPI Summary, Social Determinants of health, Records Reviewed, DDx, testing done/not done, ED Course, Reassessment, disposition considerations/shared decision making with patient, consults, disposition:        The patient is a 29-year-old male presenting for suicidal ideation.  Patient is hemodynamically stable, nontoxic, and in no acute distress.     Patient is a daily smoker and also with history of meth use as well which are known social detriments to his health.  Patient was admitted to [facility] on 3/7/2025 due to psychiatric issues and records were reviewed from this visit.    Differential diagnose includes but is not limited to mood disorder versus schizoaffective disorder versus depression versus anxiety versus drug use.    CMP does not show any electrolyte abnormalities.  CK7 02.  LFTs shows slight elevation of AST at 44.  ALT is 16.  Bilirubin is 1.7.  Patient has no abdominal pain.  Drug screen is positive for amphetamines along with barbiturates, buprenorphine, cannabinoid, and methadone.  CBC shows slightly cytosis over 12,000.  Urinalysis does not show any evidence of acute infection.  EKG shows normal QTc with no acute ischemic changes..     Patient not experienced any fall or trauma.  He does have a documented psych history and has not been taking his medications and does admit to using multiple drugs but he states he would like to also receive help with his drug use.    Patient pink slipped upon arrival.  Patient medically cleared for social evaluation treatment.  Disposition pending social work assessment at this time.       CONSULTS: (Who and What was discussed)  IP CONSULT TO TELE-PSYCH (SOCIAL WORK ONLY)        I

## 2025-04-05 NOTE — ED NOTES
Called [facility] to give nurse to nurse. Phone was set on the desk , hearing several conversations amongst staff. Waiting for 15 minutes \"on hold\". Hung up and tried to call N2N again. Connected to voicemail.

## 2025-04-05 NOTE — VIRTUAL HEALTH
Ernie Ken Maxi  [id number]  1995     Social Work Behavioral Health Crisis Assessment    04/05/25    Chief Complaint: Sucidal ideation with plan and intent    HPI: Patient is a 29 y.o. White (non-) male who presents for Sucidal ideation . Patient presented to the ED on 04/05/25 from home.    Per chart review, Suicidal +Plan \"to OD on fentanyl and slam it in my neck last used meth 2200 last evening nonmed compliant +visual hallucination \"shadow people\" denies AH, +HI \"this couple and their evon friend because they ripped me off, I want to burn down their tents or intentionally make them overdose if burning the tent doesn't work     Writer talked to the patient, during assessment patient reported that he has a plan and intent to kill himself by overdosing on fentanyl. Patient reported that he is tired of living due to having a drug addiction to amphetamines. Patient reported that he is homeless, sleeping on the street, and is not currently employed. Patient reports that he doesn't feel like he can keep himself safe at this time, and has plan to kill himself if he discharges. Patient reported that he is not currently receiving any mental health services.     Pt's symptoms are consistent with major depressive disorder severe with suicidal ideation and plan to overdose. This pt. would benefit from inpatient hospitalization and is agreeable to admission.     Past Psychiatric History:  Previous Diagnoses/symptoms: denied   Previous suicide attempts/self-harm: Drug use   Inpatient psychiatric hospitalizations: 2024  Current outpatient psychiatric provider: Denies  Current therapist: States not in therapy  Previous psychiatric medication trials: No prior medication trials  Current psychiatric medications: No current psychiatric medications  Family Psychiatric History: Denies    Sleep Hours: 3    Sleep concerns: difficulty attaining sleep    Use of sleep medications: denies    Substance Abuse History:  Tobacco:

## 2025-04-05 NOTE — ED NOTES
All belongings placed in locker 27. 5 bags. Two string bags, one back pack and two patient belonging bags.

## 2025-04-07 LAB
EKG ATRIAL RATE: 77 BPM
EKG P AXIS: 51 DEGREES
EKG P-R INTERVAL: 154 MS
EKG Q-T INTERVAL: 406 MS
EKG QRS DURATION: 78 MS
EKG QTC CALCULATION (BAZETT): 459 MS
EKG R AXIS: 17 DEGREES
EKG T AXIS: 27 DEGREES
EKG VENTRICULAR RATE: 77 BPM

## 2025-04-07 PROCEDURE — 93010 ELECTROCARDIOGRAM REPORT: CPT | Performed by: INTERNAL MEDICINE

## 2025-04-16 ENCOUNTER — APPOINTMENT (OUTPATIENT)
Dept: CARDIOLOGY | Facility: HOSPITAL | Age: 30
End: 2025-04-16
Payer: COMMERCIAL

## 2025-04-16 ENCOUNTER — HOSPITAL ENCOUNTER (EMERGENCY)
Facility: HOSPITAL | Age: 30
Discharge: PSYCHIATRIC OR SUBSTANCE USE FACILITY | End: 2025-04-17
Attending: STUDENT IN AN ORGANIZED HEALTH CARE EDUCATION/TRAINING PROGRAM
Payer: COMMERCIAL

## 2025-04-16 DIAGNOSIS — R45.851 SUICIDAL IDEATION: ICD-10-CM

## 2025-04-16 DIAGNOSIS — F15.10 AMPHETAMINE ABUSE (MULTI): Primary | ICD-10-CM

## 2025-04-16 LAB
ALBUMIN SERPL BCP-MCNC: 4.9 G/DL (ref 3.4–5)
ALP SERPL-CCNC: 47 U/L (ref 33–120)
ALT SERPL W P-5'-P-CCNC: 28 U/L (ref 10–52)
AMPHETAMINES UR QL SCN: ABNORMAL
ANION GAP SERPL CALCULATED.3IONS-SCNC: 15 MMOL/L (ref 10–20)
APAP SERPL-MCNC: <10 UG/ML (ref ?–30)
APPEARANCE UR: CLEAR
AST SERPL W P-5'-P-CCNC: 73 U/L (ref 9–39)
BARBITURATES UR QL SCN: ABNORMAL
BASOPHILS # BLD AUTO: 0.02 X10*3/UL (ref 0–0.1)
BASOPHILS NFR BLD AUTO: 0.2 %
BENZODIAZ UR QL SCN: ABNORMAL
BILIRUB SERPL-MCNC: 1.5 MG/DL (ref 0–1.2)
BILIRUB UR STRIP.AUTO-MCNC: NEGATIVE MG/DL
BUN SERPL-MCNC: 21 MG/DL (ref 6–23)
BZE UR QL SCN: ABNORMAL
CALCIUM SERPL-MCNC: 9.1 MG/DL (ref 8.6–10.3)
CANNABINOIDS UR QL SCN: ABNORMAL
CHLORIDE SERPL-SCNC: 106 MMOL/L (ref 98–107)
CO2 SERPL-SCNC: 21 MMOL/L (ref 21–32)
COLOR UR: YELLOW
CREAT SERPL-MCNC: 0.53 MG/DL (ref 0.5–1.3)
EGFRCR SERPLBLD CKD-EPI 2021: >90 ML/MIN/1.73M*2
EOSINOPHIL # BLD AUTO: 0.05 X10*3/UL (ref 0–0.7)
EOSINOPHIL NFR BLD AUTO: 0.5 %
ERYTHROCYTE [DISTWIDTH] IN BLOOD BY AUTOMATED COUNT: 13 % (ref 11.5–14.5)
ETHANOL SERPL-MCNC: <10 MG/DL
FENTANYL+NORFENTANYL UR QL SCN: ABNORMAL
GLUCOSE SERPL-MCNC: 83 MG/DL (ref 74–99)
GLUCOSE UR STRIP.AUTO-MCNC: NORMAL MG/DL
HCT VFR BLD AUTO: 34.8 % (ref 41–52)
HGB BLD-MCNC: 12.4 G/DL (ref 13.5–17.5)
IMM GRANULOCYTES # BLD AUTO: 0.03 X10*3/UL (ref 0–0.7)
IMM GRANULOCYTES NFR BLD AUTO: 0.3 % (ref 0–0.9)
KETONES UR STRIP.AUTO-MCNC: ABNORMAL MG/DL
LEUKOCYTE ESTERASE UR QL STRIP.AUTO: NEGATIVE
LYMPHOCYTES # BLD AUTO: 2.92 X10*3/UL (ref 1.2–4.8)
LYMPHOCYTES NFR BLD AUTO: 29.6 %
MCH RBC QN AUTO: 31.1 PG (ref 26–34)
MCHC RBC AUTO-ENTMCNC: 35.6 G/DL (ref 32–36)
MCV RBC AUTO: 87 FL (ref 80–100)
METHADONE UR QL SCN: ABNORMAL
MONOCYTES # BLD AUTO: 1 X10*3/UL (ref 0.1–1)
MONOCYTES NFR BLD AUTO: 10.1 %
MUCOUS THREADS #/AREA URNS AUTO: ABNORMAL /LPF
NEUTROPHILS # BLD AUTO: 5.84 X10*3/UL (ref 1.2–7.7)
NEUTROPHILS NFR BLD AUTO: 59.3 %
NITRITE UR QL STRIP.AUTO: NEGATIVE
NRBC BLD-RTO: 0 /100 WBCS (ref 0–0)
OPIATES UR QL SCN: ABNORMAL
OXYCODONE+OXYMORPHONE UR QL SCN: ABNORMAL
PCP UR QL SCN: ABNORMAL
PH UR STRIP.AUTO: 6 [PH]
PLATELET # BLD AUTO: 251 X10*3/UL (ref 150–450)
POTASSIUM SERPL-SCNC: 3.2 MMOL/L (ref 3.5–5.3)
PROT SERPL-MCNC: 6.9 G/DL (ref 6.4–8.2)
PROT UR STRIP.AUTO-MCNC: ABNORMAL MG/DL
RBC # BLD AUTO: 3.99 X10*6/UL (ref 4.5–5.9)
RBC # UR STRIP.AUTO: ABNORMAL MG/DL
RBC #/AREA URNS AUTO: ABNORMAL /HPF
SALICYLATES SERPL-MCNC: <3 MG/DL (ref ?–20)
SODIUM SERPL-SCNC: 139 MMOL/L (ref 136–145)
SP GR UR STRIP.AUTO: 1.04
UROBILINOGEN UR STRIP.AUTO-MCNC: ABNORMAL MG/DL
WBC # BLD AUTO: 9.9 X10*3/UL (ref 4.4–11.3)
WBC #/AREA URNS AUTO: ABNORMAL /HPF

## 2025-04-16 PROCEDURE — 36415 COLL VENOUS BLD VENIPUNCTURE: CPT

## 2025-04-16 PROCEDURE — 80053 COMPREHEN METABOLIC PANEL: CPT

## 2025-04-16 PROCEDURE — 99285 EMERGENCY DEPT VISIT HI MDM: CPT | Performed by: STUDENT IN AN ORGANIZED HEALTH CARE EDUCATION/TRAINING PROGRAM

## 2025-04-16 PROCEDURE — 85025 COMPLETE CBC W/AUTO DIFF WBC: CPT

## 2025-04-16 PROCEDURE — 2500000001 HC RX 250 WO HCPCS SELF ADMINISTERED DRUGS (ALT 637 FOR MEDICARE OP): Performed by: STUDENT IN AN ORGANIZED HEALTH CARE EDUCATION/TRAINING PROGRAM

## 2025-04-16 PROCEDURE — 80143 DRUG ASSAY ACETAMINOPHEN: CPT

## 2025-04-16 PROCEDURE — 81001 URINALYSIS AUTO W/SCOPE: CPT

## 2025-04-16 PROCEDURE — 93005 ELECTROCARDIOGRAM TRACING: CPT

## 2025-04-16 PROCEDURE — 96372 THER/PROPH/DIAG INJ SC/IM: CPT

## 2025-04-16 PROCEDURE — 2500000004 HC RX 250 GENERAL PHARMACY W/ HCPCS (ALT 636 FOR OP/ED)

## 2025-04-16 PROCEDURE — 80307 DRUG TEST PRSMV CHEM ANLYZR: CPT

## 2025-04-16 RX ORDER — WATER
500 LIQUID (ML) MISCELLANEOUS
Status: DISCONTINUED | OUTPATIENT
Start: 2025-04-16 | End: 2025-04-17 | Stop reason: HOSPADM

## 2025-04-16 RX ORDER — HALOPERIDOL LACTATE 5 MG/ML
5 INJECTION, SOLUTION INTRAMUSCULAR ONCE
Status: COMPLETED | OUTPATIENT
Start: 2025-04-16 | End: 2025-04-16

## 2025-04-16 RX ORDER — LORAZEPAM 2 MG/ML
2 INJECTION INTRAMUSCULAR ONCE
Status: COMPLETED | OUTPATIENT
Start: 2025-04-16 | End: 2025-04-16

## 2025-04-16 RX ORDER — DIPHENHYDRAMINE HYDROCHLORIDE 50 MG/ML
50 INJECTION, SOLUTION INTRAMUSCULAR; INTRAVENOUS ONCE
Status: COMPLETED | OUTPATIENT
Start: 2025-04-16 | End: 2025-04-16

## 2025-04-16 RX ORDER — POTASSIUM CHLORIDE 1.5 G/1.58G
40 POWDER, FOR SOLUTION ORAL ONCE
Status: COMPLETED | OUTPATIENT
Start: 2025-04-16 | End: 2025-04-16

## 2025-04-16 RX ADMIN — LORAZEPAM 2 MG: 2 INJECTION INTRAMUSCULAR; INTRAVENOUS at 15:40

## 2025-04-16 RX ADMIN — DIPHENHYDRAMINE HYDROCHLORIDE 50 MG: 50 INJECTION, SOLUTION INTRAMUSCULAR; INTRAVENOUS at 15:47

## 2025-04-16 RX ADMIN — HALOPERIDOL LACTATE 5 MG: 5 INJECTION, SOLUTION INTRAMUSCULAR at 15:47

## 2025-04-16 RX ADMIN — Medication 500 ML: at 19:13

## 2025-04-16 RX ADMIN — POTASSIUM CHLORIDE 40 MEQ: 1.5 POWDER, FOR SOLUTION ORAL at 19:12

## 2025-04-16 SDOH — HEALTH STABILITY: MENTAL HEALTH: DELUSIONS: EROTOMANIC

## 2025-04-16 SDOH — HEALTH STABILITY: MENTAL HEALTH: BEHAVIORAL HEALTH(WDL): EXCEPTIONS TO WDL

## 2025-04-16 SDOH — HEALTH STABILITY: MENTAL HEALTH: FOR HIGH RISK PATIENTS: ALL INTERVENTIONS ABOVE, PLUS:;1:1 PATIENT OBSERVER AT ALL TIMES

## 2025-04-16 SDOH — HEALTH STABILITY: MENTAL HEALTH

## 2025-04-16 ASSESSMENT — PAIN SCALES - GENERAL: PAINLEVEL_OUTOF10: 4

## 2025-04-16 ASSESSMENT — PAIN DESCRIPTION - PROGRESSION: CLINICAL_PROGRESSION: NOT CHANGED

## 2025-04-16 ASSESSMENT — PAIN DESCRIPTION - ORIENTATION: ORIENTATION: RIGHT;LEFT

## 2025-04-16 ASSESSMENT — PAIN - FUNCTIONAL ASSESSMENT: PAIN_FUNCTIONAL_ASSESSMENT: 0-10

## 2025-04-16 ASSESSMENT — PAIN DESCRIPTION - LOCATION: LOCATION: FOOT

## 2025-04-16 NOTE — ED PROVIDER NOTES
"HPI   Chief Complaint   Patient presents with    Psychiatric Evaluation     Pt bib ems for c/o intoxication. Pt states he did a lot of meth in attempt to kill himself and has not slept in 4 days. Pt admits to SI and denies HI. Pt denies alcohol use.        Patient is a 29-year-old male presents emergency department for psychiatric evaluation.  Patient reports that he has not slept in the last 4 days and he has been doing meth.  He states that he did a significant amount of meth in an attempt to kill himself, but is highly intoxicated at this time.  He states that he was upset about being sold \"bad ice.\"  He states that was having thoughts of ending his life and ending the life of the individual who told him the bad drugs.  He states that he has been smoking the meth over the last 4 days and has not been sleeping.  He states that he wanted to perform a ceremony to end his life and end the life of the drug dealer.  He states he buried a gun in the woods.  He called EMS denies any medical complaints at today's visit, but states he just wants to go to sleep.      History provided by:  Patient and police   used: No            Patient History   Medical History[1]  Surgical History[2]  Family History[3]  Social History[4]    Physical Exam   ED Triage Vitals [04/16/25 1457]   Temperature Heart Rate Respirations BP   36.8 °C (98.2 °F) 50 18 --      Pulse Ox Temp src Heart Rate Source Patient Position   99 % -- -- --      BP Location FiO2 (%)     -- --       Physical Exam  Constitutional:       Comments: Disheveled   Eyes:      Extraocular Movements: Extraocular movements intact.      Pupils: Pupils are equal, round, and reactive to light.   Cardiovascular:      Rate and Rhythm: Normal rate and regular rhythm.   Pulmonary:      Effort: Pulmonary effort is normal.      Breath sounds: Normal breath sounds.   Abdominal:      General: Abdomen is flat.      Palpations: Abdomen is soft.      Tenderness: There is " no abdominal tenderness.   Musculoskeletal:         General: Normal range of motion.   Skin:     General: Skin is warm and dry.   Neurological:      General: No focal deficit present.      Mental Status: He is alert and oriented to person, place, and time.   Psychiatric:         Mood and Affect: Mood is anxious.         Speech: Speech is rapid and pressured and tangential.         Behavior: Behavior is aggressive and hyperactive.         Thought Content: Thought content includes suicidal ideation.           ED Course & MDM   ED Course as of 04/16/25 1800   Wed Apr 16, 2025   1514 EKG performed at 15: 02 and independently reviewed by provider: Reveals NSR with a rate of 92 bpm, normal axis, normal intervals, no ST changes, no T wave abnormalities, no ectopy. No STEMI.  Sinus arrhythmia appreciated.  Significant baseline artifact appreciated. [TL]   1546 Had increasing agitation and aggressive behavior toward staff and security had to be called.  Patient given Ativan Haldol and Benadryl for control of symptoms and sedation. [AF]      ED Course User Index  [AF] Mary Humphrey PA-C  [TL] Samir Encinas DO         Diagnoses as of 04/16/25 1800   Amphetamine abuse (Multi)   Suicidal ideation                 No data recorded                                 Medical Decision Making  Patient is a 29-year-old male presents emergency department for psychiatric evaluation after smoking meth and hyperactivity and concern for suicidal ideation.    EKG was interpreted by attending physician and reviewed by me.    Lab work done today included CMP, CBC, alcohol level, salicylate and tylenol level, urinalysis, urine drug screen.  Lab work with urine drug screen positive for cannabis and amphetamines with elevation of AST with hyperbilirubinemia and anemia.    Scans not warranted at today's visit.    Medications given at today's visit include IM Haldol/Ativan/Benadryl    I saw this patient in conjunction with Dr. Encinas.  Patient very  hyperactive pacing around room clearly intoxicated leading to amphetamine use.  He is actively suicidal is very tangential speech.  He does become progressively agitated and aggressive towards staff and therefore IM B-52 ordered for sedation of patient.  Ultimately his urine drug screen was positive for amphetamines, but otherwise labs at baseline for patients with no active abdominal pain with some mild hyperbilirubinemia likely manifestation of chronic hepatitis.  Otherwise hemodynamically stable at this time.  Patient care was ongoing at time of my departure. Continuation of care and final disposition will by managed by attending physician. We discussed the pertinent history, physical exam, completed/pending test results (if applicable) and current treatment plan. Please refer to his/her chart for the patients remaining Emergency Department course and final disposition.    ** Disclaimer:  Parts of this document were written utilizing a voice to text dictation software.  Note may contain minor transcription or typographical errors that were inadvertently transcribed by the computer software.        Procedure  Procedures         [1] No past medical history on file.  [2] No past surgical history on file.  [3] No family history on file.  [4]   Social History  Tobacco Use    Smoking status: Not on file    Smokeless tobacco: Not on file   Substance Use Topics    Alcohol use: Not on file    Drug use: Not on file        Mary Humphrey PA-C  04/16/25 1800

## 2025-04-16 NOTE — PROGRESS NOTES
Social Work Note    Pt BIB EMS for psychiatric evaluation. Pt reports using meth and not sleeping for the last four days. Pt reportedly stated he took the meth in an attempt to end his life and expresses HI towards the person who sold him the drugs. Pt calm and cooperative on arrival with tangential speech, loose associations and disorganized thoughts. Pt escalated when asked to return to his room and was medicated due to agitation.     1730 Attempted to meet with pt but he was sleeping.     1830 Attempted to wake pt for assessment but he was unable to arouse.

## 2025-04-17 VITALS
SYSTOLIC BLOOD PRESSURE: 133 MMHG | OXYGEN SATURATION: 100 % | BODY MASS INDEX: 34.46 KG/M2 | TEMPERATURE: 97.9 F | DIASTOLIC BLOOD PRESSURE: 72 MMHG | HEIGHT: 73 IN | RESPIRATION RATE: 18 BRPM | HEART RATE: 70 BPM | WEIGHT: 260 LBS

## 2025-04-17 LAB
ATRIAL RATE: 92 BPM
P AXIS: 69 DEGREES
P OFFSET: 201 MS
P ONSET: 157 MS
PR INTERVAL: 116 MS
Q ONSET: 215 MS
QRS COUNT: 15 BEATS
QRS DURATION: 90 MS
QT INTERVAL: 380 MS
QTC CALCULATION(BAZETT): 469 MS
QTC FREDERICIA: 438 MS
R AXIS: 33 DEGREES
T AXIS: 25 DEGREES
T OFFSET: 405 MS
VENTRICULAR RATE: 92 BPM

## 2025-04-17 PROCEDURE — 90839 PSYTX CRISIS INITIAL 60 MIN: CPT

## 2025-04-17 RX ADMIN — Medication 500 ML: at 08:06

## 2025-04-17 RX ADMIN — Medication 500 ML: at 06:00

## 2025-04-17 RX ADMIN — Medication 500 ML: at 09:33

## 2025-04-17 SDOH — HEALTH STABILITY: MENTAL HEALTH: ACTIVE SUICIDAL IDEATION WITH SOME INTENT TO ACT, WITHOUT SPECIFIC PLAN (PAST 1 MONTH): YES

## 2025-04-17 SDOH — HEALTH STABILITY: MENTAL HEALTH: WISH TO BE DEAD (PAST 1 MONTH): YES

## 2025-04-17 SDOH — HEALTH STABILITY: MENTAL HEALTH: SUICIDAL BEHAVIOR (3 MONTHS): YES

## 2025-04-17 SDOH — HEALTH STABILITY: MENTAL HEALTH: SUICIDAL BEHAVIOR (LIFETIME): YES

## 2025-04-17 SDOH — HEALTH STABILITY: MENTAL HEALTH: NON-SPECIFIC ACTIVE SUICIDAL THOUGHTS (PAST 1 MONTH): YES

## 2025-04-17 SDOH — HEALTH STABILITY: MENTAL HEALTH: ANXIETY SYMPTOMS: NO PROBLEMS REPORTED OR OBSERVED.

## 2025-04-17 SDOH — HEALTH STABILITY: MENTAL HEALTH: SUICIDAL BEHAVIOR (DESCRIPTION): OVERDOSING ON METH

## 2025-04-17 SDOH — HEALTH STABILITY: MENTAL HEALTH: ACTIVE SUICIDAL IDEATION WITH SPECIFIC PLAN AND INTENT (PAST 1 MONTH): YES

## 2025-04-17 SDOH — HEALTH STABILITY: MENTAL HEALTH: DEPRESSION SYMPTOMS: SLEEP DISTURBANCE;FEELINGS OF WORTHLESSNESS;FEELINGS OF HOPELESSESS;IMPAIRED CONCENTRATION

## 2025-04-17 SDOH — ECONOMIC STABILITY: HOUSING INSECURITY: FEELS SAFE LIVING IN HOME: YES

## 2025-04-17 ASSESSMENT — LIFESTYLE VARIABLES
PRESCIPTION_ABUSE_PAST_12_MONTHS: NO
SUBSTANCE_ABUSE_PAST_12_MONTHS: YES

## 2025-04-17 NOTE — ED PROVIDER NOTES
Patient stable during my shift, still awaiting placement, endorsed to the oncoming physician     Karthikeyan Gonzalez DO  04/17/25 3297
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
## 2025-04-17 NOTE — ED PROVIDER NOTES
Patient received in sign out from Dr Gonzalez. Patient with suicide attempt and methamphetamine abuse. Pending placement.    Patient accepted at [facility].     Tavon Carr MD  04/17/25 0690

## 2025-04-25 ENCOUNTER — APPOINTMENT (OUTPATIENT)
Dept: CARDIOLOGY | Facility: HOSPITAL | Age: 30
End: 2025-04-25
Payer: COMMERCIAL

## 2025-04-25 ENCOUNTER — HOSPITAL ENCOUNTER (EMERGENCY)
Facility: HOSPITAL | Age: 30
Discharge: HOME | End: 2025-04-26
Attending: EMERGENCY MEDICINE
Payer: COMMERCIAL

## 2025-04-25 DIAGNOSIS — L03.113 CELLULITIS OF RIGHT UPPER EXTREMITY: ICD-10-CM

## 2025-04-25 DIAGNOSIS — F19.10 SUBSTANCE ABUSE: Primary | ICD-10-CM

## 2025-04-25 LAB
BASOPHILS # BLD AUTO: 0.03 X10*3/UL (ref 0–0.1)
BASOPHILS NFR BLD AUTO: 0.3 %
EOSINOPHIL # BLD AUTO: 0.15 X10*3/UL (ref 0–0.7)
EOSINOPHIL NFR BLD AUTO: 1.5 %
ERYTHROCYTE [DISTWIDTH] IN BLOOD BY AUTOMATED COUNT: 12.8 % (ref 11.5–14.5)
HCT VFR BLD AUTO: 33.4 % (ref 41–52)
HGB BLD-MCNC: 11.8 G/DL (ref 13.5–17.5)
IMM GRANULOCYTES # BLD AUTO: 0.02 X10*3/UL (ref 0–0.7)
IMM GRANULOCYTES NFR BLD AUTO: 0.2 % (ref 0–0.9)
LYMPHOCYTES # BLD AUTO: 2.62 X10*3/UL (ref 1.2–4.8)
LYMPHOCYTES NFR BLD AUTO: 26.3 %
MCH RBC QN AUTO: 31 PG (ref 26–34)
MCHC RBC AUTO-ENTMCNC: 35.3 G/DL (ref 32–36)
MCV RBC AUTO: 88 FL (ref 80–100)
MONOCYTES # BLD AUTO: 1.05 X10*3/UL (ref 0.1–1)
MONOCYTES NFR BLD AUTO: 10.5 %
NEUTROPHILS # BLD AUTO: 6.1 X10*3/UL (ref 1.2–7.7)
NEUTROPHILS NFR BLD AUTO: 61.2 %
NRBC BLD-RTO: 0 /100 WBCS (ref 0–0)
PLATELET # BLD AUTO: 248 X10*3/UL (ref 150–450)
RBC # BLD AUTO: 3.81 X10*6/UL (ref 4.5–5.9)
WBC # BLD AUTO: 10 X10*3/UL (ref 4.4–11.3)

## 2025-04-25 PROCEDURE — 85025 COMPLETE CBC W/AUTO DIFF WBC: CPT | Performed by: EMERGENCY MEDICINE

## 2025-04-25 PROCEDURE — 80053 COMPREHEN METABOLIC PANEL: CPT | Performed by: EMERGENCY MEDICINE

## 2025-04-25 PROCEDURE — 96372 THER/PROPH/DIAG INJ SC/IM: CPT | Performed by: EMERGENCY MEDICINE

## 2025-04-25 PROCEDURE — 2500000001 HC RX 250 WO HCPCS SELF ADMINISTERED DRUGS (ALT 637 FOR MEDICARE OP): Performed by: EMERGENCY MEDICINE

## 2025-04-25 PROCEDURE — 99284 EMERGENCY DEPT VISIT MOD MDM: CPT | Performed by: EMERGENCY MEDICINE

## 2025-04-25 PROCEDURE — 93005 ELECTROCARDIOGRAM TRACING: CPT

## 2025-04-25 PROCEDURE — 80320 DRUG SCREEN QUANTALCOHOLS: CPT | Performed by: EMERGENCY MEDICINE

## 2025-04-25 PROCEDURE — 2500000004 HC RX 250 GENERAL PHARMACY W/ HCPCS (ALT 636 FOR OP/ED): Mod: JZ | Performed by: EMERGENCY MEDICINE

## 2025-04-25 PROCEDURE — 36415 COLL VENOUS BLD VENIPUNCTURE: CPT | Performed by: EMERGENCY MEDICINE

## 2025-04-25 RX ORDER — LORAZEPAM 1 MG/1
2 TABLET ORAL ONCE
Status: COMPLETED | OUTPATIENT
Start: 2025-04-25 | End: 2025-04-25

## 2025-04-25 RX ORDER — CEPHALEXIN 500 MG/1
500 CAPSULE ORAL ONCE
Status: COMPLETED | OUTPATIENT
Start: 2025-04-25 | End: 2025-04-25

## 2025-04-25 RX ORDER — ZIPRASIDONE MESYLATE 20 MG/ML
20 INJECTION, POWDER, LYOPHILIZED, FOR SOLUTION INTRAMUSCULAR ONCE
Status: COMPLETED | OUTPATIENT
Start: 2025-04-25 | End: 2025-04-25

## 2025-04-25 RX ADMIN — CEPHALEXIN 500 MG: 500 CAPSULE ORAL at 21:17

## 2025-04-25 RX ADMIN — ZIPRASIDONE MESYLATE 20 MG: 20 INJECTION, POWDER, LYOPHILIZED, FOR SOLUTION INTRAMUSCULAR at 22:10

## 2025-04-25 RX ADMIN — LORAZEPAM 2 MG: 1 TABLET ORAL at 21:16

## 2025-04-25 ASSESSMENT — PAIN DESCRIPTION - LOCATION: LOCATION: FOOT

## 2025-04-25 ASSESSMENT — PAIN DESCRIPTION - ORIENTATION: ORIENTATION: RIGHT;LEFT

## 2025-04-25 ASSESSMENT — PAIN - FUNCTIONAL ASSESSMENT: PAIN_FUNCTIONAL_ASSESSMENT: 0-10

## 2025-04-25 ASSESSMENT — PAIN DESCRIPTION - PROGRESSION: CLINICAL_PROGRESSION: NOT CHANGED

## 2025-04-25 ASSESSMENT — PAIN DESCRIPTION - PAIN TYPE: TYPE: ACUTE PAIN

## 2025-04-25 ASSESSMENT — PAIN SCALES - GENERAL: PAINLEVEL_OUTOF10: 10 - WORST POSSIBLE PAIN

## 2025-04-26 VITALS
OXYGEN SATURATION: 99 % | RESPIRATION RATE: 17 BRPM | WEIGHT: 243 LBS | HEIGHT: 74 IN | DIASTOLIC BLOOD PRESSURE: 58 MMHG | TEMPERATURE: 98.7 F | BODY MASS INDEX: 31.18 KG/M2 | HEART RATE: 57 BPM | SYSTOLIC BLOOD PRESSURE: 122 MMHG

## 2025-04-26 PROBLEM — F19.10 SUBSTANCE ABUSE: Status: ACTIVE | Noted: 2025-04-26

## 2025-04-26 LAB
ALBUMIN SERPL BCP-MCNC: 4.2 G/DL (ref 3.4–5)
ALP SERPL-CCNC: 54 U/L (ref 33–120)
ALT SERPL W P-5'-P-CCNC: 13 U/L (ref 10–52)
ANION GAP SERPL CALCULATED.3IONS-SCNC: 12 MMOL/L (ref 10–20)
APAP SERPL-MCNC: <10 UG/ML (ref ?–30)
AST SERPL W P-5'-P-CCNC: 33 U/L (ref 9–39)
BILIRUB SERPL-MCNC: 0.9 MG/DL (ref 0–1.2)
BUN SERPL-MCNC: 17 MG/DL (ref 6–23)
CALCIUM SERPL-MCNC: 8.7 MG/DL (ref 8.6–10.3)
CHLORIDE SERPL-SCNC: 105 MMOL/L (ref 98–107)
CO2 SERPL-SCNC: 24 MMOL/L (ref 21–32)
CREAT SERPL-MCNC: 0.62 MG/DL (ref 0.5–1.3)
EGFRCR SERPLBLD CKD-EPI 2021: >90 ML/MIN/1.73M*2
ETHANOL SERPL-MCNC: <10 MG/DL
GLUCOSE SERPL-MCNC: 115 MG/DL (ref 74–99)
POTASSIUM SERPL-SCNC: 3.1 MMOL/L (ref 3.5–5.3)
PROT SERPL-MCNC: 6.9 G/DL (ref 6.4–8.2)
SALICYLATES SERPL-MCNC: <3 MG/DL (ref ?–20)
SODIUM SERPL-SCNC: 138 MMOL/L (ref 136–145)

## 2025-04-26 ASSESSMENT — LIFESTYLE VARIABLES
AGITATION: NORMAL ACTIVITY
AUDITORY DISTURBANCES: NOT PRESENT
ORIENTATION AND CLOUDING OF SENSORIUM: ORIENTED AND CAN DO SERIAL ADDITIONS
TREMOR: NO TREMOR
HEADACHE, FULLNESS IN HEAD: NOT PRESENT
VISUAL DISTURBANCES: NOT PRESENT
TOTAL SCORE: 0
ANXIETY: NO ANXIETY, AT EASE
PAROXYSMAL SWEATS: NO SWEAT VISIBLE
NAUSEA AND VOMITING: NO NAUSEA AND NO VOMITING

## 2025-04-26 NOTE — ED PROVIDER NOTES
Patient endorsed to me by the previous physician.  Patient was monitored in the emergency department.  He became more alert and awake.  He is no longer suicidal.  He was eval by the crisis worker felt that he was stable for discharge.     Karthikeyan Gonzalez DO  04/26/25 0436

## 2025-04-26 NOTE — DISCHARGE INSTRUCTIONS
Thank you for choosing [facility]. It was my pleasure to be involved in your care today.         As of today's visit, based on reasonable likelihood, that it is safe for you to be discharged back to your residence to follow-up as an outpatient for ongoing management of your medical problem. You should follow-up with any referrals / primary provider as soon as possible. The contacts (number, addresses) are listed below.         Important:  Even though we think it is safe for you to go home, there is always a small chance that we are missing something that could require hospitalization.  Therefore it is very important that if you get worse or develops any new symptoms that you return here as soon as possible to be re-evaluated.  This includes return of symptoms that have resolved such as fainting, chest pain, or symptoms that could be warning signs for stroke important:  Even though we think it is safe for you to go home, there is always a small chance that we are missing something that could require hospitalization.  Therefore it is very important that if you get worse or develops any new symptoms that you return here as soon as possible to be re-evaluated.  This includes return of symptoms that have resolved such as fainting, chest pain, or symptoms that could be warning signs for stroke         Make sure your pharmacy and primary doctor is aware of any new medications prescribed today.          It is your responsibility to contact as soon as possible, and follow through with, any referrals you were given today. We do recommend you inform them you are a [facility] follow-up patient, as often they can better accommodate your need to be seen, provided their schedules allow. We will, and have, made every effort to ensure you have access to adequate follow-up specialists available.          All problems may not be able to be fixed in one ER visit. This is why timely ongoing care is important, and this  is a responsibility you share in. Further, you are free to follow up with any provider you choose, and this is not limited to our suggestion.          If cultures were obtained today, you will be contacted should anything result that would require further treatment. Please contact the ED at the number provided with questions.          Having trouble affording medications? Try [email or web address]! (This is not a hospital endorsed website, merely a recommendation based on my own personal experiences with [employer])

## 2025-04-27 ENCOUNTER — APPOINTMENT (OUTPATIENT)
Dept: CARDIOLOGY | Facility: HOSPITAL | Age: 30
End: 2025-04-27
Payer: COMMERCIAL

## 2025-04-27 ENCOUNTER — HOSPITAL ENCOUNTER (EMERGENCY)
Facility: HOSPITAL | Age: 30
Discharge: LEFT BEFORE DISCHARGE | End: 2025-04-28
Attending: STUDENT IN AN ORGANIZED HEALTH CARE EDUCATION/TRAINING PROGRAM
Payer: COMMERCIAL

## 2025-04-27 VITALS
DIASTOLIC BLOOD PRESSURE: 59 MMHG | TEMPERATURE: 99.8 F | WEIGHT: 244.71 LBS | RESPIRATION RATE: 17 BRPM | HEART RATE: 77 BPM | OXYGEN SATURATION: 96 % | SYSTOLIC BLOOD PRESSURE: 118 MMHG | BODY MASS INDEX: 31.41 KG/M2 | HEIGHT: 74 IN

## 2025-04-27 DIAGNOSIS — F10.10 ALCOHOL ABUSE: Primary | ICD-10-CM

## 2025-04-27 DIAGNOSIS — F19.10 DRUG ABUSE: ICD-10-CM

## 2025-04-27 LAB
ALBUMIN SERPL BCP-MCNC: 4.1 G/DL (ref 3.4–5)
ALP SERPL-CCNC: 55 U/L (ref 33–120)
ALT SERPL W P-5'-P-CCNC: 13 U/L (ref 10–52)
AMPHETAMINES UR QL SCN: ABNORMAL
ANION GAP SERPL CALCULATED.3IONS-SCNC: 12 MMOL/L (ref 10–20)
APPEARANCE UR: CLEAR
AST SERPL W P-5'-P-CCNC: 27 U/L (ref 9–39)
BARBITURATES UR QL SCN: ABNORMAL
BASOPHILS # BLD AUTO: 0.03 X10*3/UL (ref 0–0.1)
BASOPHILS NFR BLD AUTO: 0.3 %
BENZODIAZ UR QL SCN: ABNORMAL
BILIRUB SERPL-MCNC: 0.5 MG/DL (ref 0–1.2)
BILIRUB UR STRIP.AUTO-MCNC: NEGATIVE MG/DL
BUN SERPL-MCNC: 9 MG/DL (ref 6–23)
BZE UR QL SCN: ABNORMAL
CALCIUM SERPL-MCNC: 8.7 MG/DL (ref 8.6–10.3)
CANNABINOIDS UR QL SCN: ABNORMAL
CHLORIDE SERPL-SCNC: 106 MMOL/L (ref 98–107)
CO2 SERPL-SCNC: 26 MMOL/L (ref 21–32)
COLOR UR: NORMAL
CREAT SERPL-MCNC: 0.53 MG/DL (ref 0.5–1.3)
EGFRCR SERPLBLD CKD-EPI 2021: >90 ML/MIN/1.73M*2
EOSINOPHIL # BLD AUTO: 0.34 X10*3/UL (ref 0–0.7)
EOSINOPHIL NFR BLD AUTO: 3.5 %
ERYTHROCYTE [DISTWIDTH] IN BLOOD BY AUTOMATED COUNT: 12.9 % (ref 11.5–14.5)
ETHANOL SERPL-MCNC: 120 MG/DL
FENTANYL+NORFENTANYL UR QL SCN: ABNORMAL
GLUCOSE SERPL-MCNC: 110 MG/DL (ref 74–99)
GLUCOSE UR STRIP.AUTO-MCNC: NORMAL MG/DL
HCT VFR BLD AUTO: 35.7 % (ref 41–52)
HGB BLD-MCNC: 12 G/DL (ref 13.5–17.5)
IMM GRANULOCYTES # BLD AUTO: 0.03 X10*3/UL (ref 0–0.7)
IMM GRANULOCYTES NFR BLD AUTO: 0.3 % (ref 0–0.9)
KETONES UR STRIP.AUTO-MCNC: NEGATIVE MG/DL
LEUKOCYTE ESTERASE UR QL STRIP.AUTO: NEGATIVE
LYMPHOCYTES # BLD AUTO: 3.18 X10*3/UL (ref 1.2–4.8)
LYMPHOCYTES NFR BLD AUTO: 32.5 %
MCH RBC QN AUTO: 30.1 PG (ref 26–34)
MCHC RBC AUTO-ENTMCNC: 33.6 G/DL (ref 32–36)
MCV RBC AUTO: 90 FL (ref 80–100)
METHADONE UR QL SCN: ABNORMAL
MONOCYTES # BLD AUTO: 0.8 X10*3/UL (ref 0.1–1)
MONOCYTES NFR BLD AUTO: 8.2 %
NEUTROPHILS # BLD AUTO: 5.39 X10*3/UL (ref 1.2–7.7)
NEUTROPHILS NFR BLD AUTO: 55.2 %
NITRITE UR QL STRIP.AUTO: NEGATIVE
NRBC BLD-RTO: 0 /100 WBCS (ref 0–0)
OPIATES UR QL SCN: ABNORMAL
OXYCODONE+OXYMORPHONE UR QL SCN: ABNORMAL
PCP UR QL SCN: ABNORMAL
PH UR STRIP.AUTO: 6 [PH]
PLATELET # BLD AUTO: 271 X10*3/UL (ref 150–450)
POTASSIUM SERPL-SCNC: 3.7 MMOL/L (ref 3.5–5.3)
PROT SERPL-MCNC: 6.6 G/DL (ref 6.4–8.2)
PROT UR STRIP.AUTO-MCNC: NEGATIVE MG/DL
RBC # BLD AUTO: 3.99 X10*6/UL (ref 4.5–5.9)
RBC # UR STRIP.AUTO: NEGATIVE MG/DL
SODIUM SERPL-SCNC: 140 MMOL/L (ref 136–145)
SP GR UR STRIP.AUTO: 1.01
UROBILINOGEN UR STRIP.AUTO-MCNC: NORMAL MG/DL
WBC # BLD AUTO: 9.8 X10*3/UL (ref 4.4–11.3)

## 2025-04-27 PROCEDURE — 93005 ELECTROCARDIOGRAM TRACING: CPT

## 2025-04-27 PROCEDURE — 36415 COLL VENOUS BLD VENIPUNCTURE: CPT | Performed by: STUDENT IN AN ORGANIZED HEALTH CARE EDUCATION/TRAINING PROGRAM

## 2025-04-27 PROCEDURE — 82077 ASSAY SPEC XCP UR&BREATH IA: CPT | Performed by: STUDENT IN AN ORGANIZED HEALTH CARE EDUCATION/TRAINING PROGRAM

## 2025-04-27 PROCEDURE — 99284 EMERGENCY DEPT VISIT MOD MDM: CPT | Performed by: STUDENT IN AN ORGANIZED HEALTH CARE EDUCATION/TRAINING PROGRAM

## 2025-04-27 PROCEDURE — 84075 ASSAY ALKALINE PHOSPHATASE: CPT | Performed by: STUDENT IN AN ORGANIZED HEALTH CARE EDUCATION/TRAINING PROGRAM

## 2025-04-27 PROCEDURE — 81003 URINALYSIS AUTO W/O SCOPE: CPT | Performed by: STUDENT IN AN ORGANIZED HEALTH CARE EDUCATION/TRAINING PROGRAM

## 2025-04-27 PROCEDURE — 2500000001 HC RX 250 WO HCPCS SELF ADMINISTERED DRUGS (ALT 637 FOR MEDICARE OP): Performed by: STUDENT IN AN ORGANIZED HEALTH CARE EDUCATION/TRAINING PROGRAM

## 2025-04-27 PROCEDURE — 80307 DRUG TEST PRSMV CHEM ANLYZR: CPT | Performed by: STUDENT IN AN ORGANIZED HEALTH CARE EDUCATION/TRAINING PROGRAM

## 2025-04-27 PROCEDURE — 85025 COMPLETE CBC W/AUTO DIFF WBC: CPT | Performed by: STUDENT IN AN ORGANIZED HEALTH CARE EDUCATION/TRAINING PROGRAM

## 2025-04-27 RX ORDER — MULTIVIT-MIN/IRON FUM/FOLIC AC 7.5 MG-4
1 TABLET ORAL ONCE
Status: COMPLETED | OUTPATIENT
Start: 2025-04-27 | End: 2025-04-27

## 2025-04-27 RX ORDER — FOLIC ACID 1 MG/1
1 TABLET ORAL ONCE
Status: COMPLETED | OUTPATIENT
Start: 2025-04-27 | End: 2025-04-27

## 2025-04-27 RX ORDER — LANOLIN ALCOHOL/MO/W.PET/CERES
100 CREAM (GRAM) TOPICAL ONCE
Status: COMPLETED | OUTPATIENT
Start: 2025-04-27 | End: 2025-04-27

## 2025-04-27 RX ORDER — DIAZEPAM 5 MG/1
10 TABLET ORAL EVERY 2 HOUR PRN
Status: DISCONTINUED | OUTPATIENT
Start: 2025-04-27 | End: 2025-04-28 | Stop reason: HOSPADM

## 2025-04-27 RX ADMIN — FOLIC ACID 1 MG: 1 TABLET ORAL at 22:44

## 2025-04-27 RX ADMIN — Medication 100 MG: at 22:47

## 2025-04-27 RX ADMIN — Medication 1 TABLET: at 22:45

## 2025-04-27 SDOH — HEALTH STABILITY: MENTAL HEALTH: BEHAVIORS/MOOD: AGITATED;APPEARS INTOXICATED;PACING

## 2025-04-27 SDOH — HEALTH STABILITY: MENTAL HEALTH: BEHAVIORAL HEALTH(WDL): EXCEPTIONS TO WDL

## 2025-04-27 ASSESSMENT — LIFESTYLE VARIABLES
HEADACHE, FULLNESS IN HEAD: NOT PRESENT
NAUSEA AND VOMITING: NO NAUSEA AND NO VOMITING
AUDITORY DISTURBANCES: NOT PRESENT
PAROXYSMAL SWEATS: NO SWEAT VISIBLE
TREMOR: NO TREMOR
TOTAL SCORE: 11
ANXIETY: MILDLY ANXIOUS
VISUAL DISTURBANCES: MILD SENSITIVITY
AUDITORY DISTURBANCES: NOT PRESENT
NAUSEA AND VOMITING: NO NAUSEA AND NO VOMITING
AGITATION: NORMAL ACTIVITY
TREMOR: NO TREMOR
VISUAL DISTURBANCES: NOT PRESENT
AGITATION: PACES BACK AND FORTH DURING MOST OF THE INTERVIEW, OR CONSTANTLY THRASHES ABOUT
PAROXYSMAL SWEATS: NO SWEAT VISIBLE
PULSE: 70
BLOOD PRESSURE: 112/60
HEADACHE, FULLNESS IN HEAD: NOT PRESENT
ANXIETY: MILDLY ANXIOUS
ORIENTATION AND CLOUDING OF SENSORIUM: CANNOT DO SERIAL ADDITIONS OR IS UNCERTAIN ABOUT DATE
TOTAL SCORE: 1
BLOOD PRESSURE: 112/72
PULSE: 71
ORIENTATION AND CLOUDING OF SENSORIUM: ORIENTED AND CAN DO SERIAL ADDITIONS

## 2025-04-27 ASSESSMENT — ABNORMAL INVOLUNTARY MOVEMENT SCALE (AIMS)
AIMS_PATIENT_INCAPACITATION: NONE, NORMAL
CURRENT_PROBLEMS_TEETH_DENTURES: NO
FACIAL_EXPRESSION_MUSCLES: MINIMAL
LOWER_BODY_EXTREMITIES: NONE, NORMAL
JAW: NONE, NORMAL
TONGUE: MILD
AIMS_PATIENT_AWARENESS: AWARE, NO DISTRESS
NECK_SHOULDER_HIPS: NONE, NORMAL
PATIENT_WEARS_DENTURES: NO
LIPS_PARIETAL: MINIMAL
UPPER_BODY_EXTREMITIES: NONE, NORMAL

## 2025-04-27 ASSESSMENT — COLUMBIA-SUICIDE SEVERITY RATING SCALE - C-SSRS
2. HAVE YOU ACTUALLY HAD ANY THOUGHTS OF KILLING YOURSELF?: NO
1. IN THE PAST MONTH, HAVE YOU WISHED YOU WERE DEAD OR WISHED YOU COULD GO TO SLEEP AND NOT WAKE UP?: NO
6. HAVE YOU EVER DONE ANYTHING, STARTED TO DO ANYTHING, OR PREPARED TO DO ANYTHING TO END YOUR LIFE?: NO

## 2025-04-27 ASSESSMENT — PAIN - FUNCTIONAL ASSESSMENT: PAIN_FUNCTIONAL_ASSESSMENT: 0-10

## 2025-04-27 ASSESSMENT — PAIN SCALES - GENERAL: PAINLEVEL_OUTOF10: 0 - NO PAIN

## 2025-04-27 NOTE — ED NOTES
15:07 FTF with pt for ss consult. Pt is a 30 yo male that presents to [facility] ED for Alcohol Abuse and Amphetamine overdose BIB EMS [facility]. Pt reports daily drinking of Whiskey along with Amphetamine and hx of Heroin use (IV). Pt reports withdrawal symptoms of shakiness, hot flashes and a headache asking to go to detox. Pt arrived with a ETOH Bal of .120. Pt has a hx dx of Alcohol Use Disorder and Schizoaffective do with last provider note being from [facility] in [place] January ,2025. Pt went to [facility] for dual treatment 4/16/2025. Pt would benefit from a detoxification inpatient admission.      Isaias Schumacher, [facility]  04/27/25 7173

## 2025-04-27 NOTE — ED PROVIDER NOTES
HPI   Chief Complaint   Patient presents with    Addiction Problem     Pt currently states he did meth and drank half a bottle of whiskey about an hour ago and really wants detox. Pt is visibly incoherent        Patient presents requesting alcohol detox.  Patient endorses using methamphetamine and drinking alcohol.  He reports that he drinks 4 beers daily.  When asked if he does other substances, patient rolled over and stop talking to me.              Patient History   Medical History[1]  Surgical History[2]  Family History[3]  Social History[4]    Physical Exam   ED Triage Vitals [04/27/25 1413]   Temperature Heart Rate Respirations BP   37.1 °C (98.7 °F) 71 18 112/72      Pulse Ox Temp Source Heart Rate Source Patient Position   99 % Oral Monitor --      BP Location FiO2 (%)     -- --       Physical Exam  HENT:      Head: Normocephalic.   Eyes:      General: No scleral icterus.  Cardiovascular:      Rate and Rhythm: Normal rate.   Pulmonary:      Effort: Pulmonary effort is normal.   Neurological:      Mental Status: He is alert.      Comments: Patient awake and alert, answers questions appropriately.  Able to move all extremities.           ED Course & MDM   Diagnoses as of 04/27/25 1523   Alcohol abuse   Drug abuse                 No data recorded     [place] Coma Scale Score: 15 (04/27/25 1417 : Prema Longo RN)                           Medical Decision Making  Laboratory studies reveal mild elevation of alcohol level.  Patient is medically cleared and awaiting placement at this time.  Parts of this chart were completed with dictation software, please excuse any errors in transcription.        Procedure  Procedures         [1]   Past Medical History:  Diagnosis Date    Substance abuse    [2] History reviewed. No pertinent surgical history.  [3] No family history on file.  [4]   Social History  Tobacco Use    Smoking status: Every Day     Types: Cigarettes    Smokeless tobacco: Not on file   Substance Use  "Topics    Alcohol use: Not on file    Drug use: Yes     Types: Methamphetamines, Heroin, Marijuana, \"Crack\" cocaine        Tavon Carr MD  04/27/25 1063    "

## 2025-04-28 NOTE — PROGRESS NOTES
ED TRANSITION OF CARE NOTE    pt Name: Anmol Burk  MRN: [id number]  Birthdate 1995  Date of evaluation: 4/27/2025  Provider: Dorothy Mcfadden, DO    History    This patient was Signed out to me by the outgoing ED provider, Dr. Carr.  This note exists as an addendum to the record.  For the full history and physical please see their initial ED provider note.    In short, this is a 29 y.o. male presenting to the ED for evaluation for detox and placement.  He was signed out to me currently waitlisted both at [facility] and [facility].        ED COURSE:  Received acceptance from [facility] for detox, patient became belligerent as this is another facility he was to go to, he is currently voluntary, was offered the choice to come down and accept the placement we can provide him, or he is free to leave.     Patient opted to leave without further treatment.    Diagnoses as of 04/28/25 0430   Alcohol abuse   Drug abuse         LABS:  Abnormal Labs Reviewed   CBC WITH AUTO DIFFERENTIAL - Abnormal; Notable for the following components:       Result Value    RBC 3.99 (*)     Hemoglobin 12.0 (*)     Hematocrit 35.7 (*)     All other components within normal limits   COMPREHENSIVE METABOLIC PANEL - Abnormal; Notable for the following components:    Glucose 110 (*)     All other components within normal limits   ALCOHOL - Abnormal; Notable for the following components:    Alcohol 120 (*)     All other components within normal limits   DRUG SCREEN,URINE - Abnormal; Notable for the following components:    Amphetamine Screen, Urine Presumptive Positive (*)     Cannabinoid Screen, Urine Presumptive Positive (*)     All other components within normal limits    Narrative:     Drug screen results are presumptive and should not be used to assess   compliance with prescribed medication. Contact the performing [facility] laboratory   to add-on definitive confirmatory testing if clinically indicated.    Toxicology  screening results are reported qualitatively. The concentration must   be greater than or equal to the cutoff to be reported as positive. The concentration   at which the screening test can detect an individual drug or metabolite varies.   The absence of expected drug(s) and/or drug metabolite(s) may indicate non-compliance,   inappropriate timing of specimen collection relative to drug administration, poor drug   absorption, diluted/adulterated urine, or limitations of testing. For medical purposes   only; not valid for forensic use.    Interpretive questions should be directed to the laboratory medical directors.        All other labs were within normal range or not returned as of this dictation.    Imaging  No orders to display                Dorothy Sales, DO  Emergency Medicine    The above documentation was completed with the use of speech recognition software. It may contain dictation errors secondary to limitations of the software.

## 2025-04-28 NOTE — ED NOTES
Patient came out of room Stating he needs to go to rehab now Explained that  needs to find a rehab to take him easily redirected     Laurie Rosen RN  04/28/25 2495

## 2025-04-28 NOTE — ED NOTES
Pt is on the [facility] and [facility] waitilist for 04/28/2025 due to lack of beds.      Isaias Schumacher, [facility]  04/27/25 2674

## 2025-04-28 NOTE — PROGRESS NOTES
"Social Work Note    SW informed pt, who was awake, that [facility] accepted pt for detox.  Pt became agitated and hostile at this news stating he wanted to go to [facility] or \"any other recovery center but not a [facility] hospital.\" SW informed pt that he would be able to detox, as requested, at [facility]. SW also explained that hospital policy did not allow transfers to recovery centers, but that he was free to go there himself if he wanted.  Pt was able to be redirected to take a moment and consider his options.  After several minutes, WAYNE checked in with patient and stated [facility] [email or web address]  Pt did not report SI/HI/AVH at this time. Pt did not desire additional resources from WAYNE.    "

## 2025-04-29 LAB
ATRIAL RATE: 98 BPM
P AXIS: 57 DEGREES
P OFFSET: 196 MS
P ONSET: 142 MS
PR INTERVAL: 146 MS
Q ONSET: 215 MS
QRS COUNT: 15 BEATS
QRS DURATION: 84 MS
QT INTERVAL: 364 MS
QTC CALCULATION(BAZETT): 464 MS
QTC FREDERICIA: 428 MS
R AXIS: 31 DEGREES
T AXIS: 32 DEGREES
T OFFSET: 397 MS
VENTRICULAR RATE: 98 BPM

## 2025-04-30 LAB
ATRIAL RATE: 65 BPM
P AXIS: 59 DEGREES
P OFFSET: 207 MS
P ONSET: 144 MS
PR INTERVAL: 148 MS
Q ONSET: 218 MS
QRS COUNT: 11 BEATS
QRS DURATION: 88 MS
QT INTERVAL: 434 MS
QTC CALCULATION(BAZETT): 451 MS
QTC FREDERICIA: 445 MS
R AXIS: 64 DEGREES
T AXIS: 46 DEGREES
T OFFSET: 435 MS
VENTRICULAR RATE: 65 BPM

## 2025-05-05 ENCOUNTER — CLINICAL SUPPORT (OUTPATIENT)
Dept: EMERGENCY MEDICINE | Facility: HOSPITAL | Age: 30
End: 2025-05-05
Payer: COMMERCIAL

## 2025-05-05 ENCOUNTER — HOSPITAL ENCOUNTER (EMERGENCY)
Facility: HOSPITAL | Age: 30
Discharge: HOME | End: 2025-05-06
Attending: EMERGENCY MEDICINE
Payer: COMMERCIAL

## 2025-05-05 DIAGNOSIS — R45.851 SUICIDAL IDEATION: Primary | ICD-10-CM

## 2025-05-05 DIAGNOSIS — R45.850 HOMICIDAL IDEATION: ICD-10-CM

## 2025-05-05 LAB
ATRIAL RATE: 94 BPM
P AXIS: 61 DEGREES
P OFFSET: 201 MS
P ONSET: 145 MS
PR INTERVAL: 154 MS
Q ONSET: 222 MS
QRS COUNT: 16 BEATS
QRS DURATION: 86 MS
QT INTERVAL: 334 MS
QTC CALCULATION(BAZETT): 417 MS
QTC FREDERICIA: 387 MS
R AXIS: 41 DEGREES
T AXIS: 25 DEGREES
T OFFSET: 389 MS
VENTRICULAR RATE: 94 BPM

## 2025-05-05 PROCEDURE — 93005 ELECTROCARDIOGRAM TRACING: CPT

## 2025-05-05 PROCEDURE — 93010 ELECTROCARDIOGRAM REPORT: CPT

## 2025-05-05 PROCEDURE — 80307 DRUG TEST PRSMV CHEM ANLYZR: CPT

## 2025-05-05 PROCEDURE — 85025 COMPLETE CBC W/AUTO DIFF WBC: CPT

## 2025-05-05 PROCEDURE — 99285 EMERGENCY DEPT VISIT HI MDM: CPT | Performed by: EMERGENCY MEDICINE

## 2025-05-05 PROCEDURE — 36415 COLL VENOUS BLD VENIPUNCTURE: CPT

## 2025-05-05 PROCEDURE — 80053 COMPREHEN METABOLIC PANEL: CPT

## 2025-05-05 PROCEDURE — 80320 DRUG SCREEN QUANTALCOHOLS: CPT

## 2025-05-05 ASSESSMENT — COLUMBIA-SUICIDE SEVERITY RATING SCALE - C-SSRS
2. HAVE YOU ACTUALLY HAD ANY THOUGHTS OF KILLING YOURSELF?: YES
5. HAVE YOU STARTED TO WORK OUT OR WORKED OUT THE DETAILS OF HOW TO KILL YOURSELF? DO YOU INTEND TO CARRY OUT THIS PLAN?: YES
6. HAVE YOU EVER DONE ANYTHING, STARTED TO DO ANYTHING, OR PREPARED TO DO ANYTHING TO END YOUR LIFE?: YES
1. IN THE PAST MONTH, HAVE YOU WISHED YOU WERE DEAD OR WISHED YOU COULD GO TO SLEEP AND NOT WAKE UP?: YES
4. HAVE YOU HAD THESE THOUGHTS AND HAD SOME INTENTION OF ACTING ON THEM?: YES
6. HAVE YOU EVER DONE ANYTHING, STARTED TO DO ANYTHING, OR PREPARED TO DO ANYTHING TO END YOUR LIFE?: YES

## 2025-05-06 VITALS
WEIGHT: 240 LBS | TEMPERATURE: 97.4 F | HEART RATE: 55 BPM | BODY MASS INDEX: 30.8 KG/M2 | SYSTOLIC BLOOD PRESSURE: 109 MMHG | RESPIRATION RATE: 20 BRPM | HEIGHT: 74 IN | OXYGEN SATURATION: 97 % | DIASTOLIC BLOOD PRESSURE: 67 MMHG

## 2025-05-06 LAB
ALBUMIN SERPL BCP-MCNC: 4.2 G/DL (ref 3.4–5)
ALP SERPL-CCNC: 58 U/L (ref 33–120)
ALT SERPL W P-5'-P-CCNC: 8 U/L (ref 10–52)
AMPHETAMINES UR QL SCN: ABNORMAL
ANION GAP SERPL CALC-SCNC: 15 MMOL/L (ref 10–20)
APAP SERPL-MCNC: <10 UG/ML (ref ?–30)
AST SERPL W P-5'-P-CCNC: 16 U/L (ref 9–39)
BARBITURATES UR QL SCN: ABNORMAL
BASOPHILS # BLD AUTO: 0.02 X10*3/UL (ref 0–0.1)
BASOPHILS NFR BLD AUTO: 0.2 %
BENZODIAZ UR QL SCN: ABNORMAL
BILIRUB SERPL-MCNC: 0.4 MG/DL (ref 0–1.2)
BUN SERPL-MCNC: 13 MG/DL (ref 6–23)
BZE UR QL SCN: ABNORMAL
CALCIUM SERPL-MCNC: 9.2 MG/DL (ref 8.6–10.6)
CANNABINOIDS UR QL SCN: ABNORMAL
CHLORIDE SERPL-SCNC: 105 MMOL/L (ref 98–107)
CO2 SERPL-SCNC: 23 MMOL/L (ref 21–32)
CREAT SERPL-MCNC: 0.63 MG/DL (ref 0.5–1.3)
EGFRCR SERPLBLD CKD-EPI 2021: >90 ML/MIN/1.73M*2
EOSINOPHIL # BLD AUTO: 0.13 X10*3/UL (ref 0–0.7)
EOSINOPHIL NFR BLD AUTO: 1.2 %
ERYTHROCYTE [DISTWIDTH] IN BLOOD BY AUTOMATED COUNT: 13.1 % (ref 11.5–14.5)
ETHANOL SERPL-MCNC: <10 MG/DL
FENTANYL+NORFENTANYL UR QL SCN: ABNORMAL
GLUCOSE SERPL-MCNC: 134 MG/DL (ref 74–99)
HCT VFR BLD AUTO: 38.1 % (ref 41–52)
HGB BLD-MCNC: 13.2 G/DL (ref 13.5–17.5)
IMM GRANULOCYTES # BLD AUTO: 0.04 X10*3/UL (ref 0–0.7)
IMM GRANULOCYTES NFR BLD AUTO: 0.4 % (ref 0–0.9)
LYMPHOCYTES # BLD AUTO: 2.91 X10*3/UL (ref 1.2–4.8)
LYMPHOCYTES NFR BLD AUTO: 26.7 %
MCH RBC QN AUTO: 30.4 PG (ref 26–34)
MCHC RBC AUTO-ENTMCNC: 34.6 G/DL (ref 32–36)
MCV RBC AUTO: 88 FL (ref 80–100)
METHADONE UR QL SCN: ABNORMAL
MONOCYTES # BLD AUTO: 0.72 X10*3/UL (ref 0.1–1)
MONOCYTES NFR BLD AUTO: 6.6 %
NEUTROPHILS # BLD AUTO: 7.07 X10*3/UL (ref 1.2–7.7)
NEUTROPHILS NFR BLD AUTO: 64.9 %
NRBC BLD-RTO: 0 /100 WBCS (ref 0–0)
OPIATES UR QL SCN: ABNORMAL
OXYCODONE+OXYMORPHONE UR QL SCN: ABNORMAL
PCP UR QL SCN: ABNORMAL
PLATELET # BLD AUTO: 318 X10*3/UL (ref 150–450)
POTASSIUM SERPL-SCNC: 3.9 MMOL/L (ref 3.5–5.3)
PROT SERPL-MCNC: 6.6 G/DL (ref 6.4–8.2)
RBC # BLD AUTO: 4.34 X10*6/UL (ref 4.5–5.9)
SALICYLATES SERPL-MCNC: <3 MG/DL (ref ?–20)
SODIUM SERPL-SCNC: 139 MMOL/L (ref 136–145)
WBC # BLD AUTO: 10.9 X10*3/UL (ref 4.4–11.3)

## 2025-05-06 SDOH — HEALTH STABILITY: MENTAL HEALTH: ACTIVE SUICIDAL IDEATION WITH SPECIFIC PLAN AND INTENT (PAST 1 MONTH): NO

## 2025-05-06 SDOH — HEALTH STABILITY: MENTAL HEALTH: SUICIDAL BEHAVIOR (LIFETIME): YES

## 2025-05-06 SDOH — HEALTH STABILITY: MENTAL HEALTH: WISH TO BE DEAD (PAST 1 MONTH): YES

## 2025-05-06 SDOH — HEALTH STABILITY: MENTAL HEALTH

## 2025-05-06 SDOH — HEALTH STABILITY: MENTAL HEALTH: ANXIETY SYMPTOMS: NO PROBLEMS REPORTED OR OBSERVED.

## 2025-05-06 SDOH — HEALTH STABILITY: MENTAL HEALTH: NON-SPECIFIC ACTIVE SUICIDAL THOUGHTS (PAST 1 MONTH): YES

## 2025-05-06 SDOH — HEALTH STABILITY: MENTAL HEALTH: CONTENT: BLAMING OTHERS

## 2025-05-06 SDOH — HEALTH STABILITY: MENTAL HEALTH: ACTIVE SUICIDAL IDEATION WITH SOME INTENT TO ACT, WITHOUT SPECIFIC PLAN (PAST 1 MONTH): YES

## 2025-05-06 SDOH — HEALTH STABILITY: MENTAL HEALTH: SUICIDAL BEHAVIOR (3 MONTHS): NO

## 2025-05-06 SDOH — HEALTH STABILITY: MENTAL HEALTH: BEHAVIORS/MOOD: ANXIOUS;COOPERATIVE

## 2025-05-06 SDOH — HEALTH STABILITY: MENTAL HEALTH: DELUSIONS: PARANOID

## 2025-05-06 SDOH — HEALTH STABILITY: MENTAL HEALTH: SLEEP PATTERN: INSOMNIA

## 2025-05-06 SDOH — HEALTH STABILITY: MENTAL HEALTH: BEHAVIORAL HEALTH(WDL): EXCEPTIONS TO WDL

## 2025-05-06 SDOH — HEALTH STABILITY: MENTAL HEALTH: DEPRESSION SYMPTOMS: FEELINGS OF HELPLESSNESS;FEELINGS OF HOPELESSESS;ISOLATIVE;LOSS OF INTEREST

## 2025-05-06 ASSESSMENT — LIFESTYLE VARIABLES
PRESCIPTION_ABUSE_PAST_12_MONTHS: NO
SUBSTANCE_ABUSE_PAST_12_MONTHS: YES
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
## 2025-05-06 NOTE — ED PROVIDER NOTES
HPI   Chief Complaint   Patient presents with    Suicidal       HPI  Patient is a 29-year-old past medical history of schizoaffective, substance use disorder presenting with suicidal and homicidal ideation.  Patient said he wanted to kill Adair because Adair made him relapse.  He has been having thoughts of killing Adair and thoughts of harming himself.  He said he is thinking about jumping off a bridge.  But before he does that he wants to kill Adair.  Patient said he has been also hearing voices.  Patient denies any fever, nausea, and vomiting.  Patient denies any chest pain and headache.      Patient History   Medical History[1]  Surgical History[2]  Family History[3]  Social History[4]    Physical Exam   ED Triage Vitals [05/05/25 2247]   Temperature Heart Rate Respirations BP   36.3 °C (97.4 °F) 75 17 109/75      Pulse Ox Temp Source Heart Rate Source Patient Position   96 % Temporal Monitor Sitting      BP Location FiO2 (%)     Left arm --       Physical Exam  Constitutional:       Appearance: Normal appearance.   HENT:      Head: Normocephalic and atraumatic.   Cardiovascular:      Rate and Rhythm: Normal rate and regular rhythm.   Pulmonary:      Effort: Pulmonary effort is normal.      Breath sounds: Normal breath sounds.   Abdominal:      General: Abdomen is flat. Bowel sounds are normal.   Skin:     General: Skin is warm.   Neurological:      General: No focal deficit present.      Mental Status: He is alert.           ED Course & MDM   Diagnoses as of 05/06/25 1603   Suicidal ideation   Homicidal ideation                 No data recorded                                 Medical Decision Making  Patient is a 29-year-old past medical history of schizoaffective, substance use disorder presenting with suicidal and homicidal ideation.  At bedside patient was hemodynamically stable.  Patient physical exam was unremarkable.  Patient endorsing suicidal and homicidal ideation, EPAT was consulted.  Labs were unremarkable.   "Patient drug screen was positive for cannabinoids.  During my time of signout patient was still pending placement.    Procedure  Procedures         [1]   Past Medical History:  Diagnosis Date    Substance abuse    [2] No past surgical history on file.  [3] No family history on file.  [4]   Social History  Tobacco Use    Smoking status: Every Day     Types: Cigarettes    Smokeless tobacco: Not on file   Substance Use Topics    Alcohol use: Not on file    Drug use: Yes     Types: Methamphetamines, Heroin, Marijuana, \"Crack\" cocaine        Chinmay Cedillo MD  Resident  05/06/25 8273    "

## 2025-05-06 NOTE — SIGNIFICANT EVENT
Application for Emergency Admission      Ready for Transfer?  Is the patient medically cleared for transfer to inpatient psychiatry: Yes  Has the patient been accepted to an inpatient psychiatric hospital: Yes    Application for Emergency Admission  IN ACCORDANCE WITH [place] 5122.10 [place]  The Chief Clinical Officer of: D[facility] Jimenez 5/6/2025 .10:49 AM    Reason for Hospitalization  The undersigned has reason to believe that: Anmol Burk Is a mentally ill person subject to hospitalization by court order under division B Section 5122.01 of the Revised Code, i.e., this person:    1.Yes  Represents a substantial risk of physical harm to self as manifested by evidence of threats of, or attempts at, suicide or serious self-inflicted bodily harm    2.Yes Represents a substantial risk of physical harm to others as manifested by evidence of recent homicidal or other violent behavior, evidence of recent threats that place another in reasonable fear of violent behavior and serious physical harm, or other evidence of present dangerousness    3.Yes Represents a substantial and immediate risk of serious physical impairment or injury to self as manifested by  evidence that the person is unable to provide for and is not providing for the person's basic physical needs because of the person's mental illness and that appropriate provision for those needs cannot be made  immediately available in the community    4.Yes Would benefit from treatment in a hospital for his mental illness and is in need of such treatment as manifested by evidence of behavior that creates a grave and imminent risk to substantial rights of others or  himself.    5.Yes Would benefit from treatment as manifested by evidence of behavior that indicates all of the following:       (a) The person is unlikely to survive safely in the community without supervision, based on a clinical determination.       (b) The person has a history of lack of compliance  with treatment for mental illness and one of the following applies:      (i) At least twice within the thirty-six months prior to the filing of an affidavit seeking court-ordered treatment of the person under section 5122.111 of the Revised Code, the lack of compliance has been a significant factor in necessitating hospitalization in a hospital or receipt of services in a forensic or other mental health unit of a correctional facility, provided that the thirty-six-month period shall be extended by the length of any hospitalization or incarceration of the person that occurred within the thirty-six-month period.      (ii) Within the forty-eight months prior to the filing of an affidavit seeking court-ordered treatment of the person under section 5122.111 of the Revised Code, the lack of compliance resulted in one or more acts of serious violent behavior toward self or others or threats of, or attempts at, serious physical harm to self or others, provided that the forty-eight-month period shall be extended by the length of any hospitalization or incarceration of the person that occurred within the forty-eight-month period.      (c) The person, as a result of mental illness, is unlikely to voluntarily participate in necessary treatment.       (d) In view of the person's treatment history and current behavior, the person is in need of treatment in order to prevent a relapse or deterioration that would be likely to result in substantial risk of serious harm to the person or others.    (e) Represents a substantial risk of physical harm to self or others if allowed to remain at liberty pending examination.    Therefore, it is requested that said person be admitted to the above named facility.    STATEMENT OF BELIEF    Must be filled out by one of the following: a psychiatrist, licensed physician, licensed clinical psychologist, health or ,  or .  (Statement shall include the circumstances  under which the individual was taken into custody and the reason for the person's belief that hospitalization is necessary. The statement shall also include a reference to efforts made to secure the individual's property at his residence if he was taken into custody there. Every reasonable and appropriate effort should be made to take this person into custody in the least conspicuous manner possible.)    This patient would benefit form inpatient psychiatric stabilization due to suicidal ideations. He is a significant risk to himself.      Guanakito Barrientos DO 5/6/2025     _____________________________________________________________   Place of Employment: [facility]    STATEMENT OF OBSERVATION BY PSYCHIATRIST, LICENSED PHYSICIAN, OR LICENSED CLINICAL PSYCHOLOGIST, IF APPLICABLE    Place of Observation (e.g., [facility] center, general hospital, office, emergency facility)  (If applicable, please complete)    Guanakito Barrientos DO 5/6/2025    _____________________________________________________________

## 2025-05-06 NOTE — PROGRESS NOTES
"Observation History and Physical  [facility] EMERGENCY MEDICINE           History of Present Illness     History provided by: Patient  Limitations to History: Mental Illness  External Records Reviewed: Previous ED records, inpatient records, and outpatient      Patient History:  Anmol Burk is a 29 y.o. male who presented to the emergency department for suicidal ideation    Anmol Burk was escalated to observation status. Observation was necessary as they continue to require treatment and monitoring of their psychiatric illness while awaiting inpatient behavioral health bed availability.    Physical Exam     Visit Vitals  /71   Pulse 81   Temp 36.3 °C (97.4 °F) (Temporal)   Resp 16   Ht 1.88 m (6' 2\")   Wt 109 kg (240 lb)   SpO2 97%   BMI 30.81 kg/m²   Smoking Status Every Day   BSA 2.39 m²       GENERAL:  The patient appears nourished and normally developed. Vital signs as documented.     PULMONARY:  Without any respiratory distress. Able to speak full sentences, no accessory muscle use    CARDIAC: Warm and well perfused. No cyanosis.    MUSCULOSKELETAL:   Able to ambulate, Non edematous, with no obvious deformities.     SKIN: No pallor. Intact.    NEURO:  No obvious neurological deficits.  Able to follow commands.    Psych: Suicidal and homicidal.  No visual or auditory hallucinations.      Impression and Plan           Anmol Burk under observation status in [facility] EMERGENCY MEDICINE for psychiatric illness monitoring and treatment while awaiting inpatient behavioral health bed availability.   Emergency Psychiatric Assessment Team has been consulted. Case discussed with them and decision for inpatient hospitalization deemed necessary. Patient is medically clear at this time.     Home medication reconciliation was not reviewed and restarted where clinically indicated.     Patient and Family updated on plan of care.     Guanakito Barrientos,        "

## 2025-05-06 NOTE — PROGRESS NOTES
EPAT - Social Work Psychiatric Assessment    Arrival Details  Mode of Arrival: Ambulatory  Admission Source:  (Community)  Admission Type: Voluntary  EPAT Assessment Start Date: 05/06/25  EPAT Assessment Start Time: 0245  Name of : CHAPARRO Nolan LSW    History of Present Illness  Admission Reason: Psychiatric Evaluation  HPI:     Patient is a 28yo male presenting to the ED by self through triage with chief complaint of suicidal ideation. Reportedly, “pt presents with SI and HI he wants to kill his friend Adair, pt states he has a gun planted in the woods to use on him. Plan is to overdose fentanyl after he kills Adair. Denies AH or VH. Pt is off Abilify for 3 months due to drug use of Meth”. Patient's chart, triage, and provider note reviewed prior to assessment.     Patient has a psychiatric history of Psychosis and Polysubstance Abuse. He is not connected with outpatient services and has numerous previous inpatient admissions. Patient was recently admitted to [facility] 4/16/25 for the same reports of SI/HI. Patient is homeless and appears to utilize inpatient psychiatric/substance abuse facilities to meet his basic needs. This is patient's 12th ed visit this year with numerous psychiatric and rehab admissions within that time frame. Patient reported one previous suicide attempt by overdose 2 years ago and was indicated high risk at triage. BAL negative, UTOX positive for Cannabis.     SW Readmission Information   Readmission within 30 Days: Yes  Previous ED Visit Date and Reason : multiple recent ED visits  Factors Contributing to  Readmission Inpatient/ED (Team Perspective): Homeless, Substance Abuse    Psychiatric Symptoms  Anxiety Symptoms: No problems reported or observed.  Depression Symptoms: Feelings of helplessness, Feelings of hopelessess, Isolative, Loss of interest  Sissy Symptoms: No problems reported or observed.    Psychosis Symptoms  Hallucination Type: Visual  Delusion Type:  "Paranoid    Additional Symptoms - Adult  Generalized Anxiety Disorder: No problems reported or observed.  Obsessive Compulsive Disorder: No problems reported or observed.  Panic Attack: No problems reported or observed.  Post Traumatic Stress Disorder: No problems reported or observed.  Delirium: No problems reported or observed.    Past Psychiatric History/Meds/Treatments  Past Psychiatric History: Psychiatric Diagnosis: Psychosis, Polysubstance Use // Current  Center: none // Previous Admissions: numerous, most recently [facility] 4/16/2025  Past Psychiatric Meds/Treatments: Pt reports hx of Abilify, Seroquel; denies current medication management  Past Violence/Victimization History: None noted    Current Mental Health Contacts   Name/Phone Number: none   Last Appointment Date: none  Provider Name/Phone Number: none  Provider Last Appointment Date: none    Support System:  (\"no one\")    Living Arrangement: Homeless         Income Information  Employment Status for: Patient  Employment Status: Unemployed  Income Source: Unemployed    MilLendAmend Service/Education History  Current or Previous  Service: None  Education Level:  (did not assess)    Social/Cultural History  Social History: [place] Citizen: yes // Payee: none // Guardian/POA: Self  Cultural Requests During Hospitalization: none  Spiritual Requests During Hospitalization: none  Important Activities:  (none reported)    Legal  Criminal Activity/ Legal Involvement Pertinent to Current Situation/ Hospitalization: None reported    Drug Screening  Have you used any substances (canabis, cocaine, heroin, hallucinogens, inhalants, etc.) in the past 12 months?: Yes  Have you used any prescription drugs other than prescribed in the past 12 months?: No  Is a toxicology screen needed?: Yes    Stage of Change  Stage of Change: Contemplation  History of Treatment: Inpatient, Dual  Type of Treatment Offered: Inpatient (Psych)  Treatment " "Offered:  (n/a)  Duration of Substance Use: unkn  Frequency of Substance Use: Pt reported daily meth abuse; UTOX only positive for Cannabis at current visit  Age of First Substance Use: unkn    Behavioral Health  Behavioral Health(WDL): Within Defined Limits    Orientation  Orientation Level: Oriented X4    General Appearance  Motor Activity: Unremarkable  Speech Pattern:  (Unremarkable)  General Attitude: Cooperative  Appearance/Hygiene: Disheveled    Thought Process  Coherency: [place] thinking  Content: Blaming others  Delusions: Paranoid  Perception: Not altered  Hallucination: None  Judgment/Insight:  (Limited at baseline 2/2 continued substance abuse)  Confusion: None  Cognition: Appropriate attention/concentration, Follows commands    Sleep Pattern  Sleep Pattern: Insomnia    Risk Factors  Self Harm/Suicidal Ideation Plan: SI with plan to OD  Previous Self Harm/Suicidal Plans: OD attempt 2 years ago  Risk Factors: Male, Major mental illness, Substance abuse, Homicidal ideation, Clearly identified target    Violence Risk Assessment  Assessment of Violence: None noted  Thoughts of Harm to Others: Yes - currently present (\"I want to kill Adair\")  Description of Violence Behavior: None noted  Homicidal ideation: Yes - currently present  Current Homicidal Intent: Yes - currently present  Current Homicidal Plan: Yes - currently present  Access to Homicidal Means: Yes  Identified Victim: \"Adair\"  History of Harm to Others: No  Access to Weapons: Yes (Comment) (Pt reports firearm buried in the woods)  Criminal Charges Pending: No    Ability to Assess Risk Screen  Risk Screen - Ability to Assess: Able to be screened  [place] Suicide Severity Rating Scale (Screener/Recent Self-Report)  1. Wish to be Dead (Past 1 Month): Yes  2. Non-Specific Active Suicidal Thoughts (Past 1 Month): Yes  3. Active Suicidal Ideation with any Methods (Not Plan) Without Intent to Act (Past 1 Month): Yes  4. Active Suicidal Ideation with Some " Intent to Act, Without Specific Plan (Past 1 Month): Yes  5. Active Suicidal Ideation with Specific Plan and Intent (Past 1 Month): No  6. Suicidal Behavior (Lifetime): Yes  6. Suicidal Behavior (3 Months): No  Calculated C-SSRS Risk Score (Lifetime/Recent): High Risk  Step 1: Risk Factors  Current & Past Psychiatric Dx: Psychotic disorder, Alcohol/substance abuse disorders  Presenting Symptoms: Anhedonia, Insomia  Precipitants/Stressors: Substance intoxication or withdrawal, Pending incarceration or homelessness  Change in Treatment: Non-compliant or not receiving treatment, Recent inpatient discharge  Access to Lethal Methods : Yes (Pt reports access to a firearm in the woods)  Step 2: Protective Factors   Protective Factors Internal: Frustration tolerance, Fear of death or the actual act of killing self  Protective Factors External: Cultural, spiritual and/or moral attitudes against suicide  Step 3: Suicidal Ideation Intensity  How Many Times Have You Had These Thoughts: 2-5 times in a week  When You Have the Thoughts How Long do They Last : Less than 1 hour/some of the time  Could/Can You Stop Thinking About Killing Yourself or Wanting to Die if You Want to: Easily able to control thoughts  Are There Things - Anyone or Anything - That Stopped You From Wanting to Die or Acting on: Deterrents probably stopped you  What Sort of Reasons Did You Have For Thinking About Wanting to Die or Killing Yourself: Completely to get attention, revenge, or a reaction from others  Total Score: 9  Step 5: Documentation  Risk Level: Moderate suicide risk (Patient indicated moderate acute risk to self in setting of plan for inpt admission due to lack of access to lethal means on unit. Discussed with Dr. Cedillo)    Psychiatric Impression and Plan of Care  Assessment and Plan:     Upon assessment the patient remained calm and cooperative. Patient presented as dysthymic and concrete. He continued to endorse SI with plan to overdose and  "HI with plan to shoot his drug dealer “Adair”. The patient explained that he had relapsed 5 months ago “and Adair caused this”, leading to his current ideations. He endorsed intent to act on these plans and reported he has a firearm “stashed in the woods”. Patient also endorsed VH of “seeing spirits and sh*t”. He reported inability to sleep in setting of stimulant abuse and homelessness. Patient also stated vague paranoia, “I feel like I'm getting followed”. The patient demonstrated limited insight and denied significant social supports at this time. He did not appear to be responding to internal stimuli and no further delusions were elicited.     Diagnostic Impression:    MDD r/o Stimulant-Induced Mood Disorder    Stimulant Use Disorder     Psychiatric Impression and Plan for Care: Patient presents as an acute risk to self and others. Admission discussed with Chinmay Cedillo MD.     Specific Resources Provided to Patient: Admission    Outcome/Disposition  Patient's Perception of Outcome Achieved: \"I think people are following me\"  Assessment, Recommendations and Risk Level Reviewed with: Dr. Cedillo  Contact Name: Andrew Mark  Contact Number(s): [phone]  Contact Relationship: Grandparent  EPAT Assessment Completed Date: 05/06/25  EPAT Assessment Completed Time: 0353      "

## 2025-05-19 ENCOUNTER — HOSPITAL ENCOUNTER (EMERGENCY)
Age: 30
Discharge: PSYCHIATRIC OR SUBSTANCE USE FACILITY | End: 2025-05-19
Attending: EMERGENCY MEDICINE
Payer: COMMERCIAL

## 2025-05-19 ENCOUNTER — HOSPITAL ENCOUNTER (INPATIENT)
Age: 30
LOS: 2 days | Discharge: ANOTHER HOSPITAL | DRG: 751 | End: 2025-05-21
Attending: PSYCHIATRY & NEUROLOGY | Admitting: PSYCHIATRY & NEUROLOGY
Payer: COMMERCIAL

## 2025-05-19 VITALS
WEIGHT: 240 LBS | BODY MASS INDEX: 30.8 KG/M2 | DIASTOLIC BLOOD PRESSURE: 91 MMHG | SYSTOLIC BLOOD PRESSURE: 135 MMHG | HEART RATE: 70 BPM | RESPIRATION RATE: 20 BRPM | TEMPERATURE: 98.6 F | OXYGEN SATURATION: 100 % | HEIGHT: 74 IN

## 2025-05-19 DIAGNOSIS — F19.10 POLYSUBSTANCE ABUSE (HCC): ICD-10-CM

## 2025-05-19 DIAGNOSIS — R45.851 SUICIDAL IDEATION: Primary | ICD-10-CM

## 2025-05-19 DIAGNOSIS — R45.850 HOMICIDAL IDEATION: ICD-10-CM

## 2025-05-19 LAB
AMPHET UR QL SCN: POSITIVE
BARBITURATES UR QL SCN: NEGATIVE
BASOPHILS # BLD: <0.03 K/UL (ref 0–0.2)
BASOPHILS NFR BLD: 0 % (ref 0–2)
BENZODIAZ UR QL: NEGATIVE
CANNABINOIDS UR QL SCN: POSITIVE
COCAINE UR QL SCN: POSITIVE
EOSINOPHIL # BLD: 0.08 K/UL (ref 0–0.44)
EOSINOPHILS RELATIVE PERCENT: 1 % (ref 1–4)
ERYTHROCYTE [DISTWIDTH] IN BLOOD BY AUTOMATED COUNT: 13.5 % (ref 11.8–14.4)
FENTANYL UR QL: NEGATIVE
HCT VFR BLD AUTO: 40.2 % (ref 40.7–50.3)
HGB BLD-MCNC: 14 G/DL (ref 13–17)
IMM GRANULOCYTES # BLD AUTO: <0.03 K/UL (ref 0–0.3)
IMM GRANULOCYTES NFR BLD: 0 %
LYMPHOCYTES NFR BLD: 2.81 K/UL (ref 1.1–3.7)
LYMPHOCYTES RELATIVE PERCENT: 28 % (ref 24–43)
MCH RBC QN AUTO: 30.8 PG (ref 25.2–33.5)
MCHC RBC AUTO-ENTMCNC: 34.8 G/DL (ref 28.4–34.8)
MCV RBC AUTO: 88.4 FL (ref 82.6–102.9)
METHADONE UR QL: NEGATIVE
MONOCYTES NFR BLD: 0.76 K/UL (ref 0.1–1.2)
MONOCYTES NFR BLD: 8 % (ref 3–12)
NEUTROPHILS NFR BLD: 63 % (ref 36–65)
NEUTS SEG NFR BLD: 6.48 K/UL (ref 1.5–8.1)
NRBC BLD-RTO: 0 PER 100 WBC
OPIATES UR QL SCN: NEGATIVE
OXYCODONE UR QL SCN: NEGATIVE
PCP UR QL SCN: NEGATIVE
PLATELET # BLD AUTO: 271 K/UL (ref 138–453)
PMV BLD AUTO: 10.7 FL (ref 8.1–13.5)
RBC # BLD AUTO: 4.55 M/UL (ref 4.21–5.77)
TEST INFORMATION: ABNORMAL
WBC OTHER # BLD: 10.2 K/UL (ref 3.5–11.3)

## 2025-05-19 PROCEDURE — 2040000000 HC PSYCH ICU R&B

## 2025-05-19 PROCEDURE — 96372 THER/PROPH/DIAG INJ SC/IM: CPT

## 2025-05-19 PROCEDURE — 80307 DRUG TEST PRSMV CHEM ANLYZR: CPT

## 2025-05-19 PROCEDURE — 85025 COMPLETE CBC W/AUTO DIFF WBC: CPT

## 2025-05-19 PROCEDURE — 99285 EMERGENCY DEPT VISIT HI MDM: CPT

## 2025-05-19 PROCEDURE — 6360000002 HC RX W HCPCS: Performed by: STUDENT IN AN ORGANIZED HEALTH CARE EDUCATION/TRAINING PROGRAM

## 2025-05-19 PROCEDURE — 6370000000 HC RX 637 (ALT 250 FOR IP): Performed by: PSYCHIATRY & NEUROLOGY

## 2025-05-19 RX ORDER — HALOPERIDOL 5 MG/ML
5 INJECTION INTRAMUSCULAR EVERY 4 HOURS PRN
Status: DISCONTINUED | OUTPATIENT
Start: 2025-05-19 | End: 2025-05-21 | Stop reason: HOSPADM

## 2025-05-19 RX ORDER — TRAZODONE HYDROCHLORIDE 50 MG/1
50 TABLET ORAL NIGHTLY PRN
Status: DISCONTINUED | OUTPATIENT
Start: 2025-05-19 | End: 2025-05-21 | Stop reason: HOSPADM

## 2025-05-19 RX ORDER — LORAZEPAM 2 MG/ML
2 INJECTION INTRAMUSCULAR ONCE
Status: COMPLETED | OUTPATIENT
Start: 2025-05-19 | End: 2025-05-19

## 2025-05-19 RX ORDER — POLYETHYLENE GLYCOL 3350 17 G
2 POWDER IN PACKET (EA) ORAL
Status: DISCONTINUED | OUTPATIENT
Start: 2025-05-19 | End: 2025-05-21 | Stop reason: HOSPADM

## 2025-05-19 RX ORDER — HYDROXYZINE HYDROCHLORIDE 50 MG/1
50 TABLET, FILM COATED ORAL 3 TIMES DAILY PRN
Status: DISCONTINUED | OUTPATIENT
Start: 2025-05-19 | End: 2025-05-21 | Stop reason: HOSPADM

## 2025-05-19 RX ORDER — MAGNESIUM HYDROXIDE/ALUMINUM HYDROXICE/SIMETHICONE 120; 1200; 1200 MG/30ML; MG/30ML; MG/30ML
30 SUSPENSION ORAL EVERY 6 HOURS PRN
Status: DISCONTINUED | OUTPATIENT
Start: 2025-05-19 | End: 2025-05-21 | Stop reason: HOSPADM

## 2025-05-19 RX ORDER — DIPHENHYDRAMINE HYDROCHLORIDE 50 MG/ML
50 INJECTION, SOLUTION INTRAMUSCULAR; INTRAVENOUS EVERY 4 HOURS PRN
Status: DISCONTINUED | OUTPATIENT
Start: 2025-05-19 | End: 2025-05-21 | Stop reason: HOSPADM

## 2025-05-19 RX ORDER — DIPHENHYDRAMINE HYDROCHLORIDE 50 MG/ML
50 INJECTION, SOLUTION INTRAMUSCULAR; INTRAVENOUS ONCE
Status: COMPLETED | OUTPATIENT
Start: 2025-05-19 | End: 2025-05-19

## 2025-05-19 RX ORDER — ACETAMINOPHEN 325 MG/1
650 TABLET ORAL EVERY 4 HOURS PRN
Status: DISCONTINUED | OUTPATIENT
Start: 2025-05-19 | End: 2025-05-21 | Stop reason: HOSPADM

## 2025-05-19 RX ORDER — HALOPERIDOL 5 MG/1
5 TABLET ORAL EVERY 4 HOURS PRN
Status: DISCONTINUED | OUTPATIENT
Start: 2025-05-19 | End: 2025-05-21 | Stop reason: HOSPADM

## 2025-05-19 RX ORDER — POLYETHYLENE GLYCOL 3350 17 G/17G
17 POWDER, FOR SOLUTION ORAL DAILY PRN
Status: DISCONTINUED | OUTPATIENT
Start: 2025-05-19 | End: 2025-05-21 | Stop reason: HOSPADM

## 2025-05-19 RX ORDER — HALOPERIDOL 5 MG/ML
5 INJECTION INTRAMUSCULAR ONCE
Status: COMPLETED | OUTPATIENT
Start: 2025-05-19 | End: 2025-05-19

## 2025-05-19 RX ADMIN — TRAZODONE HYDROCHLORIDE 50 MG: 50 TABLET ORAL at 20:58

## 2025-05-19 RX ADMIN — LORAZEPAM 2 MG: 2 INJECTION INTRAMUSCULAR; INTRAVENOUS at 13:56

## 2025-05-19 RX ADMIN — HALOPERIDOL LACTATE 5 MG: 5 INJECTION, SOLUTION INTRAMUSCULAR at 13:56

## 2025-05-19 RX ADMIN — DIPHENHYDRAMINE HYDROCHLORIDE 50 MG: 50 INJECTION, SOLUTION INTRAMUSCULAR; INTRAVENOUS at 13:56

## 2025-05-19 RX ADMIN — HALOPERIDOL 5 MG: 5 TABLET ORAL at 20:58

## 2025-05-19 ASSESSMENT — SLEEP AND FATIGUE QUESTIONNAIRES
SLEEP PATTERN: DIFFICULTY FALLING ASLEEP;DISTURBED/INTERRUPTED SLEEP
AVERAGE NUMBER OF SLEEP HOURS: 5
DO YOU USE A SLEEP AID: NO
DO YOU HAVE DIFFICULTY SLEEPING: YES

## 2025-05-19 ASSESSMENT — PATIENT HEALTH QUESTIONNAIRE - PHQ9
SUM OF ALL RESPONSES TO PHQ QUESTIONS 1-9: 2
1. LITTLE INTEREST OR PLEASURE IN DOING THINGS: SEVERAL DAYS
SUM OF ALL RESPONSES TO PHQ QUESTIONS 1-9: 2
2. FEELING DOWN, DEPRESSED OR HOPELESS: SEVERAL DAYS

## 2025-05-19 ASSESSMENT — PAIN - FUNCTIONAL ASSESSMENT: PAIN_FUNCTIONAL_ASSESSMENT: NONE - DENIES PAIN

## 2025-05-19 ASSESSMENT — ENCOUNTER SYMPTOMS
VOMITING: 0
COUGH: 0
ABDOMINAL PAIN: 0
NAUSEA: 0
SHORTNESS OF BREATH: 0

## 2025-05-19 ASSESSMENT — LIFESTYLE VARIABLES
HOW MANY STANDARD DRINKS CONTAINING ALCOHOL DO YOU HAVE ON A TYPICAL DAY: PATIENT DOES NOT DRINK
HOW OFTEN DO YOU HAVE A DRINK CONTAINING ALCOHOL: NEVER

## 2025-05-19 NOTE — ED PROVIDER NOTES
[facility]     Emergency Department     Faculty Note/ Attestation      2:44 PM EDT  Pt Name: Ernie Ken Jr.                                       MRN: [id number]  Birthdate 1995  Date of evaluation: 5/19/2025  Patients PCP:    Not, On File (Inactive)    Attestation  I performed a history and physical examination of the patient/ or directly observed  and discussed management with the resident. I reviewed the resident’s note and agree with the documented findings and plan of care. Any areas of disagreement are noted on the chart. I was personally present for the key portions of any procedures. I have documented in the chart those procedures where I was not present during the key portions. I have reviewed the emergency nurses triage note. I agree with the chief complaint, past medical history, past surgical history, allergies, medications, social and family history as documented unless otherwise noted below.    For Physician Assistant/ Nurse Practitioner cases/documentation I have personally evaluated this patient and have completed at least one if not all key elements of the E/M (history, physical exam, and MDM). Additional findings are as noted.       Initial Screens:     -------------------------------------     ----------------------------------------  [place] Coma Scale  Eye Opening: Spontaneous  Best Verbal Response: Oriented  Best Motor Response: Obeys commands  Marita Coma Scale Score: 15  ------------------------------------------------------------------------------------------------------------  Vitals:    Vitals:    05/19/25 1224 05/19/25 1242 05/19/25 1245 05/19/25 1300   BP: 138/73  130/72 (!) 135/91   Pulse: 76 100 67 70   Resp: 19 19 14 20   Temp: 98.6 °F (37 °C)      TempSrc: Oral      SpO2: 100% 100%     Weight: 108.9 kg (240 lb)      Height: 1.88 m (6' 2\")          Chief Complaint      Chief Complaint   Patient presents with    Suicidal          height is 1.88 m 
     FACULTY SIGN-OUT  ADDENDUM       Patient: Ernie Ken Jr.   MRN: [id number]  PCP:  Not, On File (Inactive)  Note Started: 5/19/25, 4:35 PM EDT  Attestation  I was available and discussed any additional care issues that arose and coordinated the management plans with the resident(s) caring for the patient during my duty period. Any areas of disagreement with resident's documentation of care or procedures are noted on the chart. I was personally present for the key portions of any/all procedures during my duty period. I have documented in the chart those procedures where I was not present during the key portions.   The patient's initial evaluation and plan have been discussed with the prior provider who initially evaluated the patient.      Pertinent Comments:  The patient is a 29 y.o. male taken in signout with suicidal homicidal ideation and then became combative requiring further medication with Benadryl as well as Haldol and Ativan.    Doing better at this time  We are awaiting workup and discussion with psychiatric social worker    ED COURSE      The patient was given the following medications:  Orders Placed This Encounter   Medications    haloperidol lactate (HALDOL) injection 5 mg    LORazepam (ATIVAN) injection 2 mg    diphenhydrAMINE (BENADRYL) injection 50 mg       RECENT VITALS:   BP: (!) 135/91  Pulse: 70  Respirations: 20  Temp: 98.6 °F (37 °C) SpO2: 100 %    (Please note that portions of this note were completed with a voice recognition program.  Efforts were made to edit the dictations but occasionally words are mis-transcribed.)    Raymundo Sue MD [facility]  Attending Emergency Medicine Physician       Raymundo Sue MD  05/19/25 [id number]    
    Socioeconomic History    Marital status: Single     Spouse name: Not on file    Number of children: Not on file    Years of education: Not on file    Highest education level: Not on file   Occupational History    Not on file   Tobacco Use    Smoking status: Every Day     Current packs/day: 0.50     Average packs/day: 0.5 packs/day for 7.0 years (3.5 ttl pk-yrs)     Types: Cigarettes    Smokeless tobacco: Former     Types: Snuff   Vaping Use    Vaping status: Never Used   Substance and Sexual Activity    Alcohol use: Yes    Drug use: Yes     Frequency: 7.0 times per week     Types: Other-see comments, Methamphetamines (Crystal Meth), Opiates , IV     Comment: benzo's, fentanyl    Sexual activity: Not on file   Other Topics Concern    Not on file   Social History Narrative    Not on file     Social Drivers of Health     Financial Resource Strain: High Risk (1/2/2024)    Received from [facility]    Overall Financial Resource Strain (CARDIA)     Difficulty of Paying Living Expenses: Hard   Food Insecurity: Not on File (9/26/2024)    Received from [facility]    Food Insecurity     Food: 0   Transportation Needs: No Transportation Needs (9/9/2024)    Received from [facility]    PRAPARE - Transportation     Lack of Transportation (Medical): No     Lack of Transportation (Non-Medical): No   Physical Activity: Inactive (9/24/2022)    Received from [facility]    Exercise Vital Sign     Days of Exercise per Week: 0 days     Minutes of Exercise per Session: 0 min   Stress: No Stress Concern Present (9/24/2022)    Received from [facility]    [employer] [place] [employer] - Occupational Stress Questionnaire     Feeling of Stress : Not at all   Social Connections: Not on File (9/9/2024)    Received from [facility]    Social Connections     Connectedness: 0   Intimate Partner Violence: Not At Risk (9/9/2024)    Received from [facility]    Humiliation, Afraid, Rape, and Kick

## 2025-05-20 PROBLEM — F15.90 STIMULANT USE DISORDER: Status: ACTIVE | Noted: 2025-05-20

## 2025-05-20 PROCEDURE — 99222 1ST HOSP IP/OBS MODERATE 55: CPT | Performed by: INTERNAL MEDICINE

## 2025-05-20 PROCEDURE — 6370000000 HC RX 637 (ALT 250 FOR IP): Performed by: INTERNAL MEDICINE

## 2025-05-20 PROCEDURE — 6370000000 HC RX 637 (ALT 250 FOR IP): Performed by: PSYCHIATRY & NEUROLOGY

## 2025-05-20 PROCEDURE — 6370000000 HC RX 637 (ALT 250 FOR IP): Performed by: NURSE PRACTITIONER

## 2025-05-20 PROCEDURE — 2040000000 HC PSYCH ICU R&B

## 2025-05-20 PROCEDURE — APPSS60 APP SPLIT SHARED TIME 46-60 MINUTES: Performed by: NURSE PRACTITIONER

## 2025-05-20 RX ORDER — SENNOSIDES A AND B 8.6 MG/1
1 TABLET, FILM COATED ORAL NIGHTLY
Status: DISCONTINUED | OUTPATIENT
Start: 2025-05-20 | End: 2025-05-20

## 2025-05-20 RX ORDER — TRAZODONE HYDROCHLORIDE 150 MG/1
150 TABLET ORAL NIGHTLY
Status: ON HOLD | COMMUNITY
End: 2025-05-21 | Stop reason: HOSPADM

## 2025-05-20 RX ORDER — BISACODYL 10 MG
10 SUPPOSITORY, RECTAL RECTAL DAILY PRN
Status: DISCONTINUED | OUTPATIENT
Start: 2025-05-20 | End: 2025-05-21 | Stop reason: HOSPADM

## 2025-05-20 RX ORDER — BUPRENORPHINE AND NALOXONE 8; 2 MG/1; MG/1
2 FILM, SOLUBLE BUCCAL; SUBLINGUAL DAILY
Status: ON HOLD | COMMUNITY
End: 2025-05-21 | Stop reason: HOSPADM

## 2025-05-20 RX ORDER — SENNOSIDES A AND B 8.6 MG/1
1 TABLET, FILM COATED ORAL 2 TIMES DAILY
Status: DISCONTINUED | OUTPATIENT
Start: 2025-05-20 | End: 2025-05-21 | Stop reason: HOSPADM

## 2025-05-20 RX ORDER — M-VIT,TX,IRON,MINS/CALC/FOLIC 27MG-0.4MG
1 TABLET ORAL DAILY
Status: ON HOLD | COMMUNITY
End: 2025-05-21 | Stop reason: HOSPADM

## 2025-05-20 RX ADMIN — DOCUSATE SODIUM 100 MG: 50 LIQUID ORAL at 19:42

## 2025-05-20 RX ADMIN — BISACODYL 10 MG: 10 SUPPOSITORY RECTAL at 18:14

## 2025-05-20 RX ADMIN — HYDROXYZINE HYDROCHLORIDE 50 MG: 50 TABLET ORAL at 20:18

## 2025-05-20 RX ADMIN — MAGNESIUM HYDROXIDE 30 ML: 2400 SUSPENSION ORAL at 11:55

## 2025-05-20 RX ADMIN — SENNOSIDES 8.6 MG: 8.6 TABLET, FILM COATED ORAL at 20:18

## 2025-05-20 RX ADMIN — POLYETHYLENE GLYCOL 3350 17 G: 17 POWDER, FOR SOLUTION ORAL at 08:04

## 2025-05-20 RX ADMIN — SENNOSIDES 8.6 MG: 8.6 TABLET, FILM COATED ORAL at 11:55

## 2025-05-20 RX ADMIN — TRAZODONE HYDROCHLORIDE 50 MG: 50 TABLET ORAL at 20:18

## 2025-05-20 ASSESSMENT — LIFESTYLE VARIABLES
HOW OFTEN DO YOU HAVE A DRINK CONTAINING ALCOHOL: NEVER
HOW MANY STANDARD DRINKS CONTAINING ALCOHOL DO YOU HAVE ON A TYPICAL DAY: PATIENT DOES NOT DRINK

## 2025-05-20 NOTE — PROGRESS NOTES
Pharmacy Medication History Note      List of current medications patient is taking is complete.    Source of information: [facility] in [place], [place] ([phone])    Changes made to medication list:  Medications removed (include reason, ex. therapy complete or physician discontinued, noncompliance):  Invega 3 mg tablets - removed, noncompliance  Lexapro 5 mg tablets - removed, noncompliance  Potassium chloride 20 mEq ER tabs - removed, noncompliance    Medications flagged for provider review:  none    Medications added/doses adjusted:  Suboxone 8/2 mg SL films; 2 films SL daily (added)  Melatonin 5 mg tablets; 1 tab PO QHS (added)  Thera-tabs 1 tab Po QD (added)  Trazodone 150 mg 1 tab PO QHS (added)  Naloxone 4 mg nasal spray; 1 spray intranasally PRN for opioid reversal (added)    Other notes (ex. Recent course of antibiotics, Coumadin dosing):  Per [facility] - Suboxone 8/2 mg SL films, LF 05/09/2025, #60, DS 30  Per [facility], Naloxone 4 mg nasal spray, LF 04/10/2025, #2, DS 1      Current Home Medication List at Time of Admission:  Prior to Admission medications    Medication Sig   buprenorphine-naloxone (SUBOXONE) 8-2 MG FILM SL film Place 2 Film under the tongue daily.   naloxone 4 MG/0.1ML LIQD nasal spray 1 spray by Nasal route as needed for Opioid Reversal   melatonin 5 MG TABS tablet Take 1 tablet by mouth nightly   Multiple Vitamins-Minerals (THERAPEUTIC MULTIVITAMIN-MINERALS) tablet Take 1 tablet by mouth daily   traZODone (DESYREL) 150 MG tablet Take 1 tablet by mouth nightly         Please let me know if you have any questions about this encounter. Thank you!    Electronically signed by Celia Murphy RPH on 5/20/2025 at 8:45 AM

## 2025-05-20 NOTE — CARE COORDINATION
BHI Biopsychosocial Assessment    Current Level of Psychosocial Functioning     Independent XX  Dependent    Minimal Assist     Comments:    Psychosocial High Risk Factors (check all that apply)    Unable to obtain meds   Chronic illness/pain    Substance abuse XX  Lack of Family Support   Financial stress   Isolation   Inadequate Community Resources Xx  Suicide attempt(s)  Not taking medications   Victim of crime   Developmental Delay  Unable to manage personal needs    Age 65 or older   Homeless XX  No transportation   Readmission within 30 days  Unemployment  Traumatic Event    Comments:   Psychiatric Advanced Directives: n/a    Family to Involve in Treatment: denies    Sexual Orientation: n/a     Patient Strengths: Patient has insurance and social support    Patient Barriers: Homelessness, substance abuse, not linked to [facility]      Opiate Education Provided: n/a       CM[facility]/mental health history: Denies    Plan of Care   medication management, group/individual therapies, family meetings, psycho -education, treatment team meetings to assist with stabilization    Initial Discharge Plan: Shelter vs AOD treatment       Clinical Summary: Patient is a 29 year old male admitted to [facility] for suicidal ideation. Patient minimally engaged in social work assessment. Patient is homeless. He states he is from, the [facility]. Patient is unsure if he is interested in AOD treatment vs shelter. Patient was provided with AOD resource folder and encouraged patient to place phone calls to AOD facilities. Patient states \"can't you just pick one for me?\" Writer provided education on AOD placement process. Patient identifies his family as supportive. Patient has insurance. Patient denies suicidal ideation and homicidal ideation during social work assessment. Patient also denies experiencing auditory or visual hallucinations. Social work to provide support and assist with discharge planning.

## 2025-05-20 NOTE — BH NOTE
[facility]  Initial Interdisciplinary Treatment Plan NO      Original treatment plan Date & Time: 05/20/25 0845    Admission Type:  Admission Type: Involuntary    Reason for admission:   Reason for Admission: Patient reports suicidal ideations to overdose on meth. Patient reports he had been sober but relapsed earlier this year and does not want to continue living this way. Patient is homeless. Patient has been off medications.    Estimated Length of Stay:  5-7days  Estimated Discharge Date: to be determined by physician    PATIENT STRENGTHS:  Patient Strengths:   Patient Strengths and Limitations:   Addictive Behavior: Addictive Behavior  In the Past 3 Months, Have You Felt or Has Someone Told You That You Have a Problem With  : None  Medical Problems:  Past Medical History:   Diagnosis Date    Anxiety     Bipolar 1 disorder ([facility])     Drug abuse in remission ([facility])     H/O alcohol abuse     Hep C w/o coma, chronic ([facility])      Status EXAM:Mental Status and Behavioral Exam  Normal: No  Level of Assistance: Independent/Self  Facial Expression: Flat  Affect: Blunt  Level of Consciousness: Alert  Frequency of Checks: 4 times per hour, close  Mood:Normal: No  Mood: Depressed, Anxious, Irritable  Motor Activity:Normal: Yes  Eye Contact: Fair  Observed Behavior: Preoccupied, Guarded  Sexual Misconduct History: Current - no  Preception: Elizabeth to person, Elizabeth to time, Elizabeth to place, Elizabeth to situation  Attention:Normal: No  Attention: Distractible  Thought Processes: Circumstantial  Thought Content:Normal: No  Thought Content: Preoccupations  Depression Symptoms: Feelings of hopelessess, Feelings of helplessness, Impaired concentration, Increased irritability  Anxiety Symptoms: Generalized  Janet Symptoms: No problems reported or observed.  Hallucinations: None  Delusions: No  Memory:Normal: Yes  Insight and Judgment: No  Insight and Judgment: Poor judgment, Poor insight    EDUCATION:   Learner Progress

## 2025-05-20 NOTE — BH NOTE
[facility]  Admission Note     Admission Type:   Involuntary - Not Signed in Upon Admission     Reason for admission:  Reason for Admission: Patient reports suicidal ideations to overdose on meth. Patient reports he had been sober but relapsed earlier this year and does not want to continue living this way. Patient is homeless. Patient has been off medications.      Addictive Behavior:   Addictive Behavior  In the Past 3 Months, Have You Felt or Has Someone Told You That You Have a Problem With  : None    Medical Problems:   Past Medical History:   Diagnosis Date    Anxiety     Bipolar 1 disorder ([facility])     Drug abuse in remission ([facility])     H/O alcohol abuse     Hep C w/o coma, chronic ([facility])        Status EXAM:  Mental Status and Behavioral Exam  Normal: No  Level of Assistance: Independent/Self  Facial Expression: Flat  Affect: Blunt  Level of Consciousness: Alert  Frequency of Checks: 4 times per hour, close  Mood:Normal: No  Mood: Depressed, Anxious, Irritable  Motor Activity:Normal: Yes  Eye Contact: Fair  Observed Behavior: Preoccupied, Guarded  Sexual Misconduct History: Current - no  Preception: Buena to person, Buena to time, Buena to place, Buena to situation  Attention:Normal: No  Attention: Distractible  Thought Processes: Circumstantial  Thought Content:Normal: No  Thought Content: Preoccupations  Depression Symptoms: Feelings of hopelessess, Feelings of helplessness, Impaired concentration, Increased irritability  Anxiety Symptoms: Generalized  Janet Symptoms: No problems reported or observed.  Hallucinations: None  Delusions: No  Memory:Normal: Yes  Insight and Judgment: No  Insight and Judgment: Poor judgment, Poor insight    Tobacco Screening:  Practical Counseling, on admission, aron X, if applicable and completed (first 3 are required if patient doesn't refuse):            ( ) Recognizing danger situations (included triggers and roadblocks)                    ( ) Coping skills

## 2025-05-20 NOTE — ED NOTES
Caller: SRIKANTH PALM     Relationship:[employer]     Callback number: [phone]    Is it ok to leave a message: [x] Yes [] No    Requested medication for samples: XARELTO     How much medication does the patient currently have left: HAS A FEW LEFT     Who will be picking up the samples: SRIKANTH PALM       
  [] [facility]    [] [facility]    [x]  [facility]    SUICIDE RISK ASSESSMENT      Y  N     [x] [] In the past two weeks have you had thoughts of hurting yourself in any way?    [x] [] In the past two weeks have you had thoughts that you would be better off dead?   [x] [] Have you made a suicide attempt in the past two months?   [x] [] Do you have a plan for hurting yourself or suicide?   [] [x] Presence of hallucinations/voices related to hurting himself or herself or someone else.    SUICIDE/SECURITY WATCH PRECAUTION CHECKLIST     Orders    [x]  Suicide/Security Watch Precautions initiated as checked below:   5/19/25 12:37 PM EDT 06/06    [x] Notified physician:  You Mohan MD  5/19/25 12:37 PM EDT    [x] Orders obtained as appropriate:     [x] 1:1 Observer     [x] Psych Consult     [] Psych Consult    Name:  Date:  Time:    [x] 1:1 Observer, Notified by:  Sebas Bird RN    Contact Nurse Supervisor    [x] Remove all personal clothes from room and place in snap/paper gown/pants.  Slipper only    [x] Remove all personal belongings from room and secured away from patient.    Documentation    [x] Initiate Suicide/Security Watch Precaution Flow Sheet    [x] Initiate individualized Care Plan/Problem    [x] Document why precautions initiated on flow sheet (Initiate Nursing Care Plan/Problem)    [x] 1:1 Observer in place; instructions provided.  Suicide precautions require observer be within arms length.    [x] Nurse-Observer Communication Hand-off initiated by RN, reviewed with Observer.  Subsequently used as Hand Off between Observers.    [x] Initiate every 15 minute observations per observer as delegated by the RN.    [x] Initiate RN assessment and documentation    Environmental Scan  Search Criteria and Process: OPTIONAL, see Search Policy    [] Reason for search:    [x] Nursing in presence of second person to search patient    [x] Patient notified of reason for body assessment and belongings 
Dr. Delgado at bedside to assess patient   
Jamila oshea) at bedside   
Labeled blood specimens sent to lab via tube system.    [x] Green/yellow  [] Lavender   [] On ice   [] Blue   [] Green/black [] On ice  [] Yellow  [] On ice  [] Red  [] Pink  [] Blood Cultures  [] x1 [] x2    [] Ped Green  [] On ice  [] Ped Lavender  [] On ice    [] Ped Yellow  [] On ice  [] Ped Red    
Labeled blood specimens sent to lab via tube system.    [x] Green/yellow  [x] Lavender   [] On ice   [] Blue   [x] Green/black [] On ice  [] Yellow  [] On ice  [x] Red  [] Pink  [] Blood Cultures  [] x1 [] x2    [] Ped Green  [] On ice  [] Ped Lavender  [] On ice    [] Ped Yellow  [] On ice  [] Ped Red    
Medical Necessity faxed to [employer] as patient will be transported to the [facility], room #105, at 10:30pm.  Transfer packet complete and RN will call report.  SW will attempt to find another provider who can transport sooner.  LYNNE Aldana  
[facility] called d/t patient being verbally aggressive and threatening to leave   Patient demanding his belongings back   Pt states \"you fucking bitch\"   
No samples, pt contacted  
Parminder Lu called as patient is becoming verbally aggressive and threatening to leave, he is currently on suicide watch.  Patient is yelling as writer approached room, he states he is not suicide and wants to leave.  Writer attempted to de-escalate patient, however, he continued to escalate each time a new staff member arrived to the code violet.  He indicates his desire to leave and states, \"I'm not going to go get that meth I was going to use overdose, I'm not going to do it.  I want to go to [facility].\"      Writer discussed case with both attending and resident.  Patient had expressed homicidal ideations to physicians upon arrival, in addition to suicidal ideations, regarding wanting to harm either his drug dealer, or the drug dealers girlfriend.  Patient is demonstrating that he is not in behavioral control as he continues to yell and is not able to be redirected.  He was informed that he would not be discharged and is now being placed on an involuntary hold. He also received emergency medications and has returned to laying on his bed.      Social will remain available.   
Patient accepted to the [facility], PICU placement by Dr. Almonte. Involuntary admission form and facesheet faxed to [facility].     EMTALA 1 documentation complete.   
Patient changed into paper scrubs, belongings locked up per [facility]  
Patient declined from [facility].   
Pt arrives alert and oriented x4 and ambulatory from triage  Pt complains of suicidal ideation with a plan to overdose on drugs   Pt is currently under the influence of meth and crack cocaine   Pt reports injecting \"half of both\"   Pt has rapid speech, and is doing unnecessary movements with face and arms at this time   Pt also reports \"I would like to beat up this girl that ripped me off\" when asked if he was homicidal, however denies homicidal ideation   Pt denies any alcohol use, or hallucinations  Pt denies injury   Suicidal precautions initiated   RR even and unlabored.   NAD noted.   Whiteboard updated.  Will continue with plan of care.                                        
Report given to Janel MCCLENDON at [facility]  All questions answered    
[facility] given to [facility]  
Transfer Center Handoff for Behavioral Health Transfers      Patient's Current Location: [facility] EMERGENCY DEPARTMENT     Chief Complaint   Patient presents with    Suicidal       Current or History of Violent Behavior: Yes    Currently in Restraints Now or During this Encounter: No, however, did have emergency medications  (Specify if Agitation or self harm is noted in ED?)  If yes, please describe behaviors requiring restraint:   Verbally aggressive with staff, threatening to leave.           Medical Clearance Documented and Verified in the Chart: No    No Known Allergies     Can Patient Tolerate Lying Flat: Yes    Able to Perform ADLs:  Yes  (Specify if able to ambulate or uses any mobility devices such as cane or walker)  Activity:    Level of Assistance:    Assistive Device:    Miscellaneous Devices:      LABS    CBC:   Lab Results   Component Value Date/Time    WBC 10.2 05/19/2025 12:56 PM    RBC 4.55 05/19/2025 12:56 PM    RBC 3.50 10/27/2022 06:36 AM    HGB 14.0 05/19/2025 12:56 PM    HCT 40.2 05/19/2025 12:56 PM    MCV 88.4 05/19/2025 12:56 PM    MCH 30.8 05/19/2025 12:56 PM    MCHC 34.8 05/19/2025 12:56 PM    RDW 13.5 05/19/2025 12:56 PM     05/19/2025 12:56 PM    MPV 10.7 05/19/2025 12:56 PM     CMP:   Lab Results   Component Value Date/Time     04/05/2025 05:50 AM    K 3.8 04/05/2025 05:50 AM    K 3.8 04/03/2022 02:07 AM     04/05/2025 05:50 AM    CO2 15 04/05/2025 05:50 AM    BUN 23 04/05/2025 05:50 AM    CREATININE 0.7 04/05/2025 05:50 AM    GFRAA >60 09/17/2022 03:44 PM    LABGLOM >90 04/05/2025 05:50 AM    LABGLOM >60.0 12/23/2023 09:30 PM    GLUCOSE 106 04/05/2025 05:50 AM    GLUCOSE 100 07/19/2023 04:28 PM    CALCIUM 9.3 04/05/2025 05:50 AM    BILITOT 1.7 04/05/2025 05:50 AM    ALKPHOS 65 04/05/2025 05:50 AM    AST 44 04/05/2025 05:50 AM    ALT 16 04/05/2025 05:50 AM     Drug Panel:   Lab Results   Component Value Date/Time    AMPHETAMUR Positive 10/27/2022 06:36 AM    
Writer able to confirm transportation via [facility] for an earlier ETA, 1945.  [facility]  [facility] updated on time change for arrival.   
to be sent to a rehab facility for drug use.  Writer discussed patients repeated reports of wanting to end his life. Patient is understanding that he will have to be accepted to a facility that can manage both his substance use and suicidal ideations.  Writer contacted [facility], intake will discuss case with physician on call and will call back if patient is able to return. If able to return, patient can complete phone assessment with [facility] at that time.      Social work will will continue to assist with coordination to treatment facilities when patient is medically cleared.

## 2025-05-20 NOTE — PROGRESS NOTES
Behavioral Services  Medicare Certification Upon Admission    I certify that this patient's inpatient psychiatric hospital admission is medically necessary for:    [x] (1) Treatment which could reasonably be expected to improve this patient's condition,       [x] (2) Or for diagnostic study;     AND     [x](2) The inpatient psychiatric services are provided while the individual is under the care of a physician and are included in the individualized plan of care.    Estimated length of stay/service 3-5    Plan for post-hospital care AOD services    Electronically signed by Corina Almonte MD on 5/20/2025 at 5:43 PM

## 2025-05-20 NOTE — BH NOTE
Notified provider of suicide precautions not needed at this time, will monitor patient with q15 min checks

## 2025-05-20 NOTE — BH NOTE
Pt agitated AEB: PT unable to sit still, pacing, face reddened, uncooperative with admission process. Staff tried talking to pt, offered suggestions and distractions, given food and fluids, offered PO or IM medications. PT given PO haldol 5mg will continue to monitor.

## 2025-05-20 NOTE — GROUP NOTE
Group Therapy Note     Date: 5/20/2025     Group Start Time: 0900  Group End Time: 0930     Topic: Goal Group        [facility] P[facility] B    Attendees: 3/7     Patient did not participate in Goal Group, despite staff encouragement and explanation of benefits.  Patient remain seclusive to self.  Q15 minute safety checks maintained for patient safety and will continue to encourage patient to attend unit programming.          Signature:  Michell Cedillo LPN

## 2025-05-20 NOTE — GROUP NOTE
Group Therapy Note    Date: 5/20/2025    Group Start Time: 1000  Group End Time: 1030  Group Topic: Psychotherapy    [facility] B    Juliann Parnell MSW, LYNNE        Group Therapy Note    Attendees: 4/9       Patient was offered group therapy today but declined to participate despite encouragement from staff.  1:1 was offered.       Signature:  JOSÉ MIGUEL Palomo, LYNNE

## 2025-05-20 NOTE — H&P
IN-PATIENT SERVICE  [facility] Internal Medicine    CONSULTATION / HISTORY AND PHYSICAL EXAMINATION            Date:   5/20/2025  Patient name:  Ernie Ken Jr.  Date of admission:  5/19/2025  8:43 PM  MRN:   [id number]  Account:  [id number]  YOB: 1995  PCP:    Not, On File (Inactive)  Room:   [address] [place] [address]  Code Status:    Full Code    Physician Requesting Consult: Corina Almonte MD    Reason for Consult:  medical management    Chief Complaint:   Major depression, suicidal ideation    History Obtained From:     Patient medical record nursing staff    History of Present Illness:   Patient, has past medical history multiple medical problem which include hepatitis C, bipolar disorder, severe anxiety, major depression, history of substance abuse, admitted to [facility] unit due to worsening depression, suicidal ideation  Patient told me that he has constipation for many days, magnesium usually help for him requesting medication to be restarted    Past Medical History:     Past Medical History:   Diagnosis Date    Anxiety     Bipolar 1 disorder ([facility])     Drug abuse in remission ([facility])     H/O alcohol abuse     Hep C w/o coma, chronic ([facility])         Past Surgical History:     Past Surgical History:   Procedure Laterality Date    ABSCESS DRAINAGE Right 10/13/2020    Irrigation and debridement    HAND SURGERY Right         Medications Prior to Admission:     Prior to Admission medications    Medication Sig Start Date End Date Taking? Authorizing Provider   buprenorphine-naloxone (SUBOXONE) 8-2 MG FILM SL film Place 2 Film under the tongue daily.   Yes Paty Smith MD   naloxone 4 MG/0.1ML LIQD nasal spray 1 spray by Nasal route as needed for Opioid Reversal   Yes Paty Smith MD   melatonin 5 MG TABS tablet Take 1 tablet by mouth nightly   Yes Paty Smith MD   Multiple Vitamins-Minerals (THERAPEUTIC MULTIVITAMIN-MINERALS) tablet Take 1 tablet by mouth 
HISTORY:   HISTORY OF INCARCERATION: [] Yes [x] No    Family History:   No family history on file.    Psychiatric Family History  Patient  psychiatric family history.     Suicides in family: [] Yes [x] No    Substance use in family: [x] Yes [] No         PHYSICAL EXAM:  Vitals:  /73   Pulse 78   Temp 98.4 °F (36.9 °C) (Tympanic)   Resp 14   Ht 1.88 m (6' 2\")   Wt 108.9 kg (240 lb)   SpO2 99%   BMI 30.81 kg/m²   Pain Level: Endorses constipation secondary to recreational substance withdrawal.    LABS:  Labs reviewed: [x] Yes  Metabolic Screening:  [] Yes [x] No   Results from more than a year ago.  Will order lipid panel and hemoglobin A1c  Last EKG in EMR reviewed: [x] Yes  Reviewed from 4/5/2025: QTc 459          Review of Systems   Constitutional: Negative for chills and weight loss.   HENT: Negative for ear pain and nosebleeds.    Eyes: Negative for blurred vision and photophobia.   Respiratory: Negative for cough, shortness of breath and wheezing.    Cardiovascular: Negative for chest pain and palpitations.   Gastrointestinal: Negative for abdominal pain, diarrhea and vomiting.   Genitourinary: Negative for dysuria and urgency.   Musculoskeletal: Negative for falls and joint pain.   Skin: Negative for itching and rash.   Neurological: Negative for tremors, seizures and weakness.   Endo/Heme/Allergies: Does not bruise/bleed easily.      Mental Status Examination:    Level of consciousness: Awake and alert  Appearance:  Appropriate attire, resting in bed, poor grooming and hygiene, disheveled  Behavior/Motor: Approachable, engages with interviewer, no psychomotor abnormalities  Attitude toward examiner: Somewhat cooperative, irritable, avoidant eye contact  Speech: Normal rate, volume, and irritable tone.  Mood: Patient reports \"terrible\"  Affect: Depressed and irritable  Thought processes:  Goal directed, linear  Thought content: Active suicidal ideation with a plan to overdose on meth.  Unable to

## 2025-05-20 NOTE — PLAN OF CARE
Problem: Self Harm/Suicidality  Goal: Will have no self-injury during hospital stay  Description: INTERVENTIONS:1.  Ensure constant observer at bedside with Q15M safety checks2.  Maintain a safe environment3.  Secure patient belongings4.  Ensure family/visitors adhere to safety recommendations5.  Ensure safety tray has been added to patient's diet order6.  Every shift and PRN: Re-assess suicidal risk via Frequent Screener  INTERVENTIONS:1.  Ensure constant observer at bedside with Q15M safety checks2.  Maintain a safe environment3.  Secure patient belongings4.  Ensure family/visitors adhere to safety recommendations5.  Ensure safety tray has been added to patient's diet order6.  Every shift and PRN: Re-assess suicidal risk via Frequent Screener  Outcome: Progressing   Patient denies both suicidal and homicidal ideations. Patient denies both auditory and visual disturbances. Patient reports both anxiety and depression. Patient denies pain at this time. Patient complains of constipation and was given several laxatives. Patient reports eating and sleeping adequately with safety checks Q15 minutes and at irregular intervals.   Problem: Janet  Goal: Will exhibit normal sleep and speech and no impulsivity  Description: INTERVENTIONS:1. Administer medication as ordered2. Set limits on impulsive behavior3. Make attempts to decrease external stimuli as possible  Outcome: Progressing    Patient denies both suicidal and homicidal ideations. Patient denies both auditory and visual disturbances. Patient reports both anxiety and depression. Patient denies pain at this time. Patient complains of constipation and was given several laxatives. Patient reports eating and sleeping adequately with safety checks Q15 minutes and at irregular intervals.   Problem: Risk for Elopement  Goal: Patient will not exit the unit/facility without proper excort  Outcome: Progressing    Patient denies both suicidal and homicidal ideations. Patient

## 2025-05-20 NOTE — BH NOTE
Patient given tobacco quitline number 11426447099 at this time, refusing to call at this time, states \" I just dont want to quit now\"- patient given information as to the dangers of long term tobacco use. Continue to reinforce the importance of tobacco cessation.

## 2025-05-21 ENCOUNTER — HOSPITAL ENCOUNTER (EMERGENCY)
Age: 30
Discharge: LEFT BEFORE DISCHARGE | End: 2025-05-21
Attending: EMERGENCY MEDICINE
Payer: COMMERCIAL

## 2025-05-21 VITALS
HEIGHT: 74 IN | SYSTOLIC BLOOD PRESSURE: 104 MMHG | HEART RATE: 99 BPM | OXYGEN SATURATION: 99 % | TEMPERATURE: 97.9 F | RESPIRATION RATE: 14 BRPM | WEIGHT: 240 LBS | BODY MASS INDEX: 30.8 KG/M2 | DIASTOLIC BLOOD PRESSURE: 74 MMHG

## 2025-05-21 VITALS
RESPIRATION RATE: 18 BRPM | OXYGEN SATURATION: 96 % | DIASTOLIC BLOOD PRESSURE: 74 MMHG | TEMPERATURE: 98.2 F | HEART RATE: 105 BPM | SYSTOLIC BLOOD PRESSURE: 115 MMHG

## 2025-05-21 DIAGNOSIS — F19.10 POLYSUBSTANCE ABUSE (HCC): Primary | ICD-10-CM

## 2025-05-21 PROBLEM — F32.A DEPRESSION WITH SUICIDAL IDEATION: Status: RESOLVED | Noted: 2021-06-12 | Resolved: 2025-05-21

## 2025-05-21 PROBLEM — R45.851 DEPRESSION WITH SUICIDAL IDEATION: Status: RESOLVED | Noted: 2021-06-12 | Resolved: 2025-05-21

## 2025-05-21 LAB
ALBUMIN SERPL-MCNC: 4.4 G/DL (ref 3.5–5.2)
ALBUMIN/GLOB SERPL: 1.7 {RATIO} (ref 1–2.5)
ALP SERPL-CCNC: 63 U/L (ref 40–129)
ALT SERPL-CCNC: 13 U/L (ref 10–50)
AMPHET UR QL SCN: NEGATIVE
ANION GAP SERPL CALCULATED.3IONS-SCNC: 11 MMOL/L (ref 9–16)
AST SERPL-CCNC: 32 U/L (ref 10–50)
BARBITURATES UR QL SCN: NEGATIVE
BASOPHILS # BLD: <0.03 K/UL (ref 0–0.2)
BASOPHILS NFR BLD: 0 % (ref 0–2)
BENZODIAZ UR QL: NEGATIVE
BILIRUB SERPL-MCNC: 0.4 MG/DL (ref 0–1.2)
BUN SERPL-MCNC: 9 MG/DL (ref 6–20)
CALCIUM SERPL-MCNC: 9.3 MG/DL (ref 8.6–10.4)
CANNABINOIDS UR QL SCN: POSITIVE
CHLORIDE SERPL-SCNC: 106 MMOL/L (ref 98–107)
CO2 SERPL-SCNC: 24 MMOL/L (ref 20–31)
COCAINE UR QL SCN: NEGATIVE
CREAT SERPL-MCNC: 0.7 MG/DL (ref 0.7–1.2)
EOSINOPHIL # BLD: 0.12 K/UL (ref 0–0.44)
EOSINOPHILS RELATIVE PERCENT: 1 % (ref 1–4)
ERYTHROCYTE [DISTWIDTH] IN BLOOD BY AUTOMATED COUNT: 13.6 % (ref 11.8–14.4)
ETHANOL PERCENT: 0.08 %
ETHANOLAMINE SERPL-MCNC: 80 MG/DL (ref 0–0.08)
FENTANYL UR QL: NEGATIVE
GFR, ESTIMATED: >90 ML/MIN/1.73M2
GLUCOSE SERPL-MCNC: 83 MG/DL (ref 74–99)
HCT VFR BLD AUTO: 38.3 % (ref 40.7–50.3)
HGB BLD-MCNC: 13.6 G/DL (ref 13–17)
IMM GRANULOCYTES # BLD AUTO: <0.03 K/UL (ref 0–0.3)
IMM GRANULOCYTES NFR BLD: 0 %
LYMPHOCYTES NFR BLD: 3.15 K/UL (ref 1.1–3.7)
LYMPHOCYTES RELATIVE PERCENT: 36 % (ref 24–43)
MCH RBC QN AUTO: 31.4 PG (ref 25.2–33.5)
MCHC RBC AUTO-ENTMCNC: 35.5 G/DL (ref 28.4–34.8)
MCV RBC AUTO: 88.5 FL (ref 82.6–102.9)
METHADONE UR QL: NEGATIVE
MONOCYTES NFR BLD: 0.58 K/UL (ref 0.1–1.2)
MONOCYTES NFR BLD: 7 % (ref 3–12)
NEUTROPHILS NFR BLD: 56 % (ref 36–65)
NEUTS SEG NFR BLD: 4.88 K/UL (ref 1.5–8.1)
NRBC BLD-RTO: 0 PER 100 WBC
OPIATES UR QL SCN: NEGATIVE
OXYCODONE UR QL SCN: NEGATIVE
PCP UR QL SCN: NEGATIVE
PLATELET # BLD AUTO: 244 K/UL (ref 138–453)
PMV BLD AUTO: 10.1 FL (ref 8.1–13.5)
POTASSIUM SERPL-SCNC: 4.1 MMOL/L (ref 3.7–5.3)
PROT SERPL-MCNC: 7 G/DL (ref 6.6–8.7)
RBC # BLD AUTO: 4.33 M/UL (ref 4.21–5.77)
SODIUM SERPL-SCNC: 141 MMOL/L (ref 136–145)
TEST INFORMATION: ABNORMAL
WBC OTHER # BLD: 8.8 K/UL (ref 3.5–11.3)

## 2025-05-21 PROCEDURE — 85025 COMPLETE CBC W/AUTO DIFF WBC: CPT

## 2025-05-21 PROCEDURE — 80307 DRUG TEST PRSMV CHEM ANLYZR: CPT

## 2025-05-21 PROCEDURE — 80053 COMPREHEN METABOLIC PANEL: CPT

## 2025-05-21 PROCEDURE — 6370000000 HC RX 637 (ALT 250 FOR IP): Performed by: NURSE PRACTITIONER

## 2025-05-21 PROCEDURE — GZHZZZZ GROUP PSYCHOTHERAPY: ICD-10-PCS | Performed by: PSYCHIATRY & NEUROLOGY

## 2025-05-21 PROCEDURE — 99283 EMERGENCY DEPT VISIT LOW MDM: CPT

## 2025-05-21 PROCEDURE — G0480 DRUG TEST DEF 1-7 CLASSES: HCPCS

## 2025-05-21 PROCEDURE — APPSS30 APP SPLIT SHARED TIME 16-30 MINUTES: Performed by: NURSE PRACTITIONER

## 2025-05-21 RX ADMIN — MAGNESIUM HYDROXIDE 30 ML: 2400 SUSPENSION ORAL at 08:26

## 2025-05-21 RX ADMIN — SENNOSIDES 8.6 MG: 8.6 TABLET, FILM COATED ORAL at 08:26

## 2025-05-21 ASSESSMENT — PAIN SCALES - GENERAL: PAINLEVEL_OUTOF10: 0

## 2025-05-21 ASSESSMENT — LIFESTYLE VARIABLES
HOW MANY STANDARD DRINKS CONTAINING ALCOHOL DO YOU HAVE ON A TYPICAL DAY: PATIENT DECLINED
HOW OFTEN DO YOU HAVE A DRINK CONTAINING ALCOHOL: PATIENT DECLINED

## 2025-05-21 NOTE — PLAN OF CARE
Problem: Self Harm/Suicidality  Goal: Will have no self-injury during hospital stay  Description: INTERVENTIONS:1.  Ensure constant observer at bedside with Q15M safety checks2.  Maintain a safe environment3.  Secure patient belongings4.  Ensure family/visitors adhere to safety recommendations5.  Ensure safety tray has been added to patient's diet order6.  Every shift and PRN: Re-assess suicidal risk via Frequent Screener  INTERVENTIONS:1.  Ensure constant observer at bedside with Q15M safety checks2.  Maintain a safe environment3.  Secure patient belongings4.  Ensure family/visitors adhere to safety recommendations5.  Ensure safety tray has been added to patient's diet order6.  Every shift and PRN: Re-assess suicidal risk via Frequent Screener  5/21/2025 0231 by Apurva Milner LPN  Outcome: Progressing  Note: Patient is free of self harm at this time.  Patient agrees to seek out staff if thoughts to harm self arise.  Staff will provide support and reassurance as needed.  Safety checks maintained every 15 minutes.      Problem: Janet  Goal: Will exhibit normal sleep and speech and no impulsivity  Description: INTERVENTIONS:1. Administer medication as ordered2. Set limits on impulsive behavior3. Make attempts to decrease external stimuli as possible  5/21/2025 0231 by Apurva Milner LPN  Outcome: Progressing  Note: Patient refused parts of assessments this shift. Patient cooperative with scheduled medications this shift. Patient denies suicidal/homicidal ideations and hallucinations this shift. Patient isolative to room, coming out for needs only this shift. Patient is irritable and blunt when speaking with writer this shift. Patient educated and agrees to come to staff if needed this shift. Patient monitored every 15 minutes with environmental safety checks.      Problem: Risk for Elopement  Goal: Patient will not exit the unit/facility without proper excort  5/21/2025 0231 by Apurva Milner LPN  Outcome:

## 2025-05-21 NOTE — GROUP NOTE
Group Therapy Note    Date: 5/21/2025    Group Start Time: 0845  Group End Time: 0900  Group Topic: Community Meeting    [facility]    Teodora Fong, RN        Group Therapy Note    Attendees: 2/8     Patient declined  Signature:  Teodora Fong RN

## 2025-05-21 NOTE — GROUP NOTE
Group Therapy Note    Date: 5/21/2025    Group Start Time: 1000  Group End Time: 1034  Group Topic: Discharge Planning    [facility]    Sandra Ordoñez LPN        Group Therapy Note    Attendees: 0/8    patient refused to attend discharge planning group at 1000 after encouragement from staff.  1:1 talk time provided as alternative to group session      Signature:  Sandra Ordoñez LPN

## 2025-05-21 NOTE — DISCHARGE INSTR - DIET

## 2025-05-21 NOTE — CARE COORDINATION
SOCIAL SERVICE NOTE:   Lolis From [facility] in [place], [place] reported that she just finished speaking with patient and completed a phone screening with him. She requested that his paperwork be faxed to [phone].  faxes patient paperwork on this date with permission from patient. Lolis reports that when she receives the paperwork and has it reviewed, she will reach out to us.     [facility] [phone]  Fax [phone]  [facility] [place] [address]

## 2025-05-21 NOTE — PROGRESS NOTES
Daily Progress Note  5/21/2025    Patient Name: Ernie Ken Jr.    CHIEF COMPLAINT: Suicidal ideation with a plan to overdose         SUBJECTIVE:      Patient seen face-to-face for follow-up assessment.  He is resting in bed but is arousable to verbal stimuli.  Thought process is linear and goal-directed.  Ernie has been primarily withdrawn and isolative to room.  Out for needs only.  Has not attended group programming or engaged in the milieu.  He has been utilizing as needed medications trazodone and hydroxyzine, but is not interested in a scheduled psychotropic medication.  He expresses need to maintain sobriety and is interested in AOD inpatient treatment.  He was provided a list of local services and encouraged to begin making phone calls today.  He reports his suicidal thoughts are improving.  He does feel safe from self on unit, and feels he would be safe if he were to directly transition to a substance use treatment program.  Social work has been assisting with placement.  Patient denies any other concerns.  He denies any perceptual disturbances.  Does not appear to be responding to any internal stimuli.  He denies any withdrawal symptoms.  He has been intermittently refusing physical assessments from staff, but last vitals from this morning are appropriate and within normal limits.    Appetite:  [x] Adequate/Unchanged  [] Increased  [] Decreased      Sleep:       [] Adequate/Unchanged  [x] Fair  [] Poor      Group Attendance on Unit:   [] Yes   [] Selectively    [x] No    Compliant with scheduled medications: No scheduled psychotropic medications    Received emergency medications in past 24 hrs: [] Yes   [x] No    Medication Side Effects: Denies         Mental Status Exam  Level of consciousness: Alert and awake   Appearance: Appropriate attire for setting, resting in bed, with fair  grooming and hygiene   Behavior/Motor: Approachable, engages with interviewer, no psychomotor abnormalities

## 2025-05-21 NOTE — DISCHARGE INSTR - COC
Continuity of Care Form    Patient Name: Ernie Ken Jr.   :  1995  MRN:  [id number]    Admit date:  2025  Discharge date:  ***    Code Status Order: Full Code   Advance Directives:     Admitting Physician:  Corina Almonte MD  PCP: Not, On File (Inactive)    Discharging Nurse: ***  Discharging Hospital Unit/Room#: 0105/0105-01  Discharging Unit Phone Number: ***    Emergency Contact:   Extended Emergency Contact Information  Primary Emergency Contact: DC KIRKLAND  Address: [address] [place] [address]  Home Phone: [phone]  Relation: Grandparent    Past Surgical History:  Past Surgical History:   Procedure Laterality Date    ABSCESS DRAINAGE Right 10/13/2020    Irrigation and debridement    HAND SURGERY Right        Immunization History:   Immunization History   Administered Date(s) Administered    COVID-19, MODERNA BLUE border, Primary or Immunocompromised, (age 12y+), IM, 100 mcg/0.5mL 2021    Influenza, FLUARIX, FLULAVAL, FLUZONE (age 6 mo+) and AFLURIA, (age 3 y+), Quadv PF, 0.5mL 2018       Active Problems:  Patient Active Problem List   Diagnosis Code    Depression F32.A    Suicidal behavior without attempted self-injury R45.89    Schizoaffective disorder ([facility]) F25.9    Opiate abuse, continuous ([facility]) F11.10    Severe opioid use disorder ([facility]) F11.20    Abscess of right arm L02.413    IVDU (intravenous drug user) F19.90    Schizoaffective disorder, bipolar type ([facility]) F25.0    Polysubstance abuse ([facility]) F19.10    Opiate withdrawal ([facility]) F11.93    Opiate use F11.90    Methamphetamine use ([facility]) F15.10    Hypokalemia E87.6    Abuse of smoked substance ([facility]) F18.10    Agitation R45.1    Stimulant use disorder F15.90       Isolation/Infection:   Isolation            No Isolation          Patient Infection Status    No active infections.   Resolved       Infection Onset Added Last Indicated Last Indicated By Resolved Resolved By    COVID-19 10/10/22

## 2025-05-21 NOTE — BH NOTE
[facility]  Discharge Note    Pt discharged with followings belongings:   Dental Appliances: Partials  Vision - Corrective Lenses: None  Hearing Aid: None  Jewelry: Bracelet  Body Piercings Removed: No  Clothing: Footwear, Shirt, Shorts, Socks, Undergarments, Sweater  Other Valuables: Lighter/Matches, Other (Comment) (vape)   Valuables sent home with patient or returned to patient. Patient educated on aftercare instructions: yes  Information faxed to [facility] by Staff  at 2:23 PM .Patient verbalize understanding of AVS:  yes.    Status EXAM upon discharge:  Mental Status and Behavioral Exam  Normal: No  Level of Assistance: Independent/Self  Facial Expression: Flat, Hostile  Affect: Blunt  Level of Consciousness: Alert  Frequency of Checks: 4 times per hour, close  Mood:Normal: No  Mood: Anxious, Depressed, Irritable  Motor Activity:Normal: Yes  Eye Contact: Good  Observed Behavior: Guarded, Withdrawn, Preoccupied  Sexual Misconduct History: Current - no  Preception: [place] to person, [place] to time, [place] to place, [place] to situation  Attention:Normal: Yes  Attention: Unable to concentrate  Thought Processes: Unremarkable  Thought Content:Normal: No  Thought Content: Preoccupations  Depression Symptoms: Impaired concentration, Isolative, Increased irritability, Loss of interest, Change in energy level, Feelings of helplessness  Anxiety Symptoms: Generalized  Janet Symptoms: Poor judgment  Hallucinations: None  Delusions: No  Memory:Normal: Yes  Insight and Judgment: No  Insight and Judgment: Poor judgment, Poor insight, Unmotivated    Tobacco Screening:  Practical Counseling, on admission, aron X, if applicable and completed (first 3 are required if patient doesn't refuse):            ( ) Recognizing danger situations (included triggers and roadblocks)                    ( ) Coping skills (new ways to manage stress,relaxation techniques, changing routine, distraction)

## 2025-05-21 NOTE — BH NOTE
Patient given tobacco quitline number 00299493988 at this time, refusing to call at this time, states \" I just dont want to quit now\"- patient given information as to the dangers of long term tobacco use. Continue to reinforce the importance of tobacco cessation.

## 2025-05-21 NOTE — BH NOTE
Patient given tobacco quitline number 6-383-569-9844 at this time, refusing to call at this time, states \" I just dont want to quit now\"- patient given information as to the dangers of long term tobacco use. Continue to reinforce the importance of tobacco cessation.

## 2025-05-21 NOTE — CARE COORDINATION
Discharge Arrangements:  Patient refused inpatient AOD resources offered by social work. Patient refused link to [facility]. He demands to be discharged to [facility]. He no longer wants to discharge to [place], [place]. Social work provided walk in hours to [facility]    Guardian notified: n/a    Discharge destination/address: [facility] [address], [place] [address]     Transported by:  cab    Patient was not accepting of referral.  Follow up appointment is scheduled for Please complete walk-in hours within 7 days of your discharge.  Walk-in hours:  Monday-Friday  9AM-3PM     *TESSA resources were offered to patient throughout admission and at time of discharge. This list of [facility] TESSA providers was provided to patient:     [facility]  [address] [facility] [address]   [address] [facility] [address]  Phone: [phone]     Phone: [phone]    [facility]   [address] [facility] [address]   [address] [facility] [address]  Phone: [phone]     Phone: [phone]    [facility] [place]  [address] [facility] [address]    [address] [facility] [facility] [address]  Phone: [phone]     Phone: [phone]    [facility]     [facility]   [address] [place] [id number]  [place] [address]      Phone: [phone]  Phone: [phone]        [facility]  [address] [facility]. [facility] [address]   [address] [place] [address]  Phone: [phone]     Phone:  [phone]    [employer]      [facility]  [address] [facility] [address]   [address] [facility] [address]  Phone: [phone]     Phone: [phone]    [facility]  [address] [facility] [address]   [address] [facility] [address]  Phone: [phone]     Phone:

## 2025-05-21 NOTE — DISCHARGE INSTRUCTIONS
Information:  Medications:   Medication summary provided   I understand that I should take only the medications on my list.     -why and when I need to take each medicine.     -which side effects to watch for.     -that I should carry my medication information at all times in case of     Emergency situations.    I will take all of my medicines to follow up appointments.     -check with my physician or pharmacist before taking any new    Medication, over the counter product or drink alcohol.    -Ask about food, drug or dietary supplement interactions.    -discard old lists and update records with medication providers.    Notify Physician:  Notify physician if you notice:   Always call 911 if you feel your life is in danger  In case of an emergency call 911 immediately!  If 911 is not available call your local emergency medical system for help    Behavioral Health Follow Up:  Original Referral Source:  Discharge Diagnosis: Depression with suicidal ideation [F32.A, R45.851]  Recommendations for Level of Care: Follow up appointment  Patient status at discharge: stable  My hospital  was: [facility]  Aftercare plan faxed: ***   -faxed by:   [facility]   -date: 5/21/25   -time: 1400  Prescriptions: N/A    Smoking: Quit Smoking.   Call the [employer]'s smoking quitline at 4-748-85D-QUIT  Know the signs of a heart attack   If you have any of the following symptoms call 911 immediately, do not wait more    Than five minutes.    1. Pressure, fullness and/ or squeezing in the center of the chest spreading to    The jaw, neck or shoulder.    2. Chest discomfort with light headedness, fainting, sweating, nausea or    Shortness of breath.   3. Upper abdominal pressure or discomfort.   4. Lower chest pain, back pain, unusual fatigue, shortness of breath, nausea   Or dizziness.     General Information:   Questions regarding your bill: Call HELP program [phone]     Suicide Hotline ([facility] Crisis Care Line)  (886)

## 2025-05-21 NOTE — CARE COORDINATION
Writer spoke to admissions with Lolis from [facility] who states patient is not accepted to treatment due to having a dual diagnosis.

## 2025-05-21 NOTE — CARE COORDINATION
Name: Ernie Ken Jr.    : 1995    Auth number: [id number]     Discharge Date: 2025    Destination: [facility]    Discharge Medications:      Medication List        STOP taking these medications      escitalopram 5 MG tablet  Commonly known as: LEXAPRO     melatonin 5 MG Tabs tablet     naloxone 4 MG/0.1ML Liqd nasal spray     paliperidone 3 MG extended release tablet  Commonly known as: INVEGA     potassium chloride 20 MEQ extended release tablet  Commonly known as: KLOR-CON M     Suboxone 8-2 MG Film SL film  Generic drug: buprenorphine-naloxone     therapeutic multivitamin-minerals tablet     traZODone 150 MG tablet  Commonly known as: DESYREL          Pharmacy Instructions:    No medications to go to pharmacy.           Follow Up Appointment: [facility] [place] [address]  Phone: [phone]  Go on 2025  Please complete walk-in hours within 7 days of your discharge.  Walk-in hours:  Monday-Friday  9AM-3PM

## 2025-05-21 NOTE — TRANSITION OF CARE
Medication List and Instructions:      Medication List        STOP taking these medications      escitalopram 5 MG tablet  Commonly known as: LEXAPRO     melatonin 5 MG Tabs tablet     naloxone 4 MG/0.1ML Liqd nasal spray     paliperidone 3 MG extended release tablet  Commonly known as: INVEGA     potassium chloride 20 MEQ extended release tablet  Commonly known as: KLOR-CON M     Suboxone 8-2 MG Film SL film  Generic drug: buprenorphine-naloxone     therapeutic multivitamin-minerals tablet     traZODone 150 MG tablet  Commonly known as: DESYREL          Pharmacy Instructions:    No medications to go to pharmacy.           Unresulted Labs (24h ago, onward)      None            To obtain results of studies pending at discharge, please contact [phone]    Follow-up Information    None          Advanced Directive:   Does the patient have an appointed surrogate decision maker? No  Does the patient have a Medical Advance Directive? No  Does the patient have a Psychiatric Advance Directive? No  If the patient does not have a surrogate or Medical Advance Directive AND Psychiatric Advance Directive, the patient was offered information on these advance directives Patient declined to complete    Patient Instructions: Please continue all medications until otherwise directed by physician.      Tobacco Cessation Discharge Plan:   Is the patient a tobacco user  and needs referral for tobacco cessation? Yes  Patient referred to the following for tobacco cessation with an appointment? Patient refused  Patient was offered medication to assist with tobacco cessation at discharge? Patient refused    Alcohol/Substance Abuse Discharge Plan:   Does the patient have a history of substance/alcohol abuse and requires a referral for treatment? Yes  Patient referred to the following for substance/alcohol abuse treatment with an appointment? Yes  Patient was offered medication to assist with substance/alcohol abuse cessation at discharge?

## 2025-05-22 NOTE — DISCHARGE SUMMARY
Provider Discharge Summary     Patient ID:  Ernie Ken Jr.  [id number]  29 y.o.  1995    Admit date: 5/19/2025    Discharge date and time: 5/21/2025  12:15 PM    Admitting Physician: Corina Almonte MD     Discharge Physician: Corina Almonte MD    Admission Diagnoses: Depression with suicidal ideation [F32.A, R45.851]    Discharge Diagnoses:      Depression with suicidal ideation Poly substance use disorder    Patient Active Problem List   Diagnosis Code    Depression F32.A    Suicidal behavior without attempted self-injury R45.89    Schizoaffective disorder ([facility]) F25.9    Opiate abuse, continuous ([facility]) F11.10    Severe opioid use disorder ([facility]) F11.20    Abscess of right arm L02.413    IVDU (intravenous drug user) F19.90    Schizoaffective disorder, bipolar type ([facility]) F25.0    Polysubstance abuse ([facility]) F19.10    Opiate withdrawal ([facility]) F11.93    Opiate use F11.90    Methamphetamine use ([facility]) F15.10    Hypokalemia E87.6    Abuse of smoked substance ([facility]) F18.10    Agitation R45.1    Stimulant use disorder F15.90        Admission Condition: poor    Discharged Condition: stable    Indication for Admission: threat to self    History of Present Illnes (present tense wording is of findings from admission exam and are not necessarily indicative of current findings):   Ernie Ken Jr. is a 29 y.o. male who has a past medical history of polysubstance abuse and schizoaffective disorder. Patient presented to the ED endorsing suicidal ideation with a plan to overdose on methamphetamines.  Patient reports that he had been sober for a period of time, but relapsed earlier this year.  He is not currently linked with any outpatient mental health services.  Reporting feeling hopeless and helpless.  Unable to contract for safety.  Placed on application for emergency admission.  Per documentation, patient was verbally aggressive in the ED.  He did require oral emergency medications to help with agitation.     On

## 2025-05-22 NOTE — ED NOTES
Patient's UDS is positive for marijuana only. Patient's ETOH is 80. Writer contacted [facility], patient does not qualify for their services. Patient's only option is to speak with [facility], writer to provide patient the number & allow patient to speak with them on room phone.

## 2025-05-22 NOTE — ED PROVIDER NOTES
[facility] EMERGENCY DEPARTMENT  Emergency Department Encounter  Emergency Medicine Resident     Pt Name:Ernie Ken Jr.  MRN: [id number]  Birthdate 1995  Date of evaluation: 5/21/25  PCP:  Not, On File (Inactive)  Note Started: 9:41 PM EDT      CHIEF COMPLAINT       Chief Complaint   Patient presents with    Addiction Problem       HISTORY OF PRESENT ILLNESS  (Location/Symptom, Timing/Onset, Context/Setting, Quality, Duration, Modifying Factors, Severity.)      Ernie Ken Jr. is a 29 y.o. male who presents requesting resources for addiction.  Patient reports using crack and meth daily for the last several days though not today.  He reports drinking alcohol and smoking marijuana today.  He is interested in quitting.  He denies any suicidal or homicidal ideations at this time.    PAST MEDICAL / SURGICAL / SOCIAL / FAMILY HISTORY      has a past medical history of Anxiety, Bipolar 1 disorder (HCC), Drug abuse in remission (HCC), H/O alcohol abuse, and Hep C w/o coma, chronic (HCC).     has a past surgical history that includes Hand surgery (Right) and Abscess Drainage (Right, 10/13/2020).    Social History     Socioeconomic History    Marital status: Single     Spouse name: Not on file    Number of children: Not on file    Years of education: Not on file    Highest education level: Not on file   Occupational History    Not on file   Tobacco Use    Smoking status: Every Day     Current packs/day: 0.50     Average packs/day: 0.5 packs/day for 7.0 years (3.5 ttl pk-yrs)     Types: Cigarettes    Smokeless tobacco: Former     Types: Snuff   Vaping Use    Vaping status: Never Used   Substance and Sexual Activity    Alcohol use: Yes    Drug use: Yes     Frequency: 7.0 times per week     Types: Other-see comments, Methamphetamines (Crystal Meth), Opiates , IV     Comment: benzo's, fentanyl    Sexual activity: Not on file   Other Topics Concern    Not on file   Social History Narrative    Not on file

## 2025-05-22 NOTE — DISCHARGE INSTRUCTIONS
You were seen in the ER today for resources for detox.  Those have been provided to you.  Please follow-up as discussed.  Return if new or worsening symptoms or any other concerns.

## 2025-05-22 NOTE — PROGRESS NOTES
I signed up for this patient in error. I did not see this patient. I did not participate in the evlauation or treatment of this patient.    Electronically signed by Alissa Hatfield DO on 5/21/2025 at 9:37 PM

## 2025-05-22 NOTE — ED NOTES
Pt arrived via triage for c/o wanting rehab help/ detox  Pt states he's been doing IV meth, crack , marijuana and anything he can get his hands on the last few days  Pt states HE felt like he was going to die so he wants to detox  Pt A&O x4  Admits to drinking 4 16 oz cans of alcohol today  Pt even and unlabored  Denies cp/sob  Call light in reach

## 2025-05-22 NOTE — ED NOTES
Patient approached writer's desk & stated [facility] accepted him & is sending an uber but he doesn't have any of the details. Writer stated intent to contact [facility] & instructed patient to return to room but patient refused & stated he doesn't want to miss his uber. Patient walked out of ED. Writer attempted contact with [facility], phones indicate they are closed.

## 2025-05-22 NOTE — ED PROVIDER NOTES
[facility]     Emergency Department     Faculty Attestation    I performed a history and physical examination of the patient and discussed management with the resident. I reviewed the resident’s note and agree with the documented findings including all diagnostic interpretations and plan of care. Any areas of disagreement are noted on the chart. I was personally present for the key portions of any procedures. I have documented in the chart those procedures where I was not present during the key portions. I have reviewed the emergency nurses triage note. I agree with the chief complaint, past medical history, past surgical history, allergies, medications, social and family history as documented unless otherwise noted below. Documentation of the HPI, Physical Exam and Medical Decision Making performed by guadalupe is based on my personal performance of the HPI, PE and MDM. For Physician Assistant/ Nurse Practitioner cases/documentation I have personally evaluated this patient and have completed at least one if not all key elements of the E/M (history, physical exam, and MDM). Additional findings are as noted.    Primary Care Physician: Not, On File (Inactive)    Note Started: 9:56 PM EDT     VITAL SIGNS:   temperature is 98.2 °F (36.8 °C). His blood pressure is 115/74 and his pulse is 105 (abnormal). His respiration is 18 and oxygen saturation is 96%.      Medical Decision Making  Requesting assisted for substance use disorder.  Multiple recreational drugs in addition to alcohol.  No suicidal or homicidal ideation.  Recent admission to [facility] for suicidal ideation.  Clinically shows some evidence of intoxication with some slurred speech, directable in conversation, no acute distress, flat affect.  Labs, coordinate with social work    Amount and/or Complexity of Data Reviewed  External Data Reviewed: notes.  Labs: ordered.    Risk  Diagnosis or treatment

## 2025-05-22 NOTE — ED NOTES
5/6/25  [facility] note:  Patient has a psychiatric history of Psychosis and Polysubstance Abuse. He is not connected with outpatient services and has numerous previous inpatient admissions. Patient was recently admitted to [facility] 4/16/25 for the same reports of SI/HI. Patient is homeless and appears to utilize inpatient psychiatric/substance abuse facilities to meet his basic needs. This is patient's 12th ed visit this year with numerous psychiatric and rehab admissions within that time frame.      5/20/25 [facility] note: Per record review patient has been presenting to ED's multiple times in [place] and also being placed at alcohol and drug treatment programs.    Patient was placed on involuntary hold to [facility] 5/19/25. Please note that patient was discharged late this afternoon from the [facility] to [facility] at patient's insistence. Per documentation, patient refused all TESSA tx resources/placement assistance & insisted on transport to the [facility]. Writer spoke with [facility], patient is not allowed at their facility.

## 2025-06-05 ENCOUNTER — HOSPITAL ENCOUNTER (EMERGENCY)
Age: 30
Discharge: HOME | End: 2025-06-05
Attending: STUDENT IN AN ORGANIZED HEALTH CARE EDUCATION/TRAINING PROGRAM
Payer: COMMERCIAL

## 2025-06-05 ENCOUNTER — OFFICE VISIT (OUTPATIENT)
Dept: PRIMARY CARE CLINIC | Age: 30
End: 2025-06-05

## 2025-06-05 ENCOUNTER — APPOINTMENT (OUTPATIENT)
Dept: GENERAL RADIOLOGY | Age: 30
End: 2025-06-05
Payer: COMMERCIAL

## 2025-06-05 VITALS
TEMPERATURE: 98.8 F | OXYGEN SATURATION: 95 % | BODY MASS INDEX: 30.8 KG/M2 | RESPIRATION RATE: 16 BRPM | HEIGHT: 74 IN | SYSTOLIC BLOOD PRESSURE: 116 MMHG | HEART RATE: 99 BPM | WEIGHT: 240 LBS | DIASTOLIC BLOOD PRESSURE: 76 MMHG

## 2025-06-05 DIAGNOSIS — F19.10 SUBSTANCE ABUSE (HCC): Primary | ICD-10-CM

## 2025-06-05 DIAGNOSIS — R45.89 SUICIDAL BEHAVIOR WITHOUT ATTEMPTED SELF-INJURY: Primary | ICD-10-CM

## 2025-06-05 LAB
ALBUMIN SERPL-MCNC: 4.7 G/DL (ref 3.5–5.2)
ALBUMIN/GLOB SERPL: 1.9 {RATIO} (ref 1–2.5)
ALP SERPL-CCNC: 66 U/L (ref 40–129)
ALT SERPL-CCNC: 9 U/L (ref 10–50)
AMPHET UR QL SCN: NEGATIVE
ANION GAP SERPL CALCULATED.3IONS-SCNC: 10 MMOL/L (ref 9–16)
APAP SERPL-MCNC: <5 UG/ML (ref 10–30)
AST SERPL-CCNC: 23 U/L (ref 10–50)
BACTERIA URNS QL MICRO: ABNORMAL
BARBITURATES UR QL SCN: NEGATIVE
BASOPHILS # BLD: <0.03 K/UL (ref 0–0.2)
BASOPHILS NFR BLD: 0 % (ref 0–2)
BENZODIAZ UR QL: POSITIVE
BILIRUB SERPL-MCNC: 0.4 MG/DL (ref 0–1.2)
BILIRUB UR QL STRIP: NEGATIVE
BUN SERPL-MCNC: 19 MG/DL (ref 6–20)
BUN/CREAT SERPL: 27 (ref 9–20)
CALCIUM SERPL-MCNC: 9.5 MG/DL (ref 8.6–10.4)
CANNABINOIDS UR QL SCN: POSITIVE
CHARACTER UR: ABNORMAL
CHLORIDE SERPL-SCNC: 105 MMOL/L (ref 98–107)
CLARITY UR: CLEAR
CO2 SERPL-SCNC: 24 MMOL/L (ref 20–31)
COCAINE UR QL SCN: NEGATIVE
COLOR UR: YELLOW
CREAT SERPL-MCNC: 0.7 MG/DL (ref 0.7–1.2)
EOSINOPHIL # BLD: 0.03 K/UL (ref 0–0.44)
EOSINOPHILS RELATIVE PERCENT: 1 % (ref 1–4)
EPI CELLS #/AREA URNS HPF: ABNORMAL /HPF (ref 0–5)
ERYTHROCYTE [DISTWIDTH] IN BLOOD BY AUTOMATED COUNT: 13.7 % (ref 11.8–14.4)
ETHANOL PERCENT: NORMAL %
ETHANOLAMINE SERPL-MCNC: <10 MG/DL (ref 0–0.08)
FENTANYL UR QL: NEGATIVE
GFR, ESTIMATED: >90 ML/MIN/1.73M2
GLUCOSE SERPL-MCNC: 96 MG/DL (ref 74–99)
GLUCOSE UR STRIP-MCNC: NEGATIVE MG/DL
HCT VFR BLD AUTO: 38.8 % (ref 40.7–50.3)
HGB BLD-MCNC: 13.4 G/DL (ref 13–17)
HGB UR QL STRIP.AUTO: ABNORMAL
IMM GRANULOCYTES # BLD AUTO: <0.03 K/UL (ref 0–0.3)
IMM GRANULOCYTES NFR BLD: 0 %
KETONES UR STRIP-MCNC: NEGATIVE MG/DL
LEUKOCYTE ESTERASE UR QL STRIP: NEGATIVE
LYMPHOCYTES NFR BLD: 2.18 K/UL (ref 1.1–3.7)
LYMPHOCYTES RELATIVE PERCENT: 36 % (ref 24–43)
MCH RBC QN AUTO: 30.9 PG (ref 25.2–33.5)
MCHC RBC AUTO-ENTMCNC: 34.5 G/DL (ref 25.2–33.5)
MCV RBC AUTO: 89.4 FL (ref 82.6–102.9)
METHADONE UR QL: NEGATIVE
MONOCYTES NFR BLD: 0.41 K/UL (ref 0.1–1.2)
MONOCYTES NFR BLD: 7 % (ref 3–12)
NEUTROPHILS NFR BLD: 56 % (ref 36–65)
NEUTS SEG NFR BLD: 3.47 K/UL (ref 1.5–8.1)
NITRITE UR QL STRIP: NEGATIVE
NRBC BLD-RTO: 0 PER 100 WBC
OPIATES UR QL SCN: NEGATIVE
OXYCODONE UR QL SCN: NEGATIVE
PCP UR QL SCN: NEGATIVE
PH UR STRIP: 6 [PH] (ref 5–6)
PLATELET # BLD AUTO: 237 K/UL (ref 138–453)
PMV BLD AUTO: 9.9 FL (ref 8.1–13.5)
POTASSIUM SERPL-SCNC: 4.5 MMOL/L (ref 3.7–5.3)
PROT SERPL-MCNC: 7.2 G/DL (ref 6.6–8.7)
PROT UR STRIP-MCNC: NEGATIVE MG/DL
RBC # BLD AUTO: 4.34 M/UL (ref 4.21–5.77)
RBC #/AREA URNS HPF: ABNORMAL /HPF (ref 0–4)
SALICYLATES SERPL-MCNC: <0.5 MG/DL (ref 0–10)
SODIUM SERPL-SCNC: 139 MMOL/L (ref 136–145)
SP GR UR STRIP: 1.02 (ref 1.01–1.02)
TEST INFORMATION: ABNORMAL
UROBILINOGEN UR STRIP-ACNC: NORMAL EU/DL (ref 0–1)
WBC #/AREA URNS HPF: ABNORMAL /HPF (ref 0–4)
WBC OTHER # BLD: 6.1 K/UL (ref 3.5–11.3)

## 2025-06-05 PROCEDURE — 71045 X-RAY EXAM CHEST 1 VIEW: CPT

## 2025-06-05 PROCEDURE — 80179 DRUG ASSAY SALICYLATE: CPT

## 2025-06-05 PROCEDURE — 36415 COLL VENOUS BLD VENIPUNCTURE: CPT

## 2025-06-05 PROCEDURE — 80143 DRUG ASSAY ACETAMINOPHEN: CPT

## 2025-06-05 PROCEDURE — 80307 DRUG TEST PRSMV CHEM ANLYZR: CPT

## 2025-06-05 PROCEDURE — 85025 COMPLETE CBC W/AUTO DIFF WBC: CPT

## 2025-06-05 PROCEDURE — G0480 DRUG TEST DEF 1-7 CLASSES: HCPCS

## 2025-06-05 PROCEDURE — 80053 COMPREHEN METABOLIC PANEL: CPT

## 2025-06-05 PROCEDURE — 81001 URINALYSIS AUTO W/SCOPE: CPT

## 2025-06-05 PROCEDURE — 93005 ELECTROCARDIOGRAM TRACING: CPT | Performed by: STUDENT IN AN ORGANIZED HEALTH CARE EDUCATION/TRAINING PROGRAM

## 2025-06-05 PROCEDURE — 99285 EMERGENCY DEPT VISIT HI MDM: CPT

## 2025-06-05 ASSESSMENT — PAIN - FUNCTIONAL ASSESSMENT
PAIN_FUNCTIONAL_ASSESSMENT: 0-10
PAIN_FUNCTIONAL_ASSESSMENT: ACTIVITIES ARE NOT PREVENTED

## 2025-06-05 ASSESSMENT — PAIN DESCRIPTION - LOCATION: LOCATION: FOOT

## 2025-06-05 ASSESSMENT — PAIN SCALES - GENERAL: PAINLEVEL_OUTOF10: 6

## 2025-06-05 ASSESSMENT — PAIN DESCRIPTION - DESCRIPTORS: DESCRIPTORS: DISCOMFORT

## 2025-06-05 ASSESSMENT — PAIN DESCRIPTION - ORIENTATION: ORIENTATION: RIGHT;LEFT

## 2025-06-05 ASSESSMENT — PAIN DESCRIPTION - FREQUENCY: FREQUENCY: CONTINUOUS

## 2025-06-05 ASSESSMENT — PAIN DESCRIPTION - PAIN TYPE: TYPE: ACUTE PAIN

## 2025-06-05 ASSESSMENT — PAIN DESCRIPTION - ONSET: ONSET: ON-GOING

## 2025-06-05 NOTE — ED PROVIDER NOTES
[facility] Emergency Department  [address] [facility] [address] [address]  Phone: [phone]      Patient Name:  Ernie Ken Jr.  Medical Record Number:  [id number]  YOB: 1995  Date of Service:  6/5/2025  Primary Care Physician:  Not, On File (Inactive)      CHIEF COMPLAINT:       Chief Complaint   Patient presents with    Suicidal    Addiction Problem       HISTORY OF PRESENT ILLNESS:    Ernie Ken Jr. is a 29 y.o. male who presents with the complaint of substance use.  States he takes pills, uses meth, drinks alcohol.  Does report suicidal ideation which is chronic for him, no plan, no history of ATTEMPTS.  He denies any chest pain, difficulty breathing.  Denies any abdominal pain, dysuria.  States that he last drank alcohol yesterday and had a beer.  Last used drugs today, states he took Ativan and smoked marijuana.  CURRENT MEDICATIONS:      There are no discharge medications for this patient.      ALLERGIES:   has no known allergies.    PAST MEDICAL HISTORY:    has a past medical history of Anxiety, Bipolar 1 disorder (HCC), Drug abuse in remission (HCC), H/O alcohol abuse, and Hep C w/o coma, chronic ([facility]).    SURGICAL HISTORY:      has a past surgical history that includes Hand surgery (Right) and Abscess Drainage (Right, 10/13/2020).    FAMILY HISTORY:   has no family status information on file.      family history is not on file.    SOCIAL HISTORY:     reports that he has been smoking cigarettes. He has a 3.5 pack-year smoking history. He has quit using smokeless tobacco.  His smokeless tobacco use included snuff. He reports current alcohol use. He reports current drug use. Frequency: 7.00 times per week. Drugs: Other-see comments, Methamphetamines (Crystal Meth), Opiates , and IV.    IMMUNIZATION HISTORY:      Immunization History   Administered Date(s) Administered    COVID-19, MODERNA BLUE border, Primary or Immunocompromised, (age 12y+), IM, 100 mcg/0.5mL 05/24/2021     Ratio 27 (H) 9 - 20    Calcium 9.5 8.6 - 10.4 mg/dL    Total Protein 7.2 6.6 - 8.7 g/dL    Albumin 4.7 3.5 - 5.2 g/dL    Albumin/Globulin Ratio 1.9 1.0 - 2.5    Total Bilirubin 0.4 0.0 - 1.2 mg/dL    Alkaline Phosphatase 66 40 - 129 U/L    ALT 9 (L) 10 - 50 U/L    AST 23 10 - 50 U/L   Ethanol   Result Value Ref Range    Ethanol Lvl <10 <10 mg/dL    Ethanol percent Can not be calculated <0.010 %   Salicylate   Result Value Ref Range    Salicylate Lvl <0.5 0.0 - 10.0 mg/dL   Urine Drug Screen   Result Value Ref Range    Amphetamine Screen, Ur NEGATIVE NEGATIVE    Barbiturate Screen, Ur NEGATIVE NEGATIVE    Benzodiazepine Screen, Urine POSITIVE (A) NEGATIVE    Cocaine Metabolite, Urine NEGATIVE NEGATIVE    Methadone Screen, Urine NEGATIVE NEGATIVE    Opiates, Urine NEGATIVE NEGATIVE    Phencyclidine, Urine NEGATIVE NEGATIVE    Cannabinoid Scrn, Ur POSITIVE (A) NEGATIVE    Oxycodone Screen, Ur NEGATIVE NEGATIVE    Fentanyl, Ur NEGATIVE NEGATIVE    Test Information       This method is a screening test to detect only these drug classes as part of a medical workup. Confirmatory testing by another method should be ordered if clinically indicated.   Urinalysis with Reflex to Culture    Specimen: Urine   Result Value Ref Range    Color, UA Yellow Yellow    Turbidity UA Clear Clear    Glucose, Ur NEGATIVE NEGATIVE mg/dL    Bilirubin, Urine NEGATIVE NEGATIVE    Ketones, Urine NEGATIVE NEGATIVE mg/dL    Specific Gravity, UA 1.025 1.010 - 1.025    Urine Hgb TRACE (A) NEGATIVE    pH, Urine 6.0 5.0 - 6.0    Protein, UA NEGATIVE NEGATIVE mg/dL    Urobilinogen, Urine Normal 0.0 - 1.0 EU/dL    Nitrite, Urine NEGATIVE NEGATIVE    Leukocyte Esterase, Urine NEGATIVE NEGATIVE   Drug screen, tricyclic   Result Value Ref Range    Tricyclic Antidepressants, Urine NEGATIVE NEGATIVE   Microscopic Urinalysis   Result Value Ref Range    WBC, UA 2 TO 5 0 - 4 /HPF    RBC, UA 2 TO 5 0 - 4 /HPF    Epithelial Cells, UA 2 TO 5 0 - 5 /HPF    Bacteria,

## 2025-06-05 NOTE — DISCHARGE INSTRUCTIONS
You were seen in the emergency department today for detox your work-up showed that you are medically stable for detox, we reach out to multiple detox facilities which you were denied from, you did not want to wait for us to find you a placement    Please follow-up with your PCP within the next 2 to 3 days      PLEASE RETURN TO THE EMERGENCY DEPARTMENT IMMEDIATELY for worsening symptoms, or if you develop any concerning symptoms such as: high fever not relieved by acetaminophen (Tylenol) and/or ibuprofen (Motrin), chills, shortness of breath, chest pain, persistent nausea and/or vomiting, numbness, weakness or tingling in the arms or legs or change in color of the extremities, changes in mental status, persistent headache, blurry vision, unable to follow up with your physician, or other any other care or concern.

## 2025-06-06 ENCOUNTER — HOSPITAL ENCOUNTER (OUTPATIENT)
Age: 30
Setting detail: OBSERVATION
Discharge: HOME | End: 2025-06-07
Attending: EMERGENCY MEDICINE
Payer: COMMERCIAL

## 2025-06-06 ENCOUNTER — APPOINTMENT (OUTPATIENT)
Dept: GENERAL RADIOLOGY | Age: 30
End: 2025-06-06
Payer: COMMERCIAL

## 2025-06-06 DIAGNOSIS — F10.930 ALCOHOL WITHDRAWAL SYNDROME WITHOUT COMPLICATION (HCC): Primary | ICD-10-CM

## 2025-06-06 PROBLEM — F10.10 ALCOHOL ABUSE: Status: ACTIVE | Noted: 2025-06-06

## 2025-06-06 LAB
ALBUMIN SERPL BCG-MCNC: 4.4 G/DL (ref 3.4–4.9)
ALP SERPL-CCNC: 65 U/L (ref 40–129)
ALT SERPL W/O P-5'-P-CCNC: 9 U/L (ref 10–50)
ANION GAP SERPL CALC-SCNC: 14 MEQ/L (ref 8–16)
APAP SERPL-MCNC: < 5 UG/ML (ref 10–30)
AST SERPL-CCNC: 28 U/L (ref 10–50)
BASOPHILS ABSOLUTE: 0 THOU/MM3 (ref 0–0.1)
BASOPHILS NFR BLD AUTO: 0.3 %
BILIRUB CONJ SERPL-MCNC: 0.2 MG/DL (ref 0–0.2)
BILIRUB SERPL-MCNC: 0.3 MG/DL (ref 0.3–1.2)
BUN SERPL-MCNC: 13 MG/DL (ref 8–23)
CALCIUM SERPL-MCNC: 8.9 MG/DL (ref 8.6–10)
CHLORIDE SERPL-SCNC: 107 MEQ/L (ref 98–111)
CO2 SERPL-SCNC: 22 MEQ/L (ref 22–29)
CREAT SERPL-MCNC: 0.7 MG/DL (ref 0.7–1.2)
DEPRECATED RDW RBC AUTO: 47.4 FL (ref 35–45)
EKG ATRIAL RATE: 81 BPM
EKG ATRIAL RATE: 87 BPM
EKG P AXIS: 36 DEGREES
EKG P AXIS: 42 DEGREES
EKG P-R INTERVAL: 162 MS
EKG P-R INTERVAL: 172 MS
EKG Q-T INTERVAL: 346 MS
EKG Q-T INTERVAL: 360 MS
EKG QRS DURATION: 82 MS
EKG QRS DURATION: 84 MS
EKG QTC CALCULATION (BAZETT): 416 MS
EKG QTC CALCULATION (BAZETT): 418 MS
EKG R AXIS: 20 DEGREES
EKG R AXIS: 25 DEGREES
EKG T AXIS: 43 DEGREES
EKG T AXIS: 5 DEGREES
EKG VENTRICULAR RATE: 81 BPM
EKG VENTRICULAR RATE: 87 BPM
EOSINOPHIL NFR BLD AUTO: 1.4 %
EOSINOPHILS ABSOLUTE: 0.1 THOU/MM3 (ref 0–0.4)
ERYTHROCYTE [DISTWIDTH] IN BLOOD BY AUTOMATED COUNT: 13.8 % (ref 11.5–14.5)
ETHANOL SERPL-MCNC: 0.11 % (ref 0–0.08)
GFR SERPL CREATININE-BSD FRML MDRD: > 90 ML/MIN/1.73M2
GLUCOSE SERPL-MCNC: 98 MG/DL (ref 74–109)
HCT VFR BLD AUTO: 36.9 % (ref 42–52)
HGB BLD-MCNC: 12.6 GM/DL (ref 14–18)
IMM GRANULOCYTES # BLD AUTO: 0.03 THOU/MM3 (ref 0–0.07)
IMM GRANULOCYTES NFR BLD AUTO: 0.4 %
LIPASE SERPL-CCNC: 20 U/L (ref 13–60)
LYMPHOCYTES ABSOLUTE: 2.9 THOU/MM3 (ref 1–4.8)
LYMPHOCYTES NFR BLD AUTO: 42.3 %
MCH RBC QN AUTO: 31.5 PG (ref 26–33)
MCHC RBC AUTO-ENTMCNC: 34.1 GM/DL (ref 32.2–35.5)
MCV RBC AUTO: 92.3 FL (ref 80–94)
MONOCYTES ABSOLUTE: 0.4 THOU/MM3 (ref 0.4–1.3)
MONOCYTES NFR BLD AUTO: 5.8 %
NEUTROPHILS ABSOLUTE: 3.4 THOU/MM3 (ref 1.8–7.7)
NEUTROPHILS NFR BLD AUTO: 49.8 %
NRBC BLD AUTO-RTO: 0 /100 WBC
OSMOLALITY SERPL CALC.SUM OF ELEC: 285.1 MOSMOL/KG (ref 275–300)
PLATELET # BLD AUTO: 239 THOU/MM3 (ref 130–400)
PMV BLD AUTO: 9.8 FL (ref 9.4–12.4)
POTASSIUM SERPL-SCNC: 4 MEQ/L (ref 3.5–5.2)
PROT SERPL-MCNC: 7 G/DL (ref 6.4–8.3)
RBC # BLD AUTO: 4 MILL/MM3 (ref 4.7–6.1)
SALICYLATES SERPL-MCNC: < 0.5 MG/DL (ref 2–10)
SODIUM SERPL-SCNC: 143 MEQ/L (ref 135–145)
TRICYCLIC ANTIDEPRESSANTS, UR: NEGATIVE
TSH SERPL DL<=0.05 MIU/L-ACNC: 0.5 UIU/ML (ref 0.27–4.2)
WBC # BLD AUTO: 6.9 THOU/MM3 (ref 4.8–10.8)

## 2025-06-06 PROCEDURE — 71045 X-RAY EXAM CHEST 1 VIEW: CPT

## 2025-06-06 PROCEDURE — 85025 COMPLETE CBC W/AUTO DIFF WBC: CPT

## 2025-06-06 PROCEDURE — 93005 ELECTROCARDIOGRAM TRACING: CPT | Performed by: EMERGENCY MEDICINE

## 2025-06-06 PROCEDURE — 93010 ELECTROCARDIOGRAM REPORT: CPT | Performed by: INTERNAL MEDICINE

## 2025-06-06 PROCEDURE — 80053 COMPREHEN METABOLIC PANEL: CPT

## 2025-06-06 PROCEDURE — 96374 THER/PROPH/DIAG INJ IV PUSH: CPT

## 2025-06-06 PROCEDURE — 96361 HYDRATE IV INFUSION ADD-ON: CPT

## 2025-06-06 PROCEDURE — 83690 ASSAY OF LIPASE: CPT

## 2025-06-06 PROCEDURE — G0378 HOSPITAL OBSERVATION PER HR: HCPCS

## 2025-06-06 PROCEDURE — 80143 DRUG ASSAY ACETAMINOPHEN: CPT

## 2025-06-06 PROCEDURE — 2580000003 HC RX 258: Performed by: EMERGENCY MEDICINE

## 2025-06-06 PROCEDURE — 82248 BILIRUBIN DIRECT: CPT

## 2025-06-06 PROCEDURE — 82077 ASSAY SPEC XCP UR&BREATH IA: CPT

## 2025-06-06 PROCEDURE — 36415 COLL VENOUS BLD VENIPUNCTURE: CPT

## 2025-06-06 PROCEDURE — 99285 EMERGENCY DEPT VISIT HI MDM: CPT

## 2025-06-06 PROCEDURE — 80179 DRUG ASSAY SALICYLATE: CPT

## 2025-06-06 PROCEDURE — 84443 ASSAY THYROID STIM HORMONE: CPT

## 2025-06-06 PROCEDURE — 99223 1ST HOSP IP/OBS HIGH 75: CPT | Performed by: NURSE PRACTITIONER

## 2025-06-06 PROCEDURE — 6370000000 HC RX 637 (ALT 250 FOR IP): Performed by: EMERGENCY MEDICINE

## 2025-06-06 PROCEDURE — 6360000002 HC RX W HCPCS: Performed by: EMERGENCY MEDICINE

## 2025-06-06 PROCEDURE — 96375 TX/PRO/DX INJ NEW DRUG ADDON: CPT

## 2025-06-06 RX ORDER — NICOTINE 21 MG/24HR
1 PATCH, TRANSDERMAL 24 HOURS TRANSDERMAL DAILY
Status: DISCONTINUED | OUTPATIENT
Start: 2025-06-07 | End: 2025-06-07 | Stop reason: HOSPADM

## 2025-06-06 RX ORDER — LORAZEPAM 2 MG/ML
4 INJECTION INTRAMUSCULAR
Status: DISCONTINUED | OUTPATIENT
Start: 2025-06-06 | End: 2025-06-06

## 2025-06-06 RX ORDER — FOLIC ACID 1 MG/1
1 TABLET ORAL ONCE
Status: COMPLETED | OUTPATIENT
Start: 2025-06-06 | End: 2025-06-06

## 2025-06-06 RX ORDER — ONDANSETRON 2 MG/ML
4 INJECTION INTRAMUSCULAR; INTRAVENOUS EVERY 6 HOURS PRN
Status: DISCONTINUED | OUTPATIENT
Start: 2025-06-06 | End: 2025-06-06

## 2025-06-06 RX ORDER — THIAMINE HYDROCHLORIDE 100 MG/ML
300 INJECTION, SOLUTION INTRAMUSCULAR; INTRAVENOUS ONCE
Status: COMPLETED | OUTPATIENT
Start: 2025-06-06 | End: 2025-06-06

## 2025-06-06 RX ORDER — LORAZEPAM 2 MG/ML
3 INJECTION INTRAMUSCULAR
Status: DISCONTINUED | OUTPATIENT
Start: 2025-06-06 | End: 2025-06-06

## 2025-06-06 RX ORDER — ACETAMINOPHEN 650 MG/1
650 SUPPOSITORY RECTAL EVERY 6 HOURS PRN
Status: DISCONTINUED | OUTPATIENT
Start: 2025-06-06 | End: 2025-06-07 | Stop reason: HOSPADM

## 2025-06-06 RX ORDER — ACETAMINOPHEN 325 MG/1
650 TABLET ORAL EVERY 6 HOURS PRN
Status: DISCONTINUED | OUTPATIENT
Start: 2025-06-06 | End: 2025-06-07 | Stop reason: HOSPADM

## 2025-06-06 RX ORDER — LORAZEPAM 2 MG/ML
1 INJECTION INTRAMUSCULAR
Status: DISCONTINUED | OUTPATIENT
Start: 2025-06-06 | End: 2025-06-06

## 2025-06-06 RX ORDER — 0.9 % SODIUM CHLORIDE 0.9 %
1000 INTRAVENOUS SOLUTION INTRAVENOUS ONCE
Status: COMPLETED | OUTPATIENT
Start: 2025-06-06 | End: 2025-06-06

## 2025-06-06 RX ORDER — ONDANSETRON 4 MG/1
4 TABLET, ORALLY DISINTEGRATING ORAL EVERY 8 HOURS PRN
Status: DISCONTINUED | OUTPATIENT
Start: 2025-06-06 | End: 2025-06-06

## 2025-06-06 RX ORDER — ENOXAPARIN SODIUM 100 MG/ML
30 INJECTION SUBCUTANEOUS 2 TIMES DAILY
Status: DISCONTINUED | OUTPATIENT
Start: 2025-06-07 | End: 2025-06-07 | Stop reason: HOSPADM

## 2025-06-06 RX ORDER — LORAZEPAM 1 MG/1
4 TABLET ORAL
Status: DISCONTINUED | OUTPATIENT
Start: 2025-06-06 | End: 2025-06-07 | Stop reason: HOSPADM

## 2025-06-06 RX ORDER — LORAZEPAM 1 MG/1
3 TABLET ORAL
Status: DISCONTINUED | OUTPATIENT
Start: 2025-06-06 | End: 2025-06-07 | Stop reason: HOSPADM

## 2025-06-06 RX ORDER — FOLIC ACID 1 MG/1
1 TABLET ORAL DAILY
Status: DISCONTINUED | OUTPATIENT
Start: 2025-06-07 | End: 2025-06-07 | Stop reason: HOSPADM

## 2025-06-06 RX ORDER — ONDANSETRON 2 MG/ML
4 INJECTION INTRAMUSCULAR; INTRAVENOUS ONCE
Status: COMPLETED | OUTPATIENT
Start: 2025-06-06 | End: 2025-06-06

## 2025-06-06 RX ORDER — MULTIVITAMIN WITH IRON
1 TABLET ORAL DAILY
Status: DISCONTINUED | OUTPATIENT
Start: 2025-06-07 | End: 2025-06-07 | Stop reason: HOSPADM

## 2025-06-06 RX ORDER — POTASSIUM CHLORIDE 7.45 MG/ML
10 INJECTION INTRAVENOUS PRN
Status: DISCONTINUED | OUTPATIENT
Start: 2025-06-06 | End: 2025-06-07 | Stop reason: HOSPADM

## 2025-06-06 RX ORDER — ONDANSETRON 4 MG/1
4 TABLET, ORALLY DISINTEGRATING ORAL EVERY 8 HOURS PRN
Status: DISCONTINUED | OUTPATIENT
Start: 2025-06-06 | End: 2025-06-07 | Stop reason: HOSPADM

## 2025-06-06 RX ORDER — MAGNESIUM SULFATE IN WATER 40 MG/ML
2000 INJECTION, SOLUTION INTRAVENOUS PRN
Status: DISCONTINUED | OUTPATIENT
Start: 2025-06-06 | End: 2025-06-07 | Stop reason: HOSPADM

## 2025-06-06 RX ORDER — LORAZEPAM 1 MG/1
2 TABLET ORAL
Status: DISCONTINUED | OUTPATIENT
Start: 2025-06-06 | End: 2025-06-07 | Stop reason: HOSPADM

## 2025-06-06 RX ORDER — ONDANSETRON 2 MG/ML
4 INJECTION INTRAMUSCULAR; INTRAVENOUS EVERY 6 HOURS PRN
Status: DISCONTINUED | OUTPATIENT
Start: 2025-06-06 | End: 2025-06-07 | Stop reason: HOSPADM

## 2025-06-06 RX ORDER — LORAZEPAM 1 MG/1
1 TABLET ORAL
Status: DISCONTINUED | OUTPATIENT
Start: 2025-06-06 | End: 2025-06-07 | Stop reason: HOSPADM

## 2025-06-06 RX ORDER — POTASSIUM CHLORIDE 1500 MG/1
40 TABLET, EXTENDED RELEASE ORAL PRN
Status: DISCONTINUED | OUTPATIENT
Start: 2025-06-06 | End: 2025-06-07 | Stop reason: HOSPADM

## 2025-06-06 RX ORDER — LORAZEPAM 2 MG/ML
2 INJECTION INTRAMUSCULAR
Status: DISCONTINUED | OUTPATIENT
Start: 2025-06-06 | End: 2025-06-06

## 2025-06-06 RX ORDER — LANOLIN ALCOHOL/MO/W.PET/CERES
100 CREAM (GRAM) TOPICAL DAILY
Status: DISCONTINUED | OUTPATIENT
Start: 2025-06-07 | End: 2025-06-07 | Stop reason: HOSPADM

## 2025-06-06 RX ADMIN — ONDANSETRON 4 MG: 2 INJECTION INTRAMUSCULAR; INTRAVENOUS at 17:57

## 2025-06-06 RX ADMIN — SODIUM CHLORIDE 1000 ML: 9 INJECTION, SOLUTION INTRAVENOUS at 17:55

## 2025-06-06 RX ADMIN — THIAMINE HYDROCHLORIDE 300 MG: 100 INJECTION, SOLUTION INTRAMUSCULAR; INTRAVENOUS at 17:58

## 2025-06-06 RX ADMIN — FOLIC ACID 1 MG: 1 TABLET ORAL at 17:56

## 2025-06-06 ASSESSMENT — PAIN SCALES - GENERAL
PAINLEVEL_OUTOF10: 6
PAINLEVEL_OUTOF10: 0

## 2025-06-06 ASSESSMENT — PAIN DESCRIPTION - LOCATION: LOCATION: FOOT

## 2025-06-06 ASSESSMENT — PAIN - FUNCTIONAL ASSESSMENT
PAIN_FUNCTIONAL_ASSESSMENT: 0-10
PAIN_FUNCTIONAL_ASSESSMENT: NONE - DENIES PAIN

## 2025-06-06 ASSESSMENT — PAIN DESCRIPTION - ORIENTATION: ORIENTATION: RIGHT;LEFT

## 2025-06-06 ASSESSMENT — PAIN DESCRIPTION - DESCRIPTORS: DESCRIPTORS: ACHING

## 2025-06-06 NOTE — ED TRIAGE NOTES
Pt to ED c/o alcohol and substance use. Pt reports he wants help getting clean. States today, he drank seven 8% beers and smoked \"about 10 joints\". Pt denies recreation drug use today, but states he used yesterday. Pt reports suicidal thoughts. States he is going to jump off a bridge if he can't get help getting clean. Level A paged. IV access established. Labs collected.

## 2025-06-06 NOTE — ED NOTES
Pt resting in bed. Vitals assessed. RR easy and unlabored. Report from Ada MCCLENDON. Assumed care at this time. Sitter at bedside. Urinal in reach of pt.

## 2025-06-07 VITALS
HEIGHT: 74 IN | OXYGEN SATURATION: 99 % | DIASTOLIC BLOOD PRESSURE: 50 MMHG | SYSTOLIC BLOOD PRESSURE: 121 MMHG | RESPIRATION RATE: 16 BRPM | BODY MASS INDEX: 32.23 KG/M2 | TEMPERATURE: 97.9 F | HEART RATE: 61 BPM | WEIGHT: 251.1 LBS

## 2025-06-07 PROBLEM — F10.930 ALCOHOL WITHDRAWAL SYNDROME WITHOUT COMPLICATION (HCC): Status: ACTIVE | Noted: 2025-06-07

## 2025-06-07 PROCEDURE — 6360000002 HC RX W HCPCS: Performed by: NURSE PRACTITIONER

## 2025-06-07 PROCEDURE — 99238 HOSP IP/OBS DSCHRG MGMT 30/<: CPT | Performed by: PHYSICIAN ASSISTANT

## 2025-06-07 PROCEDURE — 96376 TX/PRO/DX INJ SAME DRUG ADON: CPT

## 2025-06-07 PROCEDURE — 96372 THER/PROPH/DIAG INJ SC/IM: CPT

## 2025-06-07 PROCEDURE — G0378 HOSPITAL OBSERVATION PER HR: HCPCS

## 2025-06-07 PROCEDURE — 6370000000 HC RX 637 (ALT 250 FOR IP): Performed by: NURSE PRACTITIONER

## 2025-06-07 PROCEDURE — 2580000003 HC RX 258: Performed by: NURSE PRACTITIONER

## 2025-06-07 PROCEDURE — 96361 HYDRATE IV INFUSION ADD-ON: CPT

## 2025-06-07 RX ORDER — SODIUM CHLORIDE, SODIUM LACTATE, POTASSIUM CHLORIDE, CALCIUM CHLORIDE 600; 310; 30; 20 MG/100ML; MG/100ML; MG/100ML; MG/100ML
INJECTION, SOLUTION INTRAVENOUS CONTINUOUS
Status: ACTIVE | OUTPATIENT
Start: 2025-06-07 | End: 2025-06-07

## 2025-06-07 RX ADMIN — SODIUM CHLORIDE, SODIUM LACTATE, POTASSIUM CHLORIDE, AND CALCIUM CHLORIDE: .6; .31; .03; .02 INJECTION, SOLUTION INTRAVENOUS at 02:35

## 2025-06-07 RX ADMIN — ONDANSETRON 4 MG: 2 INJECTION, SOLUTION INTRAMUSCULAR; INTRAVENOUS at 02:41

## 2025-06-07 RX ADMIN — ENOXAPARIN SODIUM 30 MG: 30 INJECTION SUBCUTANEOUS at 09:26

## 2025-06-07 RX ADMIN — FOLIC ACID 1 MG: 1 TABLET ORAL at 09:26

## 2025-06-07 RX ADMIN — LORAZEPAM 1 MG: 1 TABLET ORAL at 09:27

## 2025-06-07 RX ADMIN — LORAZEPAM 1 MG: 1 TABLET ORAL at 14:37

## 2025-06-07 RX ADMIN — Medication 1 TABLET: at 09:26

## 2025-06-07 RX ADMIN — Medication 100 MG: at 09:27

## 2025-06-07 ASSESSMENT — PATIENT HEALTH QUESTIONNAIRE - PHQ9
7. TROUBLE CONCENTRATING ON THINGS, SUCH AS READING THE NEWSPAPER OR WATCHING TELEVISION: SEVERAL DAYS
SUM OF ALL RESPONSES TO PHQ QUESTIONS 1-9: 17
SUM OF ALL RESPONSES TO PHQ QUESTIONS 1-9: 18
6. FEELING BAD ABOUT YOURSELF - OR THAT YOU ARE A FAILURE OR HAVE LET YOURSELF OR YOUR FAMILY DOWN: SEVERAL DAYS
10. IF YOU CHECKED OFF ANY PROBLEMS, HOW DIFFICULT HAVE THESE PROBLEMS MADE IT FOR YOU TO DO YOUR WORK, TAKE CARE OF THINGS AT HOME, OR GET ALONG WITH OTHER PEOPLE: EXTREMELY DIFFICULT
3. TROUBLE FALLING OR STAYING ASLEEP: NEARLY EVERY DAY
4. FEELING TIRED OR HAVING LITTLE ENERGY: NEARLY EVERY DAY
5. POOR APPETITE OR OVEREATING: MORE THAN HALF THE DAYS
1. LITTLE INTEREST OR PLEASURE IN DOING THINGS: NEARLY EVERY DAY
SUM OF ALL RESPONSES TO PHQ QUESTIONS 1-9: 18
8. MOVING OR SPEAKING SO SLOWLY THAT OTHER PEOPLE COULD HAVE NOTICED. OR THE OPPOSITE, BEING SO FIGETY OR RESTLESS THAT YOU HAVE BEEN MOVING AROUND A LOT MORE THAN USUAL: SEVERAL DAYS
2. FEELING DOWN, DEPRESSED OR HOPELESS: NEARLY EVERY DAY
9. THOUGHTS THAT YOU WOULD BE BETTER OFF DEAD, OR OF HURTING YOURSELF: SEVERAL DAYS
SUM OF ALL RESPONSES TO PHQ QUESTIONS 1-9: 18

## 2025-06-07 ASSESSMENT — LIFESTYLE VARIABLES
HOW OFTEN DO YOU HAVE A DRINK CONTAINING ALCOHOL: 4 OR MORE TIMES A WEEK
HOW MANY STANDARD DRINKS CONTAINING ALCOHOL DO YOU HAVE ON A TYPICAL DAY: 7 TO 9

## 2025-06-07 ASSESSMENT — PAIN SCALES - GENERAL: PAINLEVEL_OUTOF10: 0

## 2025-06-07 NOTE — PROGRESS NOTES
Hospitalist Progress Note    Patient:  Ernie Ken Jr.      Unit/Bed:8B-24/024-A    YOB: 1995    MRN: [id number]       Acct: [id number]     PCP: Not, On File (Inactive)    Date of Admission: 6/6/2025    Assessment/Plan:    Alcohol abuse, positive for EtOH use patient identifies 8-10 tall boys daily since the age of 18.  Noted bradycardia while in the ER with heart rate 50-44.  UDS is pending.  Will hold off on starting phenobarbital secondary to bradycardia.  CIWA-oral Ativan has been initiated.  Thiamine/multivitamin/folic acid has been ordered. PAWSS=6.  Addiction services and social work has been consulted to assist with resources.  Suicidal ideation, suicidal precautions have been initiated.  Psych has been consulted per ER provider.  Polysubstance abuse, history  Hepatitis C, history  Anemia, normocytic, monitor and trend  Schizoaffective disorder, history      LDA: []CVC / []PICC / []Midline / [][place] / []Drains / []Mediport / []None  Antibiotics: ***  Steroids: ***  Labs (still needed?): []Yes / []No  IVF (still needed?): []Yes / []No    Level of care: []Step Down / []Med-Surg  Bed Status: []Inpatient / []Observation  Telemetry: []Yes / []No  PT/OT: []Yes / []No    DVT Prophylaxis: [] Lovenox / [] Heparin / [] SCDs / [] Already on Systemic Anticoagulation / [] None     Disposition:    [] Home       [] TCU       [] Rehab       [] Psych       [] SNF       [] Long Term Care Facility       [] Other-    Chief Complaint: ***      Subjective: 29 y.o. male admitted to the hospitalist service for ***      Medications:    Infusion Medications    lactated ringers 50 mL/hr at 06/07/25 0912     Scheduled Medications    thiamine  100 mg Oral Daily    enoxaparin  30 mg SubCUTAneous BID    multivitamin  1 tablet Oral Daily    folic acid  1 mg Oral Daily    nicotine  1 patch TransDERmal Daily     PRN Meds: potassium chloride **OR** potassium alternative oral replacement **OR** potassium chloride,

## 2025-06-07 NOTE — ED NOTES
Pt to be transferred to [facility]. Pt in stable condition at this time. Spoke with [facility] prior to transport.

## 2025-06-07 NOTE — H&P
Hospitalist  History and Physical    Patient:  Ernie Ken Jr.  MRN: [id number]    CHIEF COMPLAINT: Alcohol and substance abuse    History Obtained From:  patient, electronic medical record  PCP: Not, On File (Inactive)    HISTORY OF PRESENT ILLNESS:   Ernie Michaels is a 29-year-old male presented to [facility] 6/6/2025 with chief complaint of alcohol and substance abuse.  Patient reports he wants help getting clean.  Patient states he he drank 8 beers and smoked about 10 joints prior to presenting to the hospital.  Patient does report suicidal ideations.  \"I am going to jump off the bridge if I cannot get help getting clean \".    Patient was recently seen 6/5/2025 at [facility] emergency room with similar complaint.  Documentation reveals suicidal ideation which is chronic for him no plan no history of attempts.  Patient was discharged with plan to go to homeless shelter closer to [place].    While in the emergency room patient did receive Zofran, 1L0.9 normal saline, folic acid and thiamine.    Past Medical History:        Diagnosis Date    Anxiety     Bipolar 1 disorder (HCC)     Drug abuse in remission (HCC)     H/O alcohol abuse     Hep C w/o coma, chronic ([facility])    Current everyday smoker    Past Surgical History:        Procedure Laterality Date    ABSCESS DRAINAGE Right 10/13/2020    Irrigation and debridement    HAND SURGERY Right        Medications Prior to Admission:    Prior to Admission medications    Not on File       Allergies:  Patient has no known allergies.    Social History:   TOBACCO:   reports that he has been smoking cigarettes. He has a 3.5 pack-year smoking history. He has quit using smokeless tobacco.  His smokeless tobacco use included snuff.  ETOH:   reports current alcohol use.  OCCUPATION: Single    Family History:   History reviewed. No pertinent family history.    REVIEW OF SYSTEMS:  GENERAL: Positive for fatigue, weakness, no fever   SKN: Positive for tattoos no lesions or  disorder, bipolar type (HCC) F25.0    Polysubstance abuse (HCC) F19.10    Opiate withdrawal (HCC) F11.93    Opiate use F11.90    Methamphetamine use (HCC) F15.10    Hypokalemia E87.6    Abuse of smoked substance (HCC) F18.10    Agitation R45.1    Stimulant use disorder F15.90       Chana Oleary, APRN - CNP, CNP

## 2025-06-07 NOTE — FLOWSHEET NOTE
06/07/25 0402   Vital Signs   Orthostatic B/P and Pulse? Yes   Blood Pressure Lying 91/60   Pulse Lying 58 PER MINUTE   Blood Pressure Sitting 100/57   Pulse Sitting 58 PER MINUTE   Blood Pressure Standing 104/71   Pulse Standing 62 PER MINUTE        no

## 2025-06-07 NOTE — CARE COORDINATION
06/07/25 1427   Service Assessment   Patient Orientation Alert and Oriented   Cognition Alert   History Provided By Patient   Primary Caregiver Self   Support Systems Family Members   Patient's Healthcare Decision Maker is: Patient Declined (Legal Next of Kin Remains as Decision Maker)   PCP Verified by CM Yes  (no PCP, declines)   Last Visit to PCP   (no PCP, declines)   Prior Functional Level Independent in ADLs/IADLs   Current Functional Level Independent in ADLs/IADLs   Can patient return to prior living arrangement Yes   Ability to make needs known: Good   Family able to assist with home care needs: Yes   Would you like for me to discuss the discharge plan with any other family members/significant others, and if so, who? No   Financial Resources Medicaid   Social/Functional History   Bathroom Equipment None   Home Equipment None   Active  Yes   Discharge Planning   Type of Residence House   Living Arrangements Spouse/Significant Other   Current Services Prior To Admission None   Potential Assistance Needed N/A   DME Ordered? No   Potential Assistance Purchasing Medications No   Type of Home Care Services None   Patient expects to be discharged to: House   Services At/After Discharge   Confirm Follow Up Transport Self   Condition of Participation: Discharge Planning   The Plan for Transition of Care is related to the following treatment goals: to return home       Patient Goals/Plan/Treatment Preferences: Met with Jaime. He currently lives with his sig other. Plan is to return at discharge. He denies need for DME and declines HH. He declines PCP.     If patient is discharged prior to next notation, then this note serves as note for discharge by case management.

## 2025-06-07 NOTE — ED NOTES
ED to inpatient nurses report      Chief Complaint:  Chief Complaint   Patient presents with    Alcohol Problem    Addiction Problem     Present to ED from: Home    MOA:     LOC: alert and orientated to name, place, date  Mobility: Independent  Oxygen Baseline: Room Air    Current needs required: Room Air     Code Status:   Prior    What abnormal results were found and what did you give/do to treat them? See Results  Any procedures or intervention occur? See MAR    Mental Status:  Level of Consciousness: Alert (0)    Psych Assessment:        Vitals:  Patient Vitals for the past 24 hrs:   BP Temp Temp src Pulse Resp SpO2 Height Weight   06/06/25 2032 99/62 -- -- 59 14 94 % -- --   06/06/25 1912 99/60 -- -- 60 17 93 % -- --   06/06/25 1803 (!) 108/57 -- -- 77 12 93 % -- --   06/06/25 1700 111/68 98.6 °F (37 °C) Oral 92 17 94 % 1.88 m (6' 2\") 108.9 kg (240 lb)   06/06/25 1657 -- -- Oral -- -- -- -- --        LDAs:   Peripheral IV 06/06/25 Posterior;Right Hand (Active)   Site Assessment Clean, dry & intact 06/06/25 2033   Line Status Infusing 06/06/25 2033   Phlebitis Assessment No symptoms 06/06/25 2033   Infiltration Assessment 0 06/06/25 2033   Dressing Status Clean, dry & intact 06/06/25 2033   Dressing Type Transparent 06/06/25 2033   Dressing Intervention New 06/06/25 1707       Ambulatory Status:  Presents to emergency department  because of falls (Syncope, seizure, or loss of consciousness): No, Age > 70: No, Altered Mental Status, Intoxication with alcohol or substance confusion (Disorientation, impaired judgment, poor safety awaremess, or inability to follow instructions): Yes, Impaired Mobility: Ambulates or transfers with assistive devices or assistance; Unable to ambulate or transer.: No, Nursing Judgement: Yes    Diagnosis:  DISPOSITION Admitted 06/06/2025 08:38:30 PM   Final diagnoses:   Alcohol withdrawal syndrome without complication (HCC)        Consults:  IP CONSULT TO PSYCHIATRY     Pain Score:  Pain  Assessment  Pain Assessment: None - Denies Pain  Pain Level: 6  Pain Location: Foot  Pain Orientation: Right, Left  Pain Descriptors: Aching    C-SSRS:   Risk of Suicide: High Risk    Sepsis Screening:       [place] Fall Risk:  [place] 1 Fall Risk  Presents to emergency department  because of falls (Syncope, seizure, or loss of consciousness): No  Age > 70: No  Altered Mental Status, Intoxication with alcohol or substance confusion (Disorientation, impaired judgment, poor safety awaremess, or inability to follow instructions): Yes  Impaired Mobility: Ambulates or transfers with assistive devices or assistance; Unable to ambulate or transer.: No  Nursing Judgement: Yes    Swallow Screening        Preferred Language:   English      ALLERGIES     Patient has no known allergies.    SURGICAL HISTORY       Past Surgical History:   Procedure Laterality Date    ABSCESS DRAINAGE Right 10/13/2020    Irrigation and debridement    HAND SURGERY Right        PAST MEDICAL HISTORY       Past Medical History:   Diagnosis Date    Anxiety     Bipolar 1 disorder (HCC)     Drug abuse in remission (HCC)     H/O alcohol abuse     Hep C w/o coma, chronic (HCC)            Electronically signed by Monique Dockery RN on 6/6/2025 at 8:44 PM

## 2025-06-07 NOTE — DISCHARGE SUMMARY
Hospital Medicine Discharge Summary      Patient Identification:   Ernie Ken Jr.   : 1995  MRN: [id number]   Account: [id number]      Patient's PCP: Not, On File (Inactive)    Admit Date: 2025     Discharge Date: 25    Admitting Physician: No admitting provider for patient encounter.     Discharge Physician: Albert Donnelly PA-C     Ernie Ken Jr. is a 29 y.o. male admitted to [facility] on 2025.      HPI On Admission From H&P:    ***    Assessment/Plan With Discharge Diagnoses:    ***    Exam:     Vitals:  Vitals:    25 0402 25 0925 25 1215 25 1441   BP:  (!) 105/51 113/64 (!) 121/50   Pulse:  78 55 61   Resp:  16 16 16   Temp:  98 °F (36.7 °C) 98.3 °F (36.8 °C) 97.9 °F (36.6 °C)   TempSrc:  Oral Oral Oral   SpO2:  95% 95% 99%   Weight: 113.9 kg (251 lb 1.7 oz)      Height:         Weight: Weight - Scale: 113.9 kg (251 lb 1.7 oz)     24 hour intake/output:  Intake/Output Summary (Last 24 hours) at 2025 1558  Last data filed at 2025 0922  Gross per 24 hour   Intake 1394.25 ml   Output 0 ml   Net 1394.25 ml         Labs: For convenience and continuity at follow-up the following most recent labs are provided:    CBC:    Lab Results   Component Value Date/Time    WBC 6.9 2025 05:00 PM    HGB 12.6 2025 05:00 PM    HCT 36.9 2025 05:00 PM     2025 05:00 PM       Renal:    Lab Results   Component Value Date/Time     2025 05:00 PM    K 4.0 2025 05:00 PM    K 3.8 2022 02:07 AM     2025 05:00 PM    CO2 22 2025 05:00 PM    BUN 13 2025 05:00 PM    CREATININE 0.7 2025 05:00 PM    CALCIUM 8.9 2025 05:00 PM    PHOS 3.9 10/13/2020 12:00 AM         Significant Diagnostic Studies    Radiology:   XR CHEST PORTABLE   Final Result   Impression:   Low lung volumes.      This document has been electronically signed by: Jaime Garcia MD on    2025 06:26

## 2025-06-07 NOTE — PLAN OF CARE
Problem: Discharge Planning  Goal: Discharge to home or other facility with appropriate resources  6/7/2025 0613 by Shawna Luna RN  Outcome: Progressing  6/7/2025 0613 by Shawna Luna RN  Outcome: Progressing     Problem: Safety - Adult  Goal: Free from fall injury  6/7/2025 0613 by Shawna Luna RN  Outcome: Progressing  6/7/2025 0613 by Shawna Luna RN  Outcome: Progressing     Problem: Pain  Goal: Verbalizes/displays adequate comfort level or baseline comfort level  Outcome: Progressing     Problem: Metabolic/Fluid and Electrolytes - Adult  Goal: Electrolytes maintained within normal limits  Outcome: Progressing  Goal: Hemodynamic stability and optimal renal function maintained  Outcome: Progressing  Goal: Glucose maintained within prescribed range  Outcome: Progressing     Problem: Hematologic - Adult  Goal: Maintains hematologic stability  Outcome: Progressing

## 2025-06-07 NOTE — ED PROVIDER NOTES
Pt Name: Ernie Ken Jr.  MRN: [id number]  Birthdate 1995  Date of evaluation: 6/6/2025  ED Resident: Radha Marie MD  ED Attending: Dr. Malave    CHIEF COMPLAINT       Chief Complaint   Patient presents with    Alcohol Problem    Addiction Problem     History obtained from chart review and the patient.    HISTORY OF PRESENT ILLNESS    HPI  Ernie Ken Jr. is a 29 y.o. male who presents to the emergency department for evaluation of alcohol problem    Patient states that he wants to detox from alcohol, recently tried to go to rehab but was unable to get in.  States that he is going to kill himself if he does not get admitted for detox.  Denies any history of alcohol withdrawal seizure or DTs.  Last consumed alcohol about an hour ago.  He is somnolent but oriented x 3.  Denies chest pain, shortness of breath, nausea, vomiting, diarrhea, dysuria or hematuria.  Endorses smoking marijuana as well, denies other substance use currently but has a history of polysubstance use.    The patient has no other acute complaints at this time.    ALLERGIES   No Known Allergies      PHYSICAL EXAM     Additional Vital Signs:  Vitals:    06/07/25 0347   BP: (!) 98/54   Pulse:    Resp:    Temp:    SpO2:      Physical Exam  Constitutional:       Appearance: Normal appearance.   HENT:      Head: Normocephalic and atraumatic.   Eyes:      Extraocular Movements: Extraocular movements intact.      Pupils: Pupils are equal, round, and reactive to light.   Cardiovascular:      Rate and Rhythm: Normal rate and regular rhythm.      Pulses: Normal pulses.   Pulmonary:      Effort: Pulmonary effort is normal.      Breath sounds: Normal breath sounds.   Abdominal:      General: There is no distension.      Palpations: Abdomen is soft.      Tenderness: There is no abdominal tenderness. There is no guarding.   Musculoskeletal:         General: Normal range of motion.      Cervical back: Normal range of motion.   Skin:

## 2025-06-07 NOTE — ED NOTES
Pt resting in bed with eyes closed. RR easy and unlabored. Vitals assessed. Sitter at bedside. Urinal within reach. No needs voiced. Call light in reach.

## 2025-06-07 NOTE — PROGRESS NOTES
returned with printed copy with further information of Matilde Vazquez in [facility]. Printed copy included address, phone number, and services provided.  offered to assist patient in scheduling appointment; patient declined stating \"I will do it on Monday.\"

## 2025-06-07 NOTE — CARE COORDINATION
06/07/25 1431   Readmission Assessment   Number of Days since last admission? 1-7 days   Previous Disposition Other (comment)  (From [facility]. not a readmit)   Who is being Interviewed Patient   What was the patient's/caregiver's perception as to why they think they needed to return back to the hospital? Other (Comment)  (N/A not a readmit)   Did you visit your Primary Care Physician after you left the hospital, before you returned this time? No  (not a re admit)   Why weren't you able to visit your PCP? Other (Comment)  (not a re admit)   Did you see a specialist, such as Cardiac, Pulmonary, Orthopedic Physician, etc. after you left the hospital? No   Who advised the patient to return to the hospital? Self-referral   Does the patient report anything that got in the way of taking their medications? No   In our efforts to provide the best possible care to you and others like you, can you think of anything that we could have done to help you after you left the hospital the first time, so that you might not have needed to return so soon? Other (Comment)  (pt from [facility]. NOT a re admit.)        No heavy lifting
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
## 2025-06-09 ENCOUNTER — HOSPITAL ENCOUNTER (EMERGENCY)
Age: 30
Discharge: HOME | End: 2025-06-10
Payer: COMMERCIAL

## 2025-06-09 ENCOUNTER — TELEPHONE (OUTPATIENT)
Dept: PSYCHIATRY | Age: 30
End: 2025-06-09

## 2025-06-09 DIAGNOSIS — F19.10 SUBSTANCE ABUSE (HCC): Primary | ICD-10-CM

## 2025-06-09 DIAGNOSIS — F39 MOOD DISORDER: ICD-10-CM

## 2025-06-09 LAB
ALBUMIN SERPL BCG-MCNC: 4.7 G/DL (ref 3.4–4.9)
ALP SERPL-CCNC: 73 U/L (ref 40–129)
ALT SERPL W/O P-5'-P-CCNC: 26 U/L (ref 10–50)
AMPHETAMINES UR QL SCN: POSITIVE
ANION GAP SERPL CALC-SCNC: 21 MEQ/L (ref 8–16)
APAP SERPL-MCNC: < 5 UG/ML (ref 10–30)
AST SERPL-CCNC: 87 U/L (ref 10–50)
BACTERIA URNS QL MICRO: ABNORMAL /HPF
BARBITURATES UR QL SCN: NEGATIVE
BASOPHILS ABSOLUTE: 0 THOU/MM3 (ref 0–0.1)
BASOPHILS NFR BLD AUTO: 0.2 %
BENZODIAZ UR QL SCN: POSITIVE
BILIRUB CONJ SERPL-MCNC: 0.9 MG/DL (ref 0–0.2)
BILIRUB SERPL-MCNC: 2.3 MG/DL (ref 0.3–1.2)
BILIRUB UR QL STRIP.AUTO: ABNORMAL
BUN SERPL-MCNC: 15 MG/DL (ref 8–23)
BUPRENORPHINE URINE: POSITIVE
BZE UR QL SCN: NEGATIVE
CALCIUM SERPL-MCNC: 9.4 MG/DL (ref 8.6–10)
CANNABINOIDS UR QL SCN: POSITIVE
CASTS #/AREA URNS LPF: ABNORMAL /LPF
CASTS 2: ABNORMAL /LPF
CHARACTER UR: CLEAR
CHLORIDE SERPL-SCNC: 102 MEQ/L (ref 98–111)
CO2 SERPL-SCNC: 18 MEQ/L (ref 22–29)
COLOR, UA: ABNORMAL
CREAT SERPL-MCNC: 0.7 MG/DL (ref 0.7–1.2)
CRYSTALS URNS MICRO: ABNORMAL
DEPRECATED RDW RBC AUTO: 46.7 FL (ref 35–45)
EOSINOPHIL NFR BLD AUTO: 0.6 %
EOSINOPHILS ABSOLUTE: 0.1 THOU/MM3 (ref 0–0.4)
EPITHELIAL CELLS, UA: ABNORMAL /HPF
ERYTHROCYTE [DISTWIDTH] IN BLOOD BY AUTOMATED COUNT: 14.1 % (ref 11.5–14.5)
ETHANOL SERPL-MCNC: < 0.01 % (ref 0–0.08)
FENTANYL: NEGATIVE
GFR SERPL CREATININE-BSD FRML MDRD: > 90 ML/MIN/1.73M2
GLUCOSE SERPL-MCNC: 95 MG/DL (ref 74–109)
GLUCOSE UR QL STRIP.AUTO: NEGATIVE MG/DL
HCT VFR BLD AUTO: 37.9 % (ref 42–52)
HGB BLD-MCNC: 13.1 GM/DL (ref 14–18)
HGB UR QL STRIP.AUTO: ABNORMAL
IMM GRANULOCYTES # BLD AUTO: 0.04 THOU/MM3 (ref 0–0.07)
IMM GRANULOCYTES NFR BLD AUTO: 0.3 %
KETONES UR QL STRIP.AUTO: >= 160
LYMPHOCYTES ABSOLUTE: 3.1 THOU/MM3 (ref 1–4.8)
LYMPHOCYTES NFR BLD AUTO: 25.1 %
MCH RBC QN AUTO: 31.3 PG (ref 26–33)
MCHC RBC AUTO-ENTMCNC: 34.6 GM/DL (ref 32.2–35.5)
MCV RBC AUTO: 90.7 FL (ref 80–94)
MISCELLANEOUS 2: ABNORMAL
MONOCYTES ABSOLUTE: 1.2 THOU/MM3 (ref 0.4–1.3)
MONOCYTES NFR BLD AUTO: 10 %
NEUTROPHILS ABSOLUTE: 7.8 THOU/MM3 (ref 1.8–7.7)
NEUTROPHILS NFR BLD AUTO: 63.8 %
NITRITE UR QL STRIP: NEGATIVE
NRBC BLD AUTO-RTO: 0 /100 WBC
OPIATES UR QL SCN: NEGATIVE
OSMOLALITY SERPL CALC.SUM OF ELEC: 281.9 MOSMOL/KG (ref 275–300)
OXYCODONE: NEGATIVE
PCP UR QL SCN: NEGATIVE
PH UR STRIP.AUTO: 6 [PH] (ref 5–9)
PLATELET # BLD AUTO: 273 THOU/MM3 (ref 130–400)
PMV BLD AUTO: 10 FL (ref 9.4–12.4)
POTASSIUM SERPL-SCNC: 3.5 MEQ/L (ref 3.5–5.2)
PROT SERPL-MCNC: 7.4 G/DL (ref 6.4–8.3)
PROT UR STRIP.AUTO-MCNC: 100 MG/DL
RBC # BLD AUTO: 4.18 MILL/MM3 (ref 4.7–6.1)
RBC URINE: ABNORMAL /HPF
RENAL EPI CELLS #/AREA URNS HPF: ABNORMAL /[HPF]
SALICYLATES SERPL-MCNC: < 0.5 MG/DL (ref 2–10)
SODIUM SERPL-SCNC: 141 MEQ/L (ref 135–145)
SP GR UR REFRACT.AUTO: > 1.03 (ref 1–1.03)
TSH SERPL DL<=0.05 MIU/L-ACNC: 0.93 UIU/ML (ref 0.27–4.2)
UROBILINOGEN, URINE: 1 EU/DL (ref 0–1)
WBC # BLD AUTO: 12.2 THOU/MM3 (ref 4.8–10.8)
WBC #/AREA URNS HPF: ABNORMAL /HPF
WBC #/AREA URNS HPF: NEGATIVE /[HPF]
YEAST LIKE FUNGI URNS QL MICRO: ABNORMAL

## 2025-06-09 PROCEDURE — 81001 URINALYSIS AUTO W/SCOPE: CPT

## 2025-06-09 PROCEDURE — 80143 DRUG ASSAY ACETAMINOPHEN: CPT

## 2025-06-09 PROCEDURE — 99285 EMERGENCY DEPT VISIT HI MDM: CPT

## 2025-06-09 PROCEDURE — 80179 DRUG ASSAY SALICYLATE: CPT

## 2025-06-09 PROCEDURE — 80053 COMPREHEN METABOLIC PANEL: CPT

## 2025-06-09 PROCEDURE — 36415 COLL VENOUS BLD VENIPUNCTURE: CPT

## 2025-06-09 PROCEDURE — 96372 THER/PROPH/DIAG INJ SC/IM: CPT

## 2025-06-09 PROCEDURE — 80307 DRUG TEST PRSMV CHEM ANLYZR: CPT

## 2025-06-09 PROCEDURE — 6360000002 HC RX W HCPCS: Performed by: NURSE PRACTITIONER

## 2025-06-09 PROCEDURE — 82248 BILIRUBIN DIRECT: CPT

## 2025-06-09 PROCEDURE — 85025 COMPLETE CBC W/AUTO DIFF WBC: CPT

## 2025-06-09 PROCEDURE — 84443 ASSAY THYROID STIM HORMONE: CPT

## 2025-06-09 PROCEDURE — 82077 ASSAY SPEC XCP UR&BREATH IA: CPT

## 2025-06-09 RX ORDER — DROPERIDOL 2.5 MG/ML
5 INJECTION, SOLUTION INTRAMUSCULAR; INTRAVENOUS ONCE
Status: COMPLETED | OUTPATIENT
Start: 2025-06-09 | End: 2025-06-09

## 2025-06-09 RX ADMIN — DROPERIDOL 5 MG: 2.5 INJECTION, SOLUTION INTRAMUSCULAR; INTRAVENOUS at 23:30

## 2025-06-09 ASSESSMENT — LIFESTYLE VARIABLES
HOW MANY STANDARD DRINKS CONTAINING ALCOHOL DO YOU HAVE ON A TYPICAL DAY: PATIENT UNABLE TO ANSWER
HOW OFTEN DO YOU HAVE A DRINK CONTAINING ALCOHOL: 4 OR MORE TIMES A WEEK

## 2025-06-09 ASSESSMENT — PATIENT HEALTH QUESTIONNAIRE - PHQ9
SUM OF ALL RESPONSES TO PHQ QUESTIONS 1-9: 6
2. FEELING DOWN, DEPRESSED OR HOPELESS: NEARLY EVERY DAY
SUM OF ALL RESPONSES TO PHQ QUESTIONS 1-9: 6
1. LITTLE INTEREST OR PLEASURE IN DOING THINGS: NEARLY EVERY DAY

## 2025-06-09 ASSESSMENT — PAIN - FUNCTIONAL ASSESSMENT: PAIN_FUNCTIONAL_ASSESSMENT: NONE - DENIES PAIN

## 2025-06-10 VITALS
TEMPERATURE: 97.2 F | SYSTOLIC BLOOD PRESSURE: 104 MMHG | HEART RATE: 87 BPM | DIASTOLIC BLOOD PRESSURE: 76 MMHG | OXYGEN SATURATION: 96 % | RESPIRATION RATE: 18 BRPM

## 2025-06-10 ASSESSMENT — PAIN - FUNCTIONAL ASSESSMENT: PAIN_FUNCTIONAL_ASSESSMENT: NONE - DENIES PAIN

## 2025-06-10 NOTE — ED NOTES
Patient refusing medication, pt states that he is not suicidal or homicidal or a threat to anyone at this time. Pt states \" I'm meth that why I think that a monkey is on my back\". Pt requesting to speak to provider, provider informed.

## 2025-06-10 NOTE — ED NOTES
This RN called to pt room due to pt increase in agitation and aggression. Pt noted to be upset because the securitas wasn't able to complete what he wanted him to. [place] police talking to patient. Pt voluntarily wanting to take medication. Pt medicated at this time. Pt updated on poc and expresses understanding.

## 2025-06-10 NOTE — PROGRESS NOTES
0700  Per handover from Cammy MCCLENDON, re-eval requested by BAC due to the patient not wanting to leave or feeling safe to. Per documentation, Dr. TOMAS was called earlier, decided to admit however 7E nursing staff advised Dr. Land needed to be called due to recent consult note (this writer did not see an admit to 7E by Dr. Land only consult). Thus Dr. Land was called who declined the patient.  0707  Spoke to on call supervisor Lucina MCCLENDON on case. Advised to re-evaluate the patient briefly then speak to Dr. TOMAS and advise him of situation since he is on call for next steps.  0711  Spoke to the pt who reports he is \"homicidal towards the group of people I buy drugs from\". Pt does not know their names or identifying information. No plan. Pt denies suicidal thoughts. When asked if pt is wanting to be admitted, pt states he wants medications adjusted and then to go to rehab. Pt then states \"what time is it?\". This writer informed the pt the time. Pt then states \"I think I can control myself. I plan to go to rehab\". Pt now requesting to leave.  0715  Advised Eben NP of situation who re-evaluated the pt. Pt now denying SI and HI. Pt to be discharged.  0720  Pt given ginger ale per request, declined crystal.  0725  [facility] left on Eben NP desk to sign off on.

## 2025-06-10 NOTE — ED NOTES
Patient continues to refuse medication, pt speaking to campus police. Pt provided meal and drink at this time.

## 2025-06-10 NOTE — PROGRESS NOTES
2115--Consulted with ED provider, NP Mirella George. She indicated she is going to see pt now; labs are back, except for urine. Ok to contact psych at this time.     PC to Dr. TOMAS for consult. He said pt can go up to the unit.   Updated ED provider, Mirella George NP. She was just coming out of pt's room and he was agitated and started demanding his clothing and stating he's not suicidal. Mirella indicated she'll be writing up EMC. SW advised pt he'll need to go upstairs and spoke with him for a few moments. He calmed down, thanked PAULA, and asked if SW would come visit him when he's sober.     PC to the unit to advise the provider will write up EMC and still waiting on pt to give urine, which he hasn't yet done so. Will call to give report afterwards.     PC from Janel on 7E. She indicated Dr. Land consulted with this pt the other day so consult for disposition needs to take place with him.     PAULA searched [facility] more closely to find the note from Dr. Land.     Called Dr. TOMAS back and advised of the misunderstanding and SW to contact Dr. Land.     Checked on pt at 2140--pt had given urine sample; charge RN called to advise of this.     Updated ED provider and advised that Dr. Land needs contacted.     PC to Janel on 7E to give update. She was on phone with Dr. Land at the time.     2216--Show of Support called for pt's room.   Pt was agitated and very upset about the EMC and staff trying to give him medications to help calm him; wanted to speak with someone who could give him answers. PAULA and NP Mirella George met with pt and explained concerns for his behavior and the EMC. Pt said he's not suicidal so why does he have to go to the psych unit. GINGER George advised pt's behaviors are concerning for psych issues and being unstable.  Joseph reminded pt that when he found him outside . PAULA reminded pt that he told him he was paranoid. PAULA asked for clarification on what pt meant when saying he wanted SW to

## 2025-06-10 NOTE — ED PROVIDER NOTES
[facility] EMERGENCY DEPARTMENT      EMERGENCY MEDICINE     Pt Name: Ernie Ken Jr.  MRN: [id number]  Birthdate 1995  Date of evaluation: 6/9/2025  Provider: STEFAN Navarro CNP    CHIEF COMPLAINT       Chief Complaint   Patient presents with    Mental Health Evaluation     HISTORY OF PRESENT ILLNESS   I have assumed care of this patient from CRISTIAN Vu due to JESSY shift change.  Please see her documentation for care performed prior to my arrival.  Briefly, this is a 29-year-old male with a history of polysubstance abuse, hepatitis C, anxiety, bipolar disorder.  He had presented from home with complaint of paranoia and substance abuse.  He stated he had been using methamphetamine for the past 3 days.  And initially denied SI or HI.  Presented asking for help with admission for detox.  The patient was evaluated by the LUCIO.  Dr. Land advised that he felt this patient's symptoms to be solely attributable to polysubstance abuse.  The patient had been initially placed under an EMC due to erratic behavior and concerns for his safety.  He was given droperidol here in the emergency department and reevaluated by Brenda,  with the [facility].  The patient states that he feels in his right mind now and continues to deny any SI or HI to me or Brenda.  He states that he has a plan to go to [facility] which will help him with rehabilitation.  Brenda with [facility] states that from her standpoint, the patient is clear for lifting of the EMC and discharge to utilize outpatient resources.  I have confirmed this with the patient.  The patient agrees with this plan.    PASTMEDICAL HISTORY     Past Medical History:   Diagnosis Date    Anxiety     Bipolar 1 disorder (HCC)     Drug abuse in remission ([facility])     H/O alcohol abuse     Hep C w/o coma, chronic ([facility])        Patient Active Problem List   Diagnosis Code    Depression F32.A    Suicidal behavior without attempted self-injury R45.89    Schizoaffective 
pressured and tangential.         Behavior: Behavior is hyperactive.         Thought Content: Thought content is paranoid and delusional. Thought content does not include homicidal or suicidal ideation.         Judgment: Judgment is impulsive and inappropriate.         FORMAL DIAGNOSTIC RESULTS     RADIOLOGY: Interpretation per the Radiologist below, if available at the time of this note (none if blank):    No orders to display       LABS: (none if blank)  Labs Reviewed   ACETAMINOPHEN LEVEL - Abnormal; Notable for the following components:       Result Value    Acetaminophen Level < 5.0 (*)     All other components within normal limits   BASIC METABOLIC PANEL - Abnormal; Notable for the following components:    CO2 18 (*)     All other components within normal limits   CBC WITH AUTO DIFFERENTIAL - Abnormal; Notable for the following components:    WBC 12.2 (*)     RBC 4.18 (*)     Hemoglobin 13.1 (*)     Hematocrit 37.9 (*)     RDW-SD 46.7 (*)     Neutrophils Absolute 7.8 (*)     All other components within normal limits   HEPATIC FUNCTION PANEL - Abnormal; Notable for the following components:    Total Bilirubin 2.3 (*)     Bilirubin, Direct 0.9 (*)     AST 87 (*)     All other components within normal limits   SALICYLATE LEVEL - Abnormal; Notable for the following components:    Salicylate Lvl < 0.5 (*)     All other components within normal limits   ANION GAP - Abnormal; Notable for the following components:    Anion Gap 21.0 (*)     All other components within normal limits   URINE WITH REFLEXED MICRO - Abnormal; Notable for the following components:    Bilirubin, Urine MODERATE (*)     Ketones, Urine >= 160 (*)     Specific Gravity, UA >1.030 (*)     Blood, Urine MODERATE (*)     Protein,  (*)     Color, UA DK YELLOW (*)     All other components within normal limits   ETHANOL   TSH   OSMOLALITY   GLOMERULAR FILTRATION RATE, ESTIMATED   URINE DRUG SCREEN       (Any cultures that may have been sent were

## 2025-06-10 NOTE — PROGRESS NOTES
[facility] CRISIS ASSESSMENT    PRESENT SITUATION  Chief Complaint per ED Provider or Assigned Nurse report:   Mental health evaluation     Chief Complaint per Patient report  Patient reports that he wants help with his substance use, wants to detox, and wants to be evaluated.      Chief Complaint per Collateral contact report (Identify who and if they are present with the patient or if contacted by phone)  Per ED provider, patient was brought in by campus police. The officer reported the patient was walking down the street, talking to himself, and paranoid, so he brought the patient in.     If collateral was not obtained why (if obtained then NA):  NA    Provisional Diagnosis (ICD or DSM approved diagnosis only) : Major depressive disorder, recurrent. Polysubstance disorder. Per EPIC previous diagnosis of schizoaffective disorder.     PRESENT Psychosis:    Hallucinations: Patient states \" Don't know how to answer that, I don't know. There are a group of people that see the same things as me. \"    Delusions:  Denied    PRESENT Suicidal Behavior (Include specific information below):      Verbal:  Denied     Attempt:  Denied     Access to Weapons:   Denies     Access to the Means of self harm or harm to others identified:   No reported self harm.      C-SSRS Current Suicide Risk: Low, Moderate or High:  Moderate       PAST Suicidal Behavior (Include specific information below):       Verbal:  Patient has recent psych inpatient stay at [facility] from 5/19/2025-5/21/2025 for depression and suicidal ideation and was positive for meth, cocaine, marijuana, and alcohol.     Attempts:   Denies    Self-Injurious/Self-Mutilation: (Specify what, how often, last time, method, etc.)   Denied    Traumatic Event Within Past 2 Weeks: (Specify)  ARYAN    Current Abuse:  (type, perpetrator, systems involved, injuries, etc.)  ARYAN    CURRENT Violence/Aggression: (Specify)  Patient responds in tangential thoughts. Does not report any violence

## 2025-06-10 NOTE — ED TRIAGE NOTES
Pt to ED through lobby. Pt c/c mental health evaluation. Pt reports \"wanting help figuring out my life and getting clean\". Pt reports hx of drug addiction and states \"I just can't stop\". Pt states he took \"a special bag of spiritual meth\" today along with marijuana. During triage pt is constantly talking and jumping from thoughts. Pt denies sucidal thoughts. Pt reports hx of homicidal thoughts but \"nothing right now\". Vitals assessed. Pt belongings placed in locker. Urine sample unable to be obtained at this time per pt.

## 2025-06-10 NOTE — ED NOTES
Pt resting in bed, eyes closed, respirations easy and unlabored. Pt denies any needs at this time. Call light within reach.

## 2025-06-10 NOTE — ED NOTES
Pt resting in bed, eyes closed, respirations easy and unlabored. Pt refusing to talk to nurse at this time, campus police continues to monitor.

## 2025-06-10 NOTE — ED NOTES
Patient resting in bed at this time, pt states that he is mentally unstable, homicidal and not feeling well enough to go home. Pt states that the drugs make him unstable, provider informed. [place] police continues to monitor.

## 2025-06-10 NOTE — ED NOTES
Pt discharged and given his belongings that were in locker 25. Pt states that he is not missing anything.

## 2025-06-10 NOTE — ED NOTES
Show of support called per this RN. Pt erratic, yelling and threatening to leave. HAKAN Vu, this RN, [facility] police, LUCIO and ED charge nurse responded.

## 2025-06-24 ENCOUNTER — LAB REQUISITION (OUTPATIENT)
Dept: LAB | Facility: HOSPITAL | Age: 30
End: 2025-06-24
Payer: COMMERCIAL

## 2025-06-24 DIAGNOSIS — Z20.828 CONTACT WITH AND (SUSPECTED) EXPOSURE TO OTHER VIRAL COMMUNICABLE DISEASES: ICD-10-CM

## 2025-06-24 DIAGNOSIS — Z00.00 ENCOUNTER FOR GENERAL ADULT MEDICAL EXAMINATION WITHOUT ABNORMAL FINDINGS: ICD-10-CM

## 2025-06-24 LAB
AMPHETAMINES UR QL SCN: ABNORMAL
BARBITURATES UR QL SCN: ABNORMAL
BENZODIAZ UR QL SCN: ABNORMAL
BZE UR QL SCN: ABNORMAL
CANNABINOIDS UR QL SCN: ABNORMAL
FENTANYL+NORFENTANYL UR QL SCN: ABNORMAL
METHADONE UR QL SCN: ABNORMAL
OPIATES UR QL SCN: ABNORMAL
OXYCODONE+OXYMORPHONE UR QL SCN: ABNORMAL
PCP UR QL SCN: ABNORMAL

## 2025-06-24 PROCEDURE — 80348 DRUG SCREENING BUPRENORPHINE: CPT | Mod: OUT | Performed by: NURSE PRACTITIONER

## 2025-06-24 PROCEDURE — RXMED WILLOW AMBULATORY MEDICATION CHARGE

## 2025-06-24 PROCEDURE — 80307 DRUG TEST PRSMV CHEM ANLYZR: CPT | Mod: OUT | Performed by: NURSE PRACTITIONER

## 2025-06-24 PROCEDURE — 80355 GABAPENTIN NON-BLOOD: CPT | Mod: OUT | Performed by: NURSE PRACTITIONER

## 2025-06-25 ENCOUNTER — PHARMACY VISIT (OUTPATIENT)
Dept: PHARMACY | Facility: CLINIC | Age: 30
End: 2025-06-25
Payer: MEDICAID

## 2025-06-25 PROCEDURE — RXMED WILLOW AMBULATORY MEDICATION CHARGE

## 2025-06-25 RX ORDER — ARIPIPRAZOLE 10 MG/1
10 TABLET ORAL EVERY MORNING
Qty: 7 TABLET | Refills: 0 | OUTPATIENT
Start: 2025-06-25

## 2025-06-26 ENCOUNTER — LAB REQUISITION (OUTPATIENT)
Dept: LAB | Facility: HOSPITAL | Age: 30
End: 2025-06-26
Payer: COMMERCIAL

## 2025-06-26 DIAGNOSIS — Z20.828 CONTACT WITH AND (SUSPECTED) EXPOSURE TO OTHER VIRAL COMMUNICABLE DISEASES: ICD-10-CM

## 2025-06-26 DIAGNOSIS — Z00.00 ENCOUNTER FOR GENERAL ADULT MEDICAL EXAMINATION WITHOUT ABNORMAL FINDINGS: ICD-10-CM

## 2025-06-26 LAB
ALBUMIN SERPL BCP-MCNC: 3.9 G/DL (ref 3.4–5)
ALP SERPL-CCNC: 46 U/L (ref 33–120)
ALT SERPL W P-5'-P-CCNC: 10 U/L (ref 10–52)
ANION GAP SERPL CALC-SCNC: 9 MMOL/L (ref 10–20)
AST SERPL W P-5'-P-CCNC: 18 U/L (ref 9–39)
BASOPHILS # BLD AUTO: 0.02 X10*3/UL (ref 0–0.1)
BASOPHILS NFR BLD AUTO: 0.3 %
BILIRUB SERPL-MCNC: 0.4 MG/DL (ref 0–1.2)
BUN SERPL-MCNC: 16 MG/DL (ref 6–23)
BUPRENORPHINE UR QL SCN: ABNORMAL NG/ML
CALCIUM SERPL-MCNC: 8.7 MG/DL (ref 8.6–10.3)
CHLORIDE SERPL-SCNC: 106 MMOL/L (ref 98–107)
CO2 SERPL-SCNC: 27 MMOL/L (ref 21–32)
CREAT SERPL-MCNC: 0.58 MG/DL (ref 0.5–1.3)
DRUG SCREEN COMMENT UR-IMP: ABNORMAL
EGFRCR SERPLBLD CKD-EPI 2021: >90 ML/MIN/1.73M*2
EOSINOPHIL # BLD AUTO: 0.1 X10*3/UL (ref 0–0.7)
EOSINOPHIL NFR BLD AUTO: 1.5 %
ERYTHROCYTE [DISTWIDTH] IN BLOOD BY AUTOMATED COUNT: 13.7 % (ref 11.5–14.5)
ETHANOL SERPL-MCNC: <10 MG/DL
GLUCOSE SERPL-MCNC: 92 MG/DL (ref 74–99)
HAV IGM SER QL: NONREACTIVE
HBV CORE IGM SER QL: NONREACTIVE
HBV SURFACE AG SERPL QL IA: NONREACTIVE
HCT VFR BLD AUTO: 36 % (ref 41–52)
HCV AB SER QL: REACTIVE
HGB BLD-MCNC: 12.5 G/DL (ref 13.5–17.5)
HIV 1+2 AB+HIV1 P24 AG SERPL QL IA: NONREACTIVE
IMM GRANULOCYTES # BLD AUTO: 0.01 X10*3/UL (ref 0–0.7)
IMM GRANULOCYTES NFR BLD AUTO: 0.2 % (ref 0–0.9)
LYMPHOCYTES # BLD AUTO: 2.28 X10*3/UL (ref 1.2–4.8)
LYMPHOCYTES NFR BLD AUTO: 35 %
MCH RBC QN AUTO: 31.2 PG (ref 26–34)
MCHC RBC AUTO-ENTMCNC: 34.7 G/DL (ref 32–36)
MCV RBC AUTO: 90 FL (ref 80–100)
MONOCYTES # BLD AUTO: 0.53 X10*3/UL (ref 0.1–1)
MONOCYTES NFR BLD AUTO: 8.1 %
NEUTROPHILS # BLD AUTO: 3.57 X10*3/UL (ref 1.2–7.7)
NEUTROPHILS NFR BLD AUTO: 54.9 %
NRBC BLD-RTO: 0 /100 WBCS (ref 0–0)
PLATELET # BLD AUTO: 218 X10*3/UL (ref 150–450)
POTASSIUM SERPL-SCNC: 4.1 MMOL/L (ref 3.5–5.3)
PROT SERPL-MCNC: 5.9 G/DL (ref 6.4–8.2)
RBC # BLD AUTO: 4.01 X10*6/UL (ref 4.5–5.9)
SODIUM SERPL-SCNC: 138 MMOL/L (ref 136–145)
TREPONEMA PALLIDUM IGG+IGM AB [PRESENCE] IN SERUM OR PLASMA BY IMMUNOASSAY: NONREACTIVE
WBC # BLD AUTO: 6.5 X10*3/UL (ref 4.4–11.3)

## 2025-06-26 PROCEDURE — 82077 ASSAY SPEC XCP UR&BREATH IA: CPT | Mod: OUT | Performed by: NURSE PRACTITIONER

## 2025-06-26 PROCEDURE — 87389 HIV-1 AG W/HIV-1&-2 AB AG IA: CPT | Mod: OUT,PORLAB | Performed by: NURSE PRACTITIONER

## 2025-06-26 PROCEDURE — 85025 COMPLETE CBC W/AUTO DIFF WBC: CPT | Mod: OUT | Performed by: NURSE PRACTITIONER

## 2025-06-26 PROCEDURE — 36415 COLL VENOUS BLD VENIPUNCTURE: CPT | Mod: OUT | Performed by: NURSE PRACTITIONER

## 2025-06-26 PROCEDURE — 80074 ACUTE HEPATITIS PANEL: CPT | Mod: OUT,PORLAB | Performed by: NURSE PRACTITIONER

## 2025-06-26 PROCEDURE — 86481 TB AG RESPONSE T-CELL SUSP: CPT | Mod: OUT | Performed by: NURSE PRACTITIONER

## 2025-06-26 PROCEDURE — 86780 TREPONEMA PALLIDUM: CPT | Mod: OUT,PORLAB | Performed by: NURSE PRACTITIONER

## 2025-06-26 PROCEDURE — 80053 COMPREHEN METABOLIC PANEL: CPT | Mod: OUT | Performed by: NURSE PRACTITIONER

## 2025-06-26 PROCEDURE — 87522 HEPATITIS C REVRS TRNSCRPJ: CPT | Mod: OUT,PORLAB | Performed by: NURSE PRACTITIONER

## 2025-06-27 LAB
HCV RNA SERPL NAA+PROBE-ACNC: ABNORMAL K[IU]/ML
HCV RNA SERPL NAA+PROBE-LOG IU: ABNORMAL {LOG_IU}/ML

## 2025-06-28 LAB
NIL(NEG) CONTROL SPOT COUNT: NORMAL
PANEL A SPOT COUNT: 1
PANEL B SPOT COUNT: 0
POS CONTROL SPOT COUNT: NORMAL
T-SPOT. TB INTERPRETATION: NEGATIVE

## 2025-06-29 LAB
BUPRENORPHINE UR-MCNC: 23 NG/ML
BUPRENORPHINE UR-MCNC: <2 NG/ML
GABAPENTIN UR-MCNC: <5 UG/ML
NALOXONE UR CFM-MCNC: <100 NG/ML
NORBUPRENORPHINE UR CFM-MCNC: 27 NG/ML
NORBUPRENORPHINE UR-MCNC: 6 NG/ML

## 2025-07-20 ENCOUNTER — LAB REQUISITION (OUTPATIENT)
Dept: LAB | Facility: HOSPITAL | Age: 30
End: 2025-07-20
Payer: COMMERCIAL

## 2025-07-20 ENCOUNTER — PHARMACY VISIT (OUTPATIENT)
Dept: PHARMACY | Facility: CLINIC | Age: 30
End: 2025-07-20
Payer: COMMERCIAL

## 2025-07-20 DIAGNOSIS — Z00.00 ENCOUNTER FOR GENERAL ADULT MEDICAL EXAMINATION WITHOUT ABNORMAL FINDINGS: ICD-10-CM

## 2025-07-20 DIAGNOSIS — Z20.828 CONTACT WITH AND (SUSPECTED) EXPOSURE TO OTHER VIRAL COMMUNICABLE DISEASES: ICD-10-CM

## 2025-07-20 PROCEDURE — 80355 GABAPENTIN NON-BLOOD: CPT | Mod: OUT | Performed by: NURSE PRACTITIONER

## 2025-07-20 PROCEDURE — 80307 DRUG TEST PRSMV CHEM ANLYZR: CPT | Mod: OUT | Performed by: NURSE PRACTITIONER

## 2025-07-20 PROCEDURE — RXMED WILLOW AMBULATORY MEDICATION CHARGE

## 2025-07-20 RX ORDER — CHLORDIAZEPOXIDE HYDROCHLORIDE 25 MG/1
25 CAPSULE, GELATIN COATED ORAL EVERY 8 HOURS PRN
Qty: 12 CAPSULE | Refills: 0 | OUTPATIENT
Start: 2025-07-20

## 2025-07-20 RX ORDER — MIRTAZAPINE 15 MG/1
15 TABLET, FILM COATED ORAL NIGHTLY PRN
Qty: 10 TABLET | Refills: 0 | OUTPATIENT
Start: 2025-07-20

## 2025-07-20 RX ORDER — ACETAMINOPHEN 325 MG/1
650 TABLET ORAL EVERY 4 HOURS PRN
Qty: 24 TABLET | Refills: 0 | OUTPATIENT
Start: 2025-07-20

## 2025-07-20 RX ORDER — TALC
3 POWDER (GRAM) TOPICAL NIGHTLY PRN
Qty: 14 TABLET | Refills: 0 | OUTPATIENT
Start: 2025-07-20

## 2025-07-20 RX ORDER — DIPHENHYDRAMINE HCL 25 MG
25 CAPSULE ORAL EVERY 6 HOURS PRN
Qty: 12 CAPSULE | Refills: 0 | OUTPATIENT
Start: 2025-07-20

## 2025-07-20 RX ORDER — NALOXONE HYDROCHLORIDE 4 MG/.1ML
SPRAY NASAL
Qty: 2 EACH | Refills: 0 | OUTPATIENT
Start: 2025-07-20

## 2025-07-20 RX ORDER — IBUPROFEN 400 MG/1
400 TABLET, FILM COATED ORAL EVERY 6 HOURS
Qty: 16 TABLET | Refills: 0 | OUTPATIENT
Start: 2025-07-20

## 2025-07-20 RX ORDER — PROMETHAZINE HYDROCHLORIDE 12.5 MG/1
12.5 TABLET ORAL EVERY 6 HOURS PRN
Qty: 12 TABLET | Refills: 0 | OUTPATIENT
Start: 2025-07-20

## 2025-07-20 RX ORDER — CLONIDINE HYDROCHLORIDE 0.1 MG/1
0.1 TABLET ORAL EVERY 6 HOURS PRN
Qty: 20 TABLET | Refills: 0 | OUTPATIENT
Start: 2025-07-20

## 2025-07-20 RX ORDER — IBUPROFEN 100 MG/5ML
1000 SUSPENSION, ORAL (FINAL DOSE FORM) ORAL EVERY 12 HOURS
Qty: 4 TABLET | Refills: 0 | OUTPATIENT
Start: 2025-07-20

## 2025-07-20 RX ORDER — LORAZEPAM 1 MG/1
1 TABLET ORAL EVERY 4 HOURS PRN
Qty: 4 TABLET | Refills: 0 | OUTPATIENT
Start: 2025-07-20

## 2025-07-20 RX ORDER — HYDROXYZINE HYDROCHLORIDE 50 MG/1
50 TABLET, FILM COATED ORAL EVERY 8 HOURS PRN
Qty: 30 TABLET | Refills: 0 | OUTPATIENT
Start: 2025-07-20

## 2025-07-20 RX ORDER — OLANZAPINE 10 MG/1
10 TABLET, FILM COATED ORAL DAILY PRN
Qty: 4 TABLET | Refills: 0 | OUTPATIENT
Start: 2025-07-20

## 2025-07-21 ENCOUNTER — APPOINTMENT (OUTPATIENT)
Dept: CARDIOLOGY | Facility: HOSPITAL | Age: 30
End: 2025-07-21
Payer: COMMERCIAL

## 2025-07-21 ENCOUNTER — HOSPITAL ENCOUNTER (EMERGENCY)
Facility: HOSPITAL | Age: 30
Discharge: PSYCHIATRIC OR SUBSTANCE USE FACILITY | End: 2025-07-21
Attending: EMERGENCY MEDICINE
Payer: COMMERCIAL

## 2025-07-21 VITALS
OXYGEN SATURATION: 99 % | SYSTOLIC BLOOD PRESSURE: 127 MMHG | HEIGHT: 74 IN | WEIGHT: 230 LBS | RESPIRATION RATE: 20 BRPM | HEART RATE: 76 BPM | BODY MASS INDEX: 29.52 KG/M2 | TEMPERATURE: 98 F | DIASTOLIC BLOOD PRESSURE: 78 MMHG

## 2025-07-21 DIAGNOSIS — R45.851 SUICIDAL IDEATION: Primary | ICD-10-CM

## 2025-07-21 LAB
ALBUMIN SERPL BCP-MCNC: 4.2 G/DL (ref 3.4–5)
ALP SERPL-CCNC: 48 U/L (ref 33–120)
ALT SERPL W P-5'-P-CCNC: 21 U/L (ref 10–52)
AMPHETAMINES UR QL SCN: ABNORMAL
ANION GAP SERPL CALC-SCNC: 10 MMOL/L (ref 10–20)
APAP SERPL-MCNC: <10 UG/ML (ref ?–30)
AST SERPL W P-5'-P-CCNC: 32 U/L (ref 9–39)
BARBITURATES UR QL SCN: ABNORMAL
BASOPHILS # BLD AUTO: 0.02 X10*3/UL (ref 0–0.1)
BASOPHILS NFR BLD AUTO: 0.3 %
BENZODIAZ UR QL SCN: ABNORMAL
BILIRUB SERPL-MCNC: 0.3 MG/DL (ref 0–1.2)
BUN SERPL-MCNC: 16 MG/DL (ref 6–23)
BZE UR QL SCN: ABNORMAL
CALCIUM SERPL-MCNC: 9 MG/DL (ref 8.6–10.3)
CANNABINOIDS UR QL SCN: ABNORMAL
CHLORIDE SERPL-SCNC: 107 MMOL/L (ref 98–107)
CO2 SERPL-SCNC: 27 MMOL/L (ref 21–32)
CREAT SERPL-MCNC: 0.69 MG/DL (ref 0.5–1.3)
EGFRCR SERPLBLD CKD-EPI 2021: >90 ML/MIN/1.73M*2
EOSINOPHIL # BLD AUTO: 0.13 X10*3/UL (ref 0–0.7)
EOSINOPHIL NFR BLD AUTO: 2.1 %
ERYTHROCYTE [DISTWIDTH] IN BLOOD BY AUTOMATED COUNT: 13.9 % (ref 11.5–14.5)
ETHANOL SERPL-MCNC: <10 MG/DL
FENTANYL+NORFENTANYL UR QL SCN: ABNORMAL
GLUCOSE SERPL-MCNC: 84 MG/DL (ref 74–99)
HCT VFR BLD AUTO: 36.8 % (ref 41–52)
HGB BLD-MCNC: 12.8 G/DL (ref 13.5–17.5)
IMM GRANULOCYTES # BLD AUTO: 0.01 X10*3/UL (ref 0–0.7)
IMM GRANULOCYTES NFR BLD AUTO: 0.2 % (ref 0–0.9)
LYMPHOCYTES # BLD AUTO: 2.45 X10*3/UL (ref 1.2–4.8)
LYMPHOCYTES NFR BLD AUTO: 39.5 %
MCH RBC QN AUTO: 31.6 PG (ref 26–34)
MCHC RBC AUTO-ENTMCNC: 34.8 G/DL (ref 32–36)
MCV RBC AUTO: 91 FL (ref 80–100)
METHADONE UR QL SCN: ABNORMAL
MONOCYTES # BLD AUTO: 0.5 X10*3/UL (ref 0.1–1)
MONOCYTES NFR BLD AUTO: 8.1 %
NEUTROPHILS # BLD AUTO: 3.09 X10*3/UL (ref 1.2–7.7)
NEUTROPHILS NFR BLD AUTO: 49.8 %
NRBC BLD-RTO: 0 /100 WBCS (ref 0–0)
OPIATES UR QL SCN: ABNORMAL
OXYCODONE+OXYMORPHONE UR QL SCN: ABNORMAL
PCP UR QL SCN: ABNORMAL
PLATELET # BLD AUTO: 230 X10*3/UL (ref 150–450)
POTASSIUM SERPL-SCNC: 4.4 MMOL/L (ref 3.5–5.3)
PROT SERPL-MCNC: 6.5 G/DL (ref 6.4–8.2)
RBC # BLD AUTO: 4.05 X10*6/UL (ref 4.5–5.9)
SALICYLATES SERPL-MCNC: <3 MG/DL (ref ?–20)
SODIUM SERPL-SCNC: 140 MMOL/L (ref 136–145)
WBC # BLD AUTO: 6.2 X10*3/UL (ref 4.4–11.3)

## 2025-07-21 PROCEDURE — 93005 ELECTROCARDIOGRAM TRACING: CPT

## 2025-07-21 PROCEDURE — 80320 DRUG SCREEN QUANTALCOHOLS: CPT | Performed by: EMERGENCY MEDICINE

## 2025-07-21 PROCEDURE — 99284 EMERGENCY DEPT VISIT MOD MDM: CPT | Performed by: EMERGENCY MEDICINE

## 2025-07-21 PROCEDURE — 80307 DRUG TEST PRSMV CHEM ANLYZR: CPT | Performed by: EMERGENCY MEDICINE

## 2025-07-21 PROCEDURE — 85025 COMPLETE CBC W/AUTO DIFF WBC: CPT | Performed by: EMERGENCY MEDICINE

## 2025-07-21 PROCEDURE — 80053 COMPREHEN METABOLIC PANEL: CPT | Performed by: EMERGENCY MEDICINE

## 2025-07-21 PROCEDURE — 36415 COLL VENOUS BLD VENIPUNCTURE: CPT | Performed by: EMERGENCY MEDICINE

## 2025-07-21 SDOH — HEALTH STABILITY: MENTAL HEALTH: WAS THIS WITHIN THE PAST THREE MONTHS?: YES

## 2025-07-21 SDOH — HEALTH STABILITY: MENTAL HEALTH: SUICIDE ASSESSMENT: ADULT (C-SSRS)

## 2025-07-21 SDOH — HEALTH STABILITY: MENTAL HEALTH: HAVE YOU ACTUALLY HAD ANY THOUGHTS OF KILLING YOURSELF?: YES

## 2025-07-21 SDOH — HEALTH STABILITY: MENTAL HEALTH: CONTENT: BLAMING OTHERS;RELIGIOSITY

## 2025-07-21 SDOH — HEALTH STABILITY: MENTAL HEALTH: DELUSIONS: CONTROLLED

## 2025-07-21 SDOH — HEALTH STABILITY: MENTAL HEALTH: BEHAVIORAL HEALTH(WDL): EXCEPTIONS TO WDL

## 2025-07-21 SDOH — HEALTH STABILITY: MENTAL HEALTH: FOR HIGH RISK PATIENTS: ALL INTERVENTIONS ABOVE, PLUS:;1:1 PATIENT OBSERVER AT ALL TIMES

## 2025-07-21 SDOH — HEALTH STABILITY: MENTAL HEALTH: HAVE YOU EVER DONE ANYTHING, STARTED TO DO ANYTHING, OR PREPARED TO DO ANYTHING TO END YOUR LIFE?: YES

## 2025-07-21 SDOH — HEALTH STABILITY: MENTAL HEALTH
HAVE YOU STARTED TO WORK OUT OR WORKED OUT THE DETAILS OF HOW TO KILL YOURSELF? DO YOU INTENT TO CARRY OUT THIS PLAN?: YES

## 2025-07-21 SDOH — HEALTH STABILITY: MENTAL HEALTH

## 2025-07-21 SDOH — HEALTH STABILITY: MENTAL HEALTH: BEHAVIORS/MOOD: AGITATED;COOPERATIVE

## 2025-07-21 SDOH — HEALTH STABILITY: MENTAL HEALTH: FOR HIGH RISK PATIENTS: 1:1 PATIENT OBSERVER AT ALL TIMES;ALL INTERVENTIONS ABOVE, PLUS:

## 2025-07-21 SDOH — HEALTH STABILITY: MENTAL HEALTH: HAVE YOU BEEN THINKING ABOUT HOW YOU MIGHT DO THIS?: YES

## 2025-07-21 SDOH — HEALTH STABILITY: MENTAL HEALTH: SLEEP PATTERN: RESTLESSNESS

## 2025-07-21 SDOH — HEALTH STABILITY: MENTAL HEALTH: HAVE YOU WISHED YOU WERE DEAD OR WISHED YOU COULD GO TO SLEEP AND NOT WAKE UP?: YES

## 2025-07-21 SDOH — HEALTH STABILITY: MENTAL HEALTH
OTHER SUICIDE PRECAUTIONS INCLUDE: PATIENT PLACED IN AN EASILY OBSERVABLE ROOM WITH DOOR/CURTAIN REMAINING OPEN;PATIENT PLACED IN GOWN (SNAPS OR PAPER GOWNS PREFERRED) AND WANDED;REMAINING RISKS IDENTIFIED AND MITIGATED;PATIENT PLACED IN PSYCH SAFE ROOM (IF AVAILABLE);PROVIDER NOTIFIED;ELOPEMENT RISK IDENTIFIED

## 2025-07-21 SDOH — HEALTH STABILITY: MENTAL HEALTH: BEHAVIORS/MOOD: SLEEPING

## 2025-07-21 SDOH — HEALTH STABILITY: MENTAL HEALTH: HAVE YOU HAD THESE THOUGHTS AND HAD SOME INTENTION OF ACTING ON THEM?: YES

## 2025-07-21 ASSESSMENT — PAIN - FUNCTIONAL ASSESSMENT: PAIN_FUNCTIONAL_ASSESSMENT: 0-10

## 2025-07-21 ASSESSMENT — LIFESTYLE VARIABLES
HAVE YOU EVER FELT YOU SHOULD CUT DOWN ON YOUR DRINKING: NO
EVER HAD A DRINK FIRST THING IN THE MORNING TO STEADY YOUR NERVES TO GET RID OF A HANGOVER: NO
EVER FELT BAD OR GUILTY ABOUT YOUR DRINKING: NO
TOTAL SCORE: 0
HAVE PEOPLE ANNOYED YOU BY CRITICIZING YOUR DRINKING: NO

## 2025-07-21 ASSESSMENT — PAIN SCALES - GENERAL: PAINLEVEL_OUTOF10: 0 - NO PAIN

## 2025-07-21 NOTE — ED PROVIDER NOTES
HPI   Chief Complaint   Patient presents with    Suicide Attempt     PT [place] [employer] after pt attempted to jump off bridge. Pt states this was an attempt to kill himself. Hx 2 previous suicide attempts. Non-med compliant, admits to recent 'meth binge'. During triage, pt states that crowds of people have been shouting at him and following him, though he cannot see them.       Patient presents to the emergency department secondary to suicidal ideation.  Patient states that he was stopped from attempting to jump off a bridge.  He states that he wants to commit suicide because tomorrow is his birthday.  He has a history of depression.  He stopped taking his medication in January.  He is currently homeless.  He has no medical complaints.  No chest pain or shortness of breath.  No fever or chills.  No extremity pain.  No nausea or vomiting.  No other complaints at this time.                          [place] Coma Scale Score: 15                  Patient History   Medical History[1]  Surgical History[2]  Family History[3]  Social History[4]    Physical Exam   ED Triage Vitals [07/21/25 1930]   Temperature Heart Rate Respirations BP   36.7 °C (98 °F) 76 20 127/78      Pulse Ox Temp Source Heart Rate Source Patient Position   99 % Temporal -- --      BP Location FiO2 (%)     -- --       Physical Exam  Vitals and nursing note reviewed.   Constitutional:       Appearance: Normal appearance.   HENT:      Head: Normocephalic.      Nose: Nose normal.      Mouth/Throat:      Mouth: Mucous membranes are moist.     Eyes:      Extraocular Movements: Extraocular movements intact.      Pupils: Pupils are equal, round, and reactive to light.       Cardiovascular:      Rate and Rhythm: Normal rate and regular rhythm.      Pulses: Normal pulses.      Heart sounds: Normal heart sounds.   Pulmonary:      Effort: Pulmonary effort is normal.      Breath sounds: Normal breath sounds. No wheezing, rhonchi or rales.   Abdominal:      General:  Abdomen is flat. Bowel sounds are normal.      Palpations: Abdomen is soft.      Tenderness: There is no abdominal tenderness. There is no guarding or rebound.     Musculoskeletal:         General: Normal range of motion.      Cervical back: Normal range of motion.      Right lower leg: Edema present.      Left lower leg: Edema present.      Comments: 1+ pitting bilateral lower extremity edema which is symmetric and nontender.     Skin:     General: Skin is warm and dry.      Capillary Refill: Capillary refill takes less than 2 seconds.     Neurological:      General: No focal deficit present.      Mental Status: He is alert and oriented to person, place, and time.     Psychiatric:      Comments: Patient does endorse continued suicidal ideation at this time.       Labs Reviewed   CBC WITH AUTO DIFFERENTIAL - Abnormal       Result Value    WBC 6.2      nRBC 0.0      RBC 4.05 (*)     Hemoglobin 12.8 (*)     Hematocrit 36.8 (*)     MCV 91      MCH 31.6      MCHC 34.8      RDW 13.9      Platelets 230      Neutrophils % 49.8      Immature Granulocytes %, Automated 0.2      Lymphocytes % 39.5      Monocytes % 8.1      Eosinophils % 2.1      Basophils % 0.3      Neutrophils Absolute 3.09      Immature Granulocytes Absolute, Automated 0.01      Lymphocytes Absolute 2.45      Monocytes Absolute 0.50      Eosinophils Absolute 0.13      Basophils Absolute 0.02     DRUG SCREEN,URINE - Abnormal    Amphetamine Screen, Urine Presumptive Positive (*)     Barbiturate Screen, Urine Presumptive Positive (*)     Benzodiazepines Screen, Urine Presumptive Positive (*)     Cannabinoid Screen, Urine Presumptive Positive (*)     Cocaine Metabolite Screen, Urine Presumptive Negative      Fentanyl Screen, Urine Presumptive Negative      Opiate Screen, Urine Presumptive Negative      Oxycodone Screen, Urine Presumptive Negative      PCP Screen, Urine Presumptive Negative      Methadone Screen, Urine Presumptive Negative      Narrative:      Drug screen results are presumptive and should not be used to assess   compliance with prescribed medication. Contact the performing [facility] laboratory   to add-on definitive confirmatory testing if clinically indicated.    Toxicology screening results are reported qualitatively. The concentration must   be greater than or equal to the cutoff to be reported as positive. The concentration   at which the screening test can detect an individual drug or metabolite varies.   The absence of expected drug(s) and/or drug metabolite(s) may indicate non-compliance,   inappropriate timing of specimen collection relative to drug administration, poor drug   absorption, diluted/adulterated urine, or limitations of testing. For medical purposes   only; not valid for forensic use.    Interpretive questions should be directed to the laboratory medical directors.   COMPREHENSIVE METABOLIC PANEL - Normal    Glucose 84      Sodium 140      Potassium 4.4      Chloride 107      Bicarbonate 27      Anion Gap 10      Urea Nitrogen 16      Creatinine 0.69      eGFR >90      Calcium 9.0      Albumin 4.2      Alkaline Phosphatase 48      Total Protein 6.5      AST 32      Bilirubin, Total 0.3      ALT 21     ACUTE TOXICOLOGY PANEL, BLOOD - Normal    Acetaminophen <10.0      Salicylate  <3      Alcohol <10       Pain Management Panel  More data exists         Latest Ref Rng & Units 7/21/2025 7/20/2025   Pain Management Panel   Amphetamine Screen, Urine Presumptive Negative Presumptive Positive  Presumptive Positive    Barbiturate Screen, Urine Presumptive Negative Presumptive Positive  Presumptive Negative    Benzodiazepines Screen, Urine Presumptive Negative Presumptive Positive  Presumptive Negative    Fentanyl Screen, Urine Presumptive Negative Presumptive Negative  Presumptive Negative    Methadone Screen, Urine Presumptive Negative Presumptive Negative  Presumptive Negative      No orders to display     ED Course & MDM   Diagnoses as of  "07/21/25 2054   Suicidal ideation       Medical Decision Making  Patient presents to the emergency department secondary to suicidal ideation.  Patient is here for medical clearance.  Patient is evaluated in the emergency department CBC CMP urine drug screen and toxicology panel.  EKG is obtained.  Patient is positive for amphetamines, barbiturates, benzodiazepines and THC.  Laboratory workup shows no evidence of acute electrolyte abnormality.  Patient has mild anemia with a hemoglobin of 12.8.  Alcohol level is negative along with salicylates and acetaminophen.  Patient at this time is medically cleared for psychiatric evaluation.  Filemon Gonzalez is consulted to evaluate the patient.  Patient is able to be discharged to [place] for further psychiatric treatment.    EKG was obtained at 7:44 PM.  It is sinus rhythm rate of 57.  No acute ST elevation.  IA interval is 140 and QTc is 376.        Procedure  Procedures       Gertrude Jj MD  07/21/25 2054         [1]   Past Medical History:  Diagnosis Date    Substance abuse    [2] No past surgical history on file.  [3] No family history on file.  [4]   Social History  Tobacco Use    Smoking status: Every Day     Types: Cigarettes    Smokeless tobacco: Not on file   Substance Use Topics    Alcohol use: Not on file    Drug use: Yes     Types: Methamphetamines, Heroin, Marijuana, \"Crack\" cocaine        Gertrude Jj MD  07/21/25 2148    "

## 2025-07-22 ENCOUNTER — APPOINTMENT (OUTPATIENT)
Dept: RADIOLOGY | Facility: HOSPITAL | Age: 30
End: 2025-07-22
Payer: COMMERCIAL

## 2025-07-22 ENCOUNTER — HOSPITAL ENCOUNTER (EMERGENCY)
Facility: HOSPITAL | Age: 30
Discharge: HOME | End: 2025-07-22
Attending: EMERGENCY MEDICINE
Payer: COMMERCIAL

## 2025-07-22 VITALS
RESPIRATION RATE: 18 BRPM | SYSTOLIC BLOOD PRESSURE: 98 MMHG | WEIGHT: 230 LBS | DIASTOLIC BLOOD PRESSURE: 57 MMHG | TEMPERATURE: 98.3 F | OXYGEN SATURATION: 98 % | HEART RATE: 72 BPM | BODY MASS INDEX: 29.52 KG/M2 | HEIGHT: 74 IN

## 2025-07-22 DIAGNOSIS — S09.90XA CLOSED HEAD INJURY, INITIAL ENCOUNTER: Primary | ICD-10-CM

## 2025-07-22 DIAGNOSIS — R45.851 VERBALIZES SUICIDAL THOUGHTS: ICD-10-CM

## 2025-07-22 LAB
ALBUMIN SERPL BCP-MCNC: 3.7 G/DL (ref 3.4–5)
ALP SERPL-CCNC: 45 U/L (ref 33–120)
ALT SERPL W P-5'-P-CCNC: 16 U/L (ref 10–52)
AMPHETAMINES UR QL SCN: ABNORMAL
ANION GAP SERPL CALC-SCNC: 13 MMOL/L (ref 10–20)
APAP SERPL-MCNC: <10 UG/ML (ref ?–30)
AST SERPL W P-5'-P-CCNC: 22 U/L (ref 9–39)
BARBITURATES UR QL SCN: ABNORMAL
BASOPHILS # BLD AUTO: 0.01 X10*3/UL (ref 0–0.1)
BASOPHILS NFR BLD AUTO: 0.2 %
BENZODIAZ UR QL SCN: ABNORMAL
BILIRUB SERPL-MCNC: 0.3 MG/DL (ref 0–1.2)
BUN SERPL-MCNC: 10 MG/DL (ref 6–23)
BUPRENORPHINE UR QL SCN: ABNORMAL NG/ML
BZE UR QL SCN: ABNORMAL
CALCIUM SERPL-MCNC: 8.3 MG/DL (ref 8.6–10.3)
CANNABINOIDS UR QL SCN: ABNORMAL
CHLORIDE SERPL-SCNC: 110 MMOL/L (ref 98–107)
CO2 SERPL-SCNC: 21 MMOL/L (ref 21–32)
CREAT SERPL-MCNC: 0.5 MG/DL (ref 0.5–1.3)
DRUG SCREEN COMMENT UR-IMP: ABNORMAL
EGFRCR SERPLBLD CKD-EPI 2021: >90 ML/MIN/1.73M*2
EOSINOPHIL # BLD AUTO: 0.12 X10*3/UL (ref 0–0.7)
EOSINOPHIL NFR BLD AUTO: 2.6 %
ERYTHROCYTE [DISTWIDTH] IN BLOOD BY AUTOMATED COUNT: 13.9 % (ref 11.5–14.5)
ETHANOL SERPL-MCNC: <10 MG/DL
FENTANYL+NORFENTANYL UR QL SCN: ABNORMAL
GLUCOSE SERPL-MCNC: 74 MG/DL (ref 74–99)
HCT VFR BLD AUTO: 36.5 % (ref 41–52)
HGB BLD-MCNC: 12.1 G/DL (ref 13.5–17.5)
IMM GRANULOCYTES # BLD AUTO: 0.02 X10*3/UL (ref 0–0.7)
IMM GRANULOCYTES NFR BLD AUTO: 0.4 % (ref 0–0.9)
LYMPHOCYTES # BLD AUTO: 2.29 X10*3/UL (ref 1.2–4.8)
LYMPHOCYTES NFR BLD AUTO: 50.3 %
MCH RBC QN AUTO: 30.8 PG (ref 26–34)
MCHC RBC AUTO-ENTMCNC: 33.2 G/DL (ref 32–36)
MCV RBC AUTO: 93 FL (ref 80–100)
METHADONE UR QL SCN: ABNORMAL
MONOCYTES # BLD AUTO: 0.37 X10*3/UL (ref 0.1–1)
MONOCYTES NFR BLD AUTO: 8.1 %
NEUTROPHILS # BLD AUTO: 1.74 X10*3/UL (ref 1.2–7.7)
NEUTROPHILS NFR BLD AUTO: 38.4 %
NRBC BLD-RTO: 0 /100 WBCS (ref 0–0)
OPIATES UR QL SCN: ABNORMAL
OXYCODONE+OXYMORPHONE UR QL SCN: ABNORMAL
PCP UR QL SCN: ABNORMAL
PLATELET # BLD AUTO: 212 X10*3/UL (ref 150–450)
POTASSIUM SERPL-SCNC: 3.9 MMOL/L (ref 3.5–5.3)
PROT SERPL-MCNC: 5.6 G/DL (ref 6.4–8.2)
RBC # BLD AUTO: 3.93 X10*6/UL (ref 4.5–5.9)
SALICYLATES SERPL-MCNC: <3 MG/DL (ref ?–20)
SODIUM SERPL-SCNC: 140 MMOL/L (ref 136–145)
WBC # BLD AUTO: 4.6 X10*3/UL (ref 4.4–11.3)

## 2025-07-22 PROCEDURE — 70450 CT HEAD/BRAIN W/O DYE: CPT | Performed by: STUDENT IN AN ORGANIZED HEALTH CARE EDUCATION/TRAINING PROGRAM

## 2025-07-22 PROCEDURE — 80053 COMPREHEN METABOLIC PANEL: CPT | Performed by: EMERGENCY MEDICINE

## 2025-07-22 PROCEDURE — 70450 CT HEAD/BRAIN W/O DYE: CPT

## 2025-07-22 PROCEDURE — 2500000001 HC RX 250 WO HCPCS SELF ADMINISTERED DRUGS (ALT 637 FOR MEDICARE OP): Performed by: EMERGENCY MEDICINE

## 2025-07-22 PROCEDURE — 85025 COMPLETE CBC W/AUTO DIFF WBC: CPT | Performed by: EMERGENCY MEDICINE

## 2025-07-22 PROCEDURE — 80307 DRUG TEST PRSMV CHEM ANLYZR: CPT | Performed by: EMERGENCY MEDICINE

## 2025-07-22 PROCEDURE — 99285 EMERGENCY DEPT VISIT HI MDM: CPT | Mod: 25 | Performed by: EMERGENCY MEDICINE

## 2025-07-22 PROCEDURE — 80320 DRUG SCREEN QUANTALCOHOLS: CPT | Performed by: EMERGENCY MEDICINE

## 2025-07-22 PROCEDURE — 36415 COLL VENOUS BLD VENIPUNCTURE: CPT | Performed by: EMERGENCY MEDICINE

## 2025-07-22 RX ORDER — LORAZEPAM 1 MG/1
2 TABLET ORAL ONCE
Status: COMPLETED | OUTPATIENT
Start: 2025-07-22 | End: 2025-07-22

## 2025-07-22 RX ORDER — HALOPERIDOL 5 MG/1
5 TABLET ORAL ONCE
Status: COMPLETED | OUTPATIENT
Start: 2025-07-22 | End: 2025-07-22

## 2025-07-22 RX ADMIN — LORAZEPAM 2 MG: 1 TABLET ORAL at 04:48

## 2025-07-22 RX ADMIN — HALOPERIDOL 5 MG: 5 TABLET ORAL at 04:48

## 2025-07-22 SDOH — HEALTH STABILITY: MENTAL HEALTH: BEHAVIORAL HEALTH(WDL): WITHIN DEFINED LIMITS

## 2025-07-22 SDOH — HEALTH STABILITY: MENTAL HEALTH

## 2025-07-22 SDOH — HEALTH STABILITY: MENTAL HEALTH: SUICIDAL BEHAVIOR (3 MONTHS): YES

## 2025-07-22 SDOH — HEALTH STABILITY: MENTAL HEALTH: CONTENT: UNREMARKABLE

## 2025-07-22 SDOH — HEALTH STABILITY: MENTAL HEALTH: IN THE PAST WEEK, HAVE YOU BEEN HAVING THOUGHTS ABOUT KILLING YOURSELF?: YES

## 2025-07-22 SDOH — HEALTH STABILITY: MENTAL HEALTH: HAVE YOU EVER TRIED TO KILL YOURSELF?: YES

## 2025-07-22 SDOH — HEALTH STABILITY: MENTAL HEALTH: HAVE YOU EVER DONE ANYTHING, STARTED TO DO ANYTHING, OR PREPARED TO DO ANYTHING TO END YOUR LIFE?: YES

## 2025-07-22 SDOH — HEALTH STABILITY: MENTAL HEALTH: ACTIVE SUICIDAL IDEATION WITH SPECIFIC PLAN AND INTENT (PAST 1 MONTH): YES

## 2025-07-22 SDOH — HEALTH STABILITY: MENTAL HEALTH: HAVE YOU ACTUALLY HAD ANY THOUGHTS OF KILLING YOURSELF?: YES

## 2025-07-22 SDOH — HEALTH STABILITY: MENTAL HEALTH: WAS THIS WITHIN THE PAST THREE MONTHS?: YES

## 2025-07-22 SDOH — HEALTH STABILITY: MENTAL HEALTH: RISK OF SUICIDE: MODERATE RISK

## 2025-07-22 SDOH — HEALTH STABILITY: MENTAL HEALTH: BEHAVIORS/MOOD: CALM;COOPERATIVE

## 2025-07-22 SDOH — HEALTH STABILITY: MENTAL HEALTH: HAVE YOU WISHED YOU WERE DEAD OR WISHED YOU COULD GO TO SLEEP AND NOT WAKE UP?: YES

## 2025-07-22 SDOH — HEALTH STABILITY: MENTAL HEALTH: RISK OF SUICIDE: HIGH RISK

## 2025-07-22 SDOH — HEALTH STABILITY: MENTAL HEALTH: HAVE YOU HAD THESE THOUGHTS AND HAD SOME INTENTION OF ACTING ON THEM?: YES

## 2025-07-22 SDOH — HEALTH STABILITY: MENTAL HEALTH: ANXIETY SYMPTOMS: NO PROBLEMS REPORTED OR OBSERVED.

## 2025-07-22 SDOH — HEALTH STABILITY: MENTAL HEALTH: DELUSIONS: CONTROLLED

## 2025-07-22 SDOH — HEALTH STABILITY: MENTAL HEALTH: SLEEP PATTERN: NAPS DURING THE DAY

## 2025-07-22 SDOH — HEALTH STABILITY: MENTAL HEALTH: HAVE YOU BEEN THINKING ABOUT HOW YOU MIGHT DO THIS?: YES

## 2025-07-22 SDOH — HEALTH STABILITY: MENTAL HEALTH: SUICIDAL BEHAVIOR (LIFETIME): YES

## 2025-07-22 SDOH — HEALTH STABILITY: MENTAL HEALTH
OTHER SUICIDE PRECAUTIONS INCLUDE: PATIENT PLACED IN AN EASILY OBSERVABLE ROOM WITH DOOR/CURTAIN REMAINING OPEN;PATIENT PLACED IN GOWN (SNAPS OR PAPER GOWNS PREFERRED) AND WANDED;PATIENT PLACED IN PSYCH SAFE ROOM (IF AVAILABLE)

## 2025-07-22 SDOH — HEALTH STABILITY: MENTAL HEALTH: ACTIVE SUICIDAL IDEATION WITH SOME INTENT TO ACT, WITHOUT SPECIFIC PLAN (PAST 1 MONTH): YES

## 2025-07-22 SDOH — HEALTH STABILITY: MENTAL HEALTH: WAS THIS WITHIN THE PAST THREE MONTHS?: NO

## 2025-07-22 SDOH — HEALTH STABILITY: MENTAL HEALTH: HAVE YOU HAD THESE THOUGHTS AND HAD SOME INTENTION OF ACTING ON THEM?: NO

## 2025-07-22 SDOH — HEALTH STABILITY: MENTAL HEALTH: FOR HIGH RISK PATIENTS: 1:1 PATIENT OBSERVER AT ALL TIMES

## 2025-07-22 SDOH — HEALTH STABILITY: MENTAL HEALTH: WISH TO BE DEAD (PAST 1 MONTH): YES

## 2025-07-22 SDOH — HEALTH STABILITY: MENTAL HEALTH
DEPRESSION SYMPTOMS: CHANGE IN ENERGY LEVEL;FEELINGS OF HELPLESSNESS;FEELINGS OF HOPELESSESS;INCREASED IRRITABILITY;ISOLATIVE;SLEEP DISTURBANCE

## 2025-07-22 SDOH — HEALTH STABILITY: MENTAL HEALTH: NON-SPECIFIC ACTIVE SUICIDAL THOUGHTS (PAST 1 MONTH): YES

## 2025-07-22 SDOH — HEALTH STABILITY: MENTAL HEALTH: CONTENT: BLAMING OTHERS

## 2025-07-22 SDOH — HEALTH STABILITY: MENTAL HEALTH: IN THE PAST FEW WEEKS, HAVE YOU WISHED YOU WERE DEAD?: YES

## 2025-07-22 SDOH — HEALTH STABILITY: MENTAL HEALTH: IN THE PAST FEW WEEKS, HAVE YOU FELT THAT YOU OR YOUR FAMILY WOULD BE BETTER OFF IF YOU WERE DEAD?: YES

## 2025-07-22 SDOH — HEALTH STABILITY: MENTAL HEALTH: ARE YOU HAVING THOUGHTS OF KILLING YOURSELF RIGHT NOW?: YES

## 2025-07-22 SDOH — HEALTH STABILITY: MENTAL HEALTH
HAVE YOU STARTED TO WORK OUT OR WORKED OUT THE DETAILS OF HOW TO KILL YOURSELF? DO YOU INTENT TO CARRY OUT THIS PLAN?: NO

## 2025-07-22 SDOH — HEALTH STABILITY: MENTAL HEALTH: HAVE YOU EVER DONE ANYTHING, STARTED TO DO ANYTHING, OR PREPARED TO DO ANYTHING TO END YOUR LIFE?: NO

## 2025-07-22 ASSESSMENT — PAIN SCALES - GENERAL
PAINLEVEL_OUTOF10: 0 - NO PAIN
PAINLEVEL_OUTOF10: 0 - NO PAIN

## 2025-07-22 ASSESSMENT — PAIN DESCRIPTION - PROGRESSION: CLINICAL_PROGRESSION: NOT CHANGED

## 2025-07-22 ASSESSMENT — LIFESTYLE VARIABLES
SUBSTANCE_ABUSE_PAST_12_MONTHS: YES
PRESCIPTION_ABUSE_PAST_12_MONTHS: NO

## 2025-07-22 ASSESSMENT — ACTIVITIES OF DAILY LIVING (ADL): LACK_OF_TRANSPORTATION: NO

## 2025-07-22 ASSESSMENT — PAIN - FUNCTIONAL ASSESSMENT: PAIN_FUNCTIONAL_ASSESSMENT: 0-10

## 2025-07-22 NOTE — PROGRESS NOTES
07/22/25 0831   Discharge Planning   Living Arrangements Family members   Support Systems Family members  (grandmother)   Assistance Needed EPAT to place   Type of Residence Private residence   Home or Post Acute Services Community services   Expected Discharge Disposition Psych   Does the patient need discharge transport arranged? Yes   [facility] coordination needed? Yes   Has discharge transport been arranged? No   Financial Resource Strain   How hard is it for you to pay for the very basics like food, housing, medical care, and heating? Somewhat   Housing Stability   In the last 12 months, was there a time when you were not able to pay the mortgage or rent on time? N   In the past 12 months, how many times have you moved where you were living? 2   At any time in the past 12 months, were you homeless or living in a shelter (including now)? N   Transportation Needs   In the past 12 months, has lack of transportation kept you from medical appointments or from getting medications? no   In the past 12 months, has lack of transportation kept you from meetings, work, or from getting things needed for daily living? No   Stroke Family Assessment   Stroke Family Assessment Needed No   Intensity of Service   Intensity of Service 0-30 min

## 2025-07-22 NOTE — ED TRIAGE NOTES
Pt biba from [facility] with co of SI/SA. Police found him on a bridge stating he wanted to kill himself. Police pink slipped him. Police stated he was banging his head against the wall and was sent here for med clearance from [facility]. Pt states he actively injects himself with meth, last time was a couple days ago. Hx schizoaffective and bipolar disorder.

## 2025-07-22 NOTE — ED PROVIDER NOTES
HPI   Chief Complaint   Patient presents with    medical clearance       The patient with suicidal plans about jumping off bridge.  He states is the first time he said to suicide sober.  He states he normally does use methamphetamine last IV use 2 days ago.  He was apparently cleared by angelica for discharge to the [facility].  Carlos wanted him to go to [facility].  At [facility] he is apparently banging his head on the walls and they wanted him to come here for a CT of the head.              Patient History   Medical History[1]  Surgical History[2]  Family History[3]  Social History[4]    Physical Exam   ED Triage Vitals [07/22/25 0440]   Temperature Heart Rate Respirations BP   36.8 °C (98.3 °F) 67 20 130/73      Pulse Ox Temp src Heart Rate Source Patient Position   97 % -- -- --      BP Location FiO2 (%)     -- --       Physical Exam  Vitals and nursing note reviewed.   Constitutional:       General: He is not in acute distress.     Appearance: He is well-developed.   HENT:      Head: Normocephalic and atraumatic.      Nose: No rhinorrhea.      Mouth/Throat:      Mouth: Mucous membranes are moist.     Eyes:      Conjunctiva/sclera: Conjunctivae normal.       Cardiovascular:      Rate and Rhythm: Normal rate and regular rhythm.      Heart sounds: Normal heart sounds.   Pulmonary:      Effort: Pulmonary effort is normal. No respiratory distress.      Breath sounds: Normal breath sounds.   Abdominal:      Palpations: Abdomen is soft.      Tenderness: There is no abdominal tenderness.     Musculoskeletal:         General: No swelling.      Cervical back: Neck supple.     Skin:     General: Skin is warm.      Capillary Refill: Capillary refill takes less than 2 seconds.     Neurological:      General: No focal deficit present.      Mental Status: He is alert.      Cranial Nerves: No cranial nerve deficit.     Psychiatric:         Mood and Affect: Mood normal.           ED Course & MDM   Diagnoses as  "of 07/24/25 0245   Closed head injury, initial encounter   Verbalizes suicidal thoughts                 No data recorded     [place] Coma Scale Score: 15 (07/22/25 0451 : Serena Painting RN)                           Medical Decision Making  I see no obvious signs of head trauma or skin breaking at this time.  But I will CT the head given the report.  After the patient p.o. medications for anxiolysis.  Patient had blood work yesterday.  Will attempt to return to [facility] once CT is negative.    Procedure  Procedures       López Holbrook MD  07/22/25 0442       [1]   Past Medical History:  Diagnosis Date    Substance abuse    [2] No past surgical history on file.  [3] No family history on file.  [4]   Social History  Tobacco Use    Smoking status: Every Day     Types: Cigarettes    Smokeless tobacco: Not on file   Substance Use Topics    Alcohol use: Not on file    Drug use: Yes     Types: Methamphetamines, Heroin, Marijuana, \"Crack\" cocaine        López Holbrook MD  07/24/25 0245    "

## 2025-07-22 NOTE — SIGNIFICANT EVENT
Application for Emergency Admission      Ready for Transfer?  Is the patient medically cleared for transfer to inpatient psychiatry: Yes  Has the patient been accepted to an inpatient psychiatric hospital: Yes    Application for Emergency Admission  IN ACCORDANCE WITH [place]122[place]10 [place]  The Chief Clinical Officer of: [facility]  7/22/2025 .4:05 PM    Reason for Hospitalization  The undersigned has reason to believe that: Anmol Burk Is a mentally ill person subject to hospitalization by court order under division B Section 5122.01 of the Revised Code, i.e., this person:    1.Yes  Represents a substantial risk of physical harm to self as manifested by evidence of threats of, or attempts at, suicide or serious self-inflicted bodily harm    2.Yes Represents a substantial risk of physical harm to others as manifested by evidence of recent homicidal or other violent behavior, evidence of recent threats that place another in reasonable fear of violent behavior and serious physical harm, or other evidence of present dangerousness    3.Yes Represents a substantial and immediate risk of serious physical impairment or injury to self as manifested by  evidence that the person is unable to provide for and is not providing for the person's basic physical needs because of the person's mental illness and that appropriate provision for those needs cannot be made  immediately available in the community    4.Yes Would benefit from treatment in a hospital for his mental illness and is in need of such treatment as manifested by evidence of behavior that creates a grave and imminent risk to substantial rights of others or  himself.    5.Yes Would benefit from treatment as manifested by evidence of behavior that indicates all of the following:       (a) The person is unlikely to survive safely in the community without supervision, based on a clinical determination.       (b) The person has a history of lack of compliance with  treatment for mental illness and one of the following applies:      (i) At least twice within the thirty-six months prior to the filing of an affidavit seeking court-ordered treatment of the person under section 5122.111 of the Revised Code, the lack of compliance has been a significant factor in necessitating hospitalization in a hospital or receipt of services in a forensic or other mental health unit of a correctional facility, provided that the thirty-six-month period shall be extended by the length of any hospitalization or incarceration of the person that occurred within the thirty-six-month period.      (ii) Within the forty-eight months prior to the filing of an affidavit seeking court-ordered treatment of the person under section 5122.111 of the Revised Code, the lack of compliance resulted in one or more acts of serious violent behavior toward self or others or threats of, or attempts at, serious physical harm to self or others, provided that the forty-eight-month period shall be extended by the length of any hospitalization or incarceration of the person that occurred within the forty-eight-month period.      (c) The person, as a result of mental illness, is unlikely to voluntarily participate in necessary treatment.       (d) In view of the person's treatment history and current behavior, the person is in need of treatment in order to prevent a relapse or deterioration that would be likely to result in substantial risk of serious harm to the person or others.    (e) Represents a substantial risk of physical harm to self or others if allowed to remain at liberty pending examination.    Therefore, it is requested that said person be admitted to the above named facility.    STATEMENT OF BELIEF    Must be filled out by one of the following: a psychiatrist, licensed physician, licensed clinical psychologist, health or ,  or .  (Statement shall include the circumstances under  which the individual was taken into custody and the reason for the person's belief that hospitalization is necessary. The statement shall also include a reference to efforts made to secure the individual's property at his residence if he was taken into custody there. Every reasonable and appropriate effort should be made to take this person into custody in the least conspicuous manner possible.)      Dina Payne MD 7/22/2025     _____________________________________________________________   Place of Employment: [facility]     STATEMENT OF OBSERVATION BY PSYCHIATRIST, LICENSED PHYSICIAN, OR LICENSED CLINICAL PSYCHOLOGIST, IF APPLICABLE    Place of Observation (e.g., [facility] mental health center, general hospital, office, emergency facility)  (If applicable, please complete)    Dina Payne MD 7/22/2025    _____________________________________________________________

## 2025-07-22 NOTE — ED NOTES
[facility] was called and requested transfer of patient belongings to [facility] as patient is no longer accepted back. Admissions stated she would talk to her supervisor to accommodate this.      Mary Jalloh RN  07/22/25 0677

## 2025-07-22 NOTE — PROGRESS NOTES
07/22/25 5330   [facility] Disability Status   Are you deaf or do you have serious difficulty hearing? N   Are you blind or do you have serious difficulty seeing, even when wearing glasses? N   Because of a physical, mental, or emotional condition, do you have serious difficulty concentrating, remembering, or making decisions? (5 years old or older) Y  (schizoaffective and bipolar disorder, meth abuse, SI)   Do you have serious difficulty walking or climbing stairs? N   Do you have serious difficulty dressing or bathing? N   Because of a physical, mental, or emotional condition, do you have serious difficulty doing errands alone such as visiting the doctor? N

## 2025-07-22 NOTE — PROGRESS NOTES
Patient is a 30-year-old male who presented to the emergency room during overnight team evaluation for suicidal ideation and self-injurious behavior while at [facility].  Patient was actually signed out to me as having been discharged after negative CT head.  He was recently seen at an outside hospital for psychiatric reasons which is how patient ended up at [facility].  Please see Dr. Holbrook's initial physician note for details.    After discharge Dr. MELVA Holbrook was informed that patient had been refused by [facility] because he was too acute to be cared for there.  They are requesting patient be relocated to a different facility.  As such patient was evaluated by EPAT who determined that because he has active suicidal ideation he meets criteria for inpatient hospitalization.  They are also requesting new lab work which is ordered although patient had medical clearance yesterday at outside hospital which we are able to see.  New lab work is ordered and patient has remained in stable condition awaiting placement.    Lab ordered for patient did not reveal any emergent electrolyte imbalance.  Patient has very mild anemia similar to prior with hemoglobin of 12.1 and he has no leukocytosis.  Blood tox is negative and urine tox once again is positive for cannabinoids, benzodiazepines and amphetamines.  Patient is medically cleared for psychiatric placement.    Patient was accepted to [facility] under Dr. Barbara marc.  He remains in stable condition awaiting transfer.    Dina Payne MD

## 2025-07-22 NOTE — ED NOTES
"[facility] is refusing to accept patient back to facility due to \"behavioral acuity\" and \"threatening specific staff members\". Pt will need EPAT and placement elsewhere. Pt is pink slipped.     Mary Jalloh RN  07/22/25 0641    "

## 2025-07-22 NOTE — PROGRESS NOTES
EPAT - Social Work Psychiatric Assessment    Arrival Details  Mode of Arrival: Ambulance  Admission Source: Other (Comment)  Admission Type: Involuntary  EPAT Assessment Start Date: 07/22/25  EPAT Assessment Start Time: 0930  Name of : Esperanza Hudson. LPC    History of Present Illness  Admission Reason: psych eval  HPI: Pt is 30 years old  male who presented to the ED for a psychiatric evaluation. Pt has a history of schizophrenia vs. substance-induced psychosis, PTSD, depression, polysubstance use disorder, and alcohol use disorder. Pt has a history of multiple inpatient psychiatric admissions, with the most recent one at [facility] on 07/11/25. Pt is not connected to any outpatient services and has not been compliant with medications after discharge. Pt’s chart, ED provider, and nursing notes were reviewed prior to the assessment. “The patient with suicidal plans about jumping off bridge.  He states is the first time he said to suicide sober.  He states he normally does use methamphetamine last IV use 2 days ago.  He was apparently cleared by angelica for discharge to the [facility].  [place] wanted him to go to [facility].  At [facility] he is apparently banging his head on the walls, and they wanted him to come here for a CT of the head.” Pt’s marked as high risk [facility] the [facility] during triage. Patient is homeless and appears to utilize inpatient psychiatric/substance abuse facilities to meet his basic needs. Today visit is his 9th visit within last month. And more than 20 ED visits this year.    SW Readmission Information   Readmission within 30 Days: Yes  Previous ED Visit Date and Reason : Today visit is his 9th visit within last month for various reasons  Factors Contributing to  Readmission Inpatient/ED (Team Perspective): Homeless, Lack of Community Support, Lack of Family/Friend Support, Med Compliance/Difficulty Obtaining, Transportation Issues, Substance Abuse, Failed to Keep  Follow-Up Appointments    Psychiatric Symptoms  Anxiety Symptoms: No problems reported or observed.  Depression Symptoms: Change in energy level, Feelings of helplessness, Feelings of hopelessess, Increased irritability, Isolative, Sleep disturbance  Sissy Symptoms: No problems reported or observed.    Psychosis Symptoms  Hallucination Type: No problems reported or observed.  Delusion Type: No problems reported or observed.    Additional Symptoms - Adult  Generalized Anxiety Disorder: No problems reported or observed.  Obsessive Compulsive Disorder: No problems reported or observed.  Panic Attack: No problems reported or observed.  Post Traumatic Stress Disorder: No problems reported or observed.  Delirium: No problems reported or observed.    Past Psychiatric History/Meds/Treatments  Past Psychiatric History: schizophrenia vs substance induced psychosis, PTSD, depression, polysubstance use disorder, and alcohol use disorder  Past Psychiatric Meds/Treatments: WLW 07/11/25, WLW 05/25 and more    Current Mental Health Contacts   Name/Phone Number: denied  Provider Name/Phone Number: denied    Support System: Other (Comment) (denied)    Living Arrangement: Homeless         Income Information  Employment Status for: Patient  Employment Status: Unemployed  Income Source: Unemployed    [employer] Service/Education History  Current or Previous  Service: None         Legal  Legal Considerations: Patient/ Family Capacity to Make Sound Judgments  Criminal Activity/ Legal Involvement Pertinent to Current Situation/ Hospitalization: none    Drug Screening  Have you used any substances (canabis, cocaine, heroin, hallucinogens, inhalants, etc.) in the past 12 months?: Yes  Have you used any prescription drugs other than prescribed in the past 12 months?: No  Is a toxicology screen needed?: Yes    Stage of Change  Stage of Change: Contemplation  History of Treatment: Inpatient, Dual, Sober living (last detox was  "at [facility] 06/27/25-06/30/25)  Type of Treatment Offered: Other (Comment)  Treatment Offered: Accepted    Behavioral Health  Behavioral Health(WDL): Exceptions to WDL  Behaviors/Mood: Appropriate for age, Appropriate for situation, Aggressive verbally, others, Guarded, Irritable, Manipulative  Affect: Appropriate to circumstances    Orientation  Orientation Level: Oriented X4    General Appearance  Motor Activity: Unremarkable  Speech Pattern: Excessively loud  General Attitude: Guarded  Appearance/Hygiene: Unremarkable    Thought Process  Content: Unremarkable  Delusions: Other (Comment) (none)  Perception: Not altered  Hallucination: None  Judgment/Insight: Poor  Confusion: None  Cognition: Appropriate attention/concentration, Appropriate for developmental age, Follows commands, Poor judgement, Poor safety awareness    Sleep Pattern  Sleep Pattern: Disturbed/interrupted sleep    Risk Factors  Self Harm/Suicidal Ideation Plan: SI with a plan  Previous Self Harm/Suicidal Plans: hx of SA  Risk Factors: Age < 19 years old, Lower socioeconomic status, Major mental illness, Male, Substance abuse, Unemployment    Violence Risk Assessment  Assessment of Violence: None noted  Thoughts of Harm to Others: No (When asked about HI, pt was trying to be elusive and kept saying that \"it depends.\" Eventually, pt reported that he endorses HI toward \"someone who follow me to [place].\"  Unable to provide a name or homicidal plan.)    Ability to Assess Risk Screen  Risk Screen - Ability to Assess: Able to be screened  Ask Suicide-Screening Questions  1. In the past few weeks, have you wished you were dead?: Yes  2. In the past few weeks, have you felt that you or your family would be better off if you were dead?: Yes  3. In the past week, have you been having thoughts about killing yourself?: Yes  4. Have you ever tried to kill yourself?: Yes  5. Are you having thoughts of killing yourself right now?: Yes  Calculated Risk Score: Imminent " Risk  [place] Suicide Severity Rating Scale (Screener/Recent Self-Report)  1. Wish to be Dead (Past 1 Month): Yes  2. Non-Specific Active Suicidal Thoughts (Past 1 Month): Yes  3. Active Suicidal Ideation with any Methods (Not Plan) Without Intent to Act (Past 1 Month): Yes  4. Active Suicidal Ideation with Some Intent to Act, Without Specific Plan (Past 1 Month): Yes  5. Active Suicidal Ideation with Specific Plan and Intent (Past 1 Month): Yes  6. Suicidal Behavior (Lifetime): Yes  6. Suicidal Behavior (3 Months): Yes  Calculated C-SSRS Risk Score (Lifetime/Recent): High Risk  Step 1: Risk Factors  Current & Past Psychiatric Dx: Mood disorder, Psychotic disorder, Alcohol/substance abuse disorders, PTSD  Presenting Symptoms: Impulsivity, Hopelessness or despair  Precipitants/Stressors: Triggering events leading to humiliation, shame, and/or despair (e.g. loss of relationship, financial or health status) (real or anticipated), Substance intoxication or withdrawal, Pending incarceration or homelessness, Inadequate social supports, Social isolation  Change in Treatment: Recent inpatient discharge, Non-compliant or not receiving treatment  Access to Lethal Methods : No  Step 2: Protective Factors   Protective Factors Internal: Ability to cope with stress, Frustration tolerance  Protective Factors External: Other (Comment) (denied)  Step 3: Suicidal Ideation Intensity  How Many Times Have You Had These Thoughts: Daily or almost daily  When You Have the Thoughts How Long do They Last : 1-4 hours/a lot of the time  Could/Can You Stop Thinking About Killing Yourself or Wanting to Die if You Want to: Can control thoughts with a lot of difficulty  Are There Things - Anyone or Anything - That Stopped You From Wanting to Die or Acting on: Deterrents most definitely did not stop you  What Sort of Reasons Did You Have For Thinking About Wanting to Die or Killing Yourself: Equally to get attention, revenge or a reaction from  others and to end/stop the pain  Total Score: 19  Step 5: Documentation  Risk Level: High suicide risk    Psychiatric Impression and Plan of Care  Assessment and Plan: The patient was interviewed via telehealth. Pt is 30 years old  male with a history of schizophrenia vs. substance-induced psychosis, PTSD, depression, polysubstance use disorder, and alcohol use disorder who was brought to the ED for a psychiatric evaluation. During the assessment, pt was lying in bed, dressed in hospital attire. Pt was irritated, using profanities, and manipulative. Pt was admission-seeking. Pt has hx of noncompliance with outpatient providers and medications after being discharged from . When asked why he stopped meds, he stated, “I lost them.” When asked why he didn’t follow up with outpatient providers, pt yelled, “I tried. I don’t remember what happened. I am using drugs.” Pt stated that he wants to kill himself and was standing on a bridge yesterday, wanting to jump from it. Pt continued to endorse SI. Pt has a history of SA. Thus, pt is high risk on the [place] SSRS. Pt was not able to identify a support system. In the past pt was reporting that he was talking to his father and could get a gun from him. pt denied that today, saying that he doesn’t talk to his family at all. Pt was able to identify protective factors: some ability to cope with stress and frustration tolerance. Pt denied AH/VH. Pt does not appear to be internally stimulated. No evidence of delusions or sarah. When asked about HI, pt was trying to be elusive and kept saying that “it depends.” Eventually, pt reported that he endorses HI toward “someone who followed me to [place].” Unable to provide a name or homicidal plan. Pt was admission seeking and also stated that he needs to go to rehab after psych admission.        DX: PTSD, depression, polysubstance use disorder         At this time, pt meets the criteria for inpatient psychiatric admission for  further evaluation, stability, treatment and safety. Reviewed with Dr. Payne, who is in agreement.  Specific Resources Provided to Patient: IP    Outcome/Disposition  Patient's Perception of Outcome Achieved: pt was admission seeking  Assessment, Recommendations and Risk Level Reviewed with: Dr. Payne  EPAT Assessment Completed Date: 07/22/25  EPAT Assessment Completed Time: 1000    Social Work Note

## 2025-07-23 LAB
ATRIAL RATE: 57 BPM
GABAPENTIN UR-MCNC: <5 UG/ML
P AXIS: 44 DEGREES
PR INTERVAL: 140 MS
Q ONSET: 253 MS
QRS COUNT: 9 BEATS
QRS DURATION: 88 MS
QT INTERVAL: 386 MS
QTC CALCULATION(BAZETT): 376 MS
QTC FREDERICIA: 379 MS
R AXIS: 26 DEGREES
T AXIS: 19 DEGREES
T OFFSET: 446 MS
VENTRICULAR RATE: 57 BPM

## 2025-07-28 LAB
BUPRENORPHINE UR-MCNC: 14 NG/ML
BUPRENORPHINE UR-MCNC: <2 NG/ML
NALOXONE UR CFM-MCNC: <100 NG/ML
NORBUPRENORPHINE UR CFM-MCNC: 9 NG/ML
NORBUPRENORPHINE UR-MCNC: 5 NG/ML

## 2025-07-30 ENCOUNTER — LAB REQUISITION (OUTPATIENT)
Dept: LAB | Facility: HOSPITAL | Age: 30
End: 2025-07-30
Payer: COMMERCIAL

## 2025-07-30 DIAGNOSIS — Z00.00 ENCOUNTER FOR GENERAL ADULT MEDICAL EXAMINATION WITHOUT ABNORMAL FINDINGS: ICD-10-CM

## 2025-07-30 DIAGNOSIS — Z20.828 CONTACT WITH AND (SUSPECTED) EXPOSURE TO OTHER VIRAL COMMUNICABLE DISEASES: ICD-10-CM

## 2025-07-30 PROCEDURE — 80355 GABAPENTIN NON-BLOOD: CPT | Mod: OUT | Performed by: NURSE PRACTITIONER

## 2025-07-30 PROCEDURE — 80307 DRUG TEST PRSMV CHEM ANLYZR: CPT | Mod: OUT | Performed by: NURSE PRACTITIONER

## 2025-08-01 LAB
BUPRENORPHINE UR QL SCN: ABNORMAL NG/ML
DRUG SCREEN COMMENT UR-IMP: ABNORMAL

## 2025-08-05 LAB — GABAPENTIN UR-MCNC: <5 UG/ML

## 2025-08-06 LAB
BUPRENORPHINE UR-MCNC: 65 NG/ML
BUPRENORPHINE UR-MCNC: <2 NG/ML
NALOXONE UR CFM-MCNC: <100 NG/ML
NORBUPRENORPHINE UR CFM-MCNC: 94 NG/ML
NORBUPRENORPHINE UR-MCNC: 25 NG/ML

## 2025-08-07 LAB
ATRIAL RATE: 57 BPM
P AXIS: 44 DEGREES
PR INTERVAL: 140 MS
Q ONSET: 253 MS
QRS COUNT: 9 BEATS
QRS DURATION: 88 MS
QT INTERVAL: 386 MS
QTC CALCULATION(BAZETT): 376 MS
QTC FREDERICIA: 379 MS
R AXIS: 26 DEGREES
T AXIS: 19 DEGREES
T OFFSET: 446 MS
VENTRICULAR RATE: 57 BPM

## 2025-08-08 ENCOUNTER — HOSPITAL ENCOUNTER (EMERGENCY)
Facility: HOSPITAL | Age: 30
Discharge: HOME | End: 2025-08-08
Attending: EMERGENCY MEDICINE
Payer: COMMERCIAL

## 2025-08-08 VITALS
HEART RATE: 83 BPM | SYSTOLIC BLOOD PRESSURE: 128 MMHG | RESPIRATION RATE: 20 BRPM | HEIGHT: 73 IN | BODY MASS INDEX: 31.81 KG/M2 | WEIGHT: 240 LBS | DIASTOLIC BLOOD PRESSURE: 93 MMHG | OXYGEN SATURATION: 99 % | TEMPERATURE: 97.2 F

## 2025-08-08 DIAGNOSIS — F19.10 DRUG ABUSE: Primary | ICD-10-CM

## 2025-08-08 PROCEDURE — 99283 EMERGENCY DEPT VISIT LOW MDM: CPT | Performed by: EMERGENCY MEDICINE

## 2025-08-08 PROCEDURE — 96372 THER/PROPH/DIAG INJ SC/IM: CPT | Performed by: EMERGENCY MEDICINE

## 2025-08-08 PROCEDURE — 2500000004 HC RX 250 GENERAL PHARMACY W/ HCPCS (ALT 636 FOR OP/ED): Performed by: EMERGENCY MEDICINE

## 2025-08-08 RX ORDER — ZIPRASIDONE MESYLATE 20 MG/ML
20 INJECTION, POWDER, LYOPHILIZED, FOR SOLUTION INTRAMUSCULAR ONCE
Status: DISCONTINUED | OUTPATIENT
Start: 2025-08-08 | End: 2025-08-08 | Stop reason: HOSPADM

## 2025-08-08 SDOH — HEALTH STABILITY: MENTAL HEALTH: FOR HIGH RISK PATIENTS: 1:1 PATIENT OBSERVER AT ALL TIMES

## 2025-08-08 SDOH — HEALTH STABILITY: MENTAL HEALTH: BEHAVIORS/MOOD: AGITATED;HYPER-VERBAL;IMPULSIVE;PACING;FLIGHT OF IDEAS

## 2025-08-08 SDOH — HEALTH STABILITY: MENTAL HEALTH: BEHAVIORAL HEALTH(WDL): EXCEPTIONS TO WDL

## 2025-08-08 ASSESSMENT — PAIN - FUNCTIONAL ASSESSMENT: PAIN_FUNCTIONAL_ASSESSMENT: 0-10

## 2025-08-08 ASSESSMENT — PAIN SCALES - GENERAL: PAINLEVEL_OUTOF10: 0 - NO PAIN

## 2025-08-08 NOTE — ED PROVIDER NOTES
HPI   Chief Complaint   Patient presents with    Psychiatric Evaluation     BIB EMS after found in front of half-way in state of intoxication. Pt admits to meth and marijuana use as well as recent ETOH. Pt endorses SI at this time. Pt statements rambling and word salad. Last meth use approximately 10 minutes PTA.       HPI  30-year-old male who admits to drinking and doing methamphetamine and marijuana brought in for evaluation.  Patient apparently was rambling and mention something about being suicidal so now he is in the ED.      Patient History   Medical History[1]  Surgical History[2]  Family History[3]  Social History[4]    Physical Exam   ED Triage Vitals [08/08/25 0218]   Temperature Heart Rate Respirations BP   36.2 °C (97.2 °F) 83 20 (!) 128/93      Pulse Ox Temp Source Heart Rate Source Patient Position   99 % Temporal -- --      BP Location FiO2 (%)     -- --       Physical Exam  General:  Awake, alert, no acute distress.  Head: Normocephalic, Atraumatic  Neck: Supple, trachea midline, no stridor  Skin: Warm and dry, no rashes   Lungs:  No acute respiratory distress, speaking in full sentences without difficulty  Neuro:  No gross focal neurologic deficits, NIH is 0  Musculoskeletal:  Full range of motion in all 4 extremities  Psychiatric:  Alert     ED Course & MDM   Diagnoses as of 08/08/25 0306   Drug abuse                 No data recorded     [place] Coma Scale Score: 13 (08/08/25 0220 : Kareen Dasilva, DIAMANTE)                           Medical Decision Making  Patient not verbalize any suicidal homicidal ideations to me.  It is obvious that some sort of substance.  The patient no longer wants to be in the emergency department.  At this point is not acutely psychotic.  He is seen often in the emergency department for similar type presentations.  I will discharge him to home.  He is given referral to [facility].    Procedure  Procedures       Karthikeyan Gonzalez DO  08/08/25 0244       [1]   Past Medical  "History:  Diagnosis Date    Substance abuse    [2] History reviewed. No pertinent surgical history.  [3] No family history on file.  [4]   Social History  Tobacco Use    Smoking status: Every Day     Types: Cigarettes    Smokeless tobacco: Not on file   Substance Use Topics    Alcohol use: Not on file    Drug use: Yes     Types: Methamphetamines, Heroin, Marijuana, \"Crack\" cocaine        Karthikeyan Gonzalez,   08/08/25 0308    "

## 2025-08-14 ENCOUNTER — HOSPITAL ENCOUNTER (EMERGENCY)
Age: 30
Discharge: HOME | End: 2025-08-14
Attending: STUDENT IN AN ORGANIZED HEALTH CARE EDUCATION/TRAINING PROGRAM
Payer: COMMERCIAL

## 2025-08-14 ENCOUNTER — APPOINTMENT (OUTPATIENT)
Dept: GENERAL RADIOLOGY | Age: 30
End: 2025-08-14
Payer: COMMERCIAL

## 2025-08-14 VITALS
WEIGHT: 240 LBS | BODY MASS INDEX: 30.8 KG/M2 | SYSTOLIC BLOOD PRESSURE: 133 MMHG | DIASTOLIC BLOOD PRESSURE: 56 MMHG | RESPIRATION RATE: 20 BRPM | TEMPERATURE: 98.1 F | HEART RATE: 88 BPM | HEIGHT: 74 IN | OXYGEN SATURATION: 98 %

## 2025-08-14 DIAGNOSIS — F19.10 POLYSUBSTANCE ABUSE (HCC): Primary | ICD-10-CM

## 2025-08-14 LAB
ALBUMIN SERPL-MCNC: 4.4 G/DL (ref 3.5–5.2)
ALP SERPL-CCNC: 63 U/L (ref 40–129)
ALT SERPL-CCNC: 21 U/L (ref 0–50)
AMPHET UR QL SCN: POSITIVE
ANION GAP SERPL CALCULATED.3IONS-SCNC: 18 MMOL/L (ref 7–16)
APAP SERPL-MCNC: <5 UG/ML (ref 10–30)
AST SERPL-CCNC: 66 U/L (ref 0–50)
BARBITURATES UR QL SCN: POSITIVE
BASOPHILS # BLD: 0.03 K/UL (ref 0–0.2)
BASOPHILS NFR BLD: 0 % (ref 0–2)
BENZODIAZ UR QL: NEGATIVE
BILIRUB SERPL-MCNC: 1.4 MG/DL (ref 0–1.2)
BUN SERPL-MCNC: 15 MG/DL (ref 6–20)
BUPRENORPHINE UR QL: POSITIVE
CALCIUM SERPL-MCNC: 9.3 MG/DL (ref 8.6–10)
CANNABINOIDS UR QL SCN: POSITIVE
CHLORIDE SERPL-SCNC: 102 MMOL/L (ref 98–107)
CK SERPL-CCNC: 540 U/L (ref 0–190)
CO2 SERPL-SCNC: 18 MMOL/L (ref 22–29)
COCAINE UR QL SCN: POSITIVE
CREAT SERPL-MCNC: 0.6 MG/DL (ref 0.7–1.2)
EOSINOPHIL # BLD: 0.06 K/UL (ref 0.05–0.5)
EOSINOPHILS RELATIVE PERCENT: 1 % (ref 0–6)
ERYTHROCYTE [DISTWIDTH] IN BLOOD BY AUTOMATED COUNT: 13.7 % (ref 11.5–15)
ETHANOLAMINE SERPL-MCNC: <10 MG/DL (ref 0–0.08)
FENTANYL UR QL: POSITIVE
GFR, ESTIMATED: >90 ML/MIN/1.73M2
GLUCOSE SERPL-MCNC: 71 MG/DL (ref 74–99)
HCT VFR BLD AUTO: 34.2 % (ref 37–54)
HGB BLD-MCNC: 12 G/DL (ref 12.5–16.5)
IMM GRANULOCYTES # BLD AUTO: 0.03 K/UL (ref 0–0.58)
IMM GRANULOCYTES NFR BLD: 0 % (ref 0–5)
LYMPHOCYTES NFR BLD: 2.15 K/UL (ref 1.5–4)
LYMPHOCYTES RELATIVE PERCENT: 18 % (ref 20–42)
MCH RBC QN AUTO: 31.7 PG (ref 26–35)
MCHC RBC AUTO-ENTMCNC: 35.1 G/DL (ref 32–34.5)
MCV RBC AUTO: 90.2 FL (ref 80–99.9)
METHADONE UR QL: NEGATIVE
MONOCYTES NFR BLD: 0.86 K/UL (ref 0.1–0.95)
MONOCYTES NFR BLD: 7 % (ref 2–12)
NEUTROPHILS NFR BLD: 74 % (ref 43–80)
NEUTS SEG NFR BLD: 8.95 K/UL (ref 1.8–7.3)
OPIATES UR QL SCN: NEGATIVE
OXYCODONE UR QL SCN: NEGATIVE
PCP UR QL SCN: NEGATIVE
PLATELET # BLD AUTO: 323 K/UL (ref 130–450)
PMV BLD AUTO: 9.6 FL (ref 7–12)
POTASSIUM SERPL-SCNC: 3.8 MMOL/L (ref 3.5–5.1)
PROT SERPL-MCNC: 7.1 G/DL (ref 6.4–8.3)
RBC # BLD AUTO: 3.79 M/UL (ref 3.8–5.8)
SALICYLATES SERPL-MCNC: <0.5 MG/DL (ref 0–30)
SODIUM SERPL-SCNC: 138 MMOL/L (ref 136–145)
TEST INFORMATION: ABNORMAL
TOXIC TRICYCLIC SC,BLOOD: NEGATIVE
WBC OTHER # BLD: 12.1 K/UL (ref 4.5–11.5)

## 2025-08-14 PROCEDURE — 93005 ELECTROCARDIOGRAM TRACING: CPT | Performed by: STUDENT IN AN ORGANIZED HEALTH CARE EDUCATION/TRAINING PROGRAM

## 2025-08-14 PROCEDURE — 80307 DRUG TEST PRSMV CHEM ANLYZR: CPT

## 2025-08-14 PROCEDURE — 80053 COMPREHEN METABOLIC PANEL: CPT

## 2025-08-14 PROCEDURE — 85025 COMPLETE CBC W/AUTO DIFF WBC: CPT

## 2025-08-14 PROCEDURE — 80179 DRUG ASSAY SALICYLATE: CPT

## 2025-08-14 PROCEDURE — 73630 X-RAY EXAM OF FOOT: CPT

## 2025-08-14 PROCEDURE — 6370000000 HC RX 637 (ALT 250 FOR IP): Performed by: STUDENT IN AN ORGANIZED HEALTH CARE EDUCATION/TRAINING PROGRAM

## 2025-08-14 PROCEDURE — 99285 EMERGENCY DEPT VISIT HI MDM: CPT

## 2025-08-14 PROCEDURE — 82550 ASSAY OF CK (CPK): CPT

## 2025-08-14 PROCEDURE — 80143 DRUG ASSAY ACETAMINOPHEN: CPT

## 2025-08-14 PROCEDURE — G0480 DRUG TEST DEF 1-7 CLASSES: HCPCS

## 2025-08-14 RX ORDER — ACETAMINOPHEN 500 MG
1000 TABLET ORAL ONCE
Status: COMPLETED | OUTPATIENT
Start: 2025-08-14 | End: 2025-08-14

## 2025-08-14 RX ORDER — LORAZEPAM 1 MG/1
1 TABLET ORAL ONCE
Status: COMPLETED | OUTPATIENT
Start: 2025-08-14 | End: 2025-08-14

## 2025-08-14 RX ADMIN — ACETAMINOPHEN 1000 MG: 500 TABLET ORAL at 13:25

## 2025-08-14 RX ADMIN — LORAZEPAM 1 MG: 1 TABLET ORAL at 09:34

## 2025-08-14 ASSESSMENT — LIFESTYLE VARIABLES
HOW MANY STANDARD DRINKS CONTAINING ALCOHOL DO YOU HAVE ON A TYPICAL DAY: 7 TO 9
HOW OFTEN DO YOU HAVE A DRINK CONTAINING ALCOHOL: 2-3 TIMES A WEEK

## 2025-08-14 ASSESSMENT — PAIN DESCRIPTION - ORIENTATION: ORIENTATION: RIGHT;LEFT

## 2025-08-14 ASSESSMENT — PAIN - FUNCTIONAL ASSESSMENT: PAIN_FUNCTIONAL_ASSESSMENT: 0-10

## 2025-08-14 ASSESSMENT — PAIN DESCRIPTION - DESCRIPTORS: DESCRIPTORS: ACHING;BURNING;SHARP

## 2025-08-14 ASSESSMENT — PAIN SCALES - GENERAL: PAINLEVEL_OUTOF10: 8

## 2025-08-15 LAB
EKG ATRIAL RATE: 59 BPM
EKG P AXIS: 55 DEGREES
EKG P-R INTERVAL: 138 MS
EKG Q-T INTERVAL: 454 MS
EKG QRS DURATION: 88 MS
EKG QTC CALCULATION (BAZETT): 449 MS
EKG R AXIS: 38 DEGREES
EKG T AXIS: 32 DEGREES
EKG VENTRICULAR RATE: 59 BPM

## 2025-08-15 PROCEDURE — 93010 ELECTROCARDIOGRAM REPORT: CPT | Performed by: INTERNAL MEDICINE

## 2025-09-03 ENCOUNTER — HOSPITAL ENCOUNTER (EMERGENCY)
Facility: HOSPITAL | Age: 30
Discharge: OTHER INSTITUTION | End: 2025-09-03
Attending: STUDENT IN AN ORGANIZED HEALTH CARE EDUCATION/TRAINING PROGRAM
Payer: COMMERCIAL

## 2025-09-03 ENCOUNTER — APPOINTMENT (OUTPATIENT)
Dept: CARDIOLOGY | Facility: HOSPITAL | Age: 30
End: 2025-09-03
Payer: COMMERCIAL

## 2025-09-03 VITALS
BODY MASS INDEX: 30.8 KG/M2 | TEMPERATURE: 98.6 F | HEART RATE: 82 BPM | OXYGEN SATURATION: 97 % | RESPIRATION RATE: 17 BRPM | DIASTOLIC BLOOD PRESSURE: 84 MMHG | HEIGHT: 74 IN | SYSTOLIC BLOOD PRESSURE: 125 MMHG | WEIGHT: 240 LBS

## 2025-09-03 DIAGNOSIS — F19.10 SUBSTANCE ABUSE: Primary | ICD-10-CM

## 2025-09-03 LAB
ALBUMIN SERPL BCP-MCNC: 4.6 G/DL (ref 3.4–5)
ALP SERPL-CCNC: 41 U/L (ref 33–120)
ALT SERPL W P-5'-P-CCNC: 6 U/L (ref 10–52)
AMPHETAMINES UR QL SCN: ABNORMAL
ANION GAP SERPL CALC-SCNC: 13 MMOL/L (ref 10–20)
APAP SERPL-MCNC: <10 UG/ML (ref ?–30)
AST SERPL W P-5'-P-CCNC: 17 U/L (ref 9–39)
BARBITURATES UR QL SCN: ABNORMAL
BASOPHILS # BLD AUTO: 0.02 X10*3/UL (ref 0–0.1)
BASOPHILS NFR BLD AUTO: 0.2 %
BENZODIAZ UR QL SCN: ABNORMAL
BILIRUB SERPL-MCNC: 0.5 MG/DL (ref 0–1.2)
BUN SERPL-MCNC: 13 MG/DL (ref 6–23)
BZE UR QL SCN: ABNORMAL
CALCIUM SERPL-MCNC: 9.3 MG/DL (ref 8.6–10.3)
CANNABINOIDS UR QL SCN: ABNORMAL
CHLORIDE SERPL-SCNC: 107 MMOL/L (ref 98–107)
CO2 SERPL-SCNC: 24 MMOL/L (ref 21–32)
CREAT SERPL-MCNC: 0.59 MG/DL (ref 0.5–1.3)
EGFRCR SERPLBLD CKD-EPI 2021: >90 ML/MIN/1.73M*2
EOSINOPHIL # BLD AUTO: 0.01 X10*3/UL (ref 0–0.7)
EOSINOPHIL NFR BLD AUTO: 0.1 %
ERYTHROCYTE [DISTWIDTH] IN BLOOD BY AUTOMATED COUNT: 13.5 % (ref 11.5–14.5)
ETHANOL SERPL-MCNC: 75 MG/DL
FENTANYL+NORFENTANYL UR QL SCN: ABNORMAL
GLUCOSE SERPL-MCNC: 91 MG/DL (ref 74–99)
HCT VFR BLD AUTO: 34.4 % (ref 41–52)
HGB BLD-MCNC: 12 G/DL (ref 13.5–17.5)
IMM GRANULOCYTES # BLD AUTO: 0.02 X10*3/UL (ref 0–0.7)
IMM GRANULOCYTES NFR BLD AUTO: 0.2 % (ref 0–0.9)
LYMPHOCYTES # BLD AUTO: 2.43 X10*3/UL (ref 1.2–4.8)
LYMPHOCYTES NFR BLD AUTO: 27.9 %
MCH RBC QN AUTO: 31.3 PG (ref 26–34)
MCHC RBC AUTO-ENTMCNC: 34.9 G/DL (ref 32–36)
MCV RBC AUTO: 90 FL (ref 80–100)
METHADONE UR QL SCN: ABNORMAL
MONOCYTES # BLD AUTO: 0.48 X10*3/UL (ref 0.1–1)
MONOCYTES NFR BLD AUTO: 5.5 %
NEUTROPHILS # BLD AUTO: 5.75 X10*3/UL (ref 1.2–7.7)
NEUTROPHILS NFR BLD AUTO: 66.1 %
NRBC BLD-RTO: 0 /100 WBCS (ref 0–0)
OPIATES UR QL SCN: ABNORMAL
OXYCODONE+OXYMORPHONE UR QL SCN: ABNORMAL
PCP UR QL SCN: ABNORMAL
PLATELET # BLD AUTO: 235 X10*3/UL (ref 150–450)
POTASSIUM SERPL-SCNC: 3.8 MMOL/L (ref 3.5–5.3)
PROT SERPL-MCNC: 6.7 G/DL (ref 6.4–8.2)
RBC # BLD AUTO: 3.83 X10*6/UL (ref 4.5–5.9)
SALICYLATES SERPL-MCNC: <3 MG/DL (ref ?–20)
SODIUM SERPL-SCNC: 140 MMOL/L (ref 136–145)
WBC # BLD AUTO: 8.7 X10*3/UL (ref 4.4–11.3)

## 2025-09-03 PROCEDURE — 93005 ELECTROCARDIOGRAM TRACING: CPT

## 2025-09-03 PROCEDURE — 85025 COMPLETE CBC W/AUTO DIFF WBC: CPT | Performed by: STUDENT IN AN ORGANIZED HEALTH CARE EDUCATION/TRAINING PROGRAM

## 2025-09-03 PROCEDURE — 80307 DRUG TEST PRSMV CHEM ANLYZR: CPT | Performed by: STUDENT IN AN ORGANIZED HEALTH CARE EDUCATION/TRAINING PROGRAM

## 2025-09-03 PROCEDURE — 36415 COLL VENOUS BLD VENIPUNCTURE: CPT | Performed by: STUDENT IN AN ORGANIZED HEALTH CARE EDUCATION/TRAINING PROGRAM

## 2025-09-03 PROCEDURE — 99284 EMERGENCY DEPT VISIT MOD MDM: CPT | Performed by: STUDENT IN AN ORGANIZED HEALTH CARE EDUCATION/TRAINING PROGRAM

## 2025-09-03 PROCEDURE — 80179 DRUG ASSAY SALICYLATE: CPT | Performed by: STUDENT IN AN ORGANIZED HEALTH CARE EDUCATION/TRAINING PROGRAM

## 2025-09-03 PROCEDURE — 80053 COMPREHEN METABOLIC PANEL: CPT | Performed by: STUDENT IN AN ORGANIZED HEALTH CARE EDUCATION/TRAINING PROGRAM

## 2025-09-03 ASSESSMENT — PAIN SCALES - GENERAL: PAINLEVEL_OUTOF10: 0 - NO PAIN

## 2025-09-03 ASSESSMENT — PAIN - FUNCTIONAL ASSESSMENT: PAIN_FUNCTIONAL_ASSESSMENT: 0-10

## 2025-09-05 LAB
ATRIAL RATE: 60 BPM
P AXIS: 46 DEGREES
P OFFSET: 185 MS
P ONSET: 126 MS
PR INTERVAL: 180 MS
Q ONSET: 216 MS
QRS COUNT: 10 BEATS
QRS DURATION: 96 MS
QT INTERVAL: 398 MS
QTC CALCULATION(BAZETT): 398 MS
QTC FREDERICIA: 398 MS
R AXIS: 32 DEGREES
T AXIS: 25 DEGREES
T OFFSET: 415 MS
VENTRICULAR RATE: 60 BPM